# Patient Record
Sex: MALE | Race: WHITE | URBAN - METROPOLITAN AREA
[De-identification: names, ages, dates, MRNs, and addresses within clinical notes are randomized per-mention and may not be internally consistent; named-entity substitution may affect disease eponyms.]

---

## 2017-01-18 ENCOUNTER — APPOINTMENT (RX ONLY)
Dept: URBAN - METROPOLITAN AREA CLINIC 23 | Facility: CLINIC | Age: 77
Setting detail: DERMATOLOGY
End: 2017-01-18

## 2017-01-18 DIAGNOSIS — L57.0 ACTINIC KERATOSIS: ICD-10-CM

## 2017-01-18 DIAGNOSIS — D22 MELANOCYTIC NEVI: ICD-10-CM

## 2017-01-18 DIAGNOSIS — D485 NEOPLASM OF UNCERTAIN BEHAVIOR OF SKIN: ICD-10-CM

## 2017-01-18 DIAGNOSIS — L98.8 OTHER SPECIFIED DISORDERS OF THE SKIN AND SUBCUTANEOUS TISSUE: ICD-10-CM

## 2017-01-18 DIAGNOSIS — L81.4 OTHER MELANIN HYPERPIGMENTATION: ICD-10-CM

## 2017-01-18 PROBLEM — D22.5 MELANOCYTIC NEVI OF TRUNK: Status: ACTIVE | Noted: 2017-01-18

## 2017-01-18 PROBLEM — D48.5 NEOPLASM OF UNCERTAIN BEHAVIOR OF SKIN: Status: ACTIVE | Noted: 2017-01-18

## 2017-01-18 PROCEDURE — ? LIQUID NITROGEN

## 2017-01-18 PROCEDURE — ? COUNSELING

## 2017-01-18 PROCEDURE — ? BIOPSY BY SHAVE METHOD

## 2017-01-18 PROCEDURE — 17004 DESTROY PREMAL LESIONS 15/>: CPT

## 2017-01-18 PROCEDURE — 11100: CPT | Mod: 59

## 2017-01-18 PROCEDURE — 99202 OFFICE O/P NEW SF 15 MIN: CPT | Mod: 25

## 2017-01-18 PROCEDURE — ? OTHER

## 2017-01-18 ASSESSMENT — LOCATION SIMPLE DESCRIPTION DERM
LOCATION SIMPLE: RIGHT WRIST
LOCATION SIMPLE: LEFT HAND
LOCATION SIMPLE: RIGHT UPPER BACK
LOCATION SIMPLE: RIGHT FOREHEAD
LOCATION SIMPLE: LEFT EAR
LOCATION SIMPLE: CHEST
LOCATION SIMPLE: RIGHT TEMPLE
LOCATION SIMPLE: RIGHT HAND
LOCATION SIMPLE: LEFT WRIST
LOCATION SIMPLE: LEFT FOREARM
LOCATION SIMPLE: RIGHT FOREARM

## 2017-01-18 ASSESSMENT — LOCATION ZONE DERM
LOCATION ZONE: EAR
LOCATION ZONE: HAND
LOCATION ZONE: ARM
LOCATION ZONE: FACE
LOCATION ZONE: TRUNK

## 2017-01-18 ASSESSMENT — LOCATION DETAILED DESCRIPTION DERM
LOCATION DETAILED: LEFT RADIAL DORSAL HAND
LOCATION DETAILED: RIGHT DORSAL WRIST
LOCATION DETAILED: RIGHT DISTAL DORSAL FOREARM
LOCATION DETAILED: RIGHT RADIAL DORSAL HAND
LOCATION DETAILED: LEFT DISTAL DORSAL FOREARM
LOCATION DETAILED: RIGHT LATERAL FOREHEAD
LOCATION DETAILED: RIGHT INFERIOR TEMPLE
LOCATION DETAILED: LEFT MEDIAL INFERIOR CHEST
LOCATION DETAILED: RIGHT INFERIOR FOREHEAD
LOCATION DETAILED: RIGHT SUPERIOR LATERAL UPPER BACK
LOCATION DETAILED: RIGHT PROXIMAL DORSAL FOREARM
LOCATION DETAILED: RIGHT FOREHEAD
LOCATION DETAILED: LEFT DORSAL WRIST
LOCATION DETAILED: LEFT SUPERIOR CRUS OF ANTIHELIX
LOCATION DETAILED: LEFT PROXIMAL DORSAL FOREARM
LOCATION DETAILED: RIGHT ULNAR DORSAL HAND

## 2017-01-18 NOTE — PROCEDURE: OTHER
Note Text (......Xxx Chief Complaint.): This diagnosis correlates with the
Other (Free Text): Hypertrophic lesions on right and left mid-forearm warrant followuo
Detail Level: Simple

## 2017-01-18 NOTE — PROCEDURE: BIOPSY BY SHAVE METHOD
Bill 89907 For Specimen Handling/Conveyance To Laboratory?: no
Biopsy Method: Dermablade
Hemostasis: Aluminum Chloride
Silver Nitrate Text: The wound bed was treated with silver nitrate after the biopsy was performed.
Type Of Destruction Used: Curettage
Wound Care: Petrolatum
X Size Of Lesion In Cm: 0
Billing Type: Third-Party Bill
Dressing: bandage
Anesthesia Volume In Cc: 0.5
Curettage Text: The wound bed was treated with curettage after the biopsy was performed.
Biopsy Type: H and E
Cryotherapy Text: The wound bed was treated with cryotherapy after the biopsy was performed.
Detail Level: Detailed
Anesthesia Type: 1% lidocaine with 1:100,000 epinephrine and a 1:6 solution of 8.4% sodium bicarbonate
Electrodesiccation And Curettage Text: The wound bed was treated with electrodesiccation and curettage after the biopsy was performed.
Electrodesiccation Text: The wound bed was treated with electrodesiccation after the biopsy was performed.

## 2017-01-18 NOTE — PROCEDURE: LIQUID NITROGEN
Detail Level: Simple
Consent: The patient's consent was obtained including but not limited to risks of crusting, scabbing, blistering, scarring, darker or lighter pigmentary change, recurrence, incomplete removal and infection.
Post-Care Instructions: I reviewed with the patient in detail post-care instructions. Patient is to wear sunprotection, and avoid picking at any of the treated lesions. Pt may apply Vaseline to crusted or scabbing areas.
Number Of Freeze-Thaw Cycles: 1 freeze-thaw cycle
Render Post-Care Instructions In Note?: no
Duration Of Freeze Thaw-Cycle (Seconds): 3

## 2017-03-01 ENCOUNTER — APPOINTMENT (RX ONLY)
Dept: URBAN - METROPOLITAN AREA CLINIC 24 | Facility: CLINIC | Age: 77
Setting detail: DERMATOLOGY
End: 2017-03-01

## 2017-03-01 PROBLEM — C44.319 BASAL CELL CARCINOMA OF SKIN OF OTHER PARTS OF FACE: Status: ACTIVE | Noted: 2017-03-01

## 2017-03-01 PROCEDURE — ? MOHS SURGERY

## 2017-03-01 PROCEDURE — 17312 MOHS ADDL STAGE: CPT

## 2017-03-01 PROCEDURE — 17311 MOHS 1 STAGE H/N/HF/G: CPT

## 2017-03-01 PROCEDURE — 12052 INTMD RPR FACE/MM 2.6-5.0 CM: CPT

## 2017-03-01 NOTE — PROCEDURE: MOHS SURGERY
A-T Advancement Flap Text: The defect edges were debeveled with a #15 scalpel blade.  Given the location of the defect, shape of the defect and the proximity to free margins an A-T advancement flap was deemed most appropriate.  Using a sterile surgical marker, an appropriate advancement flap was drawn incorporating the defect and placing the expected incisions within the relaxed skin tension lines where possible.    The area thus outlined was incised deep to adipose tissue with a #15 scalpel blade.  The skin margins were undermined to an appropriate distance in all directions utilizing iris scissors.
Stage 13: Number Of Blocks?: 0
Display The Individual Mohs Indications As Separate Paragraphs: Yes
Additional Anesthesia Type: 1% lidocaine with epinephrine
Quadrant Reporting?: no
Cheek-To-Nose Interpolation Flap Text: A decision was made to reconstruct the defect utilizing an interpolation axial flap and a staged reconstruction.  A telfa template was made of the defect.  This telfa template was then used to outline the Cheek-To-Nose Interpolation flap.  The donor area for the pedicle flap was then injected with anesthesia.  The flap was excised through the skin and subcutaneous tissue down to the layer of the underlying musculature.  The interpolation flap was carefully excised within this deep plane to maintain its blood supply.  The edges of the donor site were undermined.   The donor site was closed in a primary fashion.  The pedicle was then rotated into position and sutured.  Once the tube was sutured into place, adequate blood supply was confirmed with blanching and refill.  The pedicle was then wrapped with xeroform gauze and dressed appropriately with a telfa and gauze bandage to ensure continued blood supply and protect the attached pedicle.
Surgical Defect Width In Cm (Optional): -
Ftsg Text: The defect edges were debeveled with a #15 scalpel blade.  Given the location of the defect, shape of the defect and the proximity to free margins a full thickness skin graft was deemed most appropriate.  Using a sterile surgical marker, the primary defect shape was transferred to the donor site. The area thus outlined was incised deep to adipose tissue with a #15 scalpel blade.  The harvested graft was then trimmed of adipose tissue until only dermis and epidermis was left.  The skin margins of the secondary defect were undermined to an appropriate distance in all directions utilizing iris scissors.  The secondary defect was closed with interrupted buried subcutaneous sutures.  The skin edges were then re-apposed with running  sutures.  The skin graft was then placed in the primary defect and oriented appropriately.
Referring Physician (Optional): Tad
Interpolation Flap Text: A decision was made to reconstruct the defect utilizing an interpolation axial flap and a staged reconstruction.  A telfa template was made of the defect.  This telfa template was then used to outline the interpolation flap.  The donor area for the pedicle flap was then injected with anesthesia.  The flap was excised through the skin and subcutaneous tissue down to the layer of the underlying musculature.  The interpolation flap was carefully excised within this deep plane to maintain its blood supply.  The edges of the donor site were undermined.   The donor site was closed in a primary fashion.  The pedicle was then rotated into position and sutured.  Once the tube was sutured into place, adequate blood supply was confirmed with blanching and refill.  The pedicle was then wrapped with xeroform gauze and dressed appropriately with a telfa and gauze bandage to ensure continued blood supply and protect the attached pedicle.
Rotation Flap Text: The defect edges were debeveled with a #15 scalpel blade.  Given the location of the defect, shape of the defect and the proximity to free margins a rotation flap was deemed most appropriate.  Using a sterile surgical marker, an appropriate rotation flap was drawn incorporating the defect and placing the expected incisions within the relaxed skin tension lines where possible.    The area thus outlined was incised deep to adipose tissue with a #15 scalpel blade.  The skin margins were undermined to an appropriate distance in all directions utilizing iris scissors.
Mohs Histo Method Verbiage: The section(s) were then chromacoded and processed in the Mohs lab using the Mohs protocol and submitted for frozen section.
Cheiloplasty (Less Than 50%) Text: A decision was made to reconstruct the defect with a  cheiloplasty.  The defect was undermined extensively.  Additional obicularis oris muscle was excised with a 15 blade scalpel.  The defect was converted into a full thickness wedge, of less than 50% of the vertical height of the lip, to facilite a better cosmetic result.  Small vessels were then tied off with 5-0 monocyrl. The obicularis oris, superficial fascia, adipose and dermis were then reapproximated.  After the deeper layers were approximated the epidermis was reapproximated with particular care given to realign the vermillion border.
Mohs Method Verbiage: An incision at a 45 degree angle following the standard Mohs approach was done and the specimen was harvested as a microscopic controlled layer.
Lazy S Intermediate Repair Preamble Text (Leave Blank If You Do Not Want): Undermining was performed with blunt dissection.
O-T Plasty Text: The defect edges were debeveled with a #15 scalpel blade.  Given the location of the defect, shape of the defect and the proximity to free margins an O-T plasty was deemed most appropriate.  Using a sterile surgical marker, an appropriate O-T plasty was drawn incorporating the defect and placing the expected incisions within the relaxed skin tension lines where possible.    The area thus outlined was incised deep to adipose tissue with a #15 scalpel blade.  The skin margins were undermined to an appropriate distance in all directions utilizing iris scissors.
Graft Donor Site Dermal Sutures (Optional): 5-0 Vicryl
Manual Repair Warning Statement: We plan on removing the manually selected variable below in favor of our much easier automatic structured text blocks found in the previous tab. We decided to do this to help make the flow better and give you the full power of structured data. Manual selection is never going to be ideal in our platform and I would encourage you to avoid using manual selection from this point on, especially since I will be sunsetting this feature. It is important that you do one of two things with the customized text below. First, you can save all of the text in a word file so you can have it for future reference. Second, transfer the text to the appropriate area in the Library tab. Lastly, if there is a flap or graft type which we do not have you need to let us know right away so I can add it in before the variable is hidden. No need to panic, we plan to give you roughly 6 months to make the change.
Simple / Intermediate / Complex Repair - Final Wound Length In Cm: 3.1
Localized Dermabrasion With Wire Brush Text: The patient was draped in routine manner.  Localized dermabrasion using 3 x 17 mm wire brush was performed in routine manner to papillary dermis. This spot dermabrasion is being performed to complete skin cancer reconstruction. It also will eliminate the other sun damaged precancerous cells that are known to be part of the regional effect of a lifetime's worth of sun exposure. This localized dermabrasion is therapeutic and should not be considered cosmetic in any regard.
Posterior Auricular Interpolation Flap Text: A decision was made to reconstruct the defect utilizing an interpolation axial flap and a staged reconstruction.  A telfa template was made of the defect.  This telfa template was then used to outline the posterior auricular interpolation flap.  The donor area for the pedicle flap was then injected with anesthesia.  The flap was excised through the skin and subcutaneous tissue down to the layer of the underlying musculature.  The pedicle flap was carefully excised within this deep plane to maintain its blood supply.  The edges of the donor site were undermined.   The donor site was closed in a primary fashion.  The pedicle was then rotated into position and sutured.  Once the tube was sutured into place, adequate blood supply was confirmed with blanching and refill.  The pedicle was then wrapped with xeroform gauze and dressed appropriately with a telfa and gauze bandage to ensure continued blood supply and protect the attached pedicle.
Lazy S Complex Repair Preamble Text (Leave Blank If You Do Not Want): Extensive wide undermining was performed.
Closure 4 Information: This tab is for additional flaps and grafts above and beyond our usual structured repairs.  Please note if you enter information here it will not currently bill and you will need to add the billing information manually.
Consent 1/Introductory Paragraph: The rationale for Mohs was explained to the patient and consent was obtained. The risks, benefits and alternatives to therapy were discussed in detail. Specifically, the risks of infection, scarring, bleeding, prolonged wound healing, incomplete removal, allergy to anesthesia, nerve injury (both sensory which can be permanent and motor which is permanent) and recurrence were addressed. Prior to the procedure, the treatment site was clearly identified and confirmed by the patient.
No Repair - Repaired With Adjacent Surgical Defect Text (Leave Blank If You Do Not Want): After obtaining clear surgical margins the defect was repaired concurrently with another surgical defect which was in close approximation.
Area H Indication Text: Tumors in this location are included in Area H (eyelids, eyebrows, nose, lips, chin, ear, pre-auricular, post-auricular, temple, genitalia, hands, feet, ankles and areola).  Tissue conservation is critical in these anatomic locations.
Secondary Intention Text (Leave Blank If You Do Not Want): The defect will heal with secondary intention.
Area M Indication Text: Tumors in this location are included in Area M (cheek, forehead, scalp, neck, jawline and pretibial skin).  Mohs surgery is indicated for tumors 1 cm or larger in these anatomic locations.
Spiral Flap Text: The defect edges were debeveled with a #15 scalpel blade.  Given the location of the defect, shape of the defect and the proximity to free margins a spiral flap was deemed most appropriate.  Using a sterile surgical marker, an appropriate rotation flap was drawn incorporating the defect and placing the expected incisions within the relaxed skin tension lines where possible. The area thus outlined was incised deep to adipose tissue with a #15 scalpel blade.  The skin margins were undermined to an appropriate distance in all directions utilizing iris scissors.
O-L Flap Text: The defect edges were debeveled with a #15 scalpel blade.  Given the location of the defect, shape of the defect and the proximity to free margins an O-L flap was deemed most appropriate.  Using a sterile surgical marker, an appropriate advancement flap was drawn incorporating the defect and placing the expected incisions within the relaxed skin tension lines where possible.    The area thus outlined was incised deep to adipose tissue with a #15 scalpel blade.  The skin margins were undermined to an appropriate distance in all directions utilizing iris scissors.
Referred To Otolaryngology For Closure Text (Leave Blank If You Do Not Want): After obtaining clear surgical margins the patient was sent to otolaryngology for surgical repair.  The patient understands they will receive post-surgical care and follow-up from the referring physician's office.
Closure 2 Information: This tab is for additional flaps and grafts, including complex repair and grafts and complex repair and flaps. You can also specify a different location for the additional defect, if the location is the same you do not need to select a new one. We will insert the automated text for the repair you select below just as we do for solitary flaps and grafts. Please note that at this time if you select a location with a different insurance zone you will need to override the ICD10 and CPT if appropriate.
Consent 3/Introductory Paragraph: I gave the patient a chance to ask questions they had about the procedure.  Following this I explained the Mohs procedure and consent was obtained. The risks, benefits and alternatives to therapy were discussed in detail. Specifically, the risks of infection, scarring, bleeding, prolonged wound healing, incomplete removal, allergy to anesthesia, nerve injury and recurrence were addressed. Prior to the procedure, the treatment site was clearly identified and confirmed by the patient. All components of Universal Protocol/PAUSE Rule completed.
Surgeon/Pathologist Verbiage (Will Incorporate Name Of Surgeon From Intro If Not Blank): operated in two distinct and integrated capacities as the surgeon and pathologist.
Consent (Marginal Mandibular)/Introductory Paragraph: The rationale for Mohs was explained to the patient and consent was obtained. The risks, benefits and alternatives to therapy were discussed in detail. Specifically, the risks of damage to the marginal mandibular branch of the facial nerve, infection, scarring, bleeding, prolonged wound healing, incomplete removal, allergy to anesthesia, and recurrence were addressed. Prior to the procedure, the treatment site was clearly identified and confirmed by the patient. All components of Universal Protocol/PAUSE Rule completed.
Melolabial Interpolation Flap Text: A decision was made to reconstruct the defect utilizing an interpolation axial flap and a staged reconstruction.  A telfa template was made of the defect.  This telfa template was then used to outline the melolabial interpolation flap.  The donor area for the pedicle flap was then injected with anesthesia.  The flap was excised through the skin and subcutaneous tissue down to the layer of the underlying musculature.  The pedicle flap was carefully excised within this deep plane to maintain its blood supply.  The edges of the donor site were undermined.   The donor site was closed in a primary fashion.  The pedicle was then rotated into position and sutured.  Once the tube was sutured into place, adequate blood supply was confirmed with blanching and refill.  The pedicle was then wrapped with xeroform gauze and dressed appropriately with a telfa and gauze bandage to ensure continued blood supply and protect the attached pedicle.
Muscle Hinge Flap Text: The defect edges were debeveled with a #15 scalpel blade.  Given the size, depth and location of the defect and the proximity to free margins a muscle hinge flap was deemed most appropriate.  Using a sterile surgical marker, an appropriate hinge flap was drawn incorporating the defect. The area thus outlined was incised with a #15 scalpel blade.  The skin margins were undermined to an appropriate distance in all directions utilizing iris scissors.
Epidermal Closure: running and interrupted
Composite Graft Text: The defect edges were debeveled with a #15 scalpel blade.  Given the location of the defect, shape of the defect, the proximity to free margins and the fact the defect was full thickness a composite graft was deemed most appropriate.  The defect was outline and then transferred to the donor site.  A full thickness graft was then excised from the donor site. The graft was then placed in the primary defect, oriented appropriately and then sutured into place.  The secondary defect was then repaired using a primary closure.
Ear Star Wedge Flap Text: The defect edges were debeveled with a #15 blade scalpel.  Given the location of the defect and the proximity to free margins (helical rim) an ear star wedge flap was deemed most appropriate.  Using a sterile surgical marker, the appropriate flap was drawn incorporating the defect and placing the expected incisions between the helical rim and antihelix where possible.  The area thus outlined was incised through and through with a #15 scalpel blade.
Tissue Cultured Epidermal Autograft Text: The defect edges were debeveled with a #15 scalpel blade.  Given the location of the defect, shape of the defect and the proximity to free margins a tissue cultured epidermal autograft was deemed most appropriate.  The graft was then trimmed to fit the size of the defect.  The graft was then placed in the primary defect and oriented appropriately.
Epidermal Sutures: 5-0 Prolene
Anesthesia Volume In Cc: 1.8
Consent (Near Eyelid Margin)/Introductory Paragraph: The rationale for Mohs was explained to the patient and consent was obtained. The risks, benefits and alternatives to therapy were discussed in detail. Specifically, the risks of ectropion or eyelid deformity, infection, scarring, bleeding, prolonged wound healing, incomplete removal, allergy to anesthesia, nerve injury and recurrence were addressed. Prior to the procedure, the treatment site was clearly identified and confirmed by the patient. All components of Universal Protocol/PAUSE Rule completed.
Modified Advancement Flap Text: The defect edges were debeveled with a #15 scalpel blade.  Given the location of the defect, shape of the defect and the proximity to free margins a modified advancement flap was deemed most appropriate.  Using a sterile surgical marker, an appropriate advancement flap was drawn incorporating the defect and placing the expected incisions within the relaxed skin tension lines where possible.    The area thus outlined was incised deep to adipose tissue with a #15 scalpel blade.  The skin margins were undermined to an appropriate distance in all directions utilizing iris scissors.
Complex Repair And Flap Additional Text (Will Appearing After The Standard Complex Repair Text): The complex repair was not sufficient to completely close the primary defect. The remaining additional defect was repaired with the flap mentioned below.
Epidermal Autograft Text: The defect edges were debeveled with a #15 scalpel blade.  Given the location of the defect, shape of the defect and the proximity to free margins an epidermal autograft was deemed most appropriate.  Using a sterile surgical marker, the primary defect shape was transferred to the donor site. The epidermal graft was then harvested.  The skin graft was then placed in the primary defect and oriented appropriately.
Burow's Advancement Flap Text: The defect edges were debeveled with a #15 scalpel blade.  Given the location of the defect and the proximity to free margins a Burow's advancement flap was deemed most appropriate.  Using a sterile surgical marker, the appropriate advancement flap was drawn incorporating the defect and placing the expected incisions within the relaxed skin tension lines where possible.    The area thus outlined was incised deep to adipose tissue with a #15 scalpel blade.  The skin margins were undermined to an appropriate distance in all directions utilizing iris scissors.
Tumor Debulked?: curette
Bcc Histology Text: There were numerous aggregates of basaloid cells.
Repair Anesthesia Method: local infiltration
Purse String (Simple) Text: Given the location of the defect and the characteristics of the surrounding skin a pursestring closure was deemed most appropriate.  Undermining was performed circumfirentially around the surgical defect.  A purstring suture was then placed and tightened.
Bilobed Transposition Flap Text: The defect edges were debeveled with a #15 scalpel blade.  Given the location of the defect and the proximity to free margins a bilobed transposition flap was deemed most appropriate.  Using a sterile surgical marker, an appropriate bilobe flap drawn around the defect.    The area thus outlined was incised deep to adipose tissue with a #15 scalpel blade.  The skin margins were undermined to an appropriate distance in all directions utilizing iris scissors.
Mucosal Advancement Flap Text: Given the location of the defect, shape of the defect and the proximity to free margins a mucosal advancement flap was deemed most appropriate. Incisions were made with a 15 blade scalpel in the appropriate fashion along the cutaneous vermillion border and the mucosal lip. The remaining actinically damaged mucosal tissue was excised.  The mucosal advancement flap was then elevated to the gingival sulcus with care taken to preserve the neurovascular structures and advanced into the primary defect. Care was taken to ensure that precise realignment of the vermillion border was achieved.
Double Island Pedicle Flap Text: The defect edges were debeveled with a #15 scalpel blade.  Given the location of the defect, shape of the defect and the proximity to free margins a double island pedicle advancement flap was deemed most appropriate.  Using a sterile surgical marker, an appropriate advancement flap was drawn incorporating the defect, outlining the appropriate donor tissue and placing the expected incisions within the relaxed skin tension lines where possible.    The area thus outlined was incised deep to adipose tissue with a #15 scalpel blade.  The skin margins were undermined to an appropriate distance in all directions around the primary defect and laterally outward around the island pedicle utilizing iris scissors.  There was minimal undermining beneath the pedicle flap.
Consent 2/Introductory Paragraph: Mohs surgery was explained to the patient and consent was obtained. The risks, benefits and alternatives to therapy were discussed in detail. Specifically, the risks of infection, scarring, bleeding, prolonged wound healing, incomplete removal, allergy to anesthesia, nerve injury and recurrence were addressed. Prior to the procedure, the treatment site was clearly identified and confirmed by the patient. All components of Universal Protocol/PAUSE Rule completed.
Detail Level: Detailed
Split-Thickness Skin Graft Text: The defect edges were debeveled with a #15 scalpel blade.  Given the location of the defect, shape of the defect and the proximity to free margins a split thickness skin graft was deemed most appropriate.  Using a sterile surgical marker, the primary defect shape was transferred to the donor site. The split thickness graft was then harvested.  The skin graft was then placed in the primary defect and oriented appropriately.
Number Of Stages: 2
Melolabial Transposition Flap Text: The defect edges were debeveled with a #15 scalpel blade.  Given the location of the defect and the proximity to free margins a melolabial flap was deemed most appropriate.  Using a sterile surgical marker, an appropriate melolabial transposition flap was drawn incorporating the defect.    The area thus outlined was incised deep to adipose tissue with a #15 scalpel blade.  The skin margins were undermined to an appropriate distance in all directions utilizing iris scissors.
Island Pedicle Flap With Canthal Suspension Text: The defect edges were debeveled with a #15 scalpel blade.  Given the location of the defect, shape of the defect and the proximity to free margins an island pedicle advancement flap was deemed most appropriate.  Using a sterile surgical marker, an appropriate advancement flap was drawn incorporating the defect, outlining the appropriate donor tissue and placing the expected incisions within the relaxed skin tension lines where possible. The area thus outlined was incised deep to adipose tissue with a #15 scalpel blade.  The skin margins were undermined to an appropriate distance in all directions around the primary defect and laterally outward around the island pedicle utilizing iris scissors.  There was minimal undermining beneath the pedicle flap. A suspension suture was placed in the canthal tendon to prevent tension and prevent ectropion.
Advancement Flap (Single) Text: The defect edges were debeveled with a #15 scalpel blade.  Given the location of the defect and the proximity to free margins a single advancement flap was deemed most appropriate.  Using a sterile surgical marker, an appropriate advancement flap was drawn incorporating the defect and placing the expected incisions within the relaxed skin tension lines where possible.    The area thus outlined was incised deep to adipose tissue with a #15 scalpel blade.  The skin margins were undermined to an appropriate distance in all directions utilizing iris scissors.
Previous Accession (Optional): [id number]
H Plasty Text: Given the location of the defect, shape of the defect and the proximity to free margins a H-plasty was deemed most appropriate for repair.  Using a sterile surgical marker, the appropriate advancement arms of the H-plasty were drawn incorporating the defect and placing the expected incisions within the relaxed skin tension lines where possible. The area thus outlined was incised deep to adipose tissue with a #15 scalpel blade. The skin margins were undermined to an appropriate distance in all directions utilizing iris scissors.  The opposing advancement arms were then advanced into place in opposite direction and anchored with interrupted buried subcutaneous sutures.
Xenograft Text: The defect edges were debeveled with a #15 scalpel blade.  Given the location of the defect, shape of the defect and the proximity to free margins a xenograft was deemed most appropriate.  The graft was then trimmed to fit the size of the defect.  The graft was then placed in the primary defect and oriented appropriately.
Alternatives Discussed Intro (Do Not Add Period): I discussed alternative treatments to Mohs surgery and specifically discussed the risks and benefits of
Rhombic Flap Text: The defect edges were debeveled with a #15 scalpel blade.  Given the location of the defect and the proximity to free margins a rhombic flap was deemed most appropriate.  Using a sterile surgical marker, an appropriate rhombic flap was drawn incorporating the defect.    The area thus outlined was incised deep to adipose tissue with a #15 scalpel blade.  The skin margins were undermined to an appropriate distance in all directions utilizing iris scissors.
Repair Performed By Another Provider Text (Leave Blank If You Do Not Want): After obtaining clear surgical margins the defect was repaired by another provider.
Consent (Scalp)/Introductory Paragraph: The rationale for Mohs was explained to the patient and consent was obtained. The risks, benefits and alternatives to therapy were discussed in detail. Specifically, the risks of changes in hair growth pattern secondary to repair, infection, scarring, bleeding, prolonged wound healing, incomplete removal, allergy to anesthesia, nerve injury and recurrence were addressed. Prior to the procedure, the treatment site was clearly identified and confirmed by the patient. All components of Universal Protocol/PAUSE Rule completed.
S Plasty Text: Given the location and shape of the defect, and the orientation of relaxed skin tension lines, an S-plasty was deemed most appropriate for repair.  Using a sterile surgical marker, the appropriate outline of the S-plasty was drawn, incorporating the defect and placing the expected incisions within the relaxed skin tension lines where possible.  The area thus outlined was incised deep to adipose tissue with a #15 scalpel blade.  The skin margins were undermined to an appropriate distance in all directions utilizing iris scissors. The skin flaps were advanced over the defect.  The opposing margins were then approximated with interrupted buried subcutaneous sutures.
Ear Wedge Repair Text: A wedge excision was completed by carrying down an excision through the full thickness of the ear and cartilage with an inward facing Burow's triangle. The wound was then closed in a layered fashion.
Full Thickness Lip Wedge Repair (Flap) Text: Given the location of the defect and the proximity to free margins a full thickness wedge repair was deemed most appropriate.  Using a sterile surgical marker, the appropriate repair was drawn incorporating the defect and placing the expected incisions perpendicular to the vermillion border.  The vermillion border was also meticulously outlined to ensure appropriate reapproximation during the repair.  The area thus outlined was incised through and through with a #15 scalpel blade.  The muscularis and dermis were reaproximated with deep sutures following hemostasis. Care was taken to realign the vermillion border before proceeding with the superficial closure.  Once the vermillion was realigned the superfical and mucosal closure was finished.
Wound Care: Petrolatum
Advancement-Rotation Flap Text: The defect edges were debeveled with a #15 scalpel blade.  Given the location of the defect, shape of the defect and the proximity to free margins an advancement-rotation flap was deemed most appropriate.  Using a sterile surgical marker, an appropriate flap was drawn incorporating the defect and placing the expected incisions within the relaxed skin tension lines where possible. The area thus outlined was incised deep to adipose tissue with a #15 scalpel blade.  The skin margins were undermined to an appropriate distance in all directions utilizing iris scissors.
Z Plasty Text: The lesion was extirpated to the level of the fat with a #15 scalpel blade.  Given the location of the defect, shape of the defect and the proximity to free margins a Z-plasty was deemed most appropriate for repair.  Using a sterile surgical marker, the appropriate transposition arms of the Z-plasty were drawn incorporating the defect and placing the expected incisions within the relaxed skin tension lines where possible.    The area thus outlined was incised deep to adipose tissue with a #15 scalpel blade.  The skin margins were undermined to an appropriate distance in all directions utilizing iris scissors.  The opposing transposition arms were then transposed into place in opposite direction and anchored with interrupted buried subcutaneous sutures.
Postop Diagnosis: same
Home Suture Removal Text: Patient was provided instructions on removing sutures and will remove their sutures at home.  If they have any questions or difficulties they will call the office.
Subsequent Stages Histo Method Verbiage: Using a similar technique to that described above, a thin layer of tissue was removed from all areas where tumor was visible on the previous stage.  The tissue was again oriented, mapped, dyed, and processed as above.
Consent (Spinal Accessory)/Introductory Paragraph: The rationale for Mohs was explained to the patient and consent was obtained. The risks, benefits and alternatives to therapy were discussed in detail. Specifically, the risks of damage to the spinal accessory nerve, infection, scarring, bleeding, prolonged wound healing, incomplete removal, allergy to anesthesia, and recurrence were addressed. Prior to the procedure, the treatment site was clearly identified and confirmed by the patient. All components of Universal Protocol/PAUSE Rule completed.
Helical Rim Advancement Flap Text: The defect edges were debeveled with a #15 blade scalpel.  Given the location of the defect and the proximity to free margins (helical rim) a double helical rim advancement flap was deemed most appropriate.  Using a sterile surgical marker, the appropriate advancement flaps were drawn incorporating the defect and placing the expected incisions between the helical rim and antihelix where possible.  The area thus outlined was incised through and through with a #15 scalpel blade.  With a skin hook and iris scissors, the flaps were gently and sharply undermined and freed up.
Cartilage Graft Text: The defect edges were debeveled with a #15 scalpel blade.  Given the location of the defect, shape of the defect, the fact the defect involved a full thickness cartilage defect a cartilage graft was deemed most appropriate.  An appropriate donor site was identified, cleansed, and anesthetized. The cartilage graft was then harvested and transferred to the recipient site, oriented appropriately and then sutured into place.  The secondary defect was then repaired using a primary closure.
V-Y Flap Text: The defect edges were debeveled with a #15 scalpel blade.  Given the location of the defect, shape of the defect and the proximity to free margins a V-Y flap was deemed most appropriate.  Using a sterile surgical marker, an appropriate advancement flap was drawn incorporating the defect and placing the expected incisions within the relaxed skin tension lines where possible.    The area thus outlined was incised deep to adipose tissue with a #15 scalpel blade.  The skin margins were undermined to an appropriate distance in all directions utilizing iris scissors.
Bilobed Flap Text: The defect edges were debeveled with a #15 scalpel blade.  Given the location of the defect and the proximity to free margins a bilobe flap was deemed most appropriate.  Using a sterile surgical marker, an appropriate bilobe flap drawn around the defect.    The area thus outlined was incised deep to adipose tissue with a #15 scalpel blade.  The skin margins were undermined to an appropriate distance in all directions utilizing iris scissors.
Location Indication Override (Is Already Calculated Based On Selected Body Location): Area H
Alar Island Pedicle Flap Text: The defect edges were debeveled with a #15 scalpel blade.  Given the location of the defect, shape of the defect and the proximity to the alar rim an island pedicle advancement flap was deemed most appropriate.  Using a sterile surgical marker, an appropriate advancement flap was drawn incorporating the defect, outlining the appropriate donor tissue and placing the expected incisions within the nasal ala running parallel to the alar rim. The area thus outlined was incised with a #15 scalpel blade.  The skin margins were undermined minimally to an appropriate distance in all directions around the primary defect and laterally outward around the island pedicle utilizing iris scissors.  There was minimal undermining beneath the pedicle flap.
Anesthesia Volume In Cc: 2.5
Mauc Instructions: By selecting yes to the question below the MAUC number will be added into the note.  This will be calculated automatically based on the diagnosis chosen, the size entered, the body zone selected (H,M,L) and the specific indications you chose. You will also have the option to override the Mohs AUC if you disagree with the automatically calculated number and this option is found in the Case Summary tab.
Bilateral Helical Rim Advancement Flap Text: The defect edges were debeveled with a #15 blade scalpel.  Given the location of the defect and the proximity to free margins (helical rim) a bilateral helical rim advancement flap was deemed most appropriate.  Using a sterile surgical marker, the appropriate advancement flaps were drawn incorporating the defect and placing the expected incisions between the helical rim and antihelix where possible.  The area thus outlined was incised through and through with a #15 scalpel blade.  With a skin hook and iris scissors, the flaps were gently and sharply undermined and freed up.
Purse String (Intermediate) Text: Given the location of the defect and the characteristics of the surrounding skin a pursestring intermediate closure was deemed most appropriate.  Undermining was performed circumfirentially around the surgical defect.  A purstring suture was then placed and tightened.
Primary Defect Width In Cm (Final Defect Size - Required For Flaps/Grafts): 1.3
O-T Advancement Flap Text: The defect edges were debeveled with a #15 scalpel blade.  Given the location of the defect, shape of the defect and the proximity to free margins an O-T advancement flap was deemed most appropriate.  Using a sterile surgical marker, an appropriate advancement flap was drawn incorporating the defect and placing the expected incisions within the relaxed skin tension lines where possible.    The area thus outlined was incised deep to adipose tissue with a #15 scalpel blade.  The skin margins were undermined to an appropriate distance in all directions utilizing iris scissors.
No Residual Tumor Seen Histology Text: There were no malignant cells seen in the sections examined.
Dermal Autograft Text: The defect edges were debeveled with a #15 scalpel blade.  Given the location of the defect, shape of the defect and the proximity to free margins a dermal autograft was deemed most appropriate.  Using a sterile surgical marker, the primary defect shape was transferred to the donor site. The area thus outlined was incised deep to adipose tissue with a #15 scalpel blade.  The harvested graft was then trimmed of adipose and epidermal tissue until only dermis was left.  The skin graft was then placed in the primary defect and oriented appropriately.
Complex Repair And Graft Additional Text (Will Appearing After The Standard Complex Repair Text): The complex repair was not sufficient to completely close the primary defect. The remaining additional defect was repaired with the graft mentioned below.
Mastoid Interpolation Flap Text: A decision was made to reconstruct the defect utilizing an interpolation axial flap and a staged reconstruction.  A telfa template was made of the defect.  This telfa template was then used to outline the mastoid interpolation flap.  The donor area for the pedicle flap was then injected with anesthesia.  The flap was excised through the skin and subcutaneous tissue down to the layer of the underlying musculature.  The pedicle flap was carefully excised within this deep plane to maintain its blood supply.  The edges of the donor site were undermined.   The donor site was closed in a primary fashion.  The pedicle was then rotated into position and sutured.  Once the tube was sutured into place, adequate blood supply was confirmed with blanching and refill.  The pedicle was then wrapped with xeroform gauze and dressed appropriately with a telfa and gauze bandage to ensure continued blood supply and protect the attached pedicle.
Hemostasis: Electrodesiccation
Island Pedicle Flap-Requiring Vessel Identification Text: The defect edges were debeveled with a #15 scalpel blade.  Given the location of the defect, shape of the defect and the proximity to free margins an island pedicle advancement flap was deemed most appropriate.  Using a sterile surgical marker, an appropriate advancement flap was drawn, based on the axial vessel mentioned above, incorporating the defect, outlining the appropriate donor tissue and placing the expected incisions within the relaxed skin tension lines where possible.    The area thus outlined was incised deep to adipose tissue with a #15 scalpel blade.  The skin margins were undermined to an appropriate distance in all directions around the primary defect and laterally outward around the island pedicle utilizing iris scissors.  There was minimal undermining beneath the pedicle flap.
Mohs Case Number: 
Estimated Blood Loss (Cc): minimal
Trilobed Flap Text: The defect edges were debeveled with a #15 scalpel blade.  Given the location of the defect and the proximity to free margins a trilobed flap was deemed most appropriate.  Using a sterile surgical marker, an appropriate trilobed flap drawn around the defect.    The area thus outlined was incised deep to adipose tissue with a #15 scalpel blade.  The skin margins were undermined to an appropriate distance in all directions utilizing iris scissors.
Dorsal Nasal Flap Text: The defect edges were debeveled with a #15 scalpel blade.  Given the location of the defect and the proximity to free margins a dorsal nasal flap was deemed most appropriate.  Using a sterile surgical marker, an appropriate dorsal nasal flap was drawn around the defect.    The area thus outlined was incised deep to adipose tissue with a #15 scalpel blade.  The skin margins were undermined to an appropriate distance in all directions utilizing iris scissors.
Keystone Flap Text: The defect edges were debeveled with a #15 scalpel blade.  Given the location of the defect, shape of the defect a keystone flap was deemed most appropriate.  Using a sterile surgical marker, an appropriate keystone flap was drawn incorporating the defect, outlining the appropriate donor tissue and placing the expected incisions within the relaxed skin tension lines where possible. The area thus outlined was incised deep to adipose tissue with a #15 scalpel blade.  The skin margins were undermined to an appropriate distance in all directions around the primary defect and laterally outward around the flap utilizing iris scissors.
Mohs Rapid Report Verbiage: The area of clinically evident tumor was marked with skin marking ink and appropriately hatched.  The initial incision was made following the Mohs approach through the skin.  The specimen was taken to the lab, divided into the necessary number of pieces, chromacoded and processed according to the Mohs protocol.  This was repeated in successive stages until a tumor free defect was achieved.
Consent (Nose)/Introductory Paragraph: The rationale for Mohs was explained to the patient and consent was obtained. The risks, benefits and alternatives to therapy were discussed in detail. Specifically, the risks of nasal deformity, changes in the flow of air through the nose, infection, scarring, bleeding, prolonged wound healing, incomplete removal, allergy to anesthesia, nerve injury and recurrence were addressed. Prior to the procedure, the treatment site was clearly identified and confirmed by the patient. All components of Universal Protocol/PAUSE Rule completed.
Island Pedicle Flap Text: The defect edges were debeveled with a #15 scalpel blade.  Given the location of the defect, shape of the defect and the proximity to free margins an island pedicle advancement flap was deemed most appropriate.  Using a sterile surgical marker, an appropriate advancement flap was drawn incorporating the defect, outlining the appropriate donor tissue and placing the expected incisions within the relaxed skin tension lines where possible.    The area thus outlined was incised deep to adipose tissue with a #15 scalpel blade.  The skin margins were undermined to an appropriate distance in all directions around the primary defect and laterally outward around the island pedicle utilizing iris scissors.  There was minimal undermining beneath the pedicle flap.
Repair Type: Intermediate Layered Repair
Dressing: dry sterile dressing
Medical Necessity Statement: Based on my medical judgement, Mohs surgery is the most appropriate treatment for this cancer compared to other treatments.
Transposition Flap Text: The defect edges were debeveled with a #15 scalpel blade.  Given the location of the defect and the proximity to free margins a transposition flap was deemed most appropriate.  Using a sterile surgical marker, an appropriate transposition flap was drawn incorporating the defect.    The area thus outlined was incised deep to adipose tissue with a #15 scalpel blade.  The skin margins were undermined to an appropriate distance in all directions utilizing iris scissors.
Referred To Oculoplastics For Closure Text (Leave Blank If You Do Not Want): After obtaining clear surgical margins the patient was sent to oculoplastics for surgical repair.  The patient understands they will receive post-surgical care and follow-up from the referring physician's office.
Referred To Plastics For Closure Text (Leave Blank If You Do Not Want): After obtaining clear surgical margins the patient was sent to plastics for surgical repair.  The patient understands they will receive post-surgical care and follow-up from the referring physician's office.
Surgeon: Enzo Johnson MD
Consent (Temporal Branch)/Introductory Paragraph: The rationale for Mohs was explained to the patient and consent was obtained. The risks, benefits and alternatives to therapy were discussed in detail. Specifically, the risks of damage to the temporal branch of the facial nerve, infection, scarring, bleeding, prolonged wound healing, incomplete removal, allergy to anesthesia, and recurrence were addressed. Prior to the procedure, the treatment site was clearly identified and confirmed by the patient. All components of Universal Protocol/PAUSE Rule completed.
Referred To Asc For Closure Text (Leave Blank If You Do Not Want): After obtaining clear surgical margins the patient was sent to an ASC for surgical repair.  The patient understands they will receive post-surgical care and follow-up from the ASC physician.
Cheiloplasty (Complex) Text: A decision was made to reconstruct the defect with a  cheiloplasty.  The defect was undermined extensively.  Additional obicularis oris muscle was excised with a 15 blade scalpel.  The defect was converted into a full thickness wedge to facilite a better cosmetic result.  Small vessels were then tied off with 5-0 monocyrl. The obicularis oris, superficial fascia, adipose and dermis were then reapproximated.  After the deeper layers were approximated the epidermis was reapproximated with particular care given to realign the vermillion border.
W Plasty Text: The lesion was extirpated to the level of the fat with a #15 scalpel blade.  Given the location of the defect, shape of the defect and the proximity to free margins a W-plasty was deemed most appropriate for repair.  Using a sterile surgical marker, the appropriate transposition arms of the W-plasty were drawn incorporating the defect and placing the expected incisions within the relaxed skin tension lines where possible.    The area thus outlined was incised deep to adipose tissue with a #15 scalpel blade.  The skin margins were undermined to an appropriate distance in all directions utilizing iris scissors.  The opposing transposition arms were then transposed into place in opposite direction and anchored with interrupted buried subcutaneous sutures.
Advancement Flap (Double) Text: The defect edges were debeveled with a #15 scalpel blade.  Given the location of the defect and the proximity to free margins a double advancement flap was deemed most appropriate.  Using a sterile surgical marker, the appropriate advancement flaps were drawn incorporating the defect and placing the expected incisions within the relaxed skin tension lines where possible.    The area thus outlined was incised deep to adipose tissue with a #15 scalpel blade.  The skin margins were undermined to an appropriate distance in all directions utilizing iris scissors.
Consent (Ear)/Introductory Paragraph: The rationale for Mohs was explained to the patient and consent was obtained. The risks, benefits and alternatives to therapy were discussed in detail. Specifically, the risks of ear deformity, infection, scarring, bleeding, prolonged wound healing, incomplete removal, allergy to anesthesia, nerve injury and recurrence were addressed. Prior to the procedure, the treatment site was clearly identified and confirmed by the patient. All components of Universal Protocol/PAUSE Rule completed.
Area L Indication Text: Tumors in this location are included in Area L (trunk and extremities).  Mohs surgery is indicated for larger tumors, 2 cm or larger, in these anatomic locations.
Bi-Rhombic Flap Text: The defect edges were debeveled with a #15 scalpel blade.  Given the location of the defect and the proximity to free margins a bi-rhombic flap was deemed most appropriate.  Using a sterile surgical marker, an appropriate rhombic flap was drawn incorporating the defect. The area thus outlined was incised deep to adipose tissue with a #15 scalpel blade.  The skin margins were undermined to an appropriate distance in all directions utilizing iris scissors.
Consent Type: Consent 1 (Standard)
Same Histology In Subsequent Stages Text: The pattern and morphology of the tumor is as described in the first stage.
O-Z Plasty Text: The defect edges were debeveled with a #15 scalpel blade.  Given the location of the defect, shape of the defect and the proximity to free margins an O-Z plasty (double transposition flap) was deemed most appropriate.  Using a sterile surgical marker, the appropriate transposition flaps were drawn incorporating the defect and placing the expected incisions within the relaxed skin tension lines where possible.    The area thus outlined was incised deep to adipose tissue with a #15 scalpel blade.  The skin margins were undermined to an appropriate distance in all directions utilizing iris scissors.  Hemostasis was achieved with electrocautery.  The flaps were then transposed into place, one clockwise and the other counterclockwise, and anchored with interrupted buried subcutaneous sutures.
Cheek Interpolation Flap Text: A decision was made to reconstruct the defect utilizing an interpolation axial flap and a staged reconstruction.  A telfa template was made of the defect.  This telfa template was then used to outline the Cheek Interpolation flap.  The donor area for the pedicle flap was then injected with anesthesia.  The flap was excised through the skin and subcutaneous tissue down to the layer of the underlying musculature.  The interpolation flap was carefully excised within this deep plane to maintain its blood supply.  The edges of the donor site were undermined.   The donor site was closed in a primary fashion.  The pedicle was then rotated into position and sutured.  Once the tube was sutured into place, adequate blood supply was confirmed with blanching and refill.  The pedicle was then wrapped with xeroform gauze and dressed appropriately with a telfa and gauze bandage to ensure continued blood supply and protect the attached pedicle.
Crescentic Advancement Flap Text: The defect edges were debeveled with a #15 scalpel blade.  Given the location of the defect and the proximity to free margins a crescentic advancement flap was deemed most appropriate.  Using a sterile surgical marker, the appropriate advancement flap was drawn incorporating the defect and placing the expected incisions within the relaxed skin tension lines where possible.    The area thus outlined was incised deep to adipose tissue with a #15 scalpel blade.  The skin margins were undermined to an appropriate distance in all directions utilizing iris scissors.
Inflammation Suggestive Of Cancer Camouflage Histology Text: There was a dense lymphocytic infiltrate which prevented adequate histologic evaluation of adjacent structures.
Consent (Lip)/Introductory Paragraph: The rationale for Mohs was explained to the patient and consent was obtained. The risks, benefits and alternatives to therapy were discussed in detail. Specifically, the risks of lip deformity, changes in the oral aperture, infection, scarring, bleeding, prolonged wound healing, incomplete removal, allergy to anesthesia, nerve injury and recurrence were addressed. Prior to the procedure, the treatment site was clearly identified and confirmed by the patient. All components of Universal Protocol/PAUSE Rule completed.
V-Y Plasty Text: The defect edges were debeveled with a #15 scalpel blade.  Given the location of the defect, shape of the defect and the proximity to free margins an V-Y advancement flap was deemed most appropriate.  Using a sterile surgical marker, an appropriate advancement flap was drawn incorporating the defect and placing the expected incisions within the relaxed skin tension lines where possible.    The area thus outlined was incised deep to adipose tissue with a #15 scalpel blade.  The skin margins were undermined to an appropriate distance in all directions utilizing iris scissors.
Bcc Infiltrative Histology Text: There were numerous aggregates of basaloid cells demonstrating an infiltrative pattern.
Initial Size Of Lesion: 1
Skin Substitute Text: The defect edges were debeveled with a #15 scalpel blade.  Given the location of the defect, shape of the defect and the proximity to free margins a skin substitute graft was deemed most appropriate.  The graft material was trimmed to fit the size of the defect. The graft was then placed in the primary defect and oriented appropriately.
Paramedian Forehead Flap Text: A decision was made to reconstruct the defect utilizing an interpolation axial flap and a staged reconstruction.  A telfa template was made of the defect.  This telfa template was then used to outline the paramedian forehead pedicle flap.  The donor area for the pedicle flap was then injected with anesthesia.  The flap was excised through the skin and subcutaneous tissue down to the layer of the underlying musculature.  The pedicle flap was carefully excised within this deep plane to maintain its blood supply.  The edges of the donor site were undermined.   The donor site was closed in a primary fashion.  The pedicle was then rotated into position and sutured.  Once the tube was sutured into place, adequate blood supply was confirmed with blanching and refill.  The pedicle was then wrapped with xeroform gauze and dressed appropriately with a telfa and gauze bandage to ensure continued blood supply and protect the attached pedicle.
Unna Boot Text: An Unna boot was placed to help immobilize the limb and facilitate more rapid healing.
Eye Protection Verbiage: Before proceeding with the stage, a plastic scleral shield was inserted. The globe was anesthetized with a few drops of 1% lidocaine with 1:100,000 epinephrine. Then, an appropriate sized scleral shield was chosen and coated with lacrilube ointment. The shield was gently inserted and left in place for the duration of each stage. After the stage was completed, the shield was gently removed.
Post-Care Instructions: I reviewed with the patient in detail post-care instructions. Patient is not to engage in any heavy lifting, exercise, or swimming for the next 14 days. Should the patient develop any fevers, chills, bleeding, severe pain patient will contact the office immediately.
Hatchet Flap Text: The defect edges were debeveled with a #15 scalpel blade.  Given the location of the defect, shape of the defect and the proximity to free margins a hatchet flap was deemed most appropriate.  Using a sterile surgical marker, an appropriate hatchet flap was drawn incorporating the defect and placing the expected incisions within the relaxed skin tension lines where possible.    The area thus outlined was incised deep to adipose tissue with a #15 scalpel blade.  The skin margins were undermined to an appropriate distance in all directions utilizing iris scissors.

## 2017-03-09 ENCOUNTER — APPOINTMENT (RX ONLY)
Dept: URBAN - METROPOLITAN AREA CLINIC 23 | Facility: CLINIC | Age: 77
Setting detail: DERMATOLOGY
End: 2017-03-09

## 2017-03-09 DIAGNOSIS — Z48.01 ENCOUNTER FOR CHANGE OR REMOVAL OF SURGICAL WOUND DRESSING: ICD-10-CM

## 2017-03-09 PROCEDURE — ? SUTURE REMOVAL (GLOBAL PERIOD)

## 2017-03-09 ASSESSMENT — LOCATION ZONE DERM: LOCATION ZONE: FACE

## 2017-03-09 ASSESSMENT — LOCATION DETAILED DESCRIPTION DERM: LOCATION DETAILED: RIGHT LATERAL FOREHEAD

## 2017-03-09 ASSESSMENT — LOCATION SIMPLE DESCRIPTION DERM: LOCATION SIMPLE: RIGHT FOREHEAD

## 2017-03-09 NOTE — PROCEDURE: SUTURE REMOVAL (GLOBAL PERIOD)
Detail Level: Detailed
Add 88534 Cpt? (Important Note: In 2017 The Use Of 28585 Is Being Tracked By Cms To Determine Future Global Period Reimbursement For Global Periods): no

## 2017-07-20 ENCOUNTER — APPOINTMENT (RX ONLY)
Dept: URBAN - METROPOLITAN AREA CLINIC 23 | Facility: CLINIC | Age: 77
Setting detail: DERMATOLOGY
End: 2017-07-20

## 2017-07-20 DIAGNOSIS — D485 NEOPLASM OF UNCERTAIN BEHAVIOR OF SKIN: ICD-10-CM

## 2017-07-20 DIAGNOSIS — D22 MELANOCYTIC NEVI: ICD-10-CM

## 2017-07-20 DIAGNOSIS — Z85.828 PERSONAL HISTORY OF OTHER MALIGNANT NEOPLASM OF SKIN: ICD-10-CM

## 2017-07-20 DIAGNOSIS — L98.8 OTHER SPECIFIED DISORDERS OF THE SKIN AND SUBCUTANEOUS TISSUE: ICD-10-CM

## 2017-07-20 DIAGNOSIS — L81.4 OTHER MELANIN HYPERPIGMENTATION: ICD-10-CM

## 2017-07-20 DIAGNOSIS — L57.0 ACTINIC KERATOSIS: ICD-10-CM

## 2017-07-20 PROBLEM — D22.5 MELANOCYTIC NEVI OF TRUNK: Status: ACTIVE | Noted: 2017-07-20

## 2017-07-20 PROBLEM — D48.5 NEOPLASM OF UNCERTAIN BEHAVIOR OF SKIN: Status: ACTIVE | Noted: 2017-07-20

## 2017-07-20 PROCEDURE — 17003 DESTRUCT PREMALG LES 2-14: CPT

## 2017-07-20 PROCEDURE — ? LIQUID NITROGEN

## 2017-07-20 PROCEDURE — ? BIOPSY BY SHAVE METHOD

## 2017-07-20 PROCEDURE — 11100: CPT | Mod: 59

## 2017-07-20 PROCEDURE — 17000 DESTRUCT PREMALG LESION: CPT

## 2017-07-20 PROCEDURE — ? COUNSELING

## 2017-07-20 PROCEDURE — 99213 OFFICE O/P EST LOW 20 MIN: CPT | Mod: 25

## 2017-07-20 ASSESSMENT — LOCATION DETAILED DESCRIPTION DERM
LOCATION DETAILED: RIGHT INFERIOR FOREHEAD
LOCATION DETAILED: MID-OCCIPITAL SCALP
LOCATION DETAILED: LEFT SUPERIOR CRUS OF ANTIHELIX
LOCATION DETAILED: LEFT SUPERIOR CENTRAL MALAR CHEEK
LOCATION DETAILED: LEFT ULNAR DORSAL HAND
LOCATION DETAILED: LEFT MEDIAL INFERIOR CHEST
LOCATION DETAILED: RIGHT SUPERIOR LATERAL UPPER BACK
LOCATION DETAILED: LEFT SUPERIOR POSTERIOR HELIX

## 2017-07-20 ASSESSMENT — LOCATION ZONE DERM
LOCATION ZONE: TRUNK
LOCATION ZONE: HAND
LOCATION ZONE: SCALP
LOCATION ZONE: FACE
LOCATION ZONE: EAR

## 2017-07-20 ASSESSMENT — LOCATION SIMPLE DESCRIPTION DERM
LOCATION SIMPLE: LEFT CHEEK
LOCATION SIMPLE: CHEST
LOCATION SIMPLE: RIGHT UPPER BACK
LOCATION SIMPLE: LEFT HAND
LOCATION SIMPLE: LEFT EAR
LOCATION SIMPLE: RIGHT FOREHEAD
LOCATION SIMPLE: POSTERIOR SCALP

## 2017-07-20 NOTE — PROCEDURE: BIOPSY BY SHAVE METHOD
Billing Type: Third-Party Bill
Biopsy Type: H and E
Biopsy Method: Dermablade
Hemostasis: Aluminum Chloride
X Size Of Lesion In Cm: 0
Curettage Text: The wound bed was treated with curettage after the biopsy was performed.
Anesthesia Type: 1% lidocaine with 1:100,000 epinephrine and a 1:6 solution of 8.4% sodium bicarbonate
Type Of Destruction Used: Curettage
Wound Care: Petrolatum
Anesthesia Volume In Cc: 0.5
Bill For Surgical Tray: no
Electrodesiccation And Curettage Text: The wound bed was treated with electrodesiccation and curettage after the biopsy was performed.
Cryotherapy Text: The wound bed was treated with cryotherapy after the biopsy was performed.
Silver Nitrate Text: The wound bed was treated with silver nitrate after the biopsy was performed.
Dressing: bandage
Detail Level: Detailed
Electrodesiccation Text: The wound bed was treated with electrodesiccation after the biopsy was performed.

## 2017-07-28 ENCOUNTER — HOSPITAL ENCOUNTER (OUTPATIENT)
Dept: CT IMAGING | Age: 77
Discharge: HOME | End: 2017-07-28
Attending: PSYCHIATRY & NEUROLOGY
Payer: MEDICARE

## 2017-07-28 DIAGNOSIS — G72.9 MYOPATHY: ICD-10-CM

## 2017-07-28 PROCEDURE — 72125 CT NECK SPINE W/O DYE: CPT

## 2017-07-28 PROCEDURE — 72131 CT LUMBAR SPINE W/O DYE: CPT

## 2017-12-07 PROBLEM — G72.9 MYOPATHY: Status: ACTIVE | Noted: 2017-12-07

## 2017-12-07 PROBLEM — M25.50 ARTHRALGIA: Status: ACTIVE | Noted: 2017-12-07

## 2017-12-12 PROBLEM — M48.062 LUMBAR STENOSIS WITH NEUROGENIC CLAUDICATION: Status: ACTIVE | Noted: 2017-12-12

## 2018-01-05 ENCOUNTER — HOSPITAL ENCOUNTER (OUTPATIENT)
Age: 78
Setting detail: OUTPATIENT SURGERY
End: 2018-01-05
Attending: NEUROLOGICAL SURGERY | Admitting: NEUROLOGICAL SURGERY

## 2018-01-08 PROBLEM — E11.40 TYPE 2 DIABETES MELLITUS WITH DIABETIC NEUROPATHY (HCC): Status: ACTIVE | Noted: 2018-01-08

## 2018-01-24 ENCOUNTER — HOSPITAL ENCOUNTER (OUTPATIENT)
Dept: SURGERY | Age: 78
Discharge: HOME | End: 2018-01-24
Payer: MEDICARE

## 2018-01-24 VITALS
HEART RATE: 75 BPM | OXYGEN SATURATION: 98 % | TEMPERATURE: 97.4 F | SYSTOLIC BLOOD PRESSURE: 142 MMHG | RESPIRATION RATE: 16 BRPM | HEIGHT: 73 IN | WEIGHT: 220.13 LBS | BODY MASS INDEX: 29.17 KG/M2 | DIASTOLIC BLOOD PRESSURE: 88 MMHG

## 2018-01-24 LAB
ANION GAP SERPL CALC-SCNC: 9 MMOL/L (ref 7–16)
APPEARANCE UR: CLEAR
BACTERIA SPEC CULT: NORMAL
BASOPHILS # BLD: 0 K/UL (ref 0–0.2)
BASOPHILS NFR BLD: 1 % (ref 0–2)
BILIRUB UR QL: NEGATIVE
BUN SERPL-MCNC: 19 MG/DL (ref 8–23)
CALCIUM SERPL-MCNC: 8.6 MG/DL (ref 8.3–10.4)
CHLORIDE SERPL-SCNC: 107 MMOL/L (ref 98–107)
CO2 SERPL-SCNC: 26 MMOL/L (ref 21–32)
COLOR UR: YELLOW
CREAT SERPL-MCNC: 1.38 MG/DL (ref 0.8–1.5)
DIFFERENTIAL METHOD BLD: ABNORMAL
EOSINOPHIL # BLD: 0.2 K/UL (ref 0–0.8)
EOSINOPHIL NFR BLD: 4 % (ref 0.5–7.8)
ERYTHROCYTE [DISTWIDTH] IN BLOOD BY AUTOMATED COUNT: 16 % (ref 11.9–14.6)
EST. AVERAGE GLUCOSE BLD GHB EST-MCNC: 177 MG/DL
GLUCOSE BLD STRIP.AUTO-MCNC: 131 MG/DL (ref 65–100)
GLUCOSE SERPL-MCNC: 141 MG/DL (ref 65–100)
GLUCOSE UR STRIP.AUTO-MCNC: NEGATIVE MG/DL
HBA1C MFR BLD: 7.8 % (ref 4.8–6)
HCT VFR BLD AUTO: 40.8 % (ref 41.1–50.3)
HGB BLD-MCNC: 12.6 G/DL (ref 13.6–17.2)
HGB UR QL STRIP: NEGATIVE
IMM GRANULOCYTES # BLD: 0 K/UL (ref 0–0.5)
IMM GRANULOCYTES NFR BLD AUTO: 0 % (ref 0–5)
INR PPP: 5.2
KETONES UR QL STRIP.AUTO: NEGATIVE MG/DL
LEUKOCYTE ESTERASE UR QL STRIP.AUTO: NEGATIVE
LYMPHOCYTES # BLD: 1.3 K/UL (ref 0.5–4.6)
LYMPHOCYTES NFR BLD: 22 % (ref 13–44)
MCH RBC QN AUTO: 23.2 PG (ref 26.1–32.9)
MCHC RBC AUTO-ENTMCNC: 30.9 G/DL (ref 31.4–35)
MCV RBC AUTO: 75 FL (ref 79.6–97.8)
MONOCYTES # BLD: 0.6 K/UL (ref 0.1–1.3)
MONOCYTES NFR BLD: 10 % (ref 4–12)
NEUTS SEG # BLD: 3.6 K/UL (ref 1.7–8.2)
NEUTS SEG NFR BLD: 63 % (ref 43–78)
NITRITE UR QL STRIP.AUTO: NEGATIVE
PH UR STRIP: 5.5 [PH] (ref 5–9)
PLATELET # BLD AUTO: 217 K/UL (ref 150–450)
PMV BLD AUTO: 9.3 FL (ref 10.8–14.1)
POTASSIUM SERPL-SCNC: 4.6 MMOL/L (ref 3.5–5.1)
PROT UR STRIP-MCNC: NEGATIVE MG/DL
PROTHROMBIN TIME: 48 SEC (ref 11.5–14.5)
RBC # BLD AUTO: 5.44 M/UL (ref 4.23–5.67)
SERVICE CMNT-IMP: NORMAL
SODIUM SERPL-SCNC: 142 MMOL/L (ref 136–145)
SP GR UR REFRACTOMETRY: 1 (ref 1–1.02)
UROBILINOGEN UR QL STRIP.AUTO: 0.2 EU/DL (ref 0.2–1)
WBC # BLD AUTO: 5.7 K/UL (ref 4.3–11.1)

## 2018-01-24 PROCEDURE — 77030027138 HC INCENT SPIROMETER -A

## 2018-01-24 PROCEDURE — 85610 PROTHROMBIN TIME: CPT | Performed by: NEUROLOGICAL SURGERY

## 2018-01-24 PROCEDURE — 82962 GLUCOSE BLOOD TEST: CPT

## 2018-01-24 PROCEDURE — 87641 MR-STAPH DNA AMP PROBE: CPT | Performed by: NEUROLOGICAL SURGERY

## 2018-01-24 PROCEDURE — 81003 URINALYSIS AUTO W/O SCOPE: CPT | Performed by: NEUROLOGICAL SURGERY

## 2018-01-24 PROCEDURE — 83036 HEMOGLOBIN GLYCOSYLATED A1C: CPT | Performed by: NEUROLOGICAL SURGERY

## 2018-01-24 PROCEDURE — 85025 COMPLETE CBC W/AUTO DIFF WBC: CPT | Performed by: NEUROLOGICAL SURGERY

## 2018-01-24 PROCEDURE — 80048 BASIC METABOLIC PNL TOTAL CA: CPT | Performed by: NEUROLOGICAL SURGERY

## 2018-01-24 RX ORDER — WARFARIN SODIUM 5 MG/1
5 TABLET ORAL DAILY
COMMUNITY
End: 2018-02-16

## 2018-01-24 RX ORDER — CEFAZOLIN SODIUM/WATER 2 G/20 ML
2 SYRINGE (ML) INTRAVENOUS
Status: CANCELLED | OUTPATIENT
Start: 2018-01-31 | End: 2018-01-31

## 2018-01-24 RX ORDER — METOPROLOL TARTRATE 50 MG/1
50 TABLET ORAL DAILY
COMMUNITY

## 2018-01-24 NOTE — PERIOP NOTES
All labs reviewed. MRSA/MSSA negative. Hgb A1c 7.8, INR 5.2. All labs report to PCP and surgeon. UA in process, spoke with Lab, UA received. Results to be updated in [facility]. Chart placed in chart room, to follow up on UA results.

## 2018-01-24 NOTE — PERIOP NOTES
Call placed to Dr. Sha Joya office, spoke with Brandan Truong. Brandan Truong will contact Dr Lambert Pettit office for Plavix, Coumadin, Aspirin clearance. Informed pt with heart stents, taking Aspirin 81mg PO BID. [place] to notify pt with further instructions on meds. Pt voice understanding to take medications as instructed by Dr Lambert Pettit and Dr Sha Joya office will discus blood thinner with Dr Kimmy Fletcher office and notify pt with further instructions.

## 2018-01-24 NOTE — PERIOP NOTES
Spoke with Sourav Roche, nurse for Dr Bethel Poe. Reported INR 5.2 and Hgb A1c 7.8. All labs read-back. Notified Sourav Roche INR called and reported to Dr Rachell Cavanaugh office. UA pending.

## 2018-01-24 NOTE — PERIOP NOTES
Recent Results (from the past 12 hour(s))   GLUCOSE, POC    Collection Time: 01/24/18 11:55 AM   Result Value Ref Range    Glucose (POC) 131 (H) 65 - 100 mg/dL   CBC WITH AUTOMATED DIFF    Collection Time: 01/24/18 11:56 AM   Result Value Ref Range    WBC 5.7 4.3 - 11.1 K/uL    RBC 5.44 4.23 - 5.67 M/uL    HGB 12.6 (L) 13.6 - 17.2 g/dL    HCT 40.8 (L) 41.1 - 50.3 %    MCV 75.0 (L) 79.6 - 97.8 FL    MCH 23.2 (L) 26.1 - 32.9 PG    MCHC 30.9 (L) 31.4 - 35.0 g/dL    RDW 16.0 (H) 11.9 - 14.6 %    PLATELET 901 091 - 144 K/uL    MPV 9.3 (L) 10.8 - 14.1 FL    DF AUTOMATED      NEUTROPHILS 63 43 - 78 %    LYMPHOCYTES 22 13 - 44 %    MONOCYTES 10 4.0 - 12.0 %    EOSINOPHILS 4 0.5 - 7.8 %    BASOPHILS 1 0.0 - 2.0 %    IMMATURE GRANULOCYTES 0 0.0 - 5.0 %    ABS. NEUTROPHILS 3.6 1.7 - 8.2 K/UL    ABS. LYMPHOCYTES 1.3 0.5 - 4.6 K/UL    ABS. MONOCYTES 0.6 0.1 - 1.3 K/UL    ABS. EOSINOPHILS 0.2 0.0 - 0.8 K/UL    ABS. BASOPHILS 0.0 0.0 - 0.2 K/UL    ABS. IMM. GRANS. 0.0 0.0 - 0.5 K/UL   METABOLIC PANEL, BASIC    Collection Time: 01/24/18 11:56 AM   Result Value Ref Range    Sodium 142 136 - 145 mmol/L    Potassium 4.6 3.5 - 5.1 mmol/L    Chloride 107 98 - 107 mmol/L    CO2 26 21 - 32 mmol/L    Anion gap 9 7 - 16 mmol/L    Glucose 141 (H) 65 - 100 mg/dL    BUN 19 8 - 23 MG/DL    Creatinine 1.38 0.8 - 1.5 MG/DL    GFR est AA >60 >60 ml/min/1.73m2    GFR est non-AA 53 (L) >60 ml/min/1.73m2    Calcium 8.6 8.3 - 10.4 MG/DL   MSSA/MRSA SC BY PCR, NASAL SWAB    Collection Time: 01/24/18 11:56 AM   Result Value Ref Range    Special Requests: NO SPECIAL REQUESTS      Culture result:        SA target not detected. A MRSA NEGATIVE, SA NEGATIVE test result does not preclude MRSA or SA nasal colonization.    HEMOGLOBIN A1C WITH EAG    Collection Time: 01/24/18 11:59 AM   Result Value Ref Range    Hemoglobin A1c 7.8 (H) 4.8 - 6.0 %    Est. average glucose 177 mg/dL   PROTHROMBIN TIME + INR    Collection Time: 01/24/18 11:59 AM   Result Value Ref Range    Prothrombin time 48.0 (H) 11.5 - 14.5 sec    INR 5.2 ()

## 2018-01-24 NOTE — PERIOP NOTES
Call placed to Dr Maureen May office, spoke with Nurse Becky Siddiqui. Critical INR reported 5.2. Results read back and confirmed. Informed by Becky Siddiqui, pt holding blood thinners due to last INR 7. Pt to come in on Thursday for repeat INR. All labs to be routed to Dr Maureen May office.

## 2018-01-24 NOTE — PERIOP NOTES
Patient verified name, , and surgery as listed in [facility]. Patient provided medical/health information and PTA medications to the best of their ability. TYPE  CASE: 1B  Orders per surgeon: received and dated 18  Labs per surgeon: CBC with diff, BMP,UA, PT/INR,MRSA/MSSA,Hgb A1c. Results: pending  Labs per anesthesia protocol: POC glucose and PT/INR DOS. EKG:  ECHO 12.15.17/EKG 17/Stress test 17/ Surgical clearance 17     POC glucose  131. Instructed Patient that if blood sugar 300 or > , surgery may be cancelled. Pt voice understanding. SN instruct pt to contact [phone] for any complications. Nasal Swab collected per MD order and instructions for Mupirocin nasal ointment if required. Patient provided with and instructed on education handouts including Guide to Surgery, blood transfusions, pain management, and hand hygiene for the family and community, and [facility] brochure. Road to Recovery Spine surgery patient guide given. Instructed on incentive spirometry with return demonstration. Long handled prehab sponge given with instructions for use. Patient viewed spine prehab video. Hibiclens and instructions given per hospital policy. Instructed patient to continue previous medications as prescribed prior to surgery unless otherwise directed and to take the following medications the day of surgery according to anesthesia guidelines : Zyrtec, Neurontin,Prevacid,Lopressor,Aspirin 81mg as instructed . Instructed patient to hold  the following medications on the day of surgery: Magnesium. Original medication prescription bottles where not visualized during patient appointment. Medication profile updated and reviewed with patient. Patient teach back successful and patient demonstrates knowledge of instruction.

## 2018-01-24 NOTE — PERIOP NOTES
Recent Results (from the past 12 hour(s))   GLUCOSE, POC    Collection Time: 01/24/18 11:55 AM   Result Value Ref Range    Glucose (POC) 131 (H) 65 - 100 mg/dL   CBC WITH AUTOMATED DIFF    Collection Time: 01/24/18 11:56 AM   Result Value Ref Range    WBC 5.7 4.3 - 11.1 K/uL    RBC 5.44 4.23 - 5.67 M/uL    HGB 12.6 (L) 13.6 - 17.2 g/dL    HCT 40.8 (L) 41.1 - 50.3 %    MCV 75.0 (L) 79.6 - 97.8 FL    MCH 23.2 (L) 26.1 - 32.9 PG    MCHC 30.9 (L) 31.4 - 35.0 g/dL    RDW 16.0 (H) 11.9 - 14.6 %    PLATELET 590 359 - 869 K/uL    MPV 9.3 (L) 10.8 - 14.1 FL    DF AUTOMATED      NEUTROPHILS 63 43 - 78 %    LYMPHOCYTES 22 13 - 44 %    MONOCYTES 10 4.0 - 12.0 %    EOSINOPHILS 4 0.5 - 7.8 %    BASOPHILS 1 0.0 - 2.0 %    IMMATURE GRANULOCYTES 0 0.0 - 5.0 %    ABS. NEUTROPHILS 3.6 1.7 - 8.2 K/UL    ABS. LYMPHOCYTES 1.3 0.5 - 4.6 K/UL    ABS. MONOCYTES 0.6 0.1 - 1.3 K/UL    ABS. EOSINOPHILS 0.2 0.0 - 0.8 K/UL    ABS. BASOPHILS 0.0 0.0 - 0.2 K/UL    ABS. IMM. GRANS. 0.0 0.0 - 0.5 K/UL   METABOLIC PANEL, BASIC    Collection Time: 01/24/18 11:56 AM   Result Value Ref Range    Sodium 142 136 - 145 mmol/L    Potassium 4.6 3.5 - 5.1 mmol/L    Chloride 107 98 - 107 mmol/L    CO2 26 21 - 32 mmol/L    Anion gap 9 7 - 16 mmol/L    Glucose 141 (H) 65 - 100 mg/dL    BUN 19 8 - 23 MG/DL    Creatinine 1.38 0.8 - 1.5 MG/DL    GFR est AA >60 >60 ml/min/1.73m2    GFR est non-AA 53 (L) >60 ml/min/1.73m2    Calcium 8.6 8.3 - 10.4 MG/DL   MSSA/MRSA SC BY PCR, NASAL SWAB    Collection Time: 01/24/18 11:56 AM   Result Value Ref Range    Special Requests: NO SPECIAL REQUESTS      Culture result:        SA target not detected. A MRSA NEGATIVE, SA NEGATIVE test result does not preclude MRSA or SA nasal colonization.    HEMOGLOBIN A1C WITH EAG    Collection Time: 01/24/18 11:59 AM   Result Value Ref Range    Hemoglobin A1c 7.8 (H) 4.8 - 6.0 %    Est. average glucose 177 mg/dL   PROTHROMBIN TIME + INR    Collection Time: 01/24/18 11:59 AM   Result Value Ref Range    Prothrombin time 48.0 (H) 11.5 - 14.5 sec    INR 5.2 ()

## 2018-02-06 ENCOUNTER — HOME HEALTH ADMISSION (OUTPATIENT)
Dept: HOME HEALTH SERVICES | Facility: HOME HEALTH | Age: 78
End: 2018-02-06
Payer: MEDICARE

## 2018-02-13 ENCOUNTER — ANESTHESIA EVENT (OUTPATIENT)
Dept: SURGERY | Age: 78
End: 2018-02-13
Payer: MEDICARE

## 2018-02-14 ENCOUNTER — APPOINTMENT (OUTPATIENT)
Dept: GENERAL RADIOLOGY | Age: 78
End: 2018-02-14
Attending: NEUROLOGICAL SURGERY
Payer: MEDICARE

## 2018-02-14 ENCOUNTER — HOSPITAL ENCOUNTER (OUTPATIENT)
Age: 78
Discharge: HOME | End: 2018-02-16
Attending: NEUROLOGICAL SURGERY | Admitting: NEUROLOGICAL SURGERY
Payer: MEDICARE

## 2018-02-14 ENCOUNTER — ANESTHESIA (OUTPATIENT)
Dept: SURGERY | Age: 78
End: 2018-02-14
Payer: MEDICARE

## 2018-02-14 DIAGNOSIS — M48.062 LUMBAR STENOSIS WITH NEUROGENIC CLAUDICATION: Primary | ICD-10-CM

## 2018-02-14 DIAGNOSIS — M48.062 SPINAL STENOSIS OF LUMBAR REGION WITH NEUROGENIC CLAUDICATION: ICD-10-CM

## 2018-02-14 PROBLEM — M48.061 LUMBAR STENOSIS: Status: ACTIVE | Noted: 2018-02-14

## 2018-02-14 LAB
GLUCOSE BLD STRIP.AUTO-MCNC: 114 MG/DL (ref 65–100)
GLUCOSE BLD STRIP.AUTO-MCNC: 127 MG/DL (ref 65–100)
GLUCOSE BLD STRIP.AUTO-MCNC: 127 MG/DL (ref 65–100)
INR BLD: 1.1 (ref 0.9–1.2)
PT BLD: 13.4 SECS (ref 9.6–11.6)

## 2018-02-14 PROCEDURE — 77030019557 HC ELECTRD VES SEAL MEDT -F: Performed by: NEUROLOGICAL SURGERY

## 2018-02-14 PROCEDURE — 88304 TISSUE EXAM BY PATHOLOGIST: CPT | Performed by: NEUROLOGICAL SURGERY

## 2018-02-14 PROCEDURE — 77030029372 HC ADH SKN CLSR PRINEO J&J -C: Performed by: NEUROLOGICAL SURGERY

## 2018-02-14 PROCEDURE — 77030018836 HC SOL IRR NACL ICUM -A: Performed by: NEUROLOGICAL SURGERY

## 2018-02-14 PROCEDURE — 77010033678 HC OXYGEN DAILY

## 2018-02-14 PROCEDURE — 74011250637 HC RX REV CODE- 250/637: Performed by: NEUROLOGICAL SURGERY

## 2018-02-14 PROCEDURE — 77030008477 HC STYL SATN SLP COVD -A: Performed by: NURSE ANESTHETIST, CERTIFIED REGISTERED

## 2018-02-14 PROCEDURE — 74011250637 HC RX REV CODE- 250/637: Performed by: ANESTHESIOLOGY

## 2018-02-14 PROCEDURE — 77030011640 HC PAD GRND REM COVD -A: Performed by: NEUROLOGICAL SURGERY

## 2018-02-14 PROCEDURE — 74011636637 HC RX REV CODE- 636/637: Performed by: NEUROLOGICAL SURGERY

## 2018-02-14 PROCEDURE — 74011000250 HC RX REV CODE- 250

## 2018-02-14 PROCEDURE — 76210000017 HC OR PH I REC 1.5 TO 2 HR: Performed by: NEUROLOGICAL SURGERY

## 2018-02-14 PROCEDURE — 74011250636 HC RX REV CODE- 250/636: Performed by: NEUROLOGICAL SURGERY

## 2018-02-14 PROCEDURE — 74011250636 HC RX REV CODE- 250/636

## 2018-02-14 PROCEDURE — 77030008703 HC TU ET UNCUF COVD -A: Performed by: NURSE ANESTHETIST, CERTIFIED REGISTERED

## 2018-02-14 PROCEDURE — 77030018390 HC SPNG HEMSTAT2 J&J -B: Performed by: NEUROLOGICAL SURGERY

## 2018-02-14 PROCEDURE — 74011250636 HC RX REV CODE- 250/636: Performed by: ANESTHESIOLOGY

## 2018-02-14 PROCEDURE — 77030019908 HC STETH ESOPH SIMS -A: Performed by: NURSE ANESTHETIST, CERTIFIED REGISTERED

## 2018-02-14 PROCEDURE — 77030028271 HC SRGFL HEMSTAT MTRX KT J&J -C: Performed by: NEUROLOGICAL SURGERY

## 2018-02-14 PROCEDURE — 85610 PROTHROMBIN TIME: CPT

## 2018-02-14 PROCEDURE — 77030003029 HC SUT VCRL J&J -B: Performed by: NEUROLOGICAL SURGERY

## 2018-02-14 PROCEDURE — 77030030163 HC BN WAX J&J -A: Performed by: NEUROLOGICAL SURGERY

## 2018-02-14 PROCEDURE — 74011000250 HC RX REV CODE- 250: Performed by: NEUROLOGICAL SURGERY

## 2018-02-14 PROCEDURE — 77030032490 HC SLV COMPR SCD KNE COVD -B: Performed by: NEUROLOGICAL SURGERY

## 2018-02-14 PROCEDURE — 82962 GLUCOSE BLOOD TEST: CPT

## 2018-02-14 PROCEDURE — 77030012894: Performed by: NEUROLOGICAL SURGERY

## 2018-02-14 PROCEDURE — 72020 X-RAY EXAM OF SPINE 1 VIEW: CPT

## 2018-02-14 PROCEDURE — 77030012935 HC DRSG AQUACEL BMS -B: Performed by: NEUROLOGICAL SURGERY

## 2018-02-14 PROCEDURE — 77030019940 HC BLNKT HYPOTHRM STRY -B: Performed by: NURSE ANESTHETIST, CERTIFIED REGISTERED

## 2018-02-14 PROCEDURE — 94760 N-INVAS EAR/PLS OXIMETRY 1: CPT

## 2018-02-14 PROCEDURE — 76060000033 HC ANESTHESIA 1 TO 1.5 HR: Performed by: NEUROLOGICAL SURGERY

## 2018-02-14 PROCEDURE — 76010000161 HC OR TIME 1 TO 1.5 HR INTENSV-TIER 1: Performed by: NEUROLOGICAL SURGERY

## 2018-02-14 PROCEDURE — 77030020782 HC GWN BAIR PAWS FLX 3M -B: Performed by: NURSE ANESTHETIST, CERTIFIED REGISTERED

## 2018-02-14 RX ORDER — GLYCOPYRROLATE 0.2 MG/ML
INJECTION INTRAMUSCULAR; INTRAVENOUS AS NEEDED
Status: DISCONTINUED | OUTPATIENT
Start: 2018-02-14 | End: 2018-02-14 | Stop reason: HOSPADM

## 2018-02-14 RX ORDER — FENTANYL CITRATE 50 UG/ML
INJECTION, SOLUTION INTRAMUSCULAR; INTRAVENOUS AS NEEDED
Status: DISCONTINUED | OUTPATIENT
Start: 2018-02-14 | End: 2018-02-14 | Stop reason: HOSPADM

## 2018-02-14 RX ORDER — SODIUM CHLORIDE, SODIUM LACTATE, POTASSIUM CHLORIDE, CALCIUM CHLORIDE 600; 310; 30; 20 MG/100ML; MG/100ML; MG/100ML; MG/100ML
75 INJECTION, SOLUTION INTRAVENOUS CONTINUOUS
Status: DISCONTINUED | OUTPATIENT
Start: 2018-02-14 | End: 2018-02-14

## 2018-02-14 RX ORDER — LANSOPRAZOLE 30 MG/1
30 TABLET, ORALLY DISINTEGRATING, DELAYED RELEASE ORAL
Status: DISCONTINUED | OUTPATIENT
Start: 2018-02-15 | End: 2018-02-16 | Stop reason: HOSPADM

## 2018-02-14 RX ORDER — ROCURONIUM BROMIDE 10 MG/ML
INJECTION, SOLUTION INTRAVENOUS AS NEEDED
Status: DISCONTINUED | OUTPATIENT
Start: 2018-02-14 | End: 2018-02-14 | Stop reason: HOSPADM

## 2018-02-14 RX ORDER — LIDOCAINE HYDROCHLORIDE 20 MG/ML
INJECTION, SOLUTION EPIDURAL; INFILTRATION; INTRACAUDAL; PERINEURAL AS NEEDED
Status: DISCONTINUED | OUTPATIENT
Start: 2018-02-14 | End: 2018-02-14 | Stop reason: HOSPADM

## 2018-02-14 RX ORDER — LANOLIN ALCOHOL/MO/W.PET/CERES
400 CREAM (GRAM) TOPICAL DAILY
Status: DISCONTINUED | OUTPATIENT
Start: 2018-02-15 | End: 2018-02-16 | Stop reason: HOSPADM

## 2018-02-14 RX ORDER — SODIUM CHLORIDE 0.9 % (FLUSH) 0.9 %
5-10 SYRINGE (ML) INJECTION AS NEEDED
Status: DISCONTINUED | OUTPATIENT
Start: 2018-02-14 | End: 2018-02-16 | Stop reason: HOSPADM

## 2018-02-14 RX ORDER — CEFAZOLIN SODIUM/WATER 2 G/20 ML
2 SYRINGE (ML) INTRAVENOUS
Status: COMPLETED | OUTPATIENT
Start: 2018-02-14 | End: 2018-02-14

## 2018-02-14 RX ORDER — FLUMAZENIL 0.1 MG/ML
0.2 INJECTION INTRAVENOUS AS NEEDED
Status: DISCONTINUED | OUTPATIENT
Start: 2018-02-14 | End: 2018-02-14 | Stop reason: HOSPADM

## 2018-02-14 RX ORDER — LORATADINE 10 MG/1
10 TABLET ORAL DAILY
Status: DISCONTINUED | OUTPATIENT
Start: 2018-02-15 | End: 2018-02-16 | Stop reason: HOSPADM

## 2018-02-14 RX ORDER — LIDOCAINE HYDROCHLORIDE AND EPINEPHRINE 10; 10 MG/ML; UG/ML
INJECTION, SOLUTION INFILTRATION; PERINEURAL AS NEEDED
Status: DISCONTINUED | OUTPATIENT
Start: 2018-02-14 | End: 2018-02-14 | Stop reason: HOSPADM

## 2018-02-14 RX ORDER — SODIUM CHLORIDE 0.9 % (FLUSH) 0.9 %
5-10 SYRINGE (ML) INJECTION EVERY 8 HOURS
Status: DISCONTINUED | OUTPATIENT
Start: 2018-02-14 | End: 2018-02-16 | Stop reason: HOSPADM

## 2018-02-14 RX ORDER — ACETAMINOPHEN 325 MG/1
650 TABLET ORAL
Status: DISCONTINUED | OUTPATIENT
Start: 2018-02-14 | End: 2018-02-16 | Stop reason: HOSPADM

## 2018-02-14 RX ORDER — INSULIN GLARGINE 100 [IU]/ML
20 INJECTION, SOLUTION SUBCUTANEOUS
Status: DISCONTINUED | OUTPATIENT
Start: 2018-02-14 | End: 2018-02-16 | Stop reason: HOSPADM

## 2018-02-14 RX ORDER — THROMBIN, TOPICAL (BOVINE) 20000 UNIT
KIT TOPICAL AS NEEDED
Status: DISCONTINUED | OUTPATIENT
Start: 2018-02-14 | End: 2018-02-14 | Stop reason: HOSPADM

## 2018-02-14 RX ORDER — GABAPENTIN 300 MG/1
300 CAPSULE ORAL 2 TIMES DAILY
Status: DISCONTINUED | OUTPATIENT
Start: 2018-02-14 | End: 2018-02-16 | Stop reason: HOSPADM

## 2018-02-14 RX ORDER — CEFAZOLIN SODIUM 1 G/3ML
INJECTION, POWDER, FOR SOLUTION INTRAMUSCULAR; INTRAVENOUS AS NEEDED
Status: DISCONTINUED | OUTPATIENT
Start: 2018-02-14 | End: 2018-02-14 | Stop reason: HOSPADM

## 2018-02-14 RX ORDER — GLIPIZIDE 5 MG/1
10 TABLET ORAL DAILY
Status: DISCONTINUED | OUTPATIENT
Start: 2018-02-15 | End: 2018-02-16 | Stop reason: HOSPADM

## 2018-02-14 RX ORDER — HYDROMORPHONE HYDROCHLORIDE 2 MG/ML
1 INJECTION, SOLUTION INTRAMUSCULAR; INTRAVENOUS; SUBCUTANEOUS
Status: DISCONTINUED | OUTPATIENT
Start: 2018-02-14 | End: 2018-02-16 | Stop reason: HOSPADM

## 2018-02-14 RX ORDER — ONDANSETRON 2 MG/ML
4 INJECTION INTRAMUSCULAR; INTRAVENOUS ONCE
Status: COMPLETED | OUTPATIENT
Start: 2018-02-14 | End: 2018-02-14

## 2018-02-14 RX ORDER — DIPHENHYDRAMINE HYDROCHLORIDE 50 MG/ML
12.5 INJECTION, SOLUTION INTRAMUSCULAR; INTRAVENOUS
Status: DISCONTINUED | OUTPATIENT
Start: 2018-02-14 | End: 2018-02-14 | Stop reason: HOSPADM

## 2018-02-14 RX ORDER — TADALAFIL 5 MG/1
5 TABLET ORAL DAILY
Status: DISCONTINUED | OUTPATIENT
Start: 2018-02-15 | End: 2018-02-16 | Stop reason: HOSPADM

## 2018-02-14 RX ORDER — EPHEDRINE SULFATE 50 MG/ML
INJECTION, SOLUTION INTRAVENOUS AS NEEDED
Status: DISCONTINUED | OUTPATIENT
Start: 2018-02-14 | End: 2018-02-14 | Stop reason: HOSPADM

## 2018-02-14 RX ORDER — SODIUM CHLORIDE, SODIUM LACTATE, POTASSIUM CHLORIDE, CALCIUM CHLORIDE 600; 310; 30; 20 MG/100ML; MG/100ML; MG/100ML; MG/100ML
75 INJECTION, SOLUTION INTRAVENOUS CONTINUOUS
Status: DISCONTINUED | OUTPATIENT
Start: 2018-02-14 | End: 2018-02-14 | Stop reason: HOSPADM

## 2018-02-14 RX ORDER — PROPOFOL 10 MG/ML
INJECTION, EMULSION INTRAVENOUS AS NEEDED
Status: DISCONTINUED | OUTPATIENT
Start: 2018-02-14 | End: 2018-02-14 | Stop reason: HOSPADM

## 2018-02-14 RX ORDER — METOPROLOL TARTRATE 50 MG/1
50 TABLET ORAL DAILY
Status: DISCONTINUED | OUTPATIENT
Start: 2018-02-15 | End: 2018-02-16 | Stop reason: HOSPADM

## 2018-02-14 RX ORDER — CEFAZOLIN SODIUM/WATER 2 G/20 ML
2 SYRINGE (ML) INTRAVENOUS EVERY 8 HOURS
Status: COMPLETED | OUTPATIENT
Start: 2018-02-14 | End: 2018-02-15

## 2018-02-14 RX ORDER — ASPIRIN 81 MG/1
81 TABLET ORAL 2 TIMES DAILY
Status: DISCONTINUED | OUTPATIENT
Start: 2018-02-14 | End: 2018-02-16 | Stop reason: HOSPADM

## 2018-02-14 RX ORDER — LIDOCAINE HYDROCHLORIDE 10 MG/ML
0.1 INJECTION INFILTRATION; PERINEURAL AS NEEDED
Status: DISCONTINUED | OUTPATIENT
Start: 2018-02-14 | End: 2018-02-16 | Stop reason: HOSPADM

## 2018-02-14 RX ORDER — WARFARIN SODIUM 5 MG/1
5 TABLET ORAL EVERY EVENING
Status: DISCONTINUED | OUTPATIENT
Start: 2018-02-17 | End: 2018-02-16 | Stop reason: HOSPADM

## 2018-02-14 RX ORDER — SODIUM CHLORIDE, SODIUM LACTATE, POTASSIUM CHLORIDE, CALCIUM CHLORIDE 600; 310; 30; 20 MG/100ML; MG/100ML; MG/100ML; MG/100ML
75 INJECTION, SOLUTION INTRAVENOUS CONTINUOUS
Status: DISPENSED | OUTPATIENT
Start: 2018-02-14 | End: 2018-02-15

## 2018-02-14 RX ORDER — NALOXONE HYDROCHLORIDE 0.4 MG/ML
0.1 INJECTION, SOLUTION INTRAMUSCULAR; INTRAVENOUS; SUBCUTANEOUS
Status: DISCONTINUED | OUTPATIENT
Start: 2018-02-14 | End: 2018-02-14 | Stop reason: HOSPADM

## 2018-02-14 RX ORDER — NEOSTIGMINE METHYLSULFATE 1 MG/ML
INJECTION INTRAVENOUS AS NEEDED
Status: DISCONTINUED | OUTPATIENT
Start: 2018-02-14 | End: 2018-02-14 | Stop reason: HOSPADM

## 2018-02-14 RX ORDER — ONDANSETRON 2 MG/ML
INJECTION INTRAMUSCULAR; INTRAVENOUS AS NEEDED
Status: DISCONTINUED | OUTPATIENT
Start: 2018-02-14 | End: 2018-02-14 | Stop reason: HOSPADM

## 2018-02-14 RX ORDER — ZOLPIDEM TARTRATE 5 MG/1
5 TABLET ORAL
Status: DISCONTINUED | OUTPATIENT
Start: 2018-02-14 | End: 2018-02-16 | Stop reason: HOSPADM

## 2018-02-14 RX ORDER — OXYCODONE HYDROCHLORIDE 5 MG/1
10 TABLET ORAL
Status: DISCONTINUED | OUTPATIENT
Start: 2018-02-14 | End: 2018-02-14 | Stop reason: HOSPADM

## 2018-02-14 RX ORDER — DEXAMETHASONE SODIUM PHOSPHATE 4 MG/ML
INJECTION, SOLUTION INTRA-ARTICULAR; INTRALESIONAL; INTRAMUSCULAR; INTRAVENOUS; SOFT TISSUE AS NEEDED
Status: DISCONTINUED | OUTPATIENT
Start: 2018-02-14 | End: 2018-02-14 | Stop reason: HOSPADM

## 2018-02-14 RX ORDER — OXYCODONE AND ACETAMINOPHEN 10; 325 MG/1; MG/1
1 TABLET ORAL
Status: DISCONTINUED | OUTPATIENT
Start: 2018-02-14 | End: 2018-02-16 | Stop reason: HOSPADM

## 2018-02-14 RX ORDER — CLOPIDOGREL BISULFATE 75 MG/1
75 TABLET ORAL DAILY
Status: DISCONTINUED | OUTPATIENT
Start: 2018-02-15 | End: 2018-02-16 | Stop reason: HOSPADM

## 2018-02-14 RX ORDER — ESMOLOL HYDROCHLORIDE 10 MG/ML
INJECTION INTRAVENOUS AS NEEDED
Status: DISCONTINUED | OUTPATIENT
Start: 2018-02-14 | End: 2018-02-14 | Stop reason: HOSPADM

## 2018-02-14 RX ORDER — WARFARIN SODIUM 5 MG/1
5 TABLET ORAL DAILY
Status: DISCONTINUED | OUTPATIENT
Start: 2018-02-15 | End: 2018-02-14

## 2018-02-14 RX ORDER — HYDROMORPHONE HYDROCHLORIDE 2 MG/ML
0.5 INJECTION, SOLUTION INTRAMUSCULAR; INTRAVENOUS; SUBCUTANEOUS
Status: DISCONTINUED | OUTPATIENT
Start: 2018-02-14 | End: 2018-02-14 | Stop reason: HOSPADM

## 2018-02-14 RX ORDER — OXYCODONE HYDROCHLORIDE 5 MG/1
5 TABLET ORAL
Status: DISCONTINUED | OUTPATIENT
Start: 2018-02-14 | End: 2018-02-14 | Stop reason: HOSPADM

## 2018-02-14 RX ORDER — HYDRALAZINE HYDROCHLORIDE 20 MG/ML
10 INJECTION INTRAMUSCULAR; INTRAVENOUS
Status: DISCONTINUED | OUTPATIENT
Start: 2018-02-14 | End: 2018-02-16 | Stop reason: HOSPADM

## 2018-02-14 RX ORDER — ACETAMINOPHEN 500 MG
1000 TABLET ORAL ONCE
Status: COMPLETED | OUTPATIENT
Start: 2018-02-14 | End: 2018-02-14

## 2018-02-14 RX ADMIN — ACETAMINOPHEN 1000 MG: 500 TABLET, FILM COATED ORAL at 11:51

## 2018-02-14 RX ADMIN — GLYCOPYRROLATE 0.2 MG: 0.2 INJECTION INTRAMUSCULAR; INTRAVENOUS at 14:00

## 2018-02-14 RX ADMIN — INSULIN GLARGINE 20 UNITS: 100 INJECTION, SOLUTION SUBCUTANEOUS at 21:48

## 2018-02-14 RX ADMIN — Medication 10 ML: at 21:42

## 2018-02-14 RX ADMIN — LIDOCAINE HYDROCHLORIDE 60 MG: 20 INJECTION, SOLUTION EPIDURAL; INFILTRATION; INTRACAUDAL; PERINEURAL at 13:24

## 2018-02-14 RX ADMIN — ASPIRIN 81 MG: 81 TABLET, COATED ORAL at 18:09

## 2018-02-14 RX ADMIN — FENTANYL CITRATE 50 MCG: 50 INJECTION, SOLUTION INTRAMUSCULAR; INTRAVENOUS at 13:20

## 2018-02-14 RX ADMIN — ONDANSETRON 4 MG: 2 INJECTION INTRAMUSCULAR; INTRAVENOUS at 14:25

## 2018-02-14 RX ADMIN — Medication 2 G: at 13:35

## 2018-02-14 RX ADMIN — SODIUM CHLORIDE, SODIUM LACTATE, POTASSIUM CHLORIDE, AND CALCIUM CHLORIDE 75 ML/HR: 600; 310; 30; 20 INJECTION, SOLUTION INTRAVENOUS at 21:41

## 2018-02-14 RX ADMIN — ROCURONIUM BROMIDE 50 MG: 10 INJECTION, SOLUTION INTRAVENOUS at 13:24

## 2018-02-14 RX ADMIN — FENTANYL CITRATE 50 MCG: 50 INJECTION, SOLUTION INTRAMUSCULAR; INTRAVENOUS at 14:25

## 2018-02-14 RX ADMIN — ONDANSETRON 4 MG: 2 INJECTION INTRAMUSCULAR; INTRAVENOUS at 16:13

## 2018-02-14 RX ADMIN — Medication 2 G: at 21:42

## 2018-02-14 RX ADMIN — DEXAMETHASONE SODIUM PHOSPHATE 4 MG: 4 INJECTION, SOLUTION INTRA-ARTICULAR; INTRALESIONAL; INTRAMUSCULAR; INTRAVENOUS; SOFT TISSUE at 13:30

## 2018-02-14 RX ADMIN — EPHEDRINE SULFATE 10 MG: 50 INJECTION, SOLUTION INTRAVENOUS at 14:04

## 2018-02-14 RX ADMIN — Medication 5 ML: at 18:11

## 2018-02-14 RX ADMIN — SODIUM CHLORIDE 12.5 MG: 9 INJECTION INTRAMUSCULAR; INTRAVENOUS; SUBCUTANEOUS at 18:45

## 2018-02-14 RX ADMIN — SODIUM CHLORIDE, SODIUM LACTATE, POTASSIUM CHLORIDE, AND CALCIUM CHLORIDE: 600; 310; 30; 20 INJECTION, SOLUTION INTRAVENOUS at 14:30

## 2018-02-14 RX ADMIN — HYDROMORPHONE HYDROCHLORIDE 0.5 MG: 2 INJECTION, SOLUTION INTRAMUSCULAR; INTRAVENOUS; SUBCUTANEOUS at 15:11

## 2018-02-14 RX ADMIN — GABAPENTIN 300 MG: 300 CAPSULE ORAL at 18:09

## 2018-02-14 RX ADMIN — NEOSTIGMINE METHYLSULFATE 3 MG: 1 INJECTION INTRAVENOUS at 14:30

## 2018-02-14 RX ADMIN — PROPOFOL 200 MG: 10 INJECTION, EMULSION INTRAVENOUS at 13:24

## 2018-02-14 RX ADMIN — HYDROMORPHONE HYDROCHLORIDE 0.5 MG: 2 INJECTION, SOLUTION INTRAMUSCULAR; INTRAVENOUS; SUBCUTANEOUS at 15:29

## 2018-02-14 RX ADMIN — HYDROMORPHONE HYDROCHLORIDE 0.5 MG: 2 INJECTION, SOLUTION INTRAMUSCULAR; INTRAVENOUS; SUBCUTANEOUS at 15:18

## 2018-02-14 RX ADMIN — SODIUM CHLORIDE, SODIUM LACTATE, POTASSIUM CHLORIDE, AND CALCIUM CHLORIDE 75 ML/HR: 600; 310; 30; 20 INJECTION, SOLUTION INTRAVENOUS at 11:51

## 2018-02-14 RX ADMIN — ESMOLOL HYDROCHLORIDE 30 MG: 10 INJECTION INTRAVENOUS at 14:32

## 2018-02-14 RX ADMIN — GLYCOPYRROLATE 0.2 MG: 0.2 INJECTION INTRAMUSCULAR; INTRAVENOUS at 14:30

## 2018-02-14 NOTE — H&P
[facility] [address]  HISTORY AND PHYSICAL      Marsha Ramírez  MR#: [id number]  : 1940  ACCOUNT #: [de-identified]   ADMIT DATE: 2018    DATE OF SURGERY:  2018     CHIEF COMPLAINT:  Weakness in the legs times months. HISTORY OF PRESENT ILLNESS:  A 71-year-old man with bilateral lower extremity neurogenic claudication refractory to conservative measures. MRI scanning was positive for severe spinal stenosis secondary to disk protrusion and ligamentous and bony hypertrophy at L3-4. Cardiac stent placement five years ago. He has been cleared for surgery by Cardiology. ALLERGIES:  NONE. PAST MEDICAL HISTORY:  Coronary artery disease, benign prostatic hypertrophy, diabetes mellitus, GERD, hyperlipidemia, hypertension, neuropathy, kidney stones. FAMILY HISTORY:  Positive for heart disease. SOCIAL HISTORY:  He is . He is a nonsmoker, although he is a former smoker, having quit years ago. He is a minimal ethanol consumer. He is retired. MEDICATIONS:  Listed on the universal medication form attached to the chart. Coumadin is being held. REVIEW OF SYSTEMS:  Negative for chest pain, shortness of breath and fatigue currently, although he does describe intermittent shortness of breath. PHYSICAL EXAMINATION:  VITAL SIGNS:  Stable, afebrile. HEENT:  Unremarkable. Nose and throat clear. CHEST:  Clear bilaterally. HEART:  Regular rate and rhythm. No murmurs or gallops. ABDOMEN:  Soft, benign, nontender, no masses. Bowel sounds positive. EXTREMITIES:  Free of deformities. NEUROLOGIC:  Awake, oriented x3. Cranial nerves II-XII intact. Motor strength 5/5. Antalgic flexor. Gait symmetric. Reflexes normal sensation. ASSESSMENT:  Lumbar stenosis with neurogenic claudication refractory to conservative measures. PLAN:  Bilateral laminectomy and diskectomy L3-4.   The risks were thoroughly explained and include bleeding, infection, weakness, paralysis, CSF leak, vascular injury and death. Persistent pain is also a risk. The patient understands and agrees to proceed.       MD RACHEL Ramirez/AMANDA  D: 02/13/2018 14:44     T: 02/13/2018 15:14  JOB #: [id number]

## 2018-02-14 NOTE — IP AVS SNAPSHOT
[facility] 
 
 
 [address] 
[phone] Patient: Balta Friedman MRN: [id number] YYI:[id number] About your hospitalization You were admitted on:  February 14, 2018 You last received care in the:  [facility] 7 MED SURG You were discharged on:  February 16, 2018 Why you were hospitalized Your primary diagnosis was:  Lumbar Stenosis Follow-up Information Follow up With Details Comments Contact Info Brandy Sandoval MD On 3/2/2018 appointment at 230 pm at the [facility] [address] [facility] [address] [facility] [address] 
[phone] Gina Calzada MD On 3/1/2018 at 10:00 a.m. [facility] [address] [facility] [address] 
[phone] Your Scheduled Appointments Monday February 19, 2018 10:00 AM EST  
PGU CATHETER CHANGE with PGU50 INJECTION/CATH [facility] 50 (PGU [facility] UROLOGY) [address] [facility] [address] [place] [address]  
[phone] Thursday March 01, 2018 10:30 AM EST  
WOUND CHECK with PNG NURSE  
[facility] ([facility]) [address] [address] [place] [address] 40  
[phone] Discharge Orders None A check fatimah indicates which time of day the medication should be taken. My Medications START taking these medications Instructions Each Dose to Equal  
 Morning Noon Evening Bedtime HYDROcodone-acetaminophen 7.5-325 mg per tablet Commonly known as:  Ingrid Henderson Your next dose is: Take on as needed schedule Take 1 Tab by mouth every eight (8) hours as needed for Pain. Max Daily Amount: 3 Tabs. 1 Tab  
    
   
   
   
  
 oxyCODONE-acetaminophen 7.5-325 mg per tablet Commonly known as:  PERCOCET 7.5 Take 1 Tab by mouth every eight (8) hours as needed for Pain. Max Daily Amount: 3 Tabs. 1 Tab tamsulosin 0.4 mg capsule Commonly known as:  FLOMAX Take 1 Cap by mouth daily. 0.4 mg  
    
   
   
   
  
  
CONTINUE taking these medications Instructions Each Dose to Equal  
 Morning Noon Evening Bedtime  
 aspirin delayed-release 81 mg tablet Your next dose is: This evening Take 81 mg by mouth two (2) times a day. 81 mg  
    
  
   
   
  
   
  
 clopidogrel 75 mg Tab Commonly known as:  PLAVIX Your next dose is:  Tomorrow Morning Take 75 mg by mouth daily. Last dose mid Jan 2018  
 75 mg  
    
  
   
   
   
  
 gabapentin 300 mg capsule Commonly known as:  NEURONTIN Your next dose is: This evening Take 300 mg by mouth two (2) times a day. 300 mg  
    
  
   
   
  
   
  
 glipiZIDE 10 mg tablet Commonly known as:  Arnjovan Hodgkin Your next dose is:  Tomorrow Morning Take 10 mg by mouth daily. 10 mg  
    
  
   
   
   
  
 insulin detemir U-100 100 unit/mL injection Commonly known as:  LEVEMIR Your next dose is: Take tonight 20 Units by SubCUTAneous route nightly. 20 Units LOPRESSOR 50 mg tablet Generic drug:  metoprolol tartrate Your next dose is:  Tomorrow Morning Take 50 mg by mouth daily. 50 mg  
    
  
   
   
   
  
 magnesium oxide 400 mg tablet Commonly known as:  MAG-OX Your next dose is:  Tomorrow Morning Take 400 mg by mouth daily. 400 mg PREVACID PO Your next dose is:  Tomorrow Morning Take 1 Tab by mouth daily. 1 Tab  
    
  
   
   
   
  
 tadalafil 5 mg tablet Commonly known as:  CIALIS Your next dose is:  Tomorrow Morning Take 1 Tab by mouth daily. 5 mg ZyrTEC 10 mg Cap Generic drug:  Cetirizine Your next dose is:  Tomorrow Morning Take 10 mg by mouth daily.   
 10 mg  
    
  
   
   
   
  
  
 STOP taking these medications   
 warfarin 5 mg tablet Commonly known as:  COUMADIN Where to Get Your Medications These medications were sent to [address], [address] AT [facility] of [address], [address] Phone:  [phone]  
  tamsulosin 0.4 mg capsule Information on where to get these meds will be given to you by the nurse or doctor. ! Ask your nurse or doctor about these medications HYDROcodone-acetaminophen 7.5-325 mg per tablet  
 oxyCODONE-acetaminophen 7.5-325 mg per tablet Discharge Instructions MAY shower (may shower with dressing on)-->NO tub baths LEAVE dressing on incision for 3 DAYS-->then may remove (If dressing starts to fall off may re-secure with tape) NO lifting anything heavier than 5LBS  
 
NO Bending, Lifting or Twisting Avoid sitting more than 20 - 30 minutes at a time NO driving until directed by your doctor CALL Dr. Jackie Lujan if:  Fever >100.5 
([phone])               Incision becomes red/ swollen/ opens up Incision has yellow, thick drainage or an odor Pain is not managed with prescribed medications Excessive nausea and/or vomiting Avoid having pets sleep in bed with you until incision is completely healed DISCHARGE SUMMARY from Nurse PATIENT INSTRUCTIONS: 
 
 
F-face looks uneven A-arms unable to move or move unevenly S-speech slurred or non-existent T-time-call 911 as soon as signs and symptoms begin-DO NOT go Back to bed or wait to see if you get better-TIME IS BRAIN. Warning Signs of HEART ATTACK Call 911 if you have these symptoms: 
? Chest discomfort.  Most heart attacks involve discomfort in the center of the chest that lasts more than a few minutes, or that goes away and comes back. It can feel like uncomfortable pressure, squeezing, fullness, or pain. ? Discomfort in other areas of the upper body. Symptoms can include pain or discomfort in one or both arms, the back, neck, jaw, or stomach. ? Shortness of breath with or without chest discomfort. ? Other signs may include breaking out in a cold sweat, nausea, or lightheadedness. Don't wait more than five minutes to call [address]! Fast action can save your life. Calling 911 is almost always the fastest way to get lifesaving treatment. Emergency Medical Services staff can begin treatment when they arrive  up to an hour sooner than if someone gets to the hospital by car. The discharge information has been reviewed with the patient. The patient verbalized understanding. Discharge medications reviewed with the patient and appropriate educational materials and side effects teaching were provided. ___________________________________________________________________________________________________________________________________ [employer] Announcement We are excited to announce that we are making your provider's discharge notes available to you in [employer]. You will see these notes when they are completed and signed by the physician that discharged you from your recent hospital stay. If you have any questions or concerns about any information you see in [employer], please call the Health Information Department where you were seen or reach out to your Primary Care Provider for more information about your plan of care. Introducing [facility] SERVICES! [facility] introduces [employer] patient portal. Now you can access parts of your medical record, email your doctor's office, and request medication refills online. 1. In your internet browser, go to [email or web address] 2. Click on the First Time User? Click Here link in the Sign In box. You will see the New Member Sign Up page. 3. Enter your DocASAP Access Code exactly as it appears below. You will not need to use this code after youve completed the sign-up process. If you do not sign up before the expiration date, you must request a new code. · DocASAP Access Code: [email or web address] Expires: 3/7/2018  2:25 PM 
 
4. Enter the last four digits of your Social Security Number (xxxx) and Date of Birth (mm/dd/yyyy) as indicated and click Submit. You will be taken to the next sign-up page. 5. Create a DocASAP ID. This will be your DocASAP login ID and cannot be changed, so think of one that is secure and easy to remember. 6. Create a Doc[employer]AP password. You can change your password at any time. 7. Enter your Password Reset Question and Answer. This can be used at a later time if you forget your password. 8. Enter your e-mail address. You will receive e-mail notification when new information is available in [address] 9. Click Sign Up. You can now view and download portions of your medical record. 10. Click the Download Summary menu link to download a portable copy of your medical information. If you have questions, please visit the Frequently Asked Questions section of the DocASAP website. Remember, DocASAP is NOT to be used for urgent needs. For medical emergencies, dial 911. Now available from your iPhone and Android! Providers Seen During Your Hospitalization Provider Specialty Primary office phone Jess Reynaga MD Neurosurgery [phone] Your Primary Care Physician (PCP) Primary Care Physician Office Phone Office Fax Sirisha Cruz  You are allergic to the following No active allergies Recent Documentation Height Weight BMI Smoking Status 1.854 m 97.3 kg 28.31 kg/m2 Former Smoker Emergency Contacts Name Discharge Info Relation Home Work Mobile Ruddy Morales  Spouse [3] [phone] Marshall Eddy CAREGIVER [3] Daughter [21] [phone] Eriak Pain  Daughter [21] [phone] Patient Belongings The following personal items are in your possession at time of discharge: 
  Dental Appliances: None  Visual Aid: None (denies wearing glasses)      Home Medications: None   Jewelry: None  Clothing: Footwear, Jacket/Coat, Pants, Shirt, Undergarments    Other Valuables: None Please provide this summary of care documentation to your next provider. Signatures-by signing, you are acknowledging that this After Visit Summary has been reviewed with you and you have received a copy. Patient Signature:  ____________________________________________________________ Date:  ____________________________________________________________  
  
Abram Thomas Provider Signature:  ____________________________________________________________ Date:  ____________________________________________________________

## 2018-02-14 NOTE — PROGRESS NOTES
TRANSFER - IN REPORT:    Verbal report received from Ashly Paula RN(name) on [facility]  being received from Post-Op(unit) for routine progression of care      Report consisted of patients Situation, Background, Assessment and   Recommendations(SBAR). Information from the following report(s) SBAR, Kardex, OR Summary, Procedure Summary, MAR and Recent Results was reviewed with the receiving nurse. Opportunity for questions and clarification was provided. Assessment completed upon patients arrival to unit and care assumed.

## 2018-02-14 NOTE — PERIOP NOTES
TRANSFER - OUT REPORT:    Verbal report given to Janelle Inman (name) on Isreal Correa  being transferred to [facility](unit) for routine post - op       Report consisted of patients Situation, Background, Assessment and   Recommendations(SBAR). Information from the following report(s) SBAR, OR Summary, Intake/Output and MAR was reviewed with the receiving nurse. Lines:   Peripheral IV 02/14/18 Left Hand (Active)   Site Assessment Clean, dry, & intact 2/14/2018  4:13 PM   Phlebitis Assessment 0 2/14/2018  4:13 PM   Infiltration Assessment 0 2/14/2018  4:13 PM   Dressing Status Clean, dry, & intact 2/14/2018  4:13 PM   Dressing Type Transparent 2/14/2018  4:13 PM   Hub Color/Line Status Infusing 2/14/2018  4:13 PM        Opportunity for questions and clarification was provided. Patient transported with:   O2 @ 3 liters    VTE prophylaxis orders have been written for Isreal Correa. Patient and family given floor number and nurses name. Family updated re: pt status after security code verified.

## 2018-02-14 NOTE — BRIEF OP NOTE
BRIEF OPERATIVE NOTE    Date of Procedure: 2/14/2018   Preoperative Diagnosis: Lumbar stenosis with neurogenic claudication [M48.062]  Postoperative Diagnosis: Lumbar stenosis with neurogenic claudication [M48.062]    Procedure(s):  LEFT L3-4  SPINE LUMBAR LAMINECTOMY AND DISCECTOMY  Surgeon(s) and Role:     * Mayra Nelson MD - Primary         Assistant Staff: Nurse Practitioner: Lynn Stoner NP      Surgical Staff:  Circ-1: Zak Herzog RN  Circ-2: Jayant James RN  Radiology Technician: RT Connie  Scrub Tech-1: Gustavo Owens  Scrub Private/Assistant: Devaughn Quiros  Nurse Practitioner:  Lynn Stoner NP  Event Time In   Incision Start 1344   Incision Close      Anesthesia: General   Estimated Blood Loss: minimal  Specimens:   ID Type Source Tests Collected by Time Destination   1 : Disc Material LUMBAR 3/4 Preservative Disc Material  Mayra Nelson MD 2/14/2018 1420 Pathology      Findings: hnp+stenosis  Complications: none  Implants: * No implants in log *

## 2018-02-14 NOTE — PROGRESS NOTES
Per Dr. Pillo Preciado, pt should restart his coumadin in 3 days. Pharmacy notified. Order modified to reflect start date of 2-17-18. Primary RN updated.

## 2018-02-14 NOTE — OP NOTES
[facility] REPORT    Sushil Ty  MR#: [id number]  : 1940  ACCOUNT #: [de-identified]   DATE OF SERVICE: 2018    PREOPERATIVE DIAGNOSIS:  Lumbar stenosis with neurogenic claudication. POSTOPERATIVE DIAGNOSIS:  Lumbar stenosis with neurogenic claudication. PROCEDURE PERFORMED:  Left L3-L4 laminectomy, facetectomy, foraminotomy, and diskectomy. SURGEON:  Vernon Carcamo M.D. ANESTHESIA:  General endotracheal.    FIRST ASSISTANT:  None. ESTIMATED BLOOD LOSS:  Minimal.    PREPARATION:  ChloraPrep. COMPLICATIONS:  None. SPECIMENS REMOVED:  L3-L4 disk. IMPLANTS:  None. HISTORY OF PRESENT ILLNESS:  A 80-year-old man with neurogenic claudication refractory to conservative measures. MRI scan is positive for severe spinal stenosis at L3-L4 due to ligamentous and bony hypertrophy, but primarily due to a large central disk herniation narrowing the spinal canal.  The patient was admitted for surgery as conservative measures have failed. OPERATIVE NOTE:  The patient was brought to the operating room, carefully placed under general endotracheal anesthesia without complications and carefully turned prone on the Cloward frame and the posterior aspect of the back was shaved and prepped in the usual sterile fashion. An incision was made in the midline overlying L3 and L4. Muscles were stripped in the left lateral subperiosteal plane with cautery and elevators and deep retractors were placed. Lateral lumbar spine x-ray confirmed an instrument  pointing towards L3-4. Next, laminectomy and facetectomy were carried out with Leksell rongeurs and 3 mm Kerrison rongeurs. Significant bony ligamentous hypertrophy were present and a plane was created between the ligamentum flavum and the ligament was removed to expose the dural sac. The distal foraminotomy was widened with a 3 mm Kerrison rongeur.   The nerve root and dural sac were mobilized medially and a very large central extruded disk was identified. It was incised with a 15 blade and cleaned with pituitary rongeurs until clear. A large volume of pathological disk material was removed. After completion of the diskectomy, a ball tip probe was passed easily under the dural sac bilaterally without further compression. The original plan for surgery was to do a bilateral exposure; however, the left-sided approach adequately decompressed the entire dural sac by removing the central disk herniation and no further significant compression was noted. The wound was irrigated until clear. Thrombin was placed for hemostasis and the wound was closed. The fascia was closed tightly with 0 Vicryl. Subcutaneous tissues closed with 3-0 Vicryl, skin was skin was closed with Prineo tape and Dermabond and sterile dressings were placed. The patient tolerated the procedure well and was turned supine, awakened, extubated, and taken to PACU in stable condition. There were no obvious complications.       MD Adebayo Briceño / Angella  D: 02/14/2018 14:36     T: 02/14/2018 15:17  JOB #: [id number]

## 2018-02-14 NOTE — PROGRESS NOTES
Primary Nurse Jennie Kraft and Arnulfo Carlton RN performed a dual skin assessment on this patient. Sulaiman score is 20  Surgical site on back only noted skin issue.

## 2018-02-14 NOTE — ANESTHESIA PREPROCEDURE EVALUATION
Anesthetic History   No history of anesthetic complications            Review of Systems / Medical History  Patient summary reviewed and pertinent labs reviewed    Pulmonary        Sleep apnea: No treatment           Neuro/Psych   Within defined limits           Cardiovascular    Hypertension: well controlled          CAD and cardiac stents (2009 - Plavix held - will continue bASA)    Exercise tolerance: >4 METS     GI/Hepatic/Renal     GERD: well controlled           Endo/Other    Diabetes: well controlled, type 2, using insulin    Obesity and arthritis     Other Findings              Physical Exam    Airway  Mallampati: II  TM Distance: > 6 cm  Neck ROM: normal range of motion   Mouth opening: Normal     Cardiovascular    Rhythm: regular  Rate: normal         Dental  No notable dental hx       Pulmonary  Breath sounds clear to auscultation               Abdominal         Other Findings            Anesthetic Plan    ASA: 3  Anesthesia type: general            Anesthetic plan and risks discussed with: Patient and Son / Daughter

## 2018-02-14 NOTE — ANESTHESIA POSTPROCEDURE EVALUATION
Post-Anesthesia Evaluation and Assessment    Patient: Vika Perales MRN: [id number]  SSN: [id number]    YOB: 1940  Age: 69 y.o. Sex: male       Cardiovascular Function/Vital Signs  Visit Vitals    /88    Pulse 82    Temp 36.4 °C (97.6 °F)    Resp 18    Ht 6' 1\" (1.854 m)    Wt 97.3 kg (214 lb 9.6 oz)    SpO2 93%    BMI 28.31 kg/m2       Patient is status post general anesthesia for Procedure(s):  LEFT L3-4  SPINE LUMBAR LAMINECTOMY AND DISCECTOMY. Nausea/Vomiting: None    Postoperative hydration reviewed and adequate. Pain:  Pain Scale 1: Numeric (0 - 10) (02/14/18 1529)  Pain Intensity 1: 8 (02/14/18 1529)   Managed    Neurological Status:   Neuro (WDL): Exceptions to WDL (02/14/18 1443)  Neuro  Neurologic State: Sleeping (02/14/18 1443)   At baseline    Mental Status and Level of Consciousness: Arousable    Pulmonary Status:   O2 Device: Nasal cannula (02/14/18 1443)   Adequate oxygenation and airway patent    Complications related to anesthesia: None    Post-anesthesia assessment completed.  No concerns    Signed By: Luana Denise MD     February 14, 2018

## 2018-02-15 LAB
APPEARANCE UR: CLEAR
BACTERIA URNS QL MICRO: 0 /HPF
BILIRUB UR QL: NEGATIVE
CASTS URNS QL MICRO: ABNORMAL /LPF
COLOR UR: YELLOW
EPI CELLS #/AREA URNS HPF: ABNORMAL /HPF
GLUCOSE UR STRIP.AUTO-MCNC: NEGATIVE MG/DL
HGB UR QL STRIP: ABNORMAL
KETONES UR QL STRIP.AUTO: NEGATIVE MG/DL
LEUKOCYTE ESTERASE UR QL STRIP.AUTO: NEGATIVE
NITRITE UR QL STRIP.AUTO: NEGATIVE
PH UR STRIP: 5.5 [PH] (ref 5–9)
PROT UR STRIP-MCNC: NEGATIVE MG/DL
RBC #/AREA URNS HPF: ABNORMAL /HPF
SP GR UR REFRACTOMETRY: 1.03 (ref 1–1.02)
UROBILINOGEN UR QL STRIP.AUTO: 0.2 EU/DL (ref 0.2–1)
WBC URNS QL MICRO: ABNORMAL /HPF

## 2018-02-15 PROCEDURE — 77030011943

## 2018-02-15 PROCEDURE — 74011250637 HC RX REV CODE- 250/637: Performed by: NEUROLOGICAL SURGERY

## 2018-02-15 PROCEDURE — 97161 PT EVAL LOW COMPLEX 20 MIN: CPT

## 2018-02-15 PROCEDURE — 81001 URINALYSIS AUTO W/SCOPE: CPT

## 2018-02-15 PROCEDURE — 74011636637 HC RX REV CODE- 636/637: Performed by: NEUROLOGICAL SURGERY

## 2018-02-15 PROCEDURE — 51798 US URINE CAPACITY MEASURE: CPT

## 2018-02-15 PROCEDURE — 97530 THERAPEUTIC ACTIVITIES: CPT

## 2018-02-15 PROCEDURE — G8979 MOBILITY GOAL STATUS: HCPCS

## 2018-02-15 PROCEDURE — 74011250637 HC RX REV CODE- 250/637: Performed by: NURSE PRACTITIONER

## 2018-02-15 PROCEDURE — 77030005520 HC CATH URETH FOL38 BARD -A

## 2018-02-15 PROCEDURE — G8978 MOBILITY CURRENT STATUS: HCPCS

## 2018-02-15 PROCEDURE — 74011250636 HC RX REV CODE- 250/636: Performed by: NEUROLOGICAL SURGERY

## 2018-02-15 RX ORDER — TAMSULOSIN HYDROCHLORIDE 0.4 MG/1
0.4 CAPSULE ORAL
Status: DISCONTINUED | OUTPATIENT
Start: 2018-02-15 | End: 2018-02-16 | Stop reason: HOSPADM

## 2018-02-15 RX ORDER — HYDROCODONE BITARTRATE AND ACETAMINOPHEN 7.5; 325 MG/1; MG/1
1 TABLET ORAL
Qty: 21 TAB | Refills: 0 | Status: SHIPPED | OUTPATIENT
Start: 2018-02-15 | End: 2018-04-28

## 2018-02-15 RX ADMIN — Medication 10 ML: at 13:00

## 2018-02-15 RX ADMIN — Medication 10 ML: at 22:50

## 2018-02-15 RX ADMIN — OXYCODONE HYDROCHLORIDE AND ACETAMINOPHEN 1 TABLET: 10; 325 TABLET ORAL at 22:17

## 2018-02-15 RX ADMIN — GLIPIZIDE 10 MG: 5 TABLET ORAL at 08:16

## 2018-02-15 RX ADMIN — Medication 2 G: at 05:26

## 2018-02-15 RX ADMIN — INSULIN GLARGINE 20 UNITS: 100 INJECTION, SOLUTION SUBCUTANEOUS at 22:18

## 2018-02-15 RX ADMIN — OXYCODONE HYDROCHLORIDE AND ACETAMINOPHEN 1 TABLET: 10; 325 TABLET ORAL at 05:59

## 2018-02-15 RX ADMIN — ASPIRIN 81 MG: 81 TABLET, COATED ORAL at 08:15

## 2018-02-15 RX ADMIN — ASPIRIN 81 MG: 81 TABLET, COATED ORAL at 17:18

## 2018-02-15 RX ADMIN — CLOPIDOGREL BISULFATE 75 MG: 75 TABLET ORAL at 08:15

## 2018-02-15 RX ADMIN — GABAPENTIN 300 MG: 300 CAPSULE ORAL at 17:19

## 2018-02-15 RX ADMIN — Medication 2 G: at 12:54

## 2018-02-15 RX ADMIN — TAMSULOSIN HYDROCHLORIDE 0.4 MG: 0.4 CAPSULE ORAL at 22:17

## 2018-02-15 RX ADMIN — METOPROLOL TARTRATE 50 MG: 50 TABLET ORAL at 08:15

## 2018-02-15 RX ADMIN — Medication 400 MG: at 08:16

## 2018-02-15 RX ADMIN — OXYCODONE HYDROCHLORIDE AND ACETAMINOPHEN 1 TABLET: 10; 325 TABLET ORAL at 17:18

## 2018-02-15 RX ADMIN — Medication 10 ML: at 05:26

## 2018-02-15 RX ADMIN — GABAPENTIN 300 MG: 300 CAPSULE ORAL at 08:15

## 2018-02-15 RX ADMIN — LANSOPRAZOLE 30 MG: 30 TABLET, ORALLY DISINTEGRATING, DELAYED RELEASE ORAL at 05:39

## 2018-02-15 NOTE — PROGRESS NOTES
[address]  Face to Face Encounter    Patients Name: Elizabeth Badillo    YOB: 1940    Ordering Physician: Dr. Scott Mandujano    Primary Diagnosis: Lumbar stenosis with neurogenic claudication [M48.062]    Date of Face to Face:   2/15/2018                                  Face to Face Encounter findings are related to primary reason for home care:   yes. 1. I certify that the patient needs intermittent care as follows: physical therapy: strengthening, stretching/ROM, transfer training, gait/stair training, balance training and pt/caregiver education  occupational therapy:  ADL safety (ie. cooking, bathing, dressing), ROM and pt/caregiver education    2. I certify that this patient is homebound, that is: 1) patient requires the use of a walker device, special transportation, or assistance of another to leave the home; or 2) patient's condition makes leaving the home medically contraindicated; and 3) patient has a normal inability to leave the home and leaving the home requires considerable and taxing effort. Patient may leave the home for infrequent and short duration for medical reasons, and occasional absences for non-medical reasons. Homebound status is due to the following functional limitations: Patient with strength deficits limiting the performance of all ADL's without caregiver assistance or the use of an assistive device. Patient with poor safety awareness and is at risk for falls without assistance of another person and the use of an assistive device. Patient with poor ambulation endurance limiting their safe ability to ascend/descend the required number of steps to leave the home. 3. I certify that this patient is under my care and that I, or a nurse practitioner or  [id number], or clinical nurse specialist, or certified nurse midwife, working with me, had a Face-to-Face Encounter that meets the physician Face-to-Face Encounter requirements.   The following are the clinical findings from the Face-to-Face encounter that support the need for skilled services and is a summary of the encounter: see hospital chart    See hospital chart      Melinda Sandoval RN  2/15/2018      THE FOLLOWING TO BE COMPLETED BY THE COMMUNITY PHYSICIAN:    I concur with the findings described above from the F2F encounter that this patient is homebound and in need of a skilled service.     Certifying Physician: _____________________________________      Printed Certifying Physician Name: _____________________________________    Date: _________________

## 2018-02-15 NOTE — PROGRESS NOTES
NS  POD#1  AFEBRILE  DRY DRESSINGS  FEELS WELL EXCEPT BURNING URINATION  HX OF PROSTATE TROUBLES  NOT RETAINING MUCH  BUT NOT VOIDING A LOT EITHER  A/P PT  TD HOME LATER  UROLOGY C/S   SEES DR. Diane Gann MD

## 2018-02-15 NOTE — CONSULTS
Urology Consult    Subjective:     Date of Consultation:  February 15, 2018    Referring Physician: Dr. Maryland Runner    Reason for Consultation:  Difficulty urinating, pt known to Dr. Mendy Bingham    History of Present Illness:     Patient is a 69 y.o.  male who is being seen for difficulty urinating. He was admitted to the hospital for Lumbar stenosis with neurogenic claudication [M48.062]. He underwent a left L3-L4 laminectomy, facetectomy, foraminotomy and diskectomy with general anesthesia by Dr. Maryland Runner on 2/14/2018. His discharge was planned for today. Early this AM, he was unable to urinate, he was bladder scanned with 370 ml in bladder and 350 ml OP with straight catheterization. He reports that he has been able to urinate small amounts (100 ml) at a time. He is currently drinking coffee and says he drinks several cups each morning. He currently he is not experiencing pain or discomfort. He recently tried to urinate and nothing would come out, he typically experiences frequent urination at home with little amount of urine; however, he has not experienced inability to void altogether. He is known to Dr. Mendy Bingham in our group and last saw him in September of 2017 for lower urinary tract symptoms. PSA was noted to be 1.02 in September 2016 and 0.353 in September 2015. He did feel that PSA needed to be checked at last visit. His nocturia had decreased from four times per night to two. He had mildly increased PVR and minimal symptoms, he was to return to office in 1 year. His medication list at that time showed that he took Proscar daily and this was active; however, the patient says he has not been taking it. He says flomax does not work for him.         Past Medical History:   Diagnosis Date    Arthritis     BPH (benign prostatic hypertrophy)     bph    CAD (coronary artery disease) 11/2009    3 stents per pt    Claustrophobia     Coagulation defects     takes Plavix, Coumadin and aspirin- held for surgery     Former cigarette smoker     GERD (gastroesophageal reflux disease)     controlled with prevacid- only thing that controls it    H/O heart artery stent     X3    Heart disease, unspecified     Heart murmur     MVP   LVEF 50%    Hyperlipidemia     Hypertension     Neuropathy     Other calculus in bladder     Other pulmonary embolism and infarction     PONV (postoperative nausea and vomiting)     Poor historian     Pure hypercholesterolemia     no current meds    [facility] (peripheral [facility] disease) [facility]     Skin cancer     reomoved from Right side of head    Thromboembolus ([facility] [address] 2/2012    3 clots in leg and 1 PE    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)     Type II or unspecified type diabetes mellitus without mention of complication, not stated as uncontrolled     oral and insulin reliant/AVg - at night/ s.s of hypoglycemia @ 48    Unspecified essential hypertension     Unspecified hyperplasia of prostate with urinary obstruction and other lower urinary tract symptoms (LUTS)     monitored by Dr Aleshia Crespo Unspecified sleep apnea     pt denies- it is on Dr Lauren Gardner H&P      Past Surgical History:   Procedure Laterality Date    HX COLONOSCOPY      polyps removed    HX HEART CATHETERIZATION  11/2009    stents x 3    HX OTHER SURGICAL      BLADDER STONE REMOVAL    HX PROSTATECTOMY      TRANSURETHRAL- pt denies    HX UROLOGICAL      CYSTOSCOPY    AR PROSTATE BIOPSY, NEEDLE, SATURATION SAMPLING      AND ULTASOUND      Family History   Problem Relation Age of Onset    Heart Disease Brother     Cancer Mother      Stomach Cancer    No Known Problems Father     Diabetes Sister     Dementia Brother       Social History   Substance Use Topics    Smoking status: Former Smoker     Packs/day: 1.00     Years: 14.00     Quit date: 1/1/1970    Smokeless tobacco: Never Used    Alcohol use No     No Known Allergies   Prior to Admission medications    Medication Sig Start Date End Date Taking? Authorizing Provider   HYDROcodone-acetaminophen (NORCO) 7.5-325 mg per tablet Take 1 Tab by mouth every eight (8) hours as needed for Pain. Max Daily Amount: 3 Tabs. 2/15/18  Yes Monse Hall MD   metoprolol tartrate (LOPRESSOR) 50 mg tablet Take 50 mg by mouth daily. Yes Historical Provider   LANSOPRAZOLE (PREVACID PO) Take 1 Tab by mouth daily. Yes Historical Provider   gabapentin (NEURONTIN) 300 mg capsule Take 300 mg by mouth two (2) times a day. Yes Historical Provider   glipiZIDE (GLUCOTROL) 10 mg tablet Take 10 mg by mouth daily. Yes Historical Provider   insulin detemir (LEVEMIR) 100 unit/mL injection 20 Units by SubCUTAneous route nightly. Yes Historical Provider   magnesium oxide (MAG-OX) 400 mg tablet Take 400 mg by mouth daily. Yes Historical Provider   aspirin delayed-release 81 mg tablet Take 81 mg by mouth two (2) times a day. Yes Historical Provider   warfarin (COUMADIN) 5 mg tablet Take 5 mg by mouth daily. Last dose was 18    Historical Provider   Cetirizine (ZYRTEC) 10 mg cap Take 10 mg by mouth daily. Historical Provider   tadalafil (CIALIS) 5 mg tablet Take 1 Tab by mouth daily. 17   Douglas Philippe MD   clopidogrel (PLAVIX) 75 mg tablet Take 75 mg by mouth daily. Last dose mid 2018    Historical Provider         Review of Systems:  A comprehensive review of systems was negative except for that written in the HPI.     Objective:     Patient Vitals for the past 8 hrs:   BP Temp Pulse Resp SpO2   02/15/18 0714 136/77 98 °F (36.7 °C) 97 19 94 %   02/15/18 0319 94/53 98.1 °F (36.7 °C) 65 16 91 %     Temp (24hrs), Av.9 °F (36.6 °C), Min:97.6 °F (36.4 °C), Max:98.2 °F (36.8 °C)      Intake and Output:    1901 - 02/15 0700  In: 1400 [I.V.:1400]  Out: 435 [Urine:410]    Physical Exam:            General:    alert, cooperative, no distress                     Skin:  no rash or abnormalities                HEENT:  ABIGAIL Throat/Neck:  neck supple and symmetrical   Lymph nodes:  Cervical nodes normal.                 Lungs:  clear to auscultation bilaterally      Cardiovascular:  RRR, S1 S2             Abdomen[de-identified] Soft, non-tender, active BS, non-distended                         Extremities:  peripheral pulses 2+ and symmetric       Assessment:     Principal Problem:    Lumbar stenosis (2/14/2018)    41-year-old male with recent spinal surgery with questionable urinary retention. Plan:     Unclear whether he is completely retaining urine, he mentioned that he urinated small amounts this AM; however, none seen in urinal.  Given his recent spinal surgery along with his hx of LUTS, will observe for a few hours, as he would like to see if he can urinate on his own without indwelling herrera. He is supposed to d/c this afternoon, he has to be worked up by PT for needing possible assistive devices. Will continue to monitor. In the mean time, UA will be obtained. Flomax q HS started. If he retains significant amount this afternoon, we will place herrera and he can keep this in for 5-7 days, continue flomax and follow-up with Dr. Cata Capone next week for voiding trial.  If he is able to urinate adequately, he can go home without herrera catheter and could consider follow-up appointment with Dr. Cata Capone in the near future. Discussed case with Dr. Melvina Quevedo, he will determine final plans. Thank you for the opportunity to assist in the care of this patient. Signed By: Tonie Garcia NP     The patient was examined and the chart was reviewed. The patient told me that he is not able to void. I would recommend that a 12 [place] coudé catheter be placed and he may go out with this. The catheter can be removed on Monday. I would recommend that he go back on Flomax.   Yosvany Spence                        February 15, 2018

## 2018-02-15 NOTE — PROGRESS NOTES
Order, referral and face to face completed for [facility] PT/OT disciplines for discharge home tomorrow.      Care Management Interventions  Transition of Care Consult (CM Consult): [address]: Yes  Plan discussed with Pt/Family/Caregiver: Yes  Freedom of Choice Offered: Yes  Discharge Location  Discharge Placement: Home with home health

## 2018-02-15 NOTE — PROGRESS NOTES
Problem: Mobility Impaired (Adult and Pediatric)  Goal: *Acute Goals and Plan of Care (Insert Text)  STG:  (1.)Mr. Amauri Raymundo will move from supine to sit and sit to supine , scoot up and down and roll side to side with SUPERVISION within 3 treatment day(s). (2.)Mr. Amauri Raymundo will transfer from bed to chair and chair to bed with SUPERVISION using the least restrictive device within 3 treatment day(s). (3.)Mr. Amauri Raymundo will ambulate with SUPERVISION for 250 feet with the least restrictive device within 3 treatment day(s). (4.)Mr. Amauri Raymundo will perform standing static and dynamic balance activities x 15 minutes with SUPERVISION to improve safety within 3 day(s). (5.)Mr. Amauri Raymundo will maintain spinal precautions throughout all functional mobility within 3 treatment days with 0 verbal cues. LTG:  (1.)Mr. Amauri Raymundo will move from supine to sit and sit to supine , scoot up and down and roll side to side in bed with MODIFIED INDEPENDENCE within 7 treatment day(s). (2.)Mr. Amauri Raymundo will transfer from bed to chair and chair to bed with MODIFIED INDEPENDENCE using the least restrictive device within 7 treatment day(s). (3.)Mr. Amauri Raymundo will ambulate with MODIFIED INDEPENDENCE for 500 feet with the least restrictive device within 7 treatment day(s). (4.)Mr. Amauri Raymundo will perform standing static and dynamic balance activities x 25 minutes with MODIFIED INDEPENDENCE to improve safety within 7 day(s). (5.)Mr. Amauri Raymundo will ascend and descend 2 stairs using 0 hand rail(s) with MODIFIED INDEPENDENCE to improve functional mobility and safety within 7 treatment day(s).   ________________________________________________________________________________________________      PHYSICAL THERAPY: Initial Assessment, AM 2/15/2018  OUTPATIENT: Hospital Day: 2  Payor: [facility] [place] MEDICARE / Plan: [employer] [place]O / Product Type: Managed Care Medicare /      NAME/AGE/GENDER: Tez Trinh is a 69 y.o. male   PRIMARY DIAGNOSIS: Lumbar stenosis with neurogenic claudication [M48.062] Lumbar stenosis Lumbar stenosis  Procedure(s) (LRB):  LEFT L3-4  SPINE LUMBAR LAMINECTOMY AND DISCECTOMY (Bilateral)  1 Day Post-Op  ICD-10: Treatment Diagnosis:   · Difficulty in walking, Not elsewhere classified (R26.2)   Precaution/Allergies:  Review of patient's allergies indicates no known allergies. ASSESSMENT:     Mr. Marlyn Churchill is a 69 y.o. male s/p above surgery. He lives alone in a single story home, however reports his daughter will be staying with him for a few weeks. He typically ambulates independently and performs ADLs independently, and drives. He admits to 1 fall outside in the past 6 months. He is supine and agreeable to therapy. Able to recall 2/3 spinal precautions, educated on them and log roll. He transferred to sitting with minimal assist, stood with minimal assist and ambulated with rolling walker and increased trunk sway. Moderate cues required for safe walker negotiation and proximity. He returned to room to sit in chair with all needs in reach and instruction to call for assistance. Educated patient on recommendation to use his rolling walker at d/c. Kris Parra is currently functioning below his baseline and would benefit from skilled PT during acute care stay to maximize safety and independence with functional mobility. This section established at most recent assessment   PROBLEM LIST (Impairments causing functional limitations):  1. Decreased Strength  2. Decreased ADL/Functional Activities  3. Decreased Transfer Abilities  4. Decreased Ambulation Ability/Technique  5. Decreased Balance  6. Increased Pain  7. Decreased Knowledge of Precautions  8. Decreased [place] with Home Exercise Program   INTERVENTIONS PLANNED: (Benefits and precautions of physical therapy have been discussed with the patient.)  1. Balance Exercise  2. Bed Mobility  3. Family Education  4. Gait Training  5.  Home Exercise Program (HEP)  6. Therapeutic Activites  7. Therapeutic Exercise/Strengthening  8. Transfer Training  9. Patient Education  10. Group Therapy     TREATMENT PLAN: Frequency/Duration: twice daily for duration of hospital stay  Rehabilitation Potential For Stated Goals: Good     RECOMMENDED REHABILITATION/EQUIPMENT: (at time of discharge pending progress): Due to the probability of continued deficits (see above) this patient will likely need continued skilled physical therapy after discharge. Equipment:    None at this time              HISTORY:   History of Present Injury/Illness (Reason for Referral):  Per MD Note: A 50-year-old man with bilateral lower extremity neurogenic claudication refractory to conservative measures. MRI scanning was positive for severe spinal stenosis secondary to disk protrusion and ligamentous and bony hypertrophy at L3-4. Cardiac stent placement five years ago. He has been cleared for surgery by Cardiology. Past Medical History/Comorbidities:   Mr. Dea Wei  has a past medical history of Arthritis; BPH (benign prostatic hypertrophy); CAD (coronary artery disease) (11/2009); Claustrophobia; Coagulation defects; Former cigarette smoker; GERD (gastroesophageal reflux disease); H/O heart artery stent; Heart disease, unspecified; Heart murmur; Hyperlipidemia; Hypertension; Neuropathy; Other calculus in bladder; Other pulmonary embolism and infarction; PONV (postoperative nausea and vomiting); Poor historian; Pure hypercholesterolemia; PVD (peripheral vascular disease) ([address] Skin cancer; Thromboembolus ([address] (2/2012); 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 ([address] Type II or unspecified type diabetes mellitus without mention of complication, not stated as uncontrolled; Unspecified essential hypertension; Unspecified hyperplasia of prostate with urinary obstruction and other lower urinary tract symptoms (LUTS); and Unspecified sleep apnea.   Mr. Dea Wei  has a past surgical history that includes hx heart catheterization (11/2009); pr prostate biopsy, needle, saturation sampling; hx prostatectomy; hx other surgical; hx urological; and hx colonoscopy. Social History/Living Environment:   Home Environment: Private residence  # Steps to Enter: 2  Rails to Enter: No  One/Two Story Residence: One story  Living Alone: Yes (daughter coming to stay with him for a few weeks)  Support Systems: Child(jori)  Patient Expects to be Discharged to[de-identified] Private residence  Current DME Used/Available at Home: Iris Radish, straight, Walker, rolling  Tub or Shower Type: Shower  Prior Level of Function/Work/Activity:  Independent with all ADLs and ambulation, driving. Admits to 1 fall. Number of Personal Factors/Comorbidities that affect the Plan of Care: 3+: HIGH COMPLEXITY   EXAMINATION:   Most Recent Physical Functioning:   Gross Assessment:  AROM: Generally decreased, functional  PROM: Generally decreased, functional  Strength: Generally decreased, functional  Coordination: Generally decreased, functional  Tone: Normal  Sensation: Intact               Posture:  Posture (WDL): Exceptions to WDL  Posture Assessment: Forward head, Rounded shoulders  Balance:  Sitting: Intact  Standing: Impaired  Standing - Static: Fair  Standing - Dynamic : Fair Bed Mobility:  Rolling: Contact guard assistance  Supine to Sit: Minimum assistance  Scooting: Contact guard assistance  Wheelchair Mobility:     Transfers:  Sit to Stand: Minimum assistance  Stand to Sit: Minimum assistance  Bed to Chair: Minimum assistance; Adaptive equipment; Additional time  Gait:     Base of Support: Narrowed; Center of gravity altered  Speed/Nikky: Pace decreased (<100 feet/min); Shuffled; Slow  Step Length: Right shortened;Left shortened  Gait Abnormalities: Decreased step clearance;Trunk sway increased; Path deviations  Distance (ft): 250 Feet (ft)  Assistive Device: Walker, rolling  Ambulation - Level of Assistance: Minimal assistance  Interventions: Safety awareness training;Manual cues; Verbal cues      Body Structures Involved:  1. Nerves  2. Bones  3. Joints  4. Muscles  5. Ligaments Body Functions Affected:  1. Sensory/Pain  2. Neuromusculoskeletal  3. Movement Related Activities and Participation Affected:  1. Mobility  2. Self Care  3. Domestic Life  4. Interpersonal Interactions and Relationships  5. Community, Social and [place]   Number of elements that affect the Plan of Care: 4+: HIGH COMPLEXITY   CLINICAL PRESENTATION:   Presentation: Stable and uncomplicated: LOW COMPLEXITY   CLINICAL DECISION MAKIN [facility] Mobility Inpatient Short Form  How much difficulty does the patient currently have. .. Unable A Lot A Little None   1. Turning over in bed (including adjusting bedclothes, sheets and blankets)? [] 1   [] 2   [x] 3   [] 4   2. Sitting down on and standing up from a chair with arms ( e.g., wheelchair, bedside commode, etc.)   [] 1   [] 2   [x] 3   [] 4   3. Moving from lying on back to sitting on the side of the bed? [] 1   [] 2   [x] 3   [] 4   How much help from another person does the patient currently need. .. Total A Lot A Little None   4. Moving to and from a bed to a chair (including a wheelchair)? [] 1   [] 2   [x] 3   [] 4   5. Need to walk in hospital room? [] 1   [x] 2   [] 3   [] 4   6. Climbing 3-5 steps with a railing? [] 1   [x] 2   [] 3   [] 4   © , Trustees of [address] [place], [place] [id number], under license to [employer]. All rights reserved      Score:  Initial: 16 Most Recent: X (Date: -- )    Interpretation of Tool:  Represents activities that are increasingly more difficult (i.e. Bed mobility, Transfers, Gait). Score 24 23 22-20 19-15 14-10 9-7 6     Modifier CH CI CJ CK CL CM CN      ?  Mobility - Walking and Moving Around:     - CURRENT STATUS: CK - 40%-59% impaired, limited or restricted    - GOAL STATUS: CJ - 20%-39% impaired, limited or restricted    - D/C STATUS:  ---------------To be determined---------------  Payor: [employer] [facility] [place] / [place] [employer] [place] / Product Type: Managed Care Medicare /      Medical Necessity:     · Patient demonstrates good rehab potential due to higher previous functional level. Reason for Services/Other Comments:  · Patient continues to require modification of therapeutic interventions to increase complexity of exercises. Use of outcome tool(s) and clinical judgement create a POC that gives a: Clear prediction of patient's progress: LOW COMPLEXITY            TREATMENT:   (In addition to Assessment/Re-Assessment sessions the following treatments were rendered)   Pre-treatment Symptoms/Complaints:  \"I only hurt when I move. \"  Pain: Initial:   Pain Intensity 1: 0  Post Session:  0/10 in chair     Assessment/Reassessment only, no treatment provided today    Braces/Orthotics/Lines/Etc:   · IV  · O2 Device: Room air  Treatment/Session Assessment:    · Response to Treatment:  Patient tolerated well. · Interdisciplinary Collaboration:   o Physical Therapist  o Registered Nurse  · After treatment position/precautions:   o Up in chair  o Bed/Chair-wheels locked  o Bed in low position  o Call light within reach  o RN notified   · Compliance with Program/Exercises: Will assess as treatment progresses. · Recommendations/Intent for next treatment session: \"Next visit will focus on advancements to more challenging activities and reduction in assistance provided\".   Total Treatment Duration:  PT Patient Time In/Time Out  Time In: 0844  Time Out: 1814 Renee Dewey DPT

## 2018-02-15 NOTE — PROGRESS NOTES
Bladder scanned 371. Straight cath 350 ml. Patient c/o of dysuria. Urine 10 ml.  Bladder scanned 126

## 2018-02-15 NOTE — PROGRESS NOTES
Problem: Falls - Risk of  Goal: *Absence of Falls  Document Jonathon Fall Risk and appropriate interventions in the flowsheet.    Outcome: Progressing Towards Goal  Fall Risk Interventions:  Mobility Interventions: Bed/chair exit alarm         Medication Interventions: Bed/chair exit alarm         History of Falls Interventions: Bed/chair exit alarm

## 2018-02-15 NOTE — PROGRESS NOTES
Problem: Mobility Impaired (Adult and Pediatric)  Goal: *Acute Goals and Plan of Care (Insert Text)  STG:  (1.)Mr. Marleen Trujillo will move from supine to sit and sit to supine , scoot up and down and roll side to side with SUPERVISION within 3 treatment day(s). (2.)Mr. Marleen Trujillo will transfer from bed to chair and chair to bed with SUPERVISION using the least restrictive device within 3 treatment day(s). (3.)Mr. Marleen Trujillo will ambulate with SUPERVISION for 250 feet with the least restrictive device within 3 treatment day(s). (4.)Mr. Marleen Trujillo will perform standing static and dynamic balance activities x 15 minutes with SUPERVISION to improve safety within 3 day(s). (5.)Mr. Marleen Trujillo will maintain spinal precautions throughout all functional mobility within 3 treatment days with 0 verbal cues. LTG:  (1.)Mr. Marleen Trujillo will move from supine to sit and sit to supine , scoot up and down and roll side to side in bed with MODIFIED INDEPENDENCE within 7 treatment day(s). (2.)Mr. Marleen Trujillo will transfer from bed to chair and chair to bed with MODIFIED INDEPENDENCE using the least restrictive device within 7 treatment day(s). (3.)Mr. Marleen Trujillo will ambulate with MODIFIED INDEPENDENCE for 500 feet with the least restrictive device within 7 treatment day(s). (4.)Mr. Marleen Trujillo will perform standing static and dynamic balance activities x 25 minutes with MODIFIED INDEPENDENCE to improve safety within 7 day(s). (5.)Mr. Marleen Trujillo will ascend and descend 2 stairs using 0 hand rail(s) with MODIFIED INDEPENDENCE to improve functional mobility and safety within 7 treatment day(s). ________________________________________________________________________________________________      PHYSICAL THERAPY: Daily Note, Treatment Day: Day of Assessment, PM 2/15/2018  OUTPATIENT: Hospital Day: 2  Payor: [facility] [place] MED[facility]ARE / Plan: [facility] / Product Type: Managed Care Medicare /      NAME/AGE/GENDER: Irena Pratt is a 69 y.o. male   PRIMARY DIAGNOSIS: Lumbar stenosis with neurogenic claudication [M48.062] Lumbar stenosis Lumbar stenosis  Procedure(s) (LRB):  LEFT L3-4  SPINE LUMBAR LAMINECTOMY AND DISCECTOMY (Bilateral)  1 Day Post-Op  ICD-10: Treatment Diagnosis:   · Difficulty in walking, Not elsewhere classified (R26.2)   Precaution/Allergies:  Review of patient's allergies indicates no known allergies. ASSESSMENT:     Mr. Michael King is a 69 y.o. male s/p above surgery. He lives alone in a single story home, however reports his daughter will be staying with him for a few weeks. He typically ambulates independently and performs ADLs independently, and drives. He admits to 1 fall outside in the past 6 months. He is supine and agreeable to therapy. Able to recall 2/3 spinal precautions, educated on them and log roll. He transferred to sitting with minimal assist, stood with minimal assist and ambulated with rolling walker and increased trunk sway. Moderate cues required for safe walker negotiation and proximity. He returned to room to sit in chair with all needs in reach and instruction to call for assistance. Educated patient on recommendation to use his rolling walker at d/c. Adam Brian is currently functioning below his baseline and would benefit from skilled PT during acute care stay to maximize safety and independence with functional mobility. PM Note: Patient supine and agreeable to therapy with daughter present. Required cues for all mobility again this session, noted poor carry over of education provided this AM. He transferred to sitting with CGA, stood with CGA and ambulated 250' with CGA and rolling walker. Improvement noted with improved BLE step clearance and posture as well as walker proximity this PM. He was able to perform stair climbing of 5 steps x2 with maximal cues and minimal assist via hand held assist (patient does not have handrails at home). Attempted with use of cane with improvement noted.  He returned to room to perform x5 sit<>stands to improve patients sequencing. He wished to return to bed at the end of the session and had to practice x3 to improve his log rolling technique, as he attempted to lay back on the bed several times despite cues. Daughter somewhat concerned about patient's technique. He will continue to benefit from skilled PT, although he did make good progress this session. Will continue therapy efforts. This section established at most recent assessment   PROBLEM LIST (Impairments causing functional limitations):  1. Decreased Strength  2. Decreased ADL/Functional Activities  3. Decreased Transfer Abilities  4. Decreased Ambulation Ability/Technique  5. Decreased Balance  6. Increased Pain  7. Decreased Knowledge of Precautions  8. Decreased [place] with Home Exercise Program   INTERVENTIONS PLANNED: (Benefits and precautions of physical therapy have been discussed with the patient.)  1. Balance Exercise  2. Bed Mobility  3. Family Education  4. Gait Training  5. Home Exercise Program (HEP)  6. Therapeutic Activites  7. Therapeutic Exercise/Strengthening  8. Transfer Training  9. Patient Education  10. Group Therapy     TREATMENT PLAN: Frequency/Duration: twice daily for duration of hospital stay  Rehabilitation Potential For Stated Goals: Good     RECOMMENDED REHABILITATION/EQUIPMENT: (at time of discharge pending progress): Due to the probability of continued deficits (see above) this patient will likely need continued skilled physical therapy after discharge. Equipment:    None at this time              HISTORY:   History of Present Injury/Illness (Reason for Referral):  Per MD Note: A 60-year-old man with bilateral lower extremity neurogenic claudication refractory to conservative measures. MRI scanning was positive for severe spinal stenosis secondary to disk protrusion and ligamentous and bony hypertrophy at L3-4. Cardiac stent placement five years ago.   He has been cleared for surgery by Cardiology. Past Medical History/Comorbidities:   Mr. Maurice Ramos  has a past medical history of Arthritis; BPH (benign prostatic hypertrophy); CAD (coronary artery disease) (11/2009); Claustrophobia; Coagulation defects; Former cigarette smoker; GERD (gastroesophageal reflux disease); H/O heart artery stent; Heart disease, unspecified; Heart murmur; Hyperlipidemia; Hypertension; Neuropathy; Other calculus in bladder; Other pulmonary embolism and infarction; PONV (postoperative nausea and vomiting); Poor historian; Pure hypercholesterolemia; PVD (peripheral vascular disease) ([facility] [address] Skin cancer; Thromboembolus ([facility] [address] (2/2012); 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 ([facility] [address] Type II or unspecified type diabetes mellitus without mention of complication, not stated as uncontrolled; Unspecified essential hypertension; Unspecified hyperplasia of prostate with urinary obstruction and other lower urinary tract symptoms (LUTS); and Unspecified sleep apnea. Mr. Maurice Ramos  has a past surgical history that includes hx heart catheterization (11/2009); pr prostate biopsy, needle, saturation sampling; hx prostatectomy; hx other surgical; hx urological; and hx colonoscopy. Social History/Living Environment:   Home Environment: Private residence  # Steps to Enter: 2  Rails to Enter: No  One/Two Story Residence: One story  Living Alone: Yes (daughter coming to stay with him for a few weeks)  Support Systems: Child(jori)  Patient Expects to be Discharged to[de-identified] Private residence  Current DME Used/Available at Home: [address], [place], straight, Walker, rolling  Tub or Shower Type: Shower  Prior Level of Function/Work/Activity:  Independent with all ADLs and ambulation, driving. Admits to 1 fall.      Number of Personal Factors/Comorbidities that affect the Plan of Care: 3+: HIGH COMPLEXITY   EXAMINATION:   Most Recent Physical Functioning:   Gross Assessment:  AROM: Generally decreased, functional  PROM: Generally decreased, functional  Strength: Generally decreased, functional  Coordination: Generally decreased, functional  Tone: Normal  Sensation: Intact               Posture:  Posture (WDL): Exceptions to WDL  Posture Assessment: Forward head, Rounded shoulders  Balance:  Sitting: Intact  Standing: Impaired  Standing - Static: Fair  Standing - Dynamic : Fair Bed Mobility:  Rolling: Contact guard assistance  Supine to Sit: Contact guard assistance  Sit to Supine: Minimum assistance (max cues)  Scooting: Contact guard assistance  Wheelchair Mobility:     Transfers:  Sit to Stand: Contact guard assistance  Stand to Sit: Contact guard assistance  Bed to Chair: Minimum assistance; Adaptive equipment; Additional time  Interventions: Safety awareness training;Verbal cues; Visual cues  Gait:     Base of Support: Narrowed; Center of gravity altered  Speed/Nikky: Slow  Step Length: Left shortened;Right shortened  Gait Abnormalities: Decreased step clearance  Distance (ft): 250 Feet (ft)  Assistive Device: Walker, rolling  Ambulation - Level of Assistance: Contact guard assistance  Number of Stairs Trained: 10 (small set x2)  Stairs - Level of Assistance: Minimum assistance  Rail Use: Both  Interventions: Safety awareness training;Manual cues; Verbal cues      Body Structures Involved:  1. Nerves  2. Bones  3. Joints  4. Muscles  5. Ligaments Body Functions Affected:  1. Sensory/Pain  2. Neuromusculoskeletal  3. Movement Related Activities and Participation Affected:  1. Mobility  2. Self Care  3. Domestic Life  4. Interpersonal Interactions and Relationships  5. Community, Social and [place]   Number of elements that affect the Plan of Care: 4+: HIGH COMPLEXITY   CLINICAL PRESENTATION:   Presentation: Stable and uncomplicated: LOW COMPLEXITY   CLINICAL DECISION MAKIN [facility] Inpatient Short Form  How much difficulty does the patient currently have. .. Unable A Lot A Little None   1.   Turning over in bed (including adjusting bedclothes, sheets and blankets)? [] 1   [] 2   [x] 3   [] 4   2. Sitting down on and standing up from a chair with arms ( e.g., wheelchair, bedside commode, etc.)   [] 1   [] 2   [x] 3   [] 4   3. Moving from lying on back to sitting on the side of the bed? [] 1   [] 2   [x] 3   [] 4   How much help from another person does the patient currently need. .. Total A Lot A Little None   4. Moving to and from a bed to a chair (including a wheelchair)? [] 1   [] 2   [x] 3   [] 4   5. Need to walk in hospital room? [] 1   [x] 2   [] 3   [] 4   6. Climbing 3-5 steps with a railing? [] 1   [x] 2   [] 3   [] 4   © 2007, Trustees of [address] [place] [address], under license to [employer]. All rights reserved      Score:  Initial: 16 Most Recent: X (Date: -- )    Interpretation of Tool:  Represents activities that are increasingly more difficult (i.e. Bed mobility, Transfers, Gait). Score 24 23 22-20 19-15 14-10 9-7 6     Modifier CH CI CJ CK CL CM CN      ? Mobility - Walking and Moving Around:     - CURRENT STATUS: CK - 40%-59% impaired, limited or restricted    - GOAL STATUS: CJ - 20%-39% impaired, limited or restricted    - D/C STATUS:  ---------------To be determined---------------  Payor: [facility] / [place] [employer] [place] / Product Type: Managed Care Medicare /      Medical Necessity:     · Patient demonstrates good rehab potential due to higher previous functional level. Reason for Services/Other Comments:  · Patient continues to require modification of therapeutic interventions to increase complexity of exercises. Use of outcome tool(s) and clinical judgement create a POC that gives a: Clear prediction of patient's progress: LOW COMPLEXITY            TREATMENT:   (In addition to Assessment/Re-Assessment sessions the following treatments were rendered)   Pre-treatment Symptoms/Complaints:  \"I only hurt when I move. \"  Pain: Initial:   Pain Intensity 1: 0  Post Session:  0/10     Therapeutic Activity: (    45 minutes): Therapeutic activities including Bed transfers, Chair transfers, stairs, Ambulation on level ground and education on log rolling and proper sit to stand technique to improve mobility, strength, balance and coordination. Required minimal Safety awareness training;Manual cues; Verbal cues to promote static and dynamic balance in standing. Braces/Orthotics/Lines/Etc:   · IV  · O2 Device: Room air  Treatment/Session Assessment:    · Response to Treatment:  Patient tolerated well. · Interdisciplinary Collaboration:   o Physical Therapist  o Registered Nurse  · After treatment position/precautions:   o Supine in bed  o Bed/Chair-wheels locked  o Bed in low position  o Call light within reach  o RN notified  o Family at bedside   · Compliance with Program/Exercises: Will assess as treatment progresses. · Recommendations/Intent for next treatment session: \"Next visit will focus on advancements to more challenging activities and reduction in assistance provided\".   Total Treatment Duration:  PT Patient Time In/Time Out  Time In: 1345  Time Out: [address] [facility] [place] [facility]

## 2018-02-15 NOTE — PROGRESS NOTES
Discharge order noted, pt will need to see PT and has a urology c/s before discharge. Will follow along with primary RN.

## 2018-02-16 VITALS
SYSTOLIC BLOOD PRESSURE: 182 MMHG | TEMPERATURE: 98.6 F | HEIGHT: 73 IN | RESPIRATION RATE: 19 BRPM | HEART RATE: 77 BPM | DIASTOLIC BLOOD PRESSURE: 94 MMHG | BODY MASS INDEX: 28.44 KG/M2 | WEIGHT: 214.6 LBS | OXYGEN SATURATION: 90 %

## 2018-02-16 LAB
ATRIAL RATE: 84 BPM
CALCULATED P AXIS, ECG09: 54 DEGREES
CALCULATED R AXIS, ECG10: 9 DEGREES
CALCULATED T AXIS, ECG11: 25 DEGREES
DIAGNOSIS, 93000: NORMAL
GLUCOSE BLD STRIP.AUTO-MCNC: 185 MG/DL (ref 65–100)
INR PPP: 1.1
P-R INTERVAL, ECG05: 184 MS
PROTHROMBIN TIME: 13.9 SEC (ref 11.5–14.5)
Q-T INTERVAL, ECG07: 406 MS
QRS DURATION, ECG06: 140 MS
QTC CALCULATION (BEZET), ECG08: 479 MS
VENTRICULAR RATE, ECG03: 84 BPM

## 2018-02-16 PROCEDURE — G8980 MOBILITY D/C STATUS: HCPCS

## 2018-02-16 PROCEDURE — 97165 OT EVAL LOW COMPLEX 30 MIN: CPT

## 2018-02-16 PROCEDURE — 82962 GLUCOSE BLOOD TEST: CPT

## 2018-02-16 PROCEDURE — G8988 SELF CARE GOAL STATUS: HCPCS

## 2018-02-16 PROCEDURE — 93005 ELECTROCARDIOGRAM TRACING: CPT | Performed by: NEUROLOGICAL SURGERY

## 2018-02-16 PROCEDURE — 36415 COLL VENOUS BLD VENIPUNCTURE: CPT | Performed by: NEUROLOGICAL SURGERY

## 2018-02-16 PROCEDURE — G8987 SELF CARE CURRENT STATUS: HCPCS

## 2018-02-16 PROCEDURE — 97535 SELF CARE MNGMENT TRAINING: CPT

## 2018-02-16 PROCEDURE — 74011250637 HC RX REV CODE- 250/637: Performed by: NEUROLOGICAL SURGERY

## 2018-02-16 PROCEDURE — 85610 PROTHROMBIN TIME: CPT | Performed by: NEUROLOGICAL SURGERY

## 2018-02-16 PROCEDURE — G8989 SELF CARE D/C STATUS: HCPCS

## 2018-02-16 PROCEDURE — 97530 THERAPEUTIC ACTIVITIES: CPT

## 2018-02-16 RX ORDER — TAMSULOSIN HYDROCHLORIDE 0.4 MG/1
0.4 CAPSULE ORAL DAILY
Qty: 30 CAP | Refills: 2 | Status: ON HOLD | OUTPATIENT
Start: 2018-02-16 | End: 2019-06-30

## 2018-02-16 RX ORDER — OXYCODONE AND ACETAMINOPHEN 7.5; 325 MG/1; MG/1
1 TABLET ORAL
Qty: 21 TAB | Refills: 0 | Status: SHIPPED | OUTPATIENT
Start: 2018-02-16 | End: 2018-03-21

## 2018-02-16 RX ADMIN — GLIPIZIDE 10 MG: 5 TABLET ORAL at 10:00

## 2018-02-16 RX ADMIN — OXYCODONE HYDROCHLORIDE AND ACETAMINOPHEN 1 TABLET: 10; 325 TABLET ORAL at 05:57

## 2018-02-16 RX ADMIN — LANSOPRAZOLE 30 MG: 30 TABLET, ORALLY DISINTEGRATING, DELAYED RELEASE ORAL at 05:51

## 2018-02-16 RX ADMIN — ASPIRIN 81 MG: 81 TABLET, COATED ORAL at 10:01

## 2018-02-16 RX ADMIN — CLOPIDOGREL BISULFATE 75 MG: 75 TABLET ORAL at 10:01

## 2018-02-16 RX ADMIN — Medication 400 MG: at 10:01

## 2018-02-16 RX ADMIN — Medication 10 ML: at 14:00

## 2018-02-16 RX ADMIN — OXYCODONE HYDROCHLORIDE AND ACETAMINOPHEN 1 TABLET: 10; 325 TABLET ORAL at 10:01

## 2018-02-16 RX ADMIN — Medication 10 ML: at 05:51

## 2018-02-16 RX ADMIN — LORATADINE 10 MG: 10 TABLET ORAL at 10:01

## 2018-02-16 RX ADMIN — CHLORASEPTIC 1 SPRAY: 1.5 LIQUID ORAL at 05:54

## 2018-02-16 RX ADMIN — GABAPENTIN 300 MG: 300 CAPSULE ORAL at 10:01

## 2018-02-16 RX ADMIN — METOPROLOL TARTRATE 50 MG: 50 TABLET ORAL at 10:01

## 2018-02-16 NOTE — PROGRESS NOTES
Problem: Mobility Impaired (Adult and Pediatric)  Goal: *Acute Goals and Plan of Care (Insert Text)  STG:  (1.)Mr. Graciela Hassan will move from supine to sit and sit to supine , scoot up and down and roll side to side with SUPERVISION within 3 treatment day(s). (2.)Mr. Graciela Hassan will transfer from bed to chair and chair to bed with SUPERVISION using the least restrictive device within 3 treatment day(s). (3.)Mr. Graciela Hassan will ambulate with SUPERVISION for 250 feet with the least restrictive device within 3 treatment day(s). (4.)Mr. Graciela Hassan will perform standing static and dynamic balance activities x 15 minutes with SUPERVISION to improve safety within 3 day(s). (5.)Mr. Graciela Hassan will maintain spinal precautions throughout all functional mobility within 3 treatment days with 0 verbal cues. LTG:  (1.)Mr. Graciela Hassan will move from supine to sit and sit to supine , scoot up and down and roll side to side in bed with MODIFIED INDEPENDENCE within 7 treatment day(s). (2.)Mr. Graciela Hassan will transfer from bed to chair and chair to bed with MODIFIED INDEPENDENCE using the least restrictive device within 7 treatment day(s). (3.)Mr. Graciela Hassan will ambulate with MODIFIED INDEPENDENCE for 500 feet with the least restrictive device within 7 treatment day(s). (4.)Mr. Graciela Hassan will perform standing static and dynamic balance activities x 25 minutes with MODIFIED INDEPENDENCE to improve safety within 7 day(s). (5.)Mr. Graciela Hassan will ascend and descend 2 stairs using 0 hand rail(s) with MODIFIED INDEPENDENCE to improve functional mobility and safety within 7 treatment day(s).   ________________________________________________________________________________________________      PHYSICAL THERAPY: Daily Note, Treatment Day: 1st, AM 2/16/2018  OUTPATIENT: Hospital Day: 3  Payor: [facility] [place] MEDICARE / Plan: [facility] [place]O / Product Type: Managed Care Medicare /      NAME/AGE/GENDER: Ida Diana is a 69 y.o. male   PRIMARY DIAGNOSIS: Lumbar stenosis with neurogenic claudication [M48.062] Lumbar stenosis Lumbar stenosis  Procedure(s) (LRB):  LEFT L3-4  SPINE LUMBAR LAMINECTOMY AND DISCECTOMY (Bilateral)  2 Days Post-Op  ICD-10: Treatment Diagnosis:   · Difficulty in walking, Not elsewhere classified (R26.2)   Precaution/Allergies:  Review of patient's allergies indicates no known allergies. ASSESSMENT:     Mr. Mae Jean is a 69 y.o. male s/p above surgery. He lives alone in a single story home, however reports his daughter will be staying with him for a few weeks. He typically ambulates independently and performs ADLs independently, and drives. He admits to 1 fall outside in the past 6 months. Sitting in chair post OT and agreeable to therapy, reports significantly more pain today and appears somewhat confused. He required moderate assistance to stand from chair and able to ambulate with minimal assist 100' in hallway with RW. Suddenly, patient reported he felt like he was going to faint. Assisted to chair with moderate assist and BP monitored. Found to be [facility] [address] however HR 51. RN called to assist. Patient assisted with moderate assist x2 to chair with wheels to be assisted back to room. Blood sugar 185 with RN checking. Patient appeared SOB with SpO2 91-94% on room air. He was assisted with moderate assist x2 back to bed with MAXIMAL cues required for log roll. Decreased command following noted. No progress this session. Daughter appears anxious. Juliette Saenz is currently functioning below his baseline and would benefit from skilled PT during acute care stay to maximize safety and independence with functional mobility. This section established at most recent assessment   PROBLEM LIST (Impairments causing functional limitations):  1. Decreased Strength  2. Decreased ADL/Functional Activities  3. Decreased Transfer Abilities  4. Decreased Ambulation Ability/Technique  5. Decreased Balance  6. Increased Pain  7.  Decreased Knowledge of Precautions  8. Decreased [place] with Home Exercise Program   INTERVENTIONS PLANNED: (Benefits and precautions of physical therapy have been discussed with the patient.)  1. Balance Exercise  2. Bed Mobility  3. Family Education  4. Gait Training  5. Home Exercise Program (HEP)  6. Therapeutic Activites  7. Therapeutic Exercise/Strengthening  8. Transfer Training  9. Patient Education  10. Group Therapy     TREATMENT PLAN: Frequency/Duration: twice daily for duration of hospital stay  Rehabilitation Potential For Stated Goals: Good     RECOMMENDED REHABILITATION/EQUIPMENT: (at time of discharge pending progress): Due to the probability of continued deficits (see above) this patient will likely need continued skilled physical therapy after discharge. Equipment:    None at this time              HISTORY:   History of Present Injury/Illness (Reason for Referral):  Per MD Note: A 69-year-old man with bilateral lower extremity neurogenic claudication refractory to conservative measures. MRI scanning was positive for severe spinal stenosis secondary to disk protrusion and ligamentous and bony hypertrophy at L3-4. Cardiac stent placement five years ago. He has been cleared for surgery by Cardiology. Past Medical History/Comorbidities:   Mr. Shyanne Siddiqui  has a past medical history of Arthritis; BPH (benign prostatic hypertrophy); CAD (coronary artery disease) (11/2009); Claustrophobia; Coagulation defects; Former cigarette smoker; GERD (gastroesophageal reflux disease); H/O heart artery stent; Heart disease, unspecified; Heart murmur; Hyperlipidemia; Hypertension; Neuropathy; Other calculus in bladder; Other pulmonary embolism and infarction; PONV (postoperative nausea and vomiting); Poor historian; Pure hypercholesterolemia; PVD (peripheral vascular disease) ([address] Skin cancer; Thromboembolus ([address] (2/2012); 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 ([address]  Type II or unspecified type diabetes mellitus without mention of complication, not stated as uncontrolled; Unspecified essential hypertension; Unspecified hyperplasia of prostate with urinary obstruction and other lower urinary tract symptoms (LUTS); and Unspecified sleep apnea. Mr. Michael King  has a past surgical history that includes hx heart catheterization (11/2009); pr prostate biopsy, needle, saturation sampling; hx prostatectomy; hx other surgical; hx urological; and hx colonoscopy. Social History/Living Environment:   Home Environment: Private residence  # Steps to Enter: 2  Rails to Enter: No  One/Two Story Residence: One story  Living Alone: Yes  Support Systems: Child(jori)  Patient Expects to be Discharged to[de-identified] Private residence  Current DME Used/Available at Home: Adaptive dressing aides, Adaptive bathing aides, Cane, straight  Tub or Shower Type: Shower  Prior Level of Function/Work/Activity:  Independent with all ADLs and ambulation, driving. Admits to 1 fall. Number of Personal Factors/Comorbidities that affect the Plan of Care: 3+: HIGH COMPLEXITY   EXAMINATION:   Most Recent Physical Functioning:   Gross Assessment:  AROM: Generally decreased, functional  PROM: Generally decreased, functional  Strength: Generally decreased, functional  Coordination: Generally decreased, functional  Tone: Normal  Sensation: Intact               Posture:  Posture (WDL): Exceptions to WDL  Posture Assessment: Forward head, Rounded shoulders  Balance:  Sitting: Impaired  Sitting - Static: Fair (occasional)  Sitting - Dynamic: Fair (occasional)  Standing: Impaired  Standing - Static: Fair;Constant support  Standing - Dynamic : Poor Bed Mobility:  Sit to Supine: Moderate assistance;Assist x2  Wheelchair Mobility:     Transfers:  Sit to Stand: Moderate assistance  Stand to Sit: Moderate assistance  Interventions: Safety awareness training;Verbal cues; Visual cues  Gait:     Base of Support: Narrowed; Center of gravity altered  Speed/Nikky: Slow;Shuffled;Pace decreased (<100 feet/min)  Step Length: Left shortened;Right shortened  Gait Abnormalities: Decreased step clearance;Trunk sway increased; Path deviations  Distance (ft): 100 Feet (ft)  Assistive Device: Walker, rolling  Ambulation - Level of Assistance: Minimal assistance; Moderate assistance  Interventions: Manual cues; Safety awareness training;Verbal cues; Visual/Demos      Body Structures Involved:  1. Nerves  2. Bones  3. Joints  4. Muscles  5. Ligaments Body Functions Affected:  1. Sensory/Pain  2. Neuromusculoskeletal  3. Movement Related Activities and Participation Affected:  1. Mobility  2. Self Care  3. Domestic Life  4. Interpersonal Interactions and Relationships  5. Community, Social and [place]   Number of elements that affect the Plan of Care: 4+: HIGH COMPLEXITY   CLINICAL PRESENTATION:   Presentation: Stable and uncomplicated: LOW COMPLEXITY   CLINICAL DECISION MAKIN [facility] Inpatient Short Form  How much difficulty does the patient currently have. .. Unable A Lot A Little None   1. Turning over in bed (including adjusting bedclothes, sheets and blankets)? [] 1   [] 2   [x] 3   [] 4   2. Sitting down on and standing up from a chair with arms ( e.g., wheelchair, bedside commode, etc.)   [] 1   [] 2   [x] 3   [] 4   3. Moving from lying on back to sitting on the side of the bed? [] 1   [] 2   [x] 3   [] 4   How much help from another person does the patient currently need. .. Total A Lot A Little None   4. Moving to and from a bed to a chair (including a wheelchair)? [] 1   [] 2   [x] 3   [] 4   5. Need to walk in hospital room? [] 1   [x] 2   [] 3   [] 4   6. Climbing 3-5 steps with a railing? [] 1   [x] 2   [] 3   [] 4   © , Trustees of [address] [place] [address] [address] [address], under license to [employer].  All rights reserved      Score:  Initial: 16 Most Recent: X (Date: -- )    Interpretation of Tool:  Represents activities that are increasingly more difficult (i.e. Bed mobility, Transfers, Gait). Score 24 23 22-20 19-15 14-10 9-7 6     Modifier CH CI CJ CK CL CM CN      ? Mobility - Walking and Moving Around:     - CURRENT STATUS: CK - 40%-59% impaired, limited or restricted    - GOAL STATUS: CJ - 20%-39% impaired, limited or restricted    - D/C STATUS:  ---------------To be determined---------------  Payor: [facility] [place] [facility] / [place] [employer] [place] / Product Type: Managed Care Medicare /      Medical Necessity:     · Patient demonstrates good rehab potential due to higher previous functional level. Reason for Services/Other Comments:  · Patient continues to require modification of therapeutic interventions to increase complexity of exercises. Use of outcome tool(s) and clinical judgement create a POC that gives a: Clear prediction of patient's progress: LOW COMPLEXITY            TREATMENT:   (In addition to Assessment/Re-Assessment sessions the following treatments were rendered)   Pre-treatment Symptoms/Complaints:  \"I only hurt when I move. \"  Pain: Initial:   Pain Intensity 1: 8  Post Session:  0/10     Therapeutic Activity: (    40 minutes): Therapeutic activities including Bed transfers, Chair transfers, Ambulation on level ground and education on log rolling and proper sit to stand technique to improve mobility, strength, balance and coordination. Required maximal Manual cues; Safety awareness training;Verbal cues; Visual/Demos to promote static and dynamic balance in standing. Braces/Orthotics/Lines/Etc:   · O2 Device: Room air  Treatment/Session Assessment:    · Response to Treatment:  Patient tolerated well.   · Interdisciplinary Collaboration:   o Physical Therapist  o Registered Nurse  o Student PT  · After treatment position/precautions:   o Supine in bed  o Bed/Chair-wheels locked  o Bed in low position  o Call light within reach  o RN notified  o Family at bedside   · Compliance with Program/Exercises: Will assess as treatment progresses. · Recommendations/Intent for next treatment session: \"Next visit will focus on advancements to more challenging activities and reduction in assistance provided\".   Total Treatment Duration:  PT Patient Time In/Time Out  Time In: 0947  Time Out: [address] [place] [address] ISAAC Kebede

## 2018-02-16 NOTE — PROGRESS NOTES
Discharged home with home health and family care, discharge instructions provided by discharge nurse.

## 2018-02-16 NOTE — DISCHARGE INSTRUCTIONS
MAY shower (may shower with dressing on)-->NO tub baths    LEAVE dressing on incision for 3 DAYS-->then may remove   (If dressing starts to fall off may re-secure with tape)    NO lifting anything heavier than 5LBS     NO Bending, Lifting or Twisting    Avoid sitting more than 20 - 30 minutes at a time    NO driving until directed by your doctor      Elena Paget Dr. [place] if:  Fever >100.5  ([phone])               Incision becomes red/ swollen/ opens up             Incision has yellow, thick drainage or an odor             Pain is not managed with prescribed medications             Excessive nausea and/or vomiting    Avoid having pets sleep in bed with you until incision is completely healed    DISCHARGE SUMMARY from Nurse    PATIENT INSTRUCTIONS:    After general anesthesia or intravenous sedation, for 24 hours or while taking prescription Narcotics:  · Limit your activities  · Do not drive and operate hazardous machinery  · Do not make important personal or business decisions  · Do  not drink alcoholic beverages  · If you have not urinated within 8 hours after discharge, please contact your surgeon on call. Report the following to your surgeon:  · Excessive pain, swelling, redness or odor of or around the surgical area  · Temperature over 100.5  · Nausea and vomiting lasting longer than 4 hours or if unable to take medications  · Any signs of decreased circulation or nerve impairment to extremity: change in color, persistent  numbness, tingling, coldness or increase pain  · Any questions    What to do at Home:  Recommended activity: See surgical instructions, diet as tolerated. *  Please give a list of your current medications to your Primary Care Provider. *  Please update this list whenever your medications are discontinued, doses are      changed, or new medications (including over-the-counter products) are added.     *  Please carry medication information at all times in case of emergency situations. These are general instructions for a healthy lifestyle:    No smoking/ No tobacco products/ Avoid exposure to second hand smoke  Surgeon General's Warning:  Quitting smoking now greatly reduces serious risk to your health. Obesity, smoking, and sedentary lifestyle greatly increases your risk for illness    A healthy diet, regular physical exercise & weight monitoring are important for maintaining a healthy lifestyle    You may be retaining fluid if you have a history of heart failure or if you experience any of the following symptoms:  Weight gain of 3 pounds or more overnight or 5 pounds in a week, increased swelling in our hands or feet or shortness of breath while lying flat in bed. Please call your doctor as soon as you notice any of these symptoms; do not wait until your next office visit. Recognize signs and symptoms of STROKE:    F-face looks uneven    A-arms unable to move or move unevenly    S-speech slurred or non-existent    T-time-call 911 as soon as signs and symptoms begin-DO NOT go       Back to bed or wait to see if you get better-TIME IS BRAIN. Warning Signs of HEART ATTACK     Call 911 if you have these symptoms:   Chest discomfort. Most heart attacks involve discomfort in the center of the chest that lasts more than a few minutes, or that goes away and comes back. It can feel like uncomfortable pressure, squeezing, fullness, or pain.  Discomfort in other areas of the upper body. Symptoms can include pain or discomfort in one or both arms, the back, neck, jaw, or stomach.  Shortness of breath with or without chest discomfort.  Other signs may include breaking out in a cold sweat, nausea, or lightheadedness. Don't wait more than five minutes to call 911 - MINUTES MATTER! Fast action can save your life. Calling 911 is almost always the fastest way to get lifesaving treatment.  Emergency Medical Services staff can begin treatment when they arrive -- up to an hour sooner than if someone gets to the hospital by car. The discharge information has been reviewed with the patient. The patient verbalized understanding. Discharge medications reviewed with the patient and appropriate educational materials and side effects teaching were provided.   ___________________________________________________________________________________________________________________________________

## 2018-02-16 NOTE — DISCHARGE SUMMARY
[address] Faheem Scott  MR#: [id number]  : 1940  ACCOUNT #: [de-identified]   ADMIT DATE: 2018  DISCHARGE DATE:     DISCHARGE DIAGNOSES:  1. Lumbar stenosis with neurogenic claudication. 2.  History of pulmonary embolism. 3.  Type 2 diabetes. 4.  Hypercholesterolemia. 5.  Hypertension. 6.  Benign prostatic hypertrophy. 7.  Myopathy. 8.  Bladder calculi. 9.  Heart disease, unspecified. OPERATIONS AND PROCEDURES:  Left L3-4 laminectomy and diskectomy on 2018. COMPLICATIONS:  None. DISCHARGE CONDITION:  Improved. HISTORY OF PRESENT ILLNESS:  A 60-year-old male with severe neurogenic claudication and lower extremity weakness refractory to conservative measures. MRI scanning was positive for spinal stenosis and dural sac compression at L3-4 secondary to bony hypertrophy and disk herniation centrally. The patient was admitted for surgery as conservative measures have failed. ALLERGIES:  NONE. PAST MEDICAL HISTORY:  Listed above. MEDICATIONS:  Listed on the universal medication form attached to the chart. EXAMINATION:  On exam, he had an antalgic flexed forward gait. He had diffuse 4+/5 weakness and difficulty with ambulation. HOSPITAL COURSE:  He was taken to the operating room on 2018 and underwent left L3-4 laminectomy and facetectomy. Postoperatively, he had some trouble with voiding, did not have a lot of urinary retention, but still had difficulty voiding, was seen by Urology. Dr. Aneudy Bill follows him normally. Dr. Renee Wright saw him in consultation and has elected to put a Rai catheter in and leave it in for a few days and pull it out in the office next Monday.   The patient was seen by Physical Therapy and Occupational Therapy and Physical Therapy worked with him on the first day postop and contacted me and determined that he did indeed require some additional physical therapy prior to being safely discharged home and therefore, he was kept till 02/16/2018 when he was seen by Physical Therapy in the morning and discharged home later that day. Home health will be contacted. DISCHARGE INSTRUCTIONS:  DIET:  Diabetic. ACTIVITY:  Up as tolerated. FOLLOWUP:  Return to the office in two weeks for suture removal.  Follow up with Urology next Monday. The patient will contact the physician if he develops any fever, drainage or swelling. DISCHARGE MEDICATIONS:  Included admission medications plus Percocet 7.5/325 q.8h. p.r.n. and Flomax 0.4 mg daily. DISCHARGE CONDITION:  Satisfactory.          MD RACHEL Charles/AMANDA  D: 02/16/2018 07:29     T: 02/16/2018 08:25  JOB #: [id number]

## 2018-02-16 NOTE — PROGRESS NOTES
Problem: Mobility Impaired (Adult and Pediatric)  Goal: *Acute Goals and Plan of Care (Insert Text)  STG:  (1.)Mr. Theodore Mark will move from supine to sit and sit to supine , scoot up and down and roll side to side with SUPERVISION within 3 treatment day(s). (2.)Mr. Theodore Mark will transfer from bed to chair and chair to bed with SUPERVISION using the least restrictive device within 3 treatment day(s). (3.)Mr. Theodore Mark will ambulate with SUPERVISION for 250 feet with the least restrictive device within 3 treatment day(s). (4.)Mr. Theodore Mark will perform standing static and dynamic balance activities x 15 minutes with SUPERVISION to improve safety within 3 day(s). (5.)Mr. Theodore Mark will maintain spinal precautions throughout all functional mobility within 3 treatment days with 0 verbal cues. LTG:  (1.)Mr. Theodore Mark will move from supine to sit and sit to supine , scoot up and down and roll side to side in bed with MODIFIED INDEPENDENCE within 7 treatment day(s). (2.)Mr. Theodore Mark will transfer from bed to chair and chair to bed with MODIFIED INDEPENDENCE using the least restrictive device within 7 treatment day(s). (3.)Mr. Theodore Mark will ambulate with MODIFIED INDEPENDENCE for 500 feet with the least restrictive device within 7 treatment day(s). (4.)Mr. Theodore Mark will perform standing static and dynamic balance activities x 25 minutes with MODIFIED INDEPENDENCE to improve safety within 7 day(s). (5.)Mr. Theodore Mark will ascend and descend 2 stairs using 0 hand rail(s) with MODIFIED INDEPENDENCE to improve functional mobility and safety within 7 treatment day(s).   ________________________________________________________________________________________________      PHYSICAL THERAPY: Daily Note, Treatment Day: 1st, PM 2/16/2018  OUTPATIENT: Hospital Day: 3  Payor: [facility] [place] [facility] / Plan: [facility] [place]/PPO / Product Type: Managed Care Medicare /      NAME/AGE/GENDER: Ida Diana is a 69 y.o. male   PRIMARY DIAGNOSIS: Lumbar stenosis with neurogenic claudication [M48.062] Lumbar stenosis Lumbar stenosis  Procedure(s) (LRB):  LEFT L3-4  SPINE LUMBAR LAMINECTOMY AND DISCECTOMY (Bilateral)  2 Days Post-Op  ICD-10: Treatment Diagnosis:   · Difficulty in walking, Not elsewhere classified (R26.2)   Precaution/Allergies:  Review of patient's allergies indicates no known allergies. ASSESSMENT:     Mr. Arie Ortiz is a 69 y.o. male s/p above surgery. In supine agrees to tx. When we performed trnf supine- sit he ignored cues to breath and during sit - stand. Improved with gait distance 200ft, but needed lots of cues to breath, stay close to RW. Required 2 standing rests. When retuning to room he requested going to toilet, did not make to toilet and vomited on floor, toilet prior to needing to to produce BM. Handoff to RN on toilet for 10min. Dora Richards is currently functioning below his baseline and would benefit from skilled PT during acute care stay to maximize safety and independence with functional mobility. This section established at most recent assessment   PROBLEM LIST (Impairments causing functional limitations):  1. Decreased Strength  2. Decreased ADL/Functional Activities  3. Decreased Transfer Abilities  4. Decreased Ambulation Ability/Technique  5. Decreased Balance  6. Increased Pain  7. Decreased Knowledge of Precautions  8. Decreased [place] with Home Exercise Program   INTERVENTIONS PLANNED: (Benefits and precautions of physical therapy have been discussed with the patient.)  1. Balance Exercise  2. Bed Mobility  3. Family Education  4. Gait Training  5. Home Exercise Program (HEP)  6. Therapeutic Activites  7. Therapeutic Exercise/Strengthening  8. Transfer Training  9. Patient Education  10.  Group Therapy     TREATMENT PLAN: Frequency/Duration: twice daily for duration of hospital stay  Rehabilitation Potential For Stated Goals: Good     RECOMMENDED REHABILITATION/EQUIPMENT: (at time of discharge pending progress): Due to the probability of continued deficits (see above) this patient will likely need continued skilled physical therapy after discharge. Equipment:    None at this time              HISTORY:   History of Present Injury/Illness (Reason for Referral):  Per MD Note: A 50-year-old man with bilateral lower extremity neurogenic claudication refractory to conservative measures. MRI scanning was positive for severe spinal stenosis secondary to disk protrusion and ligamentous and bony hypertrophy at L3-4. Cardiac stent placement five years ago. He has been cleared for surgery by Cardiology. Past Medical History/Comorbidities:   Mr. Astrid Botello  has a past medical history of Arthritis; BPH (benign prostatic hypertrophy); CAD (coronary artery disease) (11/2009); Claustrophobia; Coagulation defects; Former cigarette smoker; GERD (gastroesophageal reflux disease); H/O heart artery stent; Heart disease, unspecified; Heart murmur; Hyperlipidemia; Hypertension; Neuropathy; Other calculus in bladder; Other pulmonary embolism and infarction; PONV (postoperative nausea and vomiting); Poor historian; Pure hypercholesterolemia; PVD (peripheral vascular disease) ([address] Skin cancer; Thromboembolus ([address] (2/2012); 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 ([address] Type II or unspecified type diabetes mellitus without mention of complication, not stated as uncontrolled; Unspecified essential hypertension; Unspecified hyperplasia of prostate with urinary obstruction and other lower urinary tract symptoms (LUTS); and Unspecified sleep apnea. Mr. Astrid Botello  has a past surgical history that includes hx heart catheterization (11/2009); pr prostate biopsy, needle, saturation sampling; hx prostatectomy; hx other surgical; hx urological; and hx colonoscopy.   Social History/Living Environment:   Home Environment: Private residence  # Steps to Enter: 2  Rails to Enter: No  One/Two Story Residence: One story  Living Alone: Yes  Support Systems: Child(jori)  Patient Expects to be Discharged to[de-identified] Private residence  Current DME Used/Available at Home: Adaptive dressing aides, Adaptive bathing aides, Cane, straight  Tub or Shower Type: Shower  Prior Level of Function/Work/Activity:  Independent with all ADLs and ambulation, driving. Admits to 1 fall. Number of Personal Factors/Comorbidities that affect the Plan of Care: 3+: HIGH COMPLEXITY   EXAMINATION:   Most Recent Physical Functioning:   Gross Assessment:                  Posture:     Balance:  Sitting: Impaired  Sitting - Static: Good (unsupported)  Sitting - Dynamic: Fair (occasional)  Standing: Impaired  Standing - Static: Fair  Standing - Dynamic : Fair Bed Mobility:  Rolling: Contact guard assistance  Supine to Sit: Contact guard assistance  Wheelchair Mobility:     Transfers:  Sit to Stand: Minimum assistance  Stand to Sit: Minimum assistance  Gait:     Base of Support: Widened  Speed/Nikky: Shuffled  Gait Abnormalities: Decreased step clearance;Shuffling gait  Distance (ft): 200 Feet (ft)  Assistive Device: Walker, rolling  Ambulation - Level of Assistance: Contact guard assistance      Body Structures Involved:  1. Nerves  2. Bones  3. Joints  4. Muscles  5. Ligaments Body Functions Affected:  1. Sensory/Pain  2. Neuromusculoskeletal  3. Movement Related Activities and Participation Affected:  1. Mobility  2. Self Care  3. Domestic Life  4. Interpersonal Interactions and Relationships  5. Community, Social and [place]   Number of elements that affect the Plan of Care: 4+: HIGH COMPLEXITY   CLINICAL PRESENTATION:   Presentation: Stable and uncomplicated: LOW COMPLEXITY   CLINICAL DECISION MAKIN [facility] Mobility Inpatient Short Form  How much difficulty does the patient currently have. .. Unable A Lot A Little None   1. Turning over in bed (including adjusting bedclothes, sheets and blankets)?    [] 1   [] 2   [x] 3   [] 4 2.  Sitting down on and standing up from a chair with arms ( e.g., wheelchair, bedside commode, etc.)   [] 1   [] 2   [x] 3   [] 4   3. Moving from lying on back to sitting on the side of the bed? [] 1   [] 2   [x] 3   [] 4   How much help from another person does the patient currently need. .. Total A Lot A Little None   4. Moving to and from a bed to a chair (including a wheelchair)? [] 1   [] 2   [x] 3   [] 4   5. Need to walk in hospital room? [] 1   [x] 2   [] 3   [] 4   6. Climbing 3-5 steps with a railing? [] 1   [x] 2   [] 3   [] 4   © 2007, Trustees of [facility] [employer], under license to [employer]. All rights reserved      Score:  Initial: 16 Most Recent: X (Date: -- )    Interpretation of Tool:  Represents activities that are increasingly more difficult (i.e. Bed mobility, Transfers, Gait). Score 24 23 22-20 19-15 14-10 9-7 6     Modifier CH CI CJ CK CL CM CN      ? Mobility - Walking and Moving Around:     - CURRENT STATUS: CK - 40%-59% impaired, limited or restricted    - GOAL STATUS: CJ - 20%-39% impaired, limited or restricted    - D/C STATUS:  ---------------To be determined---------------  Payor: [facility] [place] [facility] / [place] [employer] [place] / Product Type: Managed Care Medicare /      Medical Necessity:     · Patient demonstrates good rehab potential due to higher previous functional level. Reason for Services/Other Comments:  · Patient continues to require modification of therapeutic interventions to increase complexity of exercises.    Use of outcome tool(s) and clinical judgement create a POC that gives a: Clear prediction of patient's progress: LOW COMPLEXITY            TREATMENT:   (In addition to Assessment/Re-Assessment sessions the following treatments were rendered)   Pre-treatment Symptoms/Complaints:  none  Pain: Initial:   Pain Intensity 1: 6  Pain Location 1: Back  Post Session:  5/10     Therapeutic Activity: (    24 minutes): Therapeutic activities including Bed transfers, Chair transfers, Ambulation on level ground and education on log rolling and proper sit to stand technique to improve mobility, strength, balance and coordination. Required maximal Manual cues; Safety awareness training;Verbal cues; Visual/Demos to promote static and dynamic balance in standing. Braces/Orthotics/Lines/Etc:   · O2 Device: Room air  Treatment/Session Assessment:    · Response to Treatment:  Patient tolerated well. · Interdisciplinary Collaboration:   o Physical Therapist  o Registered Nurse  · After treatment position/precautions:   o Supine in bed  o Bed/Chair-wheels locked  o Bed in low position  o Call light within reach  o RN notified  o Family at bedside   · Compliance with Program/Exercises: Will assess as treatment progresses. · Recommendations/Intent for next treatment session: \"Next visit will focus on advancements to more challenging activities and reduction in assistance provided\".   Total Treatment Duration:  PT Patient Time In/Time Out  Time In: 1450  Time Out: [address], ISAAC

## 2018-02-16 NOTE — PROGRESS NOTES
Problem: Self Care Deficits Care Plan (Adult)  Goal: *Acute Goals and Plan of Care (Insert Text)  1. Patient will verbalize and demonstrate understanding of spinal precautions with 100% accuracy during ADLs. 2. Patient will complete lower body bathing and dressing with setup/ supervision and adaptive equipment as needed. 3. Patient will complete functional transfers with supervision and adaptive equipment as needed. 4. Patient will complete toileting and toilet transfer with supervision. 5. Patient will complete functional mobility of household distances with supervision and adaptive equipment as needed. 6. Patient will demonstrate ability to log roll in bed with modified independence and no verbal cues from therapist.     Timeframe: 7 visits        OCCUPATIONAL THERAPY: Initial Assessment, Daily Note, Discharge and Treatment Day: 1st 2/16/2018  OUTPATIENT: Hospital Day: 3  Payor: Beronica Sheriff / Plan: [place] [facility] / Product Type: [employer] Medicare /      NAME/AGE/GENDER: Valentino Snow is a 69 y.o. male   PRIMARY DIAGNOSIS:  Lumbar stenosis with neurogenic claudication [M48.062] Lumbar stenosis Lumbar stenosis  Procedure(s) (LRB):  LEFT L3-4  SPINE LUMBAR LAMINECTOMY AND DISCECTOMY (Bilateral)  2 Days Post-Op  ICD-10: Treatment Diagnosis:    · Low Back Pain (M54.5)   Precautions/Allergies:    spinal precautions   Review of patient's allergies indicates no known allergies. ASSESSMENT:     Mr. Tere Rodriguez is a 69year old male who is now s/p above procedure. Patient lives alone at baseline and is typically independent with ADLs and driving. Patient supine in bed upon arrival, agreeable to OT evaluation. Reports pain 8/10 (RN aware). BUE are [place] for ROM, strength. Currently requires moderate to maximal assistance with lower body ADLs and minimal to moderate assistance for functional mobility. Patient with impaired balance and decreased safety awareness during session. Needs cues to properly use the rolling walker. Treatment initiated to include log roll training, which he did with minimal assistance and maximal step by step cues, and UB/LB dressing ADL. Able to don bathroom with minimal assistance. OT educated/ demonstrated use of sock aid, reacher, and long handled sponge to complete LB ADLs while maintaining spinal precautions. Patient required moderate assistance for lower body ADLs with use of adaptive equipment. Patient is currently functioning below his baseline for ADLs and would benefit from continued occupational therapy to increase independence and safety. Will follow. Mr. Tere Rodriguez was discharged from our facility before further treatment could be provided in this setting. This section established at most recent assessment   PROBLEM LIST (Impairments causing functional limitations):  1. Decreased Strength  2. Decreased ADL/Functional Activities  3. Decreased Transfer Abilities  4. Decreased Ambulation Ability/Technique  5. Decreased Balance  6. Increased Pain  7. Decreased Activity Tolerance  8. Decreased Flexibility/Joint Mobility  9. Decreased Knowledge of Precautions   INTERVENTIONS PLANNED: (Benefits and precautions of occupational therapy have been discussed with the patient.)  1. Activities of daily living training  2. Adaptive equipment training  3. Donning&doffing training  4. Group therapy  5. Therapeutic activity  6. Therapeutic exercise     TREATMENT PLAN: Frequency/Duration: Follow patient 3x/ week to address above goals. Rehabilitation Potential For Stated Goals: Good     RECOMMENDED REHABILITATION/EQUIPMENT: (at time of discharge pending progress): Due to the probability of continued deficits (see above) this patient will likely need continued skilled occupational therapy after discharge.   Equipment:    Continue to assess              OCCUPATIONAL PROFILE AND HISTORY:   History of Present Injury/Illness (Reason for Referral):  S/p above procedure   Past Medical History/Comorbidities:   Mr. Dea Wei  has a past medical history of Arthritis; BPH (benign prostatic hypertrophy); CAD (coronary artery disease) (11/2009); Claustrophobia; Coagulation defects; Former cigarette smoker; GERD (gastroesophageal reflux disease); H/O heart artery stent; Heart disease, unspecified; Heart murmur; Hyperlipidemia; Hypertension; Neuropathy; Other calculus in bladder; Other pulmonary embolism and infarction; PONV (postoperative nausea and vomiting); Poor historian; Pure hypercholesterolemia; PVD (peripheral vascular disease) ([facility] Skin cancer; Thromboembolus ([facility] (2/2012); 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 Mr. Dea Wei  has a past surgical history that includes hx heart catheterization (11/2009); pr prostate biopsy, needle, saturation sampling; hx prostatectomy; hx other surgical; hx urological; and hx colonoscopy. Social History/Living Environment:   Home Environment: Private residence  # Steps to Enter: 2  Rails to Enter: No  One/Two Story Residence: One story  Living Alone: Yes  Support Systems: Child(jori)  Patient Expects to be Discharged to[de-identified] Private residence  Current DME Used/Available at Home: Adaptive dressing aides, Adaptive bathing aides, Cane, straight  Tub or Shower Type: Shower  Prior Level of Function/Work/Activity:  At baseline patient lives alone. He is independent with ADLs and driving. Daughter is planning to stay with him for 2 weeks post- discharge.       Number of Personal Factors/Comorbidities that affect the Plan of Care: Brief history (0):  LOW COMPLEXITY   ASSESSMENT OF OCCUPATIONAL PERFORMANCE[de-identified]   Activities of Daily Living:           Basic ADLs (From Assessment) Complex ADLs (From Assessment)   Basic ADL  Feeding: Setup  Oral Facial Hygiene/Grooming: Setup  Bathing: Maximum assistance  Upper Body Dressing: Minimum assistance  Lower Body Dressing: Maximum assistance  Toileting: Maximum assistance Instrumental ADL  Meal Preparation: Total assistance  Homemaking: Total assistance   Grooming/Bathing/Dressing Activities of Daily Living     Cognitive Retraining  Safety/Judgement: Decreased awareness of need for safety;Decreased awareness of need for assistance; Fall prevention               Upper Body Dressing Assistance  Dressing Assistance: Minimum assistance  Front Opened Shirt: Minimum assistance     Lower Body Dressing Assistance  Dressing Assistance: Moderate assistance  Socks: Moderate assistance; Compensatory technique training  Leg Crossed Method Used: No (unable )  Position Performed: Seated in chair  Adaptive Equipment Used: Reacher;Sock aid; Walker Bed/Mat Mobility  Supine to Sit: Minimum assistance; Additional time  Sit to Supine: Moderate assistance;Assist x2  Sit to Stand: Moderate assistance  Bed to Chair: Minimum assistance  Scooting: Contact guard assistance       Most Recent Physical Functioning:   Gross Assessment:  AROM: Within functional limits (BUE)  Strength: Within functional limits (BUE)               Posture:  Posture (WDL): Exceptions to WDL  Posture Assessment: Forward head, Rounded shoulders  Balance:  Sitting: Impaired  Sitting - Static: Fair (occasional)  Sitting - Dynamic: Fair (occasional)  Standing: Impaired  Standing - Static: Fair;Constant support  Standing - Dynamic : Poor Bed Mobility:  Supine to Sit: Minimum assistance; Additional time  Sit to Supine: Moderate assistance;Assist x2  Scooting: Contact guard assistance  Wheelchair Mobility:     Transfers:  Sit to Stand: Moderate assistance  Stand to Sit: Moderate assistance  Bed to Chair: Minimum assistance  Interventions: Safety awareness training;Verbal cues; Visual cues Patient Vitals for the past 6 hrs:   BP BP Patient Position SpO2 O2 Flow Rate (L/min) Pulse   18 1030 155/85 - 92 % - (!) 51   18 1123 (!) 182/94 At rest 90 % 1 l/min 77       Mental Status  Neurologic State: Alert  Orientation Level: Oriented to person, Oriented to place, Oriented to situation  Cognition: Follows commands  Perception: Appears intact  Perseveration: No perseveration noted  Safety/Judgement: Decreased awareness of need for safety, Decreased awareness of need for assistance, Fall prevention                          Physical Skills Involved:  1. Range of Motion  2. Balance  3. Strength  4. Activity Tolerance  5. Pain (acute) Cognitive Skills Affected (resulting in the inability to perform in a timely and safe manner):  1. None Psychosocial Skills Affected:  1. Habits/Routines  2. Environmental Adaptation  3. Self-Awareness  4. Social Roles   Number of elements that affect the Plan of Care: 5+:  HIGH COMPLEXITY   CLINICAL DECISION MAKIN[address] [place] [id number] AM-PAC 6 Clicks   Daily Activity Inpatient Short Form  How much help from another person does the patient currently need. .. Total A Lot A Little None   1. Putting on and taking off regular lower body clothing? [] 1   [x] 2   [] 3   [] 4   2. Bathing (including washing, rinsing, drying)? [] 1   [x] 2   [] 3   [] 4   3. Toileting, which includes using toilet, bedpan or urinal?   [] 1   [x] 2   [] 3   [] 4   4. Putting on and taking off regular upper body clothing? [] 1   [] 2   [x] 3   [] 4   5. Taking care of personal grooming such as brushing teeth? [] 1   [] 2   [x] 3   [] 4   6. Eating meals? [] 1   [] 2   [x] 3   [] 4   © , Trustees of [address] [place] [id number], under license to [employer]. All rights reserved      Score:  Initial: 15 Most Recent: X (Date: -- )    Interpretation of Tool:  Represents activities that are increasingly more difficult (i.e. Bed mobility, Transfers, Gait).    Score 24 23 22-20 19-15 14-10 9-7 6     Modifier CH CI CJ CK CL CM CN      ? Self Care:     - CURRENT STATUS: CK - 40%-59% impaired, limited or restricted    - GOAL STATUS: CJ - 20%-39% impaired, limited or restricted    - D/C STATUS:  CK - 40%-59% impaired, limited or restricted  Payor: Elen Wigginsjhonatan Davidsonoa [id number] / Plan: [place] [address] / Product Type: Managed Care Medicare /      Medical Necessity:     · Patient demonstrates good rehab potential due to higher previous functional level. Reason for Services/Other Comments:  · Patient continues to require present interventions due to patient's inability to care for self while maintaining spinal precautions. Use of outcome tool(s) and clinical judgement create a POC that gives a: MODERATE COMPLEXITY         TREATMENT:   (In addition to Assessment/Re-Assessment sessions the following treatments were rendered)     Pre-treatment Symptoms/Complaints:  \"I can't walk cause I have this hose attached to me. \"  Pain: Initial:   Pain Intensity 1: 8  Pain Location 1: Back  Pain Intervention(s) 1: Nurse notified  Post Session:  same     Self Care: (9 minutes): Procedure(s) (per grid) utilized to improve and/or restore self-care/home management as related to upper and lower body  dressing and bed mobility/ log roll. Required maximal visual and verbal cueing to facilitate activities of daily living skills, compensatory activities and use of adaptive equipment and safety awareness. Braces/Orthotics/Lines/Etc:   · IV  · O2 Device: Nasal cannula  Treatment/Session Assessment:    · Response to Treatment:  Tolerated well   · Interdisciplinary Collaboration:   o Occupational Therapist  o Registered Nurse  · After treatment position/precautions:   o Up in chair  o Bed/Chair-wheels locked  o Bed in low position  o Call light within reach  o RN notified  o Family at bedside   · Compliance with Program/Exercises: compliant all of the time.   · Recommendations/Intent for next treatment session: \"Next visit will focus on advancements to more challenging activities and reduction in assistance provided\".   Total Treatment Duration:  OT Patient Time In/Time Out  Time In: 0915  Time Out: [address] [place], OTR/L

## 2018-02-17 ENCOUNTER — HOME CARE VISIT (OUTPATIENT)
Dept: SCHEDULING | Facility: HOME HEALTH | Age: 78
End: 2018-02-17
Payer: MEDICARE

## 2018-02-17 ENCOUNTER — HOSPITAL ENCOUNTER (EMERGENCY)
Age: 78
Discharge: HOME | End: 2018-02-17
Attending: EMERGENCY MEDICINE
Payer: MEDICARE

## 2018-02-17 VITALS
RESPIRATION RATE: 18 BRPM | SYSTOLIC BLOOD PRESSURE: 130 MMHG | HEART RATE: 72 BPM | OXYGEN SATURATION: 97 % | DIASTOLIC BLOOD PRESSURE: 80 MMHG | TEMPERATURE: 97.7 F

## 2018-02-17 DIAGNOSIS — T83.9XXA FOLEY CATHETER PROBLEM, INITIAL ENCOUNTER (HCC): Primary | ICD-10-CM

## 2018-02-17 PROCEDURE — 99282 EMERGENCY DEPT VISIT SF MDM: CPT | Performed by: EMERGENCY MEDICINE

## 2018-02-17 PROCEDURE — 3331090001 HH PPS REVENUE CREDIT

## 2018-02-17 PROCEDURE — 400013 HH SOC

## 2018-02-17 PROCEDURE — G0151 HHCP-SERV OF PT,EA 15 MIN: HCPCS

## 2018-02-17 PROCEDURE — 3331090002 HH PPS REVENUE DEBIT

## 2018-02-17 PROCEDURE — 77030012862 HC BG URIN LEG BARD -A

## 2018-02-17 NOTE — ED NOTES
I have reviewed discharge instructions with the patient. The patient verbalized understanding. Patient left ED via Discharge Method: wheelchair to Home with family. Opportunity for questions and clarification provided. Patient given 0 scripts. To continue your aftercare when you leave the hospital, you may receive an automated call from our care team to check in on how you are doing. This is a free service and part of our promise to provide the best care and service to meet your aftercare needs.  If you have questions, or wish to unsubscribe from this service please call [phone]. Thank you for Choosing our [facility] Emergency Department.

## 2018-02-17 NOTE — DISCHARGE INSTRUCTIONS
Keep appointment with your urologist.       Lisa Rodrigues About How to Care for an Indwelling Urinary Catheter    A urinary catheter is a flexible plastic tube used to drain urine from the bladder when a person cannot urinate. A doctor will place the catheter into the bladder by inserting it through the urethra. The urethra is the opening that carries urine from the bladder to the outside of the body. When the catheter is in the bladder, a small balloon is used to keep the catheter in place. The catheter lets urine drain from the bladder into a bag. The bag is usually attached to the thigh. Urinary catheters can be used in both men and women. A catheter that stays in place for a longer period of time is called an indwelling catheter. A catheter may be needed because of certain medical conditions. These include an enlarged prostate or problems controlling urine. It may be used after surgery on the pelvis or urinary tract. Urinary catheters are also used when the lower part of the body is paralyzed. When helping a loved one with a catheter, try to be relaxed. Caring for a catheter can be embarrassing for both of you. This may be especially true if you are caring for someone of the opposite sex. If you are calm and don't seem embarrassed, the person may feel more comfortable. How do you take care of the catheter? Wear disposable gloves when handling someone's catheter. Make sure to follow all of the instructions the doctor has given. And always wash your hands before and after you're done. Here are some other things to remember when caring for someone's catheter:  · Make sure that urine is running out of the catheter into the urine collection bag. And make sure that the catheter tubing does not get twisted or bent. · Keep the urine collection bag below the level of the bladder. At night it may be helpful to hang the bag on the side of the bed.   · Make sure that the urine collection bag does not drag and pull on the catheter. · It is okay to shower with a catheter and urine collection bag in place, unless the doctor says not to. · Check for swelling or signs of infection in the area around the catheter. Signs of infection include pus or irritated, swollen, red, or tender skin. · Clean the area around the catheter twice a day with water. Dry with a clean towel afterward. · Do not apply powder or lotion to the skin around the catheter. · Do not tug or pull on the catheter. · Sexual intercourse may still be possible for individuals who wear a catheter. It is best to talk with a doctor about options. How do you empty the bag? The urine collection bag needs to be emptied regularly. It is best to empty the bag when it's about half full or at bedtime. If the doctor has asked you to measure the amount of urine, do that before you empty the urine into the toilet. When you are ready to empty the bag, follow these steps:  1. Put on disposable gloves. 2. Remove the drain spout from its sleeve at the bottom of the collection bag. Open the valve on the spout. 3. Let the urine flow out of the bag and into the toilet or a container. Do not let the tubing or drain spout touch anything. 4. After you empty the bag, wipe off any liquid on the end of the drain spout. Close the valve and put the drain spout back into its sleeve. 5. Remove your gloves and throw them away. 6. Wash your hands with soap and water. How do you care for someone after the catheter is removed? After the catheter is taken out, the person may have trouble urinating. If this happens, try helping them sit in a few inches of warm water (sitz bath). If the urge to urinate comes during the sitz bath, it may be easier for them to urinate while still in the bath. Some burning may happen the first few times the person urinates. If the burning lasts longer, it may be a sign of an infection.   If the catheter causes irritation or a rash, wearing loose, cotton underwear may help. Watch closely for changes in the person's health, and be sure to contact their doctor if you notice any problems. Where can you learn more? Go to http://helen-edmundo.info/. Enter D110 in the search box to learn more about \"Learning About How to Care for an Indwelling Urinary Catheter. \"  Current as of: September 24, 2016  Content Version: 11.4  © 8781-2205 [employer]. Care instructions adapted under license by [employer] (which disclaims liability or warranty for this information). If you have questions about a medical condition or this instruction, always ask your healthcare professional. [address] any warranty or liability for your use of this information.

## 2018-02-17 NOTE — ED PROVIDER NOTES
HPI Comments: Patient recently discharged from hospital.  Had urinary catheter placed. Appointment to see urologist next week. Had leaking from the catheter during the night and soaked his bed. No pain no fever. Patient is a 69 y.o. male presenting with urinary catheter problem. The history is provided by the patient. Urinary Catheter Problem    This is a new problem. The current episode started 6 to 12 hours ago. The problem occurs every urination. The problem has not changed since onset. The patient is experiencing no pain. There has been no fever. Associated symptoms include frequency and urgency. Pertinent negatives include no chills. His past medical history is significant for urinary catheter problem.         Past Medical History:   Diagnosis Date    Arthritis     BPH (benign prostatic hypertrophy)     bph    CAD (coronary artery disease) 11/2009    3 stents per pt    Claustrophobia     Coagulation defects     takes Plavix, Coumadin and aspirin- held for surgery     Former cigarette smoker     GERD (gastroesophageal reflux disease)     controlled with prevacid- only thing that controls it    H/O heart artery stent     X3    Heart disease, unspecified     Heart murmur     MVP   LVEF 50%    Hyperlipidemia     Hypertension     Neuropathy     Other calculus in bladder     Other pulmonary embolism and infarction     PONV (postoperative nausea and vomiting)     Poor historian     Pure hypercholesterolemia     no current meds    PVD (peripheral vascular disease) ([address]     Skin cancer     reomoved from Right side of head    Thromboembolus ([address] 2/2012    3 clots in leg and 1 PE    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)     Type II or unspecified type diabetes mellitus without mention of complication, not stated as uncontrolled     oral and insulin reliant/AVg - at night/ s.s of hypoglycemia @ 48    Unspecified essential hypertension     Unspecified hyperplasia of prostate with urinary obstruction and other lower urinary tract symptoms (LUTS)     monitored by Dr Dave Matias Unspecified sleep apnea     pt denies- it is on Dr Zohra Summers H&P       Past Surgical History:   Procedure Laterality Date    HX COLONOSCOPY      polyps removed    HX HEART CATHETERIZATION  11/2009    stents x 3    HX OTHER SURGICAL      BLADDER STONE REMOVAL    HX PROSTATECTOMY      TRANSURETHRAL- pt denies    HX UROLOGICAL      CYSTOSCOPY    [place] PROSTATE BIOPSY, NEEDLE, SATURATION SAMPLING      AND ULTASOUND         Family History:   Problem Relation Age of Onset    Heart Disease Brother     Cancer Mother      Stomach Cancer    No Known Problems Father     Diabetes Sister     Dementia Brother        Social History     Social History    Marital status:      Spouse name: N/A    Number of children: N/A    Years of education: N/A     Occupational History    Not on file. Social History Main Topics    Smoking status: Former Smoker     Packs/day: 1.00     Years: 14.00     Quit date: 1/1/1970    Smokeless tobacco: Never Used    Alcohol use No    Drug use: No    Sexual activity: Not on file     Other Topics Concern    Not on file     Social History Narrative         ALLERGIES: Review of patient's allergies indicates no known allergies. Review of Systems   Constitutional: Negative for chills and fever. Genitourinary: Positive for frequency and urgency. Negative for decreased urine volume, difficulty urinating and dysuria. Vitals:    02/17/18 1034   BP: 130/80   Pulse: 72   Resp: 18   Temp: 97.7 °F (36.5 °C)   SpO2: 97%            Physical Exam   Constitutional: He appears well-developed and well-nourished. No distress. Abdominal: Soft. There is no tenderness. Genitourinary: Penis normal.   Genitourinary Comments: Catheter without bleeding. Catheter was disconnected at the connection point to the tubing to the bag. Nursing note and vitals reviewed.        MDM      ED Course       Procedures    Nursing has reconnected catheter was removed with a more secure connection. Urine is flowing into the bag without difficulty.

## 2018-02-18 PROCEDURE — 3331090001 HH PPS REVENUE CREDIT

## 2018-02-18 PROCEDURE — 3331090002 HH PPS REVENUE DEBIT

## 2018-02-19 VITALS
SYSTOLIC BLOOD PRESSURE: 132 MMHG | HEART RATE: 73 BPM | DIASTOLIC BLOOD PRESSURE: 70 MMHG | RESPIRATION RATE: 17 BRPM | OXYGEN SATURATION: 98 % | TEMPERATURE: 96.5 F

## 2018-02-19 PROCEDURE — 3331090001 HH PPS REVENUE CREDIT

## 2018-02-19 PROCEDURE — 3331090002 HH PPS REVENUE DEBIT

## 2018-02-20 PROCEDURE — 3331090001 HH PPS REVENUE CREDIT

## 2018-02-20 PROCEDURE — 3331090002 HH PPS REVENUE DEBIT

## 2018-02-21 ENCOUNTER — HOME CARE VISIT (OUTPATIENT)
Dept: SCHEDULING | Facility: HOME HEALTH | Age: 78
End: 2018-02-21
Payer: MEDICARE

## 2018-02-21 VITALS
HEART RATE: 70 BPM | RESPIRATION RATE: 16 BRPM | SYSTOLIC BLOOD PRESSURE: 140 MMHG | TEMPERATURE: 97.7 F | DIASTOLIC BLOOD PRESSURE: 82 MMHG

## 2018-02-21 PROCEDURE — 3331090002 HH PPS REVENUE DEBIT

## 2018-02-21 PROCEDURE — G0152 HHCP-SERV OF OT,EA 15 MIN: HCPCS

## 2018-02-21 PROCEDURE — G0151 HHCP-SERV OF PT,EA 15 MIN: HCPCS

## 2018-02-21 PROCEDURE — 3331090001 HH PPS REVENUE CREDIT

## 2018-02-22 PROCEDURE — 3331090001 HH PPS REVENUE CREDIT

## 2018-02-22 PROCEDURE — 3331090002 HH PPS REVENUE DEBIT

## 2018-02-23 ENCOUNTER — HOME CARE VISIT (OUTPATIENT)
Dept: SCHEDULING | Facility: HOME HEALTH | Age: 78
End: 2018-02-23
Payer: MEDICARE

## 2018-02-23 PROCEDURE — 3331090001 HH PPS REVENUE CREDIT

## 2018-02-23 PROCEDURE — 3331090002 HH PPS REVENUE DEBIT

## 2018-02-23 PROCEDURE — G0151 HHCP-SERV OF PT,EA 15 MIN: HCPCS

## 2018-02-24 PROCEDURE — 3331090002 HH PPS REVENUE DEBIT

## 2018-02-24 PROCEDURE — 3331090001 HH PPS REVENUE CREDIT

## 2018-02-25 PROCEDURE — 3331090002 HH PPS REVENUE DEBIT

## 2018-02-25 PROCEDURE — 3331090001 HH PPS REVENUE CREDIT

## 2018-02-26 VITALS
SYSTOLIC BLOOD PRESSURE: 128 MMHG | HEART RATE: 73 BPM | DIASTOLIC BLOOD PRESSURE: 82 MMHG | RESPIRATION RATE: 16 BRPM | TEMPERATURE: 97.1 F

## 2018-02-26 PROCEDURE — 3331090001 HH PPS REVENUE CREDIT

## 2018-02-26 PROCEDURE — 3331090002 HH PPS REVENUE DEBIT

## 2018-02-27 ENCOUNTER — HOME CARE VISIT (OUTPATIENT)
Dept: SCHEDULING | Facility: HOME HEALTH | Age: 78
End: 2018-02-27
Payer: MEDICARE

## 2018-02-27 VITALS
DIASTOLIC BLOOD PRESSURE: 76 MMHG | SYSTOLIC BLOOD PRESSURE: 130 MMHG | RESPIRATION RATE: 18 BRPM | HEART RATE: 81 BPM | TEMPERATURE: 96.5 F

## 2018-02-27 PROCEDURE — 3331090002 HH PPS REVENUE DEBIT

## 2018-02-27 PROCEDURE — G0151 HHCP-SERV OF PT,EA 15 MIN: HCPCS

## 2018-02-27 PROCEDURE — 3331090001 HH PPS REVENUE CREDIT

## 2018-02-28 ENCOUNTER — HOME CARE VISIT (OUTPATIENT)
Dept: SCHEDULING | Facility: HOME HEALTH | Age: 78
End: 2018-02-28
Payer: MEDICARE

## 2018-02-28 PROCEDURE — 3331090001 HH PPS REVENUE CREDIT

## 2018-02-28 PROCEDURE — G0151 HHCP-SERV OF PT,EA 15 MIN: HCPCS

## 2018-02-28 PROCEDURE — 3331090002 HH PPS REVENUE DEBIT

## 2018-03-01 VITALS
SYSTOLIC BLOOD PRESSURE: 136 MMHG | TEMPERATURE: 93.3 F | DIASTOLIC BLOOD PRESSURE: 82 MMHG | HEART RATE: 82 BPM | RESPIRATION RATE: 18 BRPM

## 2018-03-01 PROCEDURE — 3331090001 HH PPS REVENUE CREDIT

## 2018-03-01 PROCEDURE — 3331090002 HH PPS REVENUE DEBIT

## 2018-03-02 PROCEDURE — 3331090001 HH PPS REVENUE CREDIT

## 2018-03-02 PROCEDURE — 3331090002 HH PPS REVENUE DEBIT

## 2018-03-03 PROCEDURE — 3331090001 HH PPS REVENUE CREDIT

## 2018-03-03 PROCEDURE — 3331090002 HH PPS REVENUE DEBIT

## 2018-03-04 PROCEDURE — 3331090001 HH PPS REVENUE CREDIT

## 2018-03-04 PROCEDURE — 3331090002 HH PPS REVENUE DEBIT

## 2018-03-05 PROCEDURE — 3331090001 HH PPS REVENUE CREDIT

## 2018-03-05 PROCEDURE — 3331090002 HH PPS REVENUE DEBIT

## 2018-03-06 ENCOUNTER — HOME CARE VISIT (OUTPATIENT)
Dept: SCHEDULING | Facility: HOME HEALTH | Age: 78
End: 2018-03-06
Payer: MEDICARE

## 2018-03-06 PROCEDURE — 3331090002 HH PPS REVENUE DEBIT

## 2018-03-06 PROCEDURE — G0151 HHCP-SERV OF PT,EA 15 MIN: HCPCS

## 2018-03-06 PROCEDURE — 3331090001 HH PPS REVENUE CREDIT

## 2018-03-07 ENCOUNTER — HOME CARE VISIT (OUTPATIENT)
Dept: SCHEDULING | Facility: HOME HEALTH | Age: 78
End: 2018-03-07
Payer: MEDICARE

## 2018-03-07 VITALS — RESPIRATION RATE: 17 BRPM | HEART RATE: 73 BPM | SYSTOLIC BLOOD PRESSURE: 116 MMHG | DIASTOLIC BLOOD PRESSURE: 74 MMHG

## 2018-03-07 PROCEDURE — 3331090001 HH PPS REVENUE CREDIT

## 2018-03-07 PROCEDURE — G0151 HHCP-SERV OF PT,EA 15 MIN: HCPCS

## 2018-03-07 PROCEDURE — 3331090002 HH PPS REVENUE DEBIT

## 2018-03-08 VITALS — HEART RATE: 74 BPM | DIASTOLIC BLOOD PRESSURE: 84 MMHG | SYSTOLIC BLOOD PRESSURE: 136 MMHG

## 2018-03-08 PROCEDURE — 3331090001 HH PPS REVENUE CREDIT

## 2018-03-08 PROCEDURE — 3331090002 HH PPS REVENUE DEBIT

## 2018-03-09 PROCEDURE — 3331090001 HH PPS REVENUE CREDIT

## 2018-03-09 PROCEDURE — 3331090002 HH PPS REVENUE DEBIT

## 2018-03-10 ENCOUNTER — HOSPITAL ENCOUNTER (INPATIENT)
Age: 78
LOS: 11 days | Discharge: NURSING, REHAB OR LONG-TERM CARE | DRG: 920 | End: 2018-03-21
Attending: EMERGENCY MEDICINE | Admitting: INTERNAL MEDICINE
Payer: MEDICARE

## 2018-03-10 ENCOUNTER — APPOINTMENT (OUTPATIENT)
Dept: CT IMAGING | Age: 78
DRG: 920 | End: 2018-03-10
Attending: EMERGENCY MEDICINE
Payer: MEDICARE

## 2018-03-10 ENCOUNTER — APPOINTMENT (OUTPATIENT)
Dept: GENERAL RADIOLOGY | Age: 78
DRG: 920 | End: 2018-03-10
Attending: EMERGENCY MEDICINE
Payer: MEDICARE

## 2018-03-10 DIAGNOSIS — R04.0 EPISTAXIS: ICD-10-CM

## 2018-03-10 DIAGNOSIS — Z98.818 SURGICAL WOUND HEMORRHAGE AFTER DENTAL PROCEDURE: Primary | ICD-10-CM

## 2018-03-10 DIAGNOSIS — J98.8 AIRWAY COMPROMISE: ICD-10-CM

## 2018-03-10 DIAGNOSIS — R06.03 RESPIRATORY DISTRESS: ICD-10-CM

## 2018-03-10 DIAGNOSIS — Z86.711 HX PULMONARY EMBOLISM: ICD-10-CM

## 2018-03-10 DIAGNOSIS — I10 ESSENTIAL HYPERTENSION: ICD-10-CM

## 2018-03-10 DIAGNOSIS — R53.81 DEBILITY: ICD-10-CM

## 2018-03-10 DIAGNOSIS — M48.062 SPINAL STENOSIS OF LUMBAR REGION WITH NEUROGENIC CLAUDICATION: ICD-10-CM

## 2018-03-10 DIAGNOSIS — K91.840 SURGICAL WOUND HEMORRHAGE AFTER DENTAL PROCEDURE: Primary | ICD-10-CM

## 2018-03-10 LAB
ABO + RH BLD: NORMAL
ALBUMIN SERPL-MCNC: 3.6 G/DL (ref 3.2–4.6)
ALBUMIN/GLOB SERPL: 0.9 {RATIO} (ref 1.2–3.5)
ALP SERPL-CCNC: 159 U/L (ref 50–136)
ALT SERPL-CCNC: 27 U/L (ref 12–65)
ANION GAP SERPL CALC-SCNC: 11 MMOL/L (ref 7–16)
ARTERIAL PATENCY WRIST A: ABNORMAL
AST SERPL-CCNC: 17 U/L (ref 15–37)
BASE DEFICIT BLDA-SCNC: 1.3 MMOL/L (ref 0–2)
BASOPHILS # BLD: 0.1 K/UL (ref 0–0.2)
BASOPHILS NFR BLD: 1 % (ref 0–2)
BDY SITE: ABNORMAL
BILIRUB SERPL-MCNC: 0.3 MG/DL (ref 0.2–1.1)
BUN SERPL-MCNC: 23 MG/DL (ref 8–23)
CALCIUM SERPL-MCNC: 8.9 MG/DL (ref 8.3–10.4)
CHLORIDE SERPL-SCNC: 104 MMOL/L (ref 98–107)
CO2 SERPL-SCNC: 25 MMOL/L (ref 21–32)
COHGB MFR BLD: 0.5 % (ref 0.5–1.5)
CREAT SERPL-MCNC: 1.54 MG/DL (ref 0.8–1.5)
DIFFERENTIAL METHOD BLD: ABNORMAL
DO-HGB BLD-MCNC: 2 % (ref 0–5)
EOSINOPHIL # BLD: 0.2 K/UL (ref 0–0.8)
EOSINOPHIL NFR BLD: 3 % (ref 0.5–7.8)
ERYTHROCYTE [DISTWIDTH] IN BLOOD BY AUTOMATED COUNT: 15.8 % (ref 11.9–14.6)
GLOBULIN SER CALC-MCNC: 3.9 G/DL (ref 2.3–3.5)
GLUCOSE SERPL-MCNC: 166 MG/DL (ref 65–100)
HCO3 BLDA-SCNC: 24 MMOL/L (ref 22–26)
HCT VFR BLD AUTO: 41.1 % (ref 41.1–50.3)
HGB BLD-MCNC: 12.9 G/DL (ref 13.6–17.2)
HGB BLDMV-MCNC: 11.4 GM/DL (ref 11.7–15)
IMM GRANULOCYTES # BLD: 0 K/UL (ref 0–0.5)
IMM GRANULOCYTES NFR BLD AUTO: 0 % (ref 0–5)
INR PPP: 4.2
LYMPHOCYTES # BLD: 1.4 K/UL (ref 0.5–4.6)
LYMPHOCYTES NFR BLD: 19 % (ref 13–44)
MCH RBC QN AUTO: 23.4 PG (ref 26.1–32.9)
MCHC RBC AUTO-ENTMCNC: 31.4 G/DL (ref 31.4–35)
MCV RBC AUTO: 74.6 FL (ref 79.6–97.8)
METHGB MFR BLD: 0.5 % (ref 0–1.5)
MONOCYTES # BLD: 0.6 K/UL (ref 0.1–1.3)
MONOCYTES NFR BLD: 8 % (ref 4–12)
NEUTS SEG # BLD: 4.9 K/UL (ref 1.7–8.2)
NEUTS SEG NFR BLD: 69 % (ref 43–78)
OXYHGB MFR BLDA: 97.1 % (ref 94–97)
PCO2 BLDA: 44 MMHG (ref 35–45)
PEEP RESPIRATORY: 8 CM[H2O]
PH BLDA: 7.36 [PH] (ref 7.35–7.45)
PLATELET # BLD AUTO: 283 K/UL (ref 150–450)
PMV BLD AUTO: 9.1 FL (ref 10.8–14.1)
PO2 BLDA: 128 MMHG (ref 80–105)
POTASSIUM SERPL-SCNC: 3.8 MMOL/L (ref 3.5–5.1)
PROT SERPL-MCNC: 7.5 G/DL (ref 6.3–8.2)
PROTHROMBIN TIME: 40.6 SEC (ref 11.5–14.5)
RBC # BLD AUTO: 5.51 M/UL (ref 4.23–5.67)
RESP RATE: 16
SAO2 % BLD: 98 % (ref 92–98.5)
SERVICE CMNT-IMP: ABNORMAL
SODIUM SERPL-SCNC: 140 MMOL/L (ref 136–145)
VENTILATION MODE VENT: ABNORMAL
VT SETTING VENT: 500 ML
WBC # BLD AUTO: 7.2 K/UL (ref 4.3–11.1)

## 2018-03-10 PROCEDURE — 82803 BLOOD GASES ANY COMBINATION: CPT

## 2018-03-10 PROCEDURE — 74011250636 HC RX REV CODE- 250/636: Performed by: EMERGENCY MEDICINE

## 2018-03-10 PROCEDURE — 74011250636 HC RX REV CODE- 250/636

## 2018-03-10 PROCEDURE — 96367 TX/PROPH/DG ADDL SEQ IV INF: CPT | Performed by: EMERGENCY MEDICINE

## 2018-03-10 PROCEDURE — 0BH17EZ INSERTION OF ENDOTRACHEAL AIRWAY INTO TRACHEA, VIA NATURAL OR ARTIFICIAL OPENING: ICD-10-PCS | Performed by: EMERGENCY MEDICINE

## 2018-03-10 PROCEDURE — 99291 CRITICAL CARE FIRST HOUR: CPT | Performed by: INTERNAL MEDICINE

## 2018-03-10 PROCEDURE — 3331090002 HH PPS REVENUE DEBIT

## 2018-03-10 PROCEDURE — 99285 EMERGENCY DEPT VISIT HI MDM: CPT | Performed by: EMERGENCY MEDICINE

## 2018-03-10 PROCEDURE — 94002 VENT MGMT INPAT INIT DAY: CPT

## 2018-03-10 PROCEDURE — 77030013131 HC IV BLD ST ICUM -A

## 2018-03-10 PROCEDURE — 74011000258 HC RX REV CODE- 258: Performed by: INTERNAL MEDICINE

## 2018-03-10 PROCEDURE — 71046 X-RAY EXAM CHEST 2 VIEWS: CPT

## 2018-03-10 PROCEDURE — 74011250636 HC RX REV CODE- 250/636: Performed by: INTERNAL MEDICINE

## 2018-03-10 PROCEDURE — 31500 INSERT EMERGENCY AIRWAY: CPT | Performed by: EMERGENCY MEDICINE

## 2018-03-10 PROCEDURE — 74011636320 HC RX REV CODE- 636/320: Performed by: EMERGENCY MEDICINE

## 2018-03-10 PROCEDURE — 5A1945Z RESPIRATORY VENTILATION, 24-96 CONSECUTIVE HOURS: ICD-10-PCS | Performed by: EMERGENCY MEDICINE

## 2018-03-10 PROCEDURE — P9035 PLATELET PHERES LEUKOREDUCED: HCPCS | Performed by: INTERNAL MEDICINE

## 2018-03-10 PROCEDURE — 86900 BLOOD TYPING SEROLOGIC ABO: CPT | Performed by: EMERGENCY MEDICINE

## 2018-03-10 PROCEDURE — 65610000001 HC ROOM ICU GENERAL

## 2018-03-10 PROCEDURE — 85610 PROTHROMBIN TIME: CPT | Performed by: EMERGENCY MEDICINE

## 2018-03-10 PROCEDURE — 96375 TX/PRO/DX INJ NEW DRUG ADDON: CPT | Performed by: EMERGENCY MEDICINE

## 2018-03-10 PROCEDURE — P9059 PLASMA, FRZ BETWEEN 8-24HOUR: HCPCS | Performed by: EMERGENCY MEDICINE

## 2018-03-10 PROCEDURE — 85025 COMPLETE CBC W/AUTO DIFF WBC: CPT | Performed by: EMERGENCY MEDICINE

## 2018-03-10 PROCEDURE — 3331090001 HH PPS REVENUE CREDIT

## 2018-03-10 PROCEDURE — 96376 TX/PRO/DX INJ SAME DRUG ADON: CPT | Performed by: EMERGENCY MEDICINE

## 2018-03-10 PROCEDURE — 74011000250 HC RX REV CODE- 250: Performed by: EMERGENCY MEDICINE

## 2018-03-10 PROCEDURE — 30233K1 TRANSFUSION OF NONAUTOLOGOUS FROZEN PLASMA INTO PERIPHERAL VEIN, PERCUTANEOUS APPROACH: ICD-10-PCS | Performed by: EMERGENCY MEDICINE

## 2018-03-10 PROCEDURE — 36430 TRANSFUSION BLD/BLD COMPNT: CPT

## 2018-03-10 PROCEDURE — 71045 X-RAY EXAM CHEST 1 VIEW: CPT

## 2018-03-10 PROCEDURE — 96365 THER/PROPH/DIAG IV INF INIT: CPT | Performed by: EMERGENCY MEDICINE

## 2018-03-10 PROCEDURE — 36600 WITHDRAWAL OF ARTERIAL BLOOD: CPT

## 2018-03-10 PROCEDURE — 80053 COMPREHEN METABOLIC PANEL: CPT | Performed by: EMERGENCY MEDICINE

## 2018-03-10 PROCEDURE — 70491 CT SOFT TISSUE NECK W/DYE: CPT

## 2018-03-10 PROCEDURE — 74011000258 HC RX REV CODE- 258: Performed by: EMERGENCY MEDICINE

## 2018-03-10 RX ORDER — ETOMIDATE 2 MG/ML
30 INJECTION INTRAVENOUS ONCE
Status: COMPLETED | OUTPATIENT
Start: 2018-03-10 | End: 2018-03-10

## 2018-03-10 RX ORDER — PROPOFOL 10 MG/ML
0-50 VIAL (ML) INTRAVENOUS
Status: COMPLETED | OUTPATIENT
Start: 2018-03-10 | End: 2018-03-10

## 2018-03-10 RX ORDER — SODIUM CHLORIDE 0.9 % (FLUSH) 0.9 %
10 SYRINGE (ML) INJECTION
Status: COMPLETED | OUTPATIENT
Start: 2018-03-10 | End: 2018-03-10

## 2018-03-10 RX ORDER — LORAZEPAM 2 MG/ML
0.5 INJECTION INTRAMUSCULAR
Status: COMPLETED | OUTPATIENT
Start: 2018-03-10 | End: 2018-03-10

## 2018-03-10 RX ORDER — PROPOFOL 10 MG/ML
5-50 VIAL (ML) INTRAVENOUS
Status: DISCONTINUED | OUTPATIENT
Start: 2018-03-10 | End: 2018-03-14

## 2018-03-10 RX ORDER — SODIUM CHLORIDE 0.9 % (FLUSH) 0.9 %
5-10 SYRINGE (ML) INJECTION AS NEEDED
Status: DISCONTINUED | OUTPATIENT
Start: 2018-03-10 | End: 2018-03-21 | Stop reason: HOSPADM

## 2018-03-10 RX ORDER — DEXAMETHASONE SODIUM PHOSPHATE 100 MG/10ML
10 INJECTION INTRAMUSCULAR; INTRAVENOUS
Status: COMPLETED | OUTPATIENT
Start: 2018-03-10 | End: 2018-03-10

## 2018-03-10 RX ORDER — MORPHINE SULFATE 2 MG/ML
4 INJECTION, SOLUTION INTRAMUSCULAR; INTRAVENOUS
Status: COMPLETED | OUTPATIENT
Start: 2018-03-10 | End: 2018-03-10

## 2018-03-10 RX ORDER — PROPOFOL 10 MG/ML
INJECTION, EMULSION INTRAVENOUS
Status: COMPLETED
Start: 2018-03-10 | End: 2018-03-10

## 2018-03-10 RX ORDER — FENTANYL CITRATE-0.9 % NACL/PF 25 MCG/ML
0-200 PLASTIC BAG, INJECTION (ML) INJECTION
Status: DISCONTINUED | OUTPATIENT
Start: 2018-03-10 | End: 2018-03-14

## 2018-03-10 RX ORDER — MIDAZOLAM HYDROCHLORIDE 1 MG/ML
INJECTION, SOLUTION INTRAMUSCULAR; INTRAVENOUS
Status: COMPLETED
Start: 2018-03-10 | End: 2018-03-10

## 2018-03-10 RX ORDER — SODIUM CHLORIDE 9 MG/ML
75 INJECTION, SOLUTION INTRAVENOUS CONTINUOUS
Status: DISCONTINUED | OUTPATIENT
Start: 2018-03-10 | End: 2018-03-12

## 2018-03-10 RX ORDER — SUCCINYLCHOLINE CHLORIDE 20 MG/ML
100 INJECTION INTRAMUSCULAR; INTRAVENOUS
Status: COMPLETED | OUTPATIENT
Start: 2018-03-10 | End: 2018-03-10

## 2018-03-10 RX ORDER — ONDANSETRON 2 MG/ML
4 INJECTION INTRAMUSCULAR; INTRAVENOUS
Status: COMPLETED | OUTPATIENT
Start: 2018-03-10 | End: 2018-03-10

## 2018-03-10 RX ORDER — HYDRALAZINE HYDROCHLORIDE 20 MG/ML
10 INJECTION INTRAMUSCULAR; INTRAVENOUS
Status: DISCONTINUED | OUTPATIENT
Start: 2018-03-10 | End: 2018-03-21 | Stop reason: HOSPADM

## 2018-03-10 RX ORDER — SODIUM CHLORIDE 0.9 % (FLUSH) 0.9 %
5-10 SYRINGE (ML) INJECTION EVERY 8 HOURS
Status: DISCONTINUED | OUTPATIENT
Start: 2018-03-10 | End: 2018-03-21 | Stop reason: HOSPADM

## 2018-03-10 RX ORDER — SODIUM CHLORIDE 9 MG/ML
250 INJECTION, SOLUTION INTRAVENOUS AS NEEDED
Status: DISCONTINUED | OUTPATIENT
Start: 2018-03-10 | End: 2018-03-15

## 2018-03-10 RX ORDER — LABETALOL HYDROCHLORIDE 5 MG/ML
10 INJECTION, SOLUTION INTRAVENOUS
Status: DISCONTINUED | OUTPATIENT
Start: 2018-03-10 | End: 2018-03-17

## 2018-03-10 RX ORDER — MIDAZOLAM HYDROCHLORIDE 1 MG/ML
5 INJECTION, SOLUTION INTRAMUSCULAR; INTRAVENOUS ONCE
Status: COMPLETED | OUTPATIENT
Start: 2018-03-10 | End: 2018-03-10

## 2018-03-10 RX ADMIN — PROMETHAZINE HYDROCHLORIDE 12.5 MG: 25 INJECTION INTRAMUSCULAR; INTRAVENOUS at 18:19

## 2018-03-10 RX ADMIN — Medication 10 ML: at 17:11

## 2018-03-10 RX ADMIN — Medication 10 ML: at 21:41

## 2018-03-10 RX ADMIN — PHYTONADIONE 10 MG: 10 INJECTION, EMULSION INTRAMUSCULAR; INTRAVENOUS; SUBCUTANEOUS at 19:15

## 2018-03-10 RX ADMIN — MORPHINE SULFATE 4 MG: 2 INJECTION, SOLUTION INTRAMUSCULAR; INTRAVENOUS at 18:19

## 2018-03-10 RX ADMIN — PROPOFOL 30 MCG/KG/MIN: 10 INJECTION, EMULSION INTRAVENOUS at 23:55

## 2018-03-10 RX ADMIN — Medication 10 MCG/KG/MIN: at 19:05

## 2018-03-10 RX ADMIN — Medication 50 MCG/HR: at 20:41

## 2018-03-10 RX ADMIN — METHYLPREDNISOLONE SODIUM SUCCINATE 40 MG: 40 INJECTION, POWDER, FOR SOLUTION INTRAMUSCULAR; INTRAVENOUS at 22:46

## 2018-03-10 RX ADMIN — MIDAZOLAM 5 MG: 1 INJECTION INTRAMUSCULAR; INTRAVENOUS at 19:13

## 2018-03-10 RX ADMIN — DEXAMETHASONE SODIUM PHOSPHATE 10 MG: 10 INJECTION INTRAMUSCULAR; INTRAVENOUS at 17:53

## 2018-03-10 RX ADMIN — PROPOFOL 10 MCG/KG/MIN: 10 INJECTION, EMULSION INTRAVENOUS at 19:05

## 2018-03-10 RX ADMIN — SODIUM CHLORIDE 1000 ML: 900 INJECTION, SOLUTION INTRAVENOUS at 16:41

## 2018-03-10 RX ADMIN — DESMOPRESSIN ACETATE 32 MCG: 4 SOLUTION INTRAVENOUS at 22:38

## 2018-03-10 RX ADMIN — IOPAMIDOL 100 ML: 755 INJECTION, SOLUTION INTRAVENOUS at 17:11

## 2018-03-10 RX ADMIN — SUCCINYLCHOLINE CHLORIDE 100 MG: 20 INJECTION, SOLUTION INTRAMUSCULAR; INTRAVENOUS at 18:50

## 2018-03-10 RX ADMIN — SODIUM CHLORIDE 100 ML: 900 INJECTION, SOLUTION INTRAVENOUS at 17:11

## 2018-03-10 RX ADMIN — SODIUM CHLORIDE 3 G: 900 INJECTION, SOLUTION INTRAVENOUS at 22:40

## 2018-03-10 RX ADMIN — PROPOFOL 10 MCG/KG/MIN: 10 INJECTION, EMULSION INTRAVENOUS at 19:30

## 2018-03-10 RX ADMIN — PROPOFOL 30 MCG/KG/MIN: 10 INJECTION, EMULSION INTRAVENOUS at 23:15

## 2018-03-10 RX ADMIN — ONDANSETRON 4 MG: 2 INJECTION INTRAMUSCULAR; INTRAVENOUS at 16:41

## 2018-03-10 RX ADMIN — ONDANSETRON 4 MG: 2 INJECTION INTRAMUSCULAR; INTRAVENOUS at 17:14

## 2018-03-10 RX ADMIN — MORPHINE SULFATE 4 MG: 4 INJECTION, SOLUTION INTRAMUSCULAR; INTRAVENOUS at 16:41

## 2018-03-10 RX ADMIN — ETOMIDATE 30 MG: 40 INJECTION, SOLUTION INTRAVENOUS at 18:50

## 2018-03-10 RX ADMIN — MIDAZOLAM HYDROCHLORIDE 5 MG: 1 INJECTION, SOLUTION INTRAMUSCULAR; INTRAVENOUS at 19:13

## 2018-03-10 RX ADMIN — SODIUM CHLORIDE 75 ML/HR: 900 INJECTION, SOLUTION INTRAVENOUS at 21:41

## 2018-03-10 RX ADMIN — LORAZEPAM 0.5 MG: 2 INJECTION, SOLUTION INTRAMUSCULAR; INTRAVENOUS at 17:14

## 2018-03-10 NOTE — ED PROVIDER NOTES
HPI:  69 male history of CAD,, on Coumadin  For coagulation defects, hhypertension hyperlipidemia is here with left facial swollen after a tooth filling today. He is also complaining of throat swelling sensation. Feels like he cannot take any deep breath. Feeling nauseous. Did not take off his Coumadin for his dental filling procedure was done at noon today. Symptoms started 2 hours after. No chest pain. ROS  Constitutional: No fever, no chills  Skin: no rash  Eye: No vision changes  ENMT: No sore throat  Respiratory: No shortness of breath  Cardiovascular: No chest pain  Gastrointestinal: No vomiting, no nausea, no diarrhea, no abdominal pain  : No dysuria, no hematuria  MSK: No back pain, no muscle pain  Neuro: No headache, no change in mental status, no numbness, no tingling, no weakness  Psych: + anxiety, no depression  Endocrine: No hyperglycemia  All other review of systems positive per history of present illness and the above otherwise negative or noncontributory.     Visit Vitals    BP (!) 215/101    Pulse 82    Temp 98.4 °F (36.9 °C)    Resp 30    Ht 6' 1\" (1.854 m)    Wt 97.1 kg (214 lb)    SpO2 97%    BMI 28.23 kg/m2     Past Medical History:   Diagnosis Date    Arthritis     BPH (benign prostatic hypertrophy)     bph    CAD (coronary artery disease) 11/2009    3 stents per pt    Claustrophobia     Coagulation defects     takes Plavix, Coumadin and aspirin- held for surgery     Former cigarette smoker     GERD (gastroesophageal reflux disease)     controlled with prevacid- only thing that controls it    H/O heart artery stent     X3    Heart disease, unspecified     Heart murmur     MVP   LVEF 50%    Hyperlipidemia     Hypertension     Neuropathy     Other calculus in bladder     Other pulmonary embolism and infarction     PONV (postoperative nausea and vomiting)     Poor historian     Pure hypercholesterolemia     no current meds    [facility]     Skin cancer     reomoved from Right side of head    Thromboembolus ([facility] 2012    3 clots in leg and 1 PE    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)     Type II or unspecified type diabetes mellitus without mention of complication, not stated as uncontrolled     oral and insulin reliant/AVg - at night/ s.s of hypoglycemia @ 48    Unspecified essential hypertension     Unspecified hyperplasia of prostate with urinary obstruction and other lower urinary tract symptoms (LUTS)     monitored by Dr Aleshia Crespo Unspecified sleep apnea     pt denies- it is on Dr Lauren Gardner H&P     Past Surgical History:   Procedure Laterality Date    HX COLONOSCOPY      polyps removed    HX HEART CATHETERIZATION  2009    stents x 3    HX OTHER SURGICAL      BLADDER STONE REMOVAL    HX PROSTATECTOMY      TRANSURETHRAL- pt denies    HX UROLOGICAL      CYSTOSCOPY    [place] PROSTATE BIOPSY, NEEDLE, SATURATION SAMPLING      AND ULTASOUND     Prior to Admission Medications   Prescriptions Last Dose Informant Patient Reported? Taking? Cetirizine (ZYRTEC) 10 mg cap   Yes No   Sig: Take 10 mg by mouth daily. HYDROcodone-acetaminophen (NORCO) 7.5-325 mg per tablet   No No   Sig: Take 1 Tab by mouth every eight (8) hours as needed for Pain. Max Daily Amount: 3 Tabs. LANSOPRAZOLE (PREVACID PO)   Yes No   Sig: Take 1 Tab by mouth daily. aspirin delayed-release 81 mg tablet   Yes No   Sig: Take 81 mg by mouth two (2) times a day. clopidogrel (PLAVIX) 75 mg tablet   Yes No   Sig: Take 75 mg by mouth daily. Last dose mid 2018   gabapentin (NEURONTIN) 300 mg capsule   Yes No   Sig: Take 300 mg by mouth two (2) times a day. glipiZIDE (GLUCOTROL) 10 mg tablet   Yes No   Sig: Take 10 mg by mouth daily. insulin detemir (LEVEMIR) 100 unit/mL injection   Yes No   Si Units by SubCUTAneous route nightly.   magnesium oxide (MAG-OX) 400 mg tablet   Yes No   Sig: Take 400 mg by mouth daily.    metoprolol tartrate (LOPRESSOR) 50 mg tablet   Yes No   Sig: Take 50 mg by mouth daily. oxyCODONE-acetaminophen (PERCOCET 7.5) 7.5-325 mg per tablet   No No   Sig: Take 1 Tab by mouth every eight (8) hours as needed for Pain. Max Daily Amount: 3 Tabs.   tadalafil (CIALIS) 5 mg tablet   No No   Sig: Take 1 Tab by mouth daily. tamsulosin (FLOMAX) 0.4 mg capsule   No No   Sig: Take 1 Cap by mouth daily. Facility-Administered Medications: None         Adult Exam   General: alert, awake. Appeared anxious. Holding the left side of his jaw. Complaining of pain  Head: normocephalic, atraumatic  ENT: moist mucous membranes  No sublingual swelling. There is left  Facial swelling in the  Maxilla with tenderness to palpation without signs of bruising. His left lower jaw where he had the filling with nonspecific swollen without signs of active bleeding. Unable to assess posterior pharynx because patient was not willing to open his mouth secondary to pain  Neck: supple, non-tender; full range of motion  Cardiovascular: regular rate and rhythm, normal peripheral perfusion, no edema  Respiratory: lungs are clear to auscultation;no wheezing, rales or rhonchi. Tachypnea. Gastrointestinal: soft, non-tender; no rebound or guarding, no peritoneal signs, no distension  Back: non-tender, full range of motion  Musculoskeletal: normal range of motion, normal strength, no gross deformities  Neurological: alert and oriented x 4, no gross focal deficits; normal speech  Psychiatric: anxious appearing    MDM: his lungs are clear no wheezing, stridor however he complains of difficulty breathing and throat swelling sensation. Also pain in his left jaw. He is taking pain medication orally that was prescribed. Has not been given antibiotic. INR level 4.2. I do not see any signs of sublingual swelling that would be consistent with Emeka's angina. It is too soon after his procedure for that to occur in my opinion.   Due to elevated INR on Coumadin to concern for possible bleed and in the posterior  Throat but I am unable to see at this time. We'll obtain CT scan soft tissue neck with IV contrast for assessment, IV fluid, morphine, Zofran. He also has claustrophobia. We'll give IV Ativan for anxiety. 1830 - By the time patient has returned to the ED his facial swelling has increased. He appeared to have increased work of breathing. Spoke to him about intubation to control his airway but patient initially refused. Got daughter on the phone who was able to convince him to let us intubate him. RSI with etomidate, succinylcholine performed with nursing staff, respiratory therapies and all equipment at bedside. Do not have Kcentra here. We'll give 3 units of FFP, 10 mg IV vitamin K  To reverse INR. Condition is critical.  Consulted the intensivist who will come and admit the patient. Of note patient also had bilateral nostril bleed and status post procedure. Bilateral Rhino rocket placed.    ===================================================================  This patient is critically ill and there is a high probability of of imminent or life threatening deterioration in the patient's condition without immediate management. The nature of the patient's clinical problem is: airway compromised. Hemorrhage. I have spent  At least 65 minutes in direct patient care, documentation, review of labs/xrays/old records, discussion with Intensivist.     The time involved in the performance of separately reportable procedures was not counted toward critical care time. Sukumar Swain MD; 3/10/2018 @8:12 PM  ===================================================================            Intubation  Procedure Note - Intubation  Performed by: Joao Ervin MD  Immediately prior to the procedure, the patient was reevaluated and found suitable for the planned procedure and any planned medications.   Immediately prior to the procedure a timeout was called to verify the correct patient, procedure, equipment, staff, and markings as appropriate. Indication for procedure: Bleeding, hemorrhage, airway compression, stridor, respiratory distress    RSI was performed with Etomidate and Succ. The patient was orotracheally intubated with a size 7.5 endotracheal tube using a Glidescope. The tube was advanced to 23 centimeters at the teeth. The endotracheal tube location was confirmed by color metrics, auscultation, capnography and x-ray. Number of attempts: 1  Complications: None  The procedure took 15 minutes. Dragon voice recognition software was used to create this note. Although the note has been reviewed and corrected where necessary, additional errors may have been overlooked and remain in the text.

## 2018-03-10 NOTE — ED TRIAGE NOTES
Pt arrived via POV (drove self) with c/o L side facial swelling s/p tooth filling today. Pt states he feels like his throat is closing.

## 2018-03-11 ENCOUNTER — APPOINTMENT (OUTPATIENT)
Dept: GENERAL RADIOLOGY | Age: 78
DRG: 920 | End: 2018-03-11
Attending: INTERNAL MEDICINE
Payer: MEDICARE

## 2018-03-11 PROBLEM — E11.9 TYPE II DIABETES MELLITUS (HCC): Status: ACTIVE | Noted: 2018-03-11

## 2018-03-11 LAB
ALBUMIN SERPL-MCNC: 3.2 G/DL (ref 3.2–4.6)
ALBUMIN/GLOB SERPL: 1.1 {RATIO} (ref 1.2–3.5)
ALP SERPL-CCNC: 118 U/L (ref 50–136)
ALT SERPL-CCNC: 21 U/L (ref 12–65)
ANION GAP SERPL CALC-SCNC: 10 MMOL/L (ref 7–16)
AST SERPL-CCNC: 16 U/L (ref 15–37)
BASOPHILS # BLD: 0 K/UL (ref 0–0.2)
BASOPHILS NFR BLD: 0 % (ref 0–2)
BILIRUB SERPL-MCNC: 0.5 MG/DL (ref 0.2–1.1)
BLD PROD TYP BPU: NORMAL
BPU ID: NORMAL
BUN SERPL-MCNC: 24 MG/DL (ref 8–23)
CALCIUM SERPL-MCNC: 8 MG/DL (ref 8.3–10.4)
CHLORIDE SERPL-SCNC: 104 MMOL/L (ref 98–107)
CO2 SERPL-SCNC: 25 MMOL/L (ref 21–32)
CREAT SERPL-MCNC: 1.36 MG/DL (ref 0.8–1.5)
DIFFERENTIAL METHOD BLD: ABNORMAL
EOSINOPHIL # BLD: 0 K/UL (ref 0–0.8)
EOSINOPHIL NFR BLD: 0 % (ref 0.5–7.8)
ERYTHROCYTE [DISTWIDTH] IN BLOOD BY AUTOMATED COUNT: 15.9 % (ref 11.9–14.6)
GLOBULIN SER CALC-MCNC: 2.9 G/DL (ref 2.3–3.5)
GLUCOSE BLD STRIP.AUTO-MCNC: 155 MG/DL (ref 65–100)
GLUCOSE BLD STRIP.AUTO-MCNC: 234 MG/DL (ref 65–100)
GLUCOSE BLD STRIP.AUTO-MCNC: 273 MG/DL (ref 65–100)
GLUCOSE SERPL-MCNC: 269 MG/DL (ref 65–100)
HCT VFR BLD AUTO: 31.3 % (ref 41.1–50.3)
HGB BLD-MCNC: 9.5 G/DL (ref 13.6–17.2)
IMM GRANULOCYTES # BLD: 0 K/UL (ref 0–0.5)
IMM GRANULOCYTES NFR BLD AUTO: 0 % (ref 0–5)
INR PPP: 1.4
LYMPHOCYTES # BLD: 0.4 K/UL (ref 0.5–4.6)
LYMPHOCYTES NFR BLD: 5 % (ref 13–44)
MCH RBC QN AUTO: 22.7 PG (ref 26.1–32.9)
MCHC RBC AUTO-ENTMCNC: 30.4 G/DL (ref 31.4–35)
MCV RBC AUTO: 74.7 FL (ref 79.6–97.8)
MONOCYTES # BLD: 0.1 K/UL (ref 0.1–1.3)
MONOCYTES NFR BLD: 1 % (ref 4–12)
NEUTS SEG # BLD: 7 K/UL (ref 1.7–8.2)
NEUTS SEG NFR BLD: 94 % (ref 43–78)
PLATELET # BLD AUTO: 230 K/UL (ref 150–450)
PMV BLD AUTO: 9 FL (ref 10.8–14.1)
POTASSIUM SERPL-SCNC: 4.3 MMOL/L (ref 3.5–5.1)
PROT SERPL-MCNC: 6.1 G/DL (ref 6.3–8.2)
PROTHROMBIN TIME: 17 SEC (ref 11.5–14.5)
RBC # BLD AUTO: 4.19 M/UL (ref 4.23–5.67)
SODIUM SERPL-SCNC: 139 MMOL/L (ref 136–145)
STATUS OF UNIT,%ST: NORMAL
UNIT DIVISION, %UDIV: 0
WBC # BLD AUTO: 7.5 K/UL (ref 4.3–11.1)

## 2018-03-11 PROCEDURE — 80053 COMPREHEN METABOLIC PANEL: CPT | Performed by: INTERNAL MEDICINE

## 2018-03-11 PROCEDURE — 99233 SBSQ HOSP IP/OBS HIGH 50: CPT | Performed by: INTERNAL MEDICINE

## 2018-03-11 PROCEDURE — 36430 TRANSFUSION BLD/BLD COMPNT: CPT

## 2018-03-11 PROCEDURE — 71045 X-RAY EXAM CHEST 1 VIEW: CPT

## 2018-03-11 PROCEDURE — 82962 GLUCOSE BLOOD TEST: CPT

## 2018-03-11 PROCEDURE — 77030032490 HC SLV COMPR SCD KNE COVD -B

## 2018-03-11 PROCEDURE — 74011636637 HC RX REV CODE- 636/637: Performed by: INTERNAL MEDICINE

## 2018-03-11 PROCEDURE — 74011000258 HC RX REV CODE- 258: Performed by: INTERNAL MEDICINE

## 2018-03-11 PROCEDURE — P9059 PLASMA, FRZ BETWEEN 8-24HOUR: HCPCS | Performed by: EMERGENCY MEDICINE

## 2018-03-11 PROCEDURE — 65610000001 HC ROOM ICU GENERAL

## 2018-03-11 PROCEDURE — 85025 COMPLETE CBC W/AUTO DIFF WBC: CPT | Performed by: INTERNAL MEDICINE

## 2018-03-11 PROCEDURE — 74011000250 HC RX REV CODE- 250: Performed by: INTERNAL MEDICINE

## 2018-03-11 PROCEDURE — 85610 PROTHROMBIN TIME: CPT | Performed by: INTERNAL MEDICINE

## 2018-03-11 PROCEDURE — 94003 VENT MGMT INPAT SUBQ DAY: CPT

## 2018-03-11 PROCEDURE — C9113 INJ PANTOPRAZOLE SODIUM, VIA: HCPCS | Performed by: INTERNAL MEDICINE

## 2018-03-11 PROCEDURE — 74018 RADEX ABDOMEN 1 VIEW: CPT

## 2018-03-11 PROCEDURE — 36415 COLL VENOUS BLD VENIPUNCTURE: CPT | Performed by: INTERNAL MEDICINE

## 2018-03-11 PROCEDURE — 74011250636 HC RX REV CODE- 250/636: Performed by: INTERNAL MEDICINE

## 2018-03-11 RX ADMIN — Medication 10 ML: at 05:00

## 2018-03-11 RX ADMIN — METHYLPREDNISOLONE SODIUM SUCCINATE 40 MG: 40 INJECTION, POWDER, FOR SOLUTION INTRAMUSCULAR; INTRAVENOUS at 13:33

## 2018-03-11 RX ADMIN — SODIUM CHLORIDE 40 MG: 9 INJECTION INTRAMUSCULAR; INTRAVENOUS; SUBCUTANEOUS at 08:06

## 2018-03-11 RX ADMIN — SODIUM CHLORIDE 3 G: 900 INJECTION, SOLUTION INTRAVENOUS at 10:54

## 2018-03-11 RX ADMIN — INSULIN HUMAN 2 UNITS: 100 INJECTION, SOLUTION PARENTERAL at 17:09

## 2018-03-11 RX ADMIN — SODIUM CHLORIDE 3 G: 900 INJECTION, SOLUTION INTRAVENOUS at 05:04

## 2018-03-11 RX ADMIN — SODIUM CHLORIDE 75 ML/HR: 900 INJECTION, SOLUTION INTRAVENOUS at 13:43

## 2018-03-11 RX ADMIN — Medication 10 ML: at 22:20

## 2018-03-11 RX ADMIN — METHYLPREDNISOLONE SODIUM SUCCINATE 40 MG: 40 INJECTION, POWDER, FOR SOLUTION INTRAMUSCULAR; INTRAVENOUS at 22:20

## 2018-03-11 RX ADMIN — SODIUM CHLORIDE 3 G: 900 INJECTION, SOLUTION INTRAVENOUS at 22:21

## 2018-03-11 RX ADMIN — PROPOFOL 10 MCG/KG/MIN: 10 INJECTION, EMULSION INTRAVENOUS at 10:54

## 2018-03-11 RX ADMIN — METHYLPREDNISOLONE SODIUM SUCCINATE 40 MG: 40 INJECTION, POWDER, FOR SOLUTION INTRAMUSCULAR; INTRAVENOUS at 05:04

## 2018-03-11 RX ADMIN — INSULIN HUMAN 6 UNITS: 100 INJECTION, SOLUTION PARENTERAL at 08:02

## 2018-03-11 RX ADMIN — INSULIN HUMAN 4 UNITS: 100 INJECTION, SOLUTION PARENTERAL at 12:35

## 2018-03-11 RX ADMIN — SODIUM CHLORIDE 3 G: 900 INJECTION, SOLUTION INTRAVENOUS at 17:09

## 2018-03-11 RX ADMIN — Medication 10 ML: at 13:33

## 2018-03-11 RX ADMIN — Medication 75 MCG/HR: at 19:07

## 2018-03-11 NOTE — PROGRESS NOTES
TRANSFER - IN REPORT:    Verbal report received from Pernilles Vei 115 RN(name) on Wale Cabrera  being received from ED(unit) for routine progression of care      Report consisted of patients Situation, Background, Assessment and   Recommendations(SBAR). Information from the following report(s) ED Summary was reviewed with the receiving nurse. Opportunity for questions and clarification was provided. Assessment completed upon patients arrival to unit and care assumed. Pt arrived to 3109 and placed on our monitor. Pt afebrile, hr 80s NSR, sbp 180s but once settled 140s, O2 sat 99% on vent. Dual skin assessment complete with Beronica Boyd RN. Pt skin generally c/d/i. Bilateral upper arm hematomas noted from possible PIV insertion. Pressure applied. Pt noted to have bilateral rhinorockets in place - saturated with blood. Oral blood noted and suctioned. OGT in place from ER and to LIS with minimal output. Rai in place from ER with bloody drainage noted at end of penis. Urine yellow and clear. Skin is fragile, sacrum with no breakdown or DTI - allevyn applied for pt safety. Small healing scar noted above tail bone 2\" vertical along spine. Daughter states recent back surgery. Full assessment in flow sheet.

## 2018-03-11 NOTE — PROGRESS NOTES
Daughter, Herby Gaucher ([phone]), updated with pt condition. Phone consent received and verified with Gloria Saavedra RN for ICU treatment and receiving Blood products.

## 2018-03-11 NOTE — ED NOTES
Pt had increased WOB and increased swelling to face. GENE Coronado spoke with daughter, Justus Isaac about intubating pt. Pt is very claustrophobic but after speaking with daughter agreed to intubation.  30g etinudate and 100 syccinylcholine given and pt intubated by Dr. Bennie Jara.

## 2018-03-11 NOTE — PROGRESS NOTES
Care Daily Progress Note: 3/11/2018  Admission Date: 3/10/2018     The patient's chart is reviewed and the patient is discussed with the staff. 70 yo WM with upper airway obstruction for edema/hematoma following reportedly having filling placed by dentist on 3/10. Presented with respiratory distress and facial swelling. Intubate for airway stability. Subjective:     Sedated on vent. Rhino rockets in place. ENT has seen and recommended no intervention at this time (per nurse) and maintain intubation until swelling subsides. Written consult pending. Family at bedside and reports little change in facial appearance from last night.      Current Facility-Administered Medications   Medication Dose Route Frequency    insulin regular (NOVOLIN R, HUMULIN R) injection   SubCUTAneous Q6H    ampicillin-sulbactam (UNASYN) 3 g in 0.9% sodium chloride (MBP/ADV) 100 mL  3 g IntraVENous Q6H    0.9% sodium chloride infusion 250 mL  250 mL IntraVENous PRN    0.9% sodium chloride infusion 250 mL  250 mL IntraVENous PRN    sodium chloride (NS) flush 5-10 mL  5-10 mL IntraVENous Q8H    sodium chloride (NS) flush 5-10 mL  5-10 mL IntraVENous PRN    0.9% sodium chloride infusion  75 mL/hr IntraVENous CONTINUOUS    fentaNYL in normal saline (pf) 25 mcg/mL infusion  0-200 mcg/hr IntraVENous TITRATE    propofol (DIPRIVAN) infusion  5-50 mcg/kg/min IntraVENous TITRATE    methylPREDNISolone (PF) (SOLU-MEDROL) injection 40 mg  40 mg IntraVENous Q8H    pantoprazole (PROTONIX) 40 mg in sodium chloride 10 mL injection  40 mg IntraVENous DAILY    hydrALAZINE (APRESOLINE) 20 mg/mL injection 10 mg  10 mg IntraVENous Q6H PRN    labetalol (NORMODYNE;TRANDATE) injection 10 mg  10 mg IntraVENous Q2H PRN       Review of Systems    Constitutional:  negative for fever, chills, sweats  Cardiovascular:  negative for chest pain, palpitations, syncope, edema  Gastrointestinal:  negative for dysphagia, reflux, vomiting, diarrhea, abdominal pain, or melena  Neurologic:  negative for focal weakness, numbness, headache        Objective:     Vitals:    03/11/18 0604 03/11/18 0703 03/11/18 0803 03/11/18 0821   BP: 130/69 124/65 125/66    Pulse: 77 69 64 (!) 58   Resp:  12 22 8   Temp:  98 °F (36.7 °C)     SpO2: 97% 97% 98% 98%   Weight:       Height:           Intake and Output:   03/09 1901 - 03/11 0700  In: 1743.9 [I.V.:762.9]  Out: 800 [Urine:700]  03/11 0701 - 03/11 1900  In: -   Out: 180 [Urine:180]    Physical Exam:          Constitutional:  intubated and mechanically ventilated. EENMT:  Sclera clear, pupils equal, oral mucosa moist; rhino rockets in place with no active bleeding present  Respiratory: clear  Cardiovascular:  RRR with no M,G,R;  Gastrointestinal:  soft with no tenderness; positive bowel sounds present  Musculoskeletal:  warm with no cyanosis, trace lower leg edema  Skin:  no jaundice or ecchymosis  Neurologic: no gross neuro deficits     Psychiatric:  alert and FC when sedation lightened. LINES:  ETT, PIV, herrera    DRIPS:  Propofol    CXR:          CT Neck:        FINDINGS: There is abnormal soft tissue in the left parapharyngeal region  compressing and displacing the oropharynx, likely represents hemorrhage given  the history. No definite active hemorrhage is seen, although this might be  difficult to detect on a single phase study. There are no fractures or bony  lesions. Carotid arteries are patent. The lung apices are clear.   There is  fluid distention of the esophagus.       IMPRESSION:      Left parapharyngeal hemorrhage with compression of the oropharynx    Ventilator Settings  Mode FIO2 Rate Tidal Volume Pressure PEEP   PRVC  36 %    500 ml     8 cm H20      Peak airway pressure: 19 cm H2O   Minute ventilation: 4.3 l/min     ABG:   Recent Labs      03/10/18   2230   PH  7.36   PCO2  44   PO2  128*   HCO3  24        LAB  Recent Labs      03/11/18   0758   GLUCPOC  273*     Recent Labs      03/11/18   6486 03/10/18   1608   WBC  7.5  7.2   HGB  9.5*  12.9*   HCT  31.3*  41.1   PLT  230  283   INR  1.4  4.2*     Recent Labs      03/11/18   0416  03/10/18   1608   NA  139  140   K  4.3  3.8   CL  104  104   CO2  25  25   GLU  269*  166*   BUN  24*  23   CREA  1.36  1.54*   CA  8.0*  8.9   ALB  3.2  3.6   SGOT  16  17     No results for input(s): LCAD, LAC in the last 72 hours. Assessment:  (Medical Decision Making)     Hospital Problems  Date Reviewed: 12/7/2017          Codes Class Noted POA    * (Principal)Surgical wound hemorrhage after dental procedure ICD-10-CM: K91.840  ICD-9-CM: 998.11  3/10/2018 Unknown    Bleeding appears to have stopped. Airway compromise ICD-10-CM: J98.8  ICD-9-CM: 519.8  3/10/2018 Unknown    Will likely take several days for airway to become adequate for extubation    Epistaxis ICD-10-CM: R04.0  ICD-9-CM: 784.7  3/10/2018 Unknown    Seems controlled    Hx pulmonary embolism ICD-10-CM: Z86.711  ICD-9-CM: V12.55  3/10/2018 Unknown    Unable to anticoagulate at present          Plan:  (Medical Decision Making)     Follow H/H. Continue antibiotics. Continue steroids. Await ENT recommendations on how long to maintain nasal tampons. Control BS with SSI for now. May need to add LA insulin on steroids. --    More than 50% of the time documented was spent in face-to-face contact with the patient and in the care of the patient on the floor/unit where the patient is located.     Juan Mendieta MD

## 2018-03-11 NOTE — H&P
HISTORY AND PHYSICAL      Jason Murphy    3/10/2018    Date of Admission:  3/10/2018    The patient's chart is reviewed and the patient is discussed with the staff. Subjective:     Patient is a 69 y.o.  male presents with Airway compromise ,  .    69 YCM with Oral anticoagulation with coumadin , had dental work today without holding the coumadin , after the dental work , patient started having feeling of his throat closing , he came in to the ED . Patient is taking coumadin and palvix for it looks like CAD and Hx of PE from the old problem list . Patient had blood int he oral cavity , epistaxis . He had nasal packing , then rhino rockets. Review of Systems  Review of systems not obtained due to patient factors. Patient Active Problem List   Diagnosis Code    Bladder stone N21.0    Heart disease, unspecified I51.9    Other pulmonary embolism and infarction I26.99    Type II or unspecified type diabetes mellitus without mention of complication, not stated as uncontrolled E11.9    Pure hypercholesterolemia E78.00    Unspecified essential hypertension I10    Other calculus in bladder N21.0    Unspecified hyperplasia of prostate with urinary obstruction and other lower urinary tract symptoms (LUTS) N40.1, N13.8    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) I80.209    Myopathy G72.9    Arthralgia M25.50    Lumbar stenosis with neurogenic claudication M48.062    Type 2 diabetes mellitus with diabetic neuropathy (HCC) E11.40    Lumbar stenosis M48.061    Surgical wound hemorrhage after dental procedure K91.840    Airway compromise J98.8    Epistaxis R04.0       Prior to Admission Medications   Prescriptions Last Dose Informant Patient Reported? Taking? Cetirizine (ZYRTEC) 10 mg cap   Yes No   Sig: Take 10 mg by mouth daily.    HYDROcodone-acetaminophen (NORCO) 7.5-325 mg per tablet   No No   Sig: Take 1 Tab by mouth every eight (8) hours as needed for Pain. Max Daily Amount: 3 Tabs. LANSOPRAZOLE (PREVACID PO)   Yes No   Sig: Take 1 Tab by mouth daily. aspirin delayed-release 81 mg tablet   Yes No   Sig: Take 81 mg by mouth two (2) times a day. clopidogrel (PLAVIX) 75 mg tablet   Yes No   Sig: Take 75 mg by mouth daily. Last dose mid 2018   gabapentin (NEURONTIN) 300 mg capsule   Yes No   Sig: Take 300 mg by mouth two (2) times a day. glipiZIDE (GLUCOTROL) 10 mg tablet   Yes No   Sig: Take 10 mg by mouth daily. insulin detemir (LEVEMIR) 100 unit/mL injection   Yes No   Si Units by SubCUTAneous route nightly.   magnesium oxide (MAG-OX) 400 mg tablet   Yes No   Sig: Take 400 mg by mouth daily. metoprolol tartrate (LOPRESSOR) 50 mg tablet   Yes No   Sig: Take 50 mg by mouth daily. oxyCODONE-acetaminophen (PERCOCET 7.5) 7.5-325 mg per tablet   No No   Sig: Take 1 Tab by mouth every eight (8) hours as needed for Pain. Max Daily Amount: 3 Tabs.   tadalafil (CIALIS) 5 mg tablet   No No   Sig: Take 1 Tab by mouth daily. tamsulosin (FLOMAX) 0.4 mg capsule   No No   Sig: Take 1 Cap by mouth daily.       Facility-Administered Medications: None       Past Medical History:   Diagnosis Date    Arthritis     BPH (benign prostatic hypertrophy)     bph    CAD (coronary artery disease) 2009    3 stents per pt    Claustrophobia     Coagulation defects     takes Plavix, Coumadin and aspirin- held for surgery     Former cigarette smoker     GERD (gastroesophageal reflux disease)     controlled with prevacid- only thing that controls it    H/O heart artery stent     X3    Heart disease, unspecified     Heart murmur     MVP   LVEF 50%    Hyperlipidemia     Hypertension     Neuropathy     Other calculus in bladder     Other pulmonary embolism and infarction     PONV (postoperative nausea and vomiting)     Poor historian     Pure hypercholesterolemia     no current meds    PVD (peripheral vascular disease) ([address]     Skin cancer reomoved from Right side of head    Thromboembolus ([facility] [address] 2/2012    3 clots in leg and 1 PE    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)     Type II or unspecified type diabetes mellitus without mention of complication, not stated as uncontrolled     oral and insulin reliant/AVg - at night/ s.s of hypoglycemia @ 48    Unspecified essential hypertension     Unspecified hyperplasia of prostate with urinary obstruction and other lower urinary tract symptoms (LUTS)     monitored by Dr Kristin Dillon Unspecified sleep apnea     pt denies- it is on Dr Analia Mitchell H&P     Past Surgical History:   Procedure Laterality Date    HX COLONOSCOPY      polyps removed    HX HEART CATHETERIZATION  11/2009    stents x 3    HX OTHER SURGICAL      BLADDER STONE REMOVAL    HX PROSTATECTOMY      TRANSURETHRAL- pt denies    HX UROLOGICAL      CYSTOSCOPY    NE PROSTATE BIOPSY, NEEDLE, SATURATION SAMPLING      AND ULTASOUND     Social History     Social History    Marital status:      Spouse name: N/A    Number of children: N/A    Years of education: N/A     Occupational History    Not on file.      Social History Main Topics    Smoking status: Former Smoker     Packs/day: 1.00     Years: 14.00     Quit date: 1/1/1970    Smokeless tobacco: Never Used    Alcohol use No    Drug use: No    Sexual activity: Not on file     Other Topics Concern    Not on file     Social History Narrative     Family History   Problem Relation Age of Onset    Heart Disease Brother     Cancer Mother      Stomach Cancer    No Known Problems Father     Diabetes Sister     Dementia Brother      No Known Allergies    Current Facility-Administered Medications   Medication Dose Route Frequency    0.9% sodium chloride infusion 250 mL  250 mL IntraVENous PRN    desmopressin (DDAVP) 32 mcg in 0.9% sodium chloride 50 mL IVPB  32 mcg IntraVENous ONCE    0.9% sodium chloride infusion 250 mL  250 mL IntraVENous PRN    ampicillin-sulbactam (UNASYN) 3 g in 0.9% sodium chloride (MBP/ADV) 100 mL  3 g IntraVENous Q8H    0.9% sodium chloride infusion  75 mL/hr IntraVENous CONTINUOUS    fentaNYL in normal saline (pf) 25 mcg/mL infusion  0-200 mcg/hr IntraVENous TITRATE    propofol (DIPRIVAN) infusion  5-50 mcg/kg/min IntraVENous TITRATE     Current Outpatient Prescriptions   Medication Sig    oxyCODONE-acetaminophen (PERCOCET 7.5) 7.5-325 mg per tablet Take 1 Tab by mouth every eight (8) hours as needed for Pain. Max Daily Amount: 3 Tabs.  tamsulosin (FLOMAX) 0.4 mg capsule Take 1 Cap by mouth daily.  HYDROcodone-acetaminophen (NORCO) 7.5-325 mg per tablet Take 1 Tab by mouth every eight (8) hours as needed for Pain. Max Daily Amount: 3 Tabs.  metoprolol tartrate (LOPRESSOR) 50 mg tablet Take 50 mg by mouth daily.  Cetirizine (ZYRTEC) 10 mg cap Take 10 mg by mouth daily.  LANSOPRAZOLE (PREVACID PO) Take 1 Tab by mouth daily.  tadalafil (CIALIS) 5 mg tablet Take 1 Tab by mouth daily.  gabapentin (NEURONTIN) 300 mg capsule Take 300 mg by mouth two (2) times a day.  glipiZIDE (GLUCOTROL) 10 mg tablet Take 10 mg by mouth daily.  insulin detemir (LEVEMIR) 100 unit/mL injection 20 Units by SubCUTAneous route nightly.  clopidogrel (PLAVIX) 75 mg tablet Take 75 mg by mouth daily. Last dose mid Jan 2018    magnesium oxide (MAG-OX) 400 mg tablet Take 400 mg by mouth daily.  aspirin delayed-release 81 mg tablet Take 81 mg by mouth two (2) times a day.            Objective:     Vitals:    03/10/18 1933 03/10/18 1937 03/10/18 1943 03/10/18 1947   BP:  132/77 (!) 161/96 142/74   Pulse: 100 97 (!) 101 93   Resp: 22 20 23 21   Temp:       SpO2: 98% 99% 99% 99%   Weight:       Height:           PHYSICAL EXAM     Constitutional:  the patient is well developed and in no acute distress, intubated , sedated   EENMT:  Sclera clear, pupils equal, oral mucosa moist  Respiratory: intubated , sedated ,   Cardiovascular:  RRR without M,G,R  Gastrointestinal: soft and non-tender; with positive bowel sounds. Musculoskeletal: warm without cyanosis. There is no lower leg edema. Skin:  no jaundice or rashes, no wounds   Neurologic: no gross neuro deficits     Psychiatric:  Intubated, sedated     CXR:      Recent Labs      03/10/18   1608   WBC  7.2   HGB  12.9*   HCT  41.1   PLT  283   INR  4.2*     Recent Labs      03/10/18   1608   NA  140   K  3.8   CL  104   GLU  166*   CO2  25   BUN  23   CREA  1.54*   CA  8.9   ALB  3.6   TBILI  0.3   ALT  27   SGOT  17     No results for input(s): PH, PCO2, PO2, HCO3 in the last 72 hours. No results for input(s): LCAD, LAC in the last 72 hours. Assessment:  (Medical Decision Making)     Hospital Problems  Date Reviewed: 12/7/2017          Codes Class Noted POA    * (Principal)Surgical wound hemorrhage after dental procedure ICD-10-CM: K91.840  ICD-9-CM: 998.11  3/10/2018 Unknown        Airway compromise ICD-10-CM: J98.8  ICD-9-CM: 519.8  3/10/2018 Unknown        Epistaxis ICD-10-CM: R04.0  ICD-9-CM: 784.7  3/10/2018 Unknown              Plan:  (Medical Decision Making)     --Will admit for further medical management  --Vent , sedation  --3 Units FFP and Vit K for supra therapeutic INR  --Stat DDAVP for the plavix, . platelets  transfusion for the Plavix  --Repeat Labs when all the blood products are infused   --Supplemental O2   --Respiratory nebulizer treatments  --culture  --steroid therapy to decrease swelling for 48 hours  --antibiotic therapy for dental abscess  --ENT consult for possible need to drain massive hematoma once the INR is corrected   --SCD. PPI    CC time 45 min    More than 50% of the time documented was spent in face-to-face contact with the patient and in the care of the patient on the floor/unit where the patient is located.     Grace Posada MD

## 2018-03-11 NOTE — PROGRESS NOTES
Ventilator check complete; patient has a #7.5 ET tube secured at the 23 at the lip. Patient is sedated. Patient is not able to follow commands. Breath sounds are clear. Trachea is midline, Negative for subcutaneous air, and chest excursion is symmetric. Patient is also Negative for cyanosis. All alarms are set and audible. Resuscitation bag is at the head of the bed.       Ventilator Settings  Mode FIO2 Rate Tidal Volume Pressure PEEP I:E Ratio   PRVC  36 %   16   500 ml     8 cm H20  1:3.13      Peak airway pressure: 20 cm H2O   Minute ventilation: 7.7 l/min     ABG:   Recent Labs      03/10/18   2230   PH  7.36   PCO2  44   PO2  128*   HCO3  24         Abhijeet Casillas, RT

## 2018-03-11 NOTE — PROGRESS NOTES
Bedside and Verbal shift change report given to Derek Wong by Martha Magaña RN. Report included the following information SBAR, Kardex, ED Summary, Intake/Output, MAR, Accordion, Recent Results, Med Rec Status, Cardiac Rhythm SR and Alarm Parameters . Dual neuro assessment and Fentanyl verification completed.

## 2018-03-12 LAB
ANION GAP SERPL CALC-SCNC: 10 MMOL/L (ref 7–16)
ARTERIAL PATENCY WRIST A: POSITIVE
BASE DEFICIT BLDA-SCNC: 1.4 MMOL/L (ref 0–2)
BDY SITE: ABNORMAL
BLD PROD TYP BPU: NORMAL
BPU ID: NORMAL
BUN SERPL-MCNC: 35 MG/DL (ref 8–23)
CALCIUM SERPL-MCNC: 7.9 MG/DL (ref 8.3–10.4)
CHLORIDE SERPL-SCNC: 106 MMOL/L (ref 98–107)
CO2 SERPL-SCNC: 27 MMOL/L (ref 21–32)
COHGB MFR BLD: 0.3 % (ref 0.5–1.5)
CREAT SERPL-MCNC: 1.35 MG/DL (ref 0.8–1.5)
DO-HGB BLD-MCNC: 4 % (ref 0–5)
ERYTHROCYTE [DISTWIDTH] IN BLOOD BY AUTOMATED COUNT: 16.2 % (ref 11.9–14.6)
GLUCOSE BLD STRIP.AUTO-MCNC: 122 MG/DL (ref 65–100)
GLUCOSE BLD STRIP.AUTO-MCNC: 148 MG/DL (ref 65–100)
GLUCOSE BLD STRIP.AUTO-MCNC: 165 MG/DL (ref 65–100)
GLUCOSE BLD STRIP.AUTO-MCNC: 185 MG/DL (ref 65–100)
GLUCOSE SERPL-MCNC: 138 MG/DL (ref 65–100)
HCO3 BLDA-SCNC: 23 MMOL/L (ref 22–26)
HCT VFR BLD AUTO: 29.2 % (ref 41.1–50.3)
HGB BLD-MCNC: 8.8 G/DL (ref 13.6–17.2)
HGB BLDMV-MCNC: 9.4 GM/DL (ref 11.7–15)
INR PPP: 1.3
MCH RBC QN AUTO: 22.9 PG (ref 26.1–32.9)
MCHC RBC AUTO-ENTMCNC: 30.1 G/DL (ref 31.4–35)
MCV RBC AUTO: 75.8 FL (ref 79.6–97.8)
METHGB MFR BLD: 0.4 % (ref 0–1.5)
OXYHGB MFR BLDA: 95.8 % (ref 94–97)
PCO2 BLDA: 36 MMHG (ref 35–45)
PEEP RESPIRATORY: 8 CM[H2O]
PH BLDA: 7.42 [PH] (ref 7.35–7.45)
PLATELET # BLD AUTO: 229 K/UL (ref 150–450)
PMV BLD AUTO: 9.1 FL (ref 10.8–14.1)
PO2 BLDA: 91 MMHG (ref 80–105)
POTASSIUM SERPL-SCNC: 4 MMOL/L (ref 3.5–5.1)
PROTHROMBIN TIME: 15.3 SEC (ref 11.5–14.5)
RBC # BLD AUTO: 3.85 M/UL (ref 4.23–5.67)
RESP RATE: 16
SAO2 % BLD: 97 % (ref 92–98.5)
SERVICE CMNT-IMP: ABNORMAL
SODIUM SERPL-SCNC: 143 MMOL/L (ref 136–145)
STATUS OF UNIT,%ST: NORMAL
UNIT DIVISION, %UDIV: 0
UNIT DIVISION, %UDIV: NORMAL
UNIT DIVISION, %UDIV: NORMAL
VENTILATION MODE VENT: ABNORMAL
VT SETTING VENT: 500 ML
WBC # BLD AUTO: 12 K/UL (ref 4.3–11.1)

## 2018-03-12 PROCEDURE — 99233 SBSQ HOSP IP/OBS HIGH 50: CPT | Performed by: INTERNAL MEDICINE

## 2018-03-12 PROCEDURE — 85610 PROTHROMBIN TIME: CPT | Performed by: INTERNAL MEDICINE

## 2018-03-12 PROCEDURE — 74011250636 HC RX REV CODE- 250/636: Performed by: INTERNAL MEDICINE

## 2018-03-12 PROCEDURE — C9113 INJ PANTOPRAZOLE SODIUM, VIA: HCPCS | Performed by: INTERNAL MEDICINE

## 2018-03-12 PROCEDURE — 82803 BLOOD GASES ANY COMBINATION: CPT

## 2018-03-12 PROCEDURE — 85027 COMPLETE CBC AUTOMATED: CPT | Performed by: INTERNAL MEDICINE

## 2018-03-12 PROCEDURE — 36600 WITHDRAWAL OF ARTERIAL BLOOD: CPT

## 2018-03-12 PROCEDURE — 65610000001 HC ROOM ICU GENERAL

## 2018-03-12 PROCEDURE — 94003 VENT MGMT INPAT SUBQ DAY: CPT

## 2018-03-12 PROCEDURE — 36592 COLLECT BLOOD FROM PICC: CPT

## 2018-03-12 PROCEDURE — 74011000258 HC RX REV CODE- 258: Performed by: INTERNAL MEDICINE

## 2018-03-12 PROCEDURE — 74011636637 HC RX REV CODE- 636/637: Performed by: INTERNAL MEDICINE

## 2018-03-12 PROCEDURE — 80048 BASIC METABOLIC PNL TOTAL CA: CPT | Performed by: INTERNAL MEDICINE

## 2018-03-12 PROCEDURE — 82962 GLUCOSE BLOOD TEST: CPT

## 2018-03-12 PROCEDURE — 74011000250 HC RX REV CODE- 250: Performed by: INTERNAL MEDICINE

## 2018-03-12 RX ORDER — DEXTROSE, SODIUM CHLORIDE, AND POTASSIUM CHLORIDE 5; .9; .15 G/100ML; G/100ML; G/100ML
75 INJECTION INTRAVENOUS CONTINUOUS
Status: DISCONTINUED | OUTPATIENT
Start: 2018-03-12 | End: 2018-03-17

## 2018-03-12 RX ADMIN — METHYLPREDNISOLONE SODIUM SUCCINATE 40 MG: 40 INJECTION, POWDER, FOR SOLUTION INTRAMUSCULAR; INTRAVENOUS at 13:01

## 2018-03-12 RX ADMIN — PROPOFOL 5 MCG/KG/MIN: 10 INJECTION, EMULSION INTRAVENOUS at 18:08

## 2018-03-12 RX ADMIN — PROPOFOL 10 MCG/KG/MIN: 10 INJECTION, EMULSION INTRAVENOUS at 03:49

## 2018-03-12 RX ADMIN — INSULIN HUMAN 2 UNITS: 100 INJECTION, SOLUTION PARENTERAL at 11:00

## 2018-03-12 RX ADMIN — SODIUM CHLORIDE 3 G: 900 INJECTION, SOLUTION INTRAVENOUS at 16:12

## 2018-03-12 RX ADMIN — METHYLPREDNISOLONE SODIUM SUCCINATE 40 MG: 40 INJECTION, POWDER, FOR SOLUTION INTRAMUSCULAR; INTRAVENOUS at 05:06

## 2018-03-12 RX ADMIN — Medication 10 ML: at 05:06

## 2018-03-12 RX ADMIN — DEXTROSE MONOHYDRATE, SODIUM CHLORIDE, AND POTASSIUM CHLORIDE 75 ML/HR: 50; 9; 1.49 INJECTION, SOLUTION INTRAVENOUS at 11:54

## 2018-03-12 RX ADMIN — INSULIN HUMAN 2 UNITS: 100 INJECTION, SOLUTION PARENTERAL at 17:01

## 2018-03-12 RX ADMIN — HYDRALAZINE HYDROCHLORIDE 10 MG: 20 INJECTION INTRAMUSCULAR; INTRAVENOUS at 11:51

## 2018-03-12 RX ADMIN — Medication 175 MCG/HR: at 18:24

## 2018-03-12 RX ADMIN — SODIUM CHLORIDE 3 G: 900 INJECTION, SOLUTION INTRAVENOUS at 05:07

## 2018-03-12 RX ADMIN — SODIUM CHLORIDE 3 G: 900 INJECTION, SOLUTION INTRAVENOUS at 10:08

## 2018-03-12 RX ADMIN — Medication 10 ML: at 13:01

## 2018-03-12 RX ADMIN — SODIUM CHLORIDE 75 ML/HR: 900 INJECTION, SOLUTION INTRAVENOUS at 03:49

## 2018-03-12 RX ADMIN — SODIUM CHLORIDE 3 G: 900 INJECTION, SOLUTION INTRAVENOUS at 23:59

## 2018-03-12 RX ADMIN — SODIUM CHLORIDE 40 MG: 9 INJECTION INTRAMUSCULAR; INTRAVENOUS; SUBCUTANEOUS at 08:13

## 2018-03-12 NOTE — PROGRESS NOTES
Bedside report given to Andria Bowers, [address] [facility]. Fentanyl gtt dual verified.

## 2018-03-12 NOTE — PROGRESS NOTES
Initial visit made to patient and a prayer was provided to patient, his granddaughter and two friends. A  card was left.         [employer]

## 2018-03-12 NOTE — CONSULTS
HPI:  Vika Perales is a 69 y.o. male seen today in initial consultation for Facial Swelling. Patient underwent dental procedure 2 days ago without attention being paid to coagulation status, being on both Coumadin and Plavix. By report his INR was elevated over 6. The sensation of his airway closing is consistent with the soft tissue CT findings of a left parapharyngeal space bleed into the soft tissue with compression of the oropharynx. HPI    Past Medical History, Past Surgical History, Family history, Social History, and Medications were all reviewed with the patient today and updated as necessary.      No Known Allergies  Patient Active Problem List   Diagnosis Code    Bladder stone N21.0    Heart disease, unspecified I51.9    Other pulmonary embolism and infarction I26.99    Type II or unspecified type diabetes mellitus without mention of complication, not stated as uncontrolled E11.9    Pure hypercholesterolemia E78.00    Unspecified essential hypertension I10    Other calculus in bladder N21.0    Unspecified hyperplasia of prostate with urinary obstruction and other lower urinary tract symptoms (LUTS) N40.1, N13.8    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) I80.209    Myopathy G72.9    Arthralgia M25.50    Lumbar stenosis with neurogenic claudication M48.062    Type 2 diabetes mellitus with diabetic neuropathy (HCC) E11.40    Lumbar stenosis M48.061    Surgical wound hemorrhage after dental procedure K91.840    Airway compromise J98.8    Epistaxis R04.0    Hx pulmonary embolism Z86.711    Type II diabetes mellitus (HCC) E11.9     Current Facility-Administered Medications   Medication Dose Route Frequency    dextrose 5% - 0.9% NaCl with KCl 20 mEq/L infusion  75 mL/hr IntraVENous CONTINUOUS    insulin regular (NOVOLIN R, HUMULIN R) injection   SubCUTAneous Q6H    ampicillin-sulbactam (UNASYN) 3 g in 0.9% sodium chloride (MBP/ADV) 100 mL  3 g IntraVENous Q6H    0.9% sodium chloride infusion 250 mL  250 mL IntraVENous PRN    0.9% sodium chloride infusion 250 mL  250 mL IntraVENous PRN    sodium chloride (NS) flush 5-10 mL  5-10 mL IntraVENous Q8H    sodium chloride (NS) flush 5-10 mL  5-10 mL IntraVENous PRN    fentaNYL in normal saline (pf) 25 mcg/mL infusion  0-200 mcg/hr IntraVENous TITRATE    propofol (DIPRIVAN) infusion  5-50 mcg/kg/min IntraVENous TITRATE    pantoprazole (PROTONIX) 40 mg in sodium chloride 10 mL injection  40 mg IntraVENous DAILY    hydrALAZINE (APRESOLINE) 20 mg/mL injection 10 mg  10 mg IntraVENous Q6H PRN    labetalol (NORMODYNE;TRANDATE) injection 10 mg  10 mg IntraVENous Q2H PRN     Past Medical History:   Diagnosis Date    Arthritis     BPH (benign prostatic hypertrophy)     bph    CAD (coronary artery disease) 11/2009    3 stents per pt    Claustrophobia     Coagulation defects     takes Plavix, Coumadin and aspirin- held for surgery     Former cigarette smoker     GERD (gastroesophageal reflux disease)     controlled with prevacid- only thing that controls it    H/O heart artery stent     X3    Heart disease, unspecified     Heart murmur     MVP   LVEF 50%    Hyperlipidemia     Hypertension     Neuropathy     Other calculus in bladder     Other pulmonary embolism and infarction     PONV (postoperative nausea and vomiting)     Poor historian     Pure hypercholesterolemia     no current meds    PVD (peripheral vascular disease) ([facility] [address]     Skin cancer     reomoved from Right side of head    Thromboembolus ([facility] [address] 2/2012    3 clots in leg and 1 PE    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)     Type II or unspecified type diabetes mellitus without mention of complication, not stated as uncontrolled     oral and insulin reliant/AVg - at night/ s.s of hypoglycemia @ 48    Unspecified essential hypertension     Unspecified hyperplasia of prostate with urinary obstruction and other lower urinary tract symptoms (LUTS)     monitored by Dr Landon Muhammad Unspecified sleep apnea     pt denies- it is on Dr Enio Garrett H&P     Social History   Substance Use Topics    Smoking status: Former Smoker     Packs/day: 1.00     Years: 14.00     Quit date: 1/1/1970    Smokeless tobacco: Never Used    Alcohol use No     Past Surgical History:   Procedure Laterality Date    HX COLONOSCOPY      polyps removed    HX HEART CATHETERIZATION  11/2009    stents x 3    HX OTHER SURGICAL      BLADDER STONE REMOVAL    HX PROSTATECTOMY      TRANSURETHRAL- pt denies    HX UROLOGICAL      CYSTOSCOPY    ID PROSTATE BIOPSY, NEEDLE, SATURATION SAMPLING      AND ULTASOUND     Family History   Problem Relation Age of Onset    Heart Disease Brother     Cancer Mother      Stomach Cancer    No Known Problems Father     Diabetes Sister     Dementia Brother         ROS:    Review of Systems     PHYSICAL EXAM:    Visit Vitals    /65    Pulse (!) 59    Temp 98.6 °F (37 °C)    Resp 24    Ht 6' 1\" (1.854 m)    Wt 212 lb 1.3 oz (96.2 kg)    SpO2 95%    BMI 27.98 kg/m2       Physical Exam    Examination of the oral cavity and oropharynx difficult due to the presence of the ET tube, high tongue base and generalized swelling. Bilateral Rhinostat nasal packs in place. No oozing or bright red blood noted from the nose or oral cavity. ASSESSMENT and PLAN      ICD-10-CM ICD-9-CM    1. Surgical wound hemorrhage after dental procedure K91.840 998.11    2. Respiratory distress R06.00 786.09    3. Airway compromise J98.8 519.8    4. Epistaxis R04.0 784.7    5. Hx pulmonary embolism Z86.711 V12.55      Soft tissue CT scan again reviewed and shared with my partners in the office who are in agreement that there does not appear to be any localized hematoma or loculation of fluid that would be amenable to any type of drainage procedure. Agree with continuing steroids to allow for swelling to resolve.  The main question is how best to evaluate the oropharynx and parapharyngeal space and timing of extubation. This may best be done with a repeat soft tissue CT after 72 hrs as the bulk of the soft tissue bleed is in the parapharyngeal space which is not readily visualized on oral exam. Bilateral nasal packs would be maintained in place for 5-7 days.             Shahnaz Castle MD  3/12/2018

## 2018-03-12 NOTE — PROGRESS NOTES
Care Daily Progress Note: 3/12/2018  Admission Date: 3/10/2018     The patient's chart is reviewed and the patient is discussed with the staff. 68 yo WM with upper airway obstruction for edema/hematoma following reportedly having filling placed by dentist on 3/10. Presented with respiratory distress and facial swelling. Intubate for airway stability. Subjective:     Awake on ventilator now with propofol going at low rate.   INR down to 1.3  Rhino rockets removed yesterday    Current Facility-Administered Medications   Medication Dose Route Frequency    insulin regular (NOVOLIN R, HUMULIN R) injection   SubCUTAneous Q6H    ampicillin-sulbactam (UNASYN) 3 g in 0.9% sodium chloride (MBP/ADV) 100 mL  3 g IntraVENous Q6H    0.9% sodium chloride infusion 250 mL  250 mL IntraVENous PRN    0.9% sodium chloride infusion 250 mL  250 mL IntraVENous PRN    sodium chloride (NS) flush 5-10 mL  5-10 mL IntraVENous Q8H    sodium chloride (NS) flush 5-10 mL  5-10 mL IntraVENous PRN    0.9% sodium chloride infusion  75 mL/hr IntraVENous CONTINUOUS    fentaNYL in normal saline (pf) 25 mcg/mL infusion  0-200 mcg/hr IntraVENous TITRATE    propofol (DIPRIVAN) infusion  5-50 mcg/kg/min IntraVENous TITRATE    methylPREDNISolone (PF) (SOLU-MEDROL) injection 40 mg  40 mg IntraVENous Q8H    pantoprazole (PROTONIX) 40 mg in sodium chloride 10 mL injection  40 mg IntraVENous DAILY    hydrALAZINE (APRESOLINE) 20 mg/mL injection 10 mg  10 mg IntraVENous Q6H PRN    labetalol (NORMODYNE;TRANDATE) injection 10 mg  10 mg IntraVENous Q2H PRN       Review of Systems    Constitutional:  negative for fever, chills, sweats  Cardiovascular:  negative for chest pain, palpitations, syncope, edema  Gastrointestinal:  negative for dysphagia, reflux, vomiting, diarrhea, abdominal pain, or melena  Neurologic:  negative for focal weakness, numbness, headache        Objective:     Vitals:    03/12/18 0903 03/12/18 0907 03/12/18 1003 03/12/18 1104   BP: (!) 193/94 178/84 135/63 173/79   Pulse: 91 84 (!) 53 (!) 55   Resp: 26 14 16 25   Temp:    98.8 °F (37.1 °C)   SpO2: 98% 99% 96% 99%   Weight:       Height:           Intake and Output:   03/10 1901 - 03/12 0700  In: 4005.7 [I.V.:3024.7]  Out: 1800 [Urine:1450]  03/12 0701 - 03/12 1900  In: 591.9 [I.V.:591.9]  Out: 100 [Urine:100]    Physical Exam:          Constitutional:  intubated and mechanically ventilated. EENMT:  Sclera clear, pupils equal, oral mucosa moist;  Respiratory: clear  Cardiovascular:  RRR with no M,G,R;  Gastrointestinal:  soft with no tenderness; positive bowel sounds present  Musculoskeletal:  warm with no cyanosis, trace lower leg edema  Skin:  no jaundice or ecchymosis  Neurologic: no gross neuro deficits     Psychiatric:  alert and FC when sedation lightened. LINES:  ETT, PIV, herrera    DRIPS:  Propofol    CXR:          CT Neck:        FINDINGS: There is abnormal soft tissue in the left parapharyngeal region  compressing and displacing the oropharynx, likely represents hemorrhage given  the history. No definite active hemorrhage is seen, although this might be  difficult to detect on a single phase study. There are no fractures or bony  lesions. Carotid arteries are patent. The lung apices are clear.   There is  fluid distention of the esophagus.       IMPRESSION:      Left parapharyngeal hemorrhage with compression of the oropharynx    Ventilator Settings  Mode FIO2 Rate Tidal Volume Pressure PEEP   PRVC  36 %    500 ml     8 cm H20      Peak airway pressure: 20.9 cm H2O   Minute ventilation: 7.7 l/min     ABG:   Recent Labs      03/12/18   0310  03/10/18   2230   PH  7.42  7.36   PCO2  36  44   PO2  91  128*   HCO3  23  24        LAB  Recent Labs      03/12/18   0458  03/11/18   2331  03/11/18   1704  03/11/18   1232  03/11/18   0758   GLUCPOC  148*  122*  155*  234*  273*     Recent Labs      03/12/18   0522  03/11/18   0416  03/10/18   1608   WBC  12.0* 7.5  7.2   HGB  8.8*  9.5*  12.9*   HCT  29.2*  31.3*  41.1   PLT  229  230  283   INR  1.3  1.4  4.2*     Recent Labs      03/12/18   0522  03/11/18   0416  03/10/18   1608   NA  143  139  140   K  4.0  4.3  3.8   CL  106  104  104   CO2  27  25  25   GLU  138*  269*  166*   BUN  35*  24*  23   CREA  1.35  1.36  1.54*   CA  7.9*  8.0*  8.9   ALB   --   3.2  3.6   SGOT   --   16  17     No results for input(s): LCAD, LAC in the last 72 hours. Assessment:  (Medical Decision Making)     Hospital Problems  Date Reviewed: 12/7/2017          Codes Class Noted POA    * (Principal)Surgical wound hemorrhage after dental procedure ICD-10-CM: K91.840  ICD-9-CM: 998.11  3/10/2018 Unknown    Bleeding appears to have stopped. Airway compromise ICD-10-CM: J98.8  ICD-9-CM: 519.8  3/10/2018 Unknown    Will likely take several days for airway to become adequate for extubation    Epistaxis ICD-10-CM: R04.0  ICD-9-CM: 784.7  3/10/2018 Unknown    Seems controlled    Hx pulmonary embolism ICD-10-CM: Z86.711  ICD-9-CM: V12.55  3/10/2018 Unknown    Unable to anticoagulate at present          Plan:  (Medical Decision Making)     Plan to continue ETT for airway protection for the next 24-48h  Check cuff leak in the morning  Final dose steroids today  Change fluid to maintenance since NPO  F/u with ENT    --    More than 50% of the time documented was spent in face-to-face contact with the patient and in the care of the patient on the floor/unit where the patient is located.     Juliana Avelar MD

## 2018-03-12 NOTE — PROGRESS NOTES
Bedside shift report received from Marsha Strauss RN. Pt sedated on vent - Fio2 35%. VSS, afebrile. Pt opens eyes to voice and follows commands. Gentle mouth care given. Slight bloody drainage visible from nose. Lung sounds are clear, bowel sounds hypoactive. Full assessment in flow sheet.

## 2018-03-12 NOTE — INTERDISCIPLINARY ROUNDS
Interdisciplinary team rounds were held 3/12/2018 with the following team members:Care Management, Nursing, Nurse Practitioner, Nutrition, Pharmacy, Physical Therapy, Physician, Respiratory Therapy and Clinical Coordinator. Plan of care discussed. See clinical pathway and/or care plan for interventions and desired outcomes.

## 2018-03-12 NOTE — PROGRESS NOTES
Pt kicking legs and pulling against restraints. Attempting to sit up in bed and reaching for ETT. Propofol gtt and fentanyl gtts increased. Pt resting calmly at this time. Will continue to monitor.

## 2018-03-12 NOTE — PROGRESS NOTES
Ventilator check complete; patient has a #7.5 ET tube secured at the 23 at the lip. Patient is not sedated. Patient is able to follow commands. Breath sounds are diminished. Trachea is midline, Negative for subcutaneous air, and chest excursion is symmetric. Patient is also Negative for cyanosis and is Negative for pitting edema. All alarms are set and audible. Resuscitation bag is at the head of the bed.       Ventilator Settings  Mode FIO2 Rate Tidal Volume Pressure PEEP I:E Ratio   PRVC  35 %    500 ml     8 cm H20  1:3.1      Peak airway pressure: 19 cm H2O   Minute ventilation: 7.7 l/min     ABG:   Recent Labs      03/12/18   0310  03/10/18   2230   PH  7.42  7.36   PCO2  36  44   PO2  91  128*   HCO3  23  24         Kyle Bingham, RT

## 2018-03-12 NOTE — PROGRESS NOTES
Called Dr. Ana Paula Lozano office and left message with his nurse Liz Lopez. Will await return call and follow up this afternoon.

## 2018-03-13 ENCOUNTER — APPOINTMENT (OUTPATIENT)
Dept: CT IMAGING | Age: 78
DRG: 920 | End: 2018-03-13
Attending: INTERNAL MEDICINE
Payer: MEDICARE

## 2018-03-13 ENCOUNTER — APPOINTMENT (OUTPATIENT)
Dept: GENERAL RADIOLOGY | Age: 78
DRG: 920 | End: 2018-03-13
Attending: INTERNAL MEDICINE
Payer: MEDICARE

## 2018-03-13 LAB
ANION GAP SERPL CALC-SCNC: 8 MMOL/L (ref 7–16)
ARTERIAL PATENCY WRIST A: POSITIVE
BASE EXCESS BLDA CALC-SCNC: 1.5 MMOL/L (ref 0–3)
BDY SITE: ABNORMAL
BUN SERPL-MCNC: 34 MG/DL (ref 8–23)
CALCIUM SERPL-MCNC: 8.3 MG/DL (ref 8.3–10.4)
CHLORIDE SERPL-SCNC: 110 MMOL/L (ref 98–107)
CO2 SERPL-SCNC: 26 MMOL/L (ref 21–32)
COHGB MFR BLD: 0.3 % (ref 0.5–1.5)
CREAT SERPL-MCNC: 1.2 MG/DL (ref 0.8–1.5)
DO-HGB BLD-MCNC: 3 % (ref 0–5)
ERYTHROCYTE [DISTWIDTH] IN BLOOD BY AUTOMATED COUNT: 16.3 % (ref 11.9–14.6)
FIO2 ON VENT: 35 %
GLUCOSE BLD STRIP.AUTO-MCNC: 108 MG/DL (ref 65–100)
GLUCOSE BLD STRIP.AUTO-MCNC: 108 MG/DL (ref 65–100)
GLUCOSE BLD STRIP.AUTO-MCNC: 122 MG/DL (ref 65–100)
GLUCOSE BLD STRIP.AUTO-MCNC: 142 MG/DL (ref 65–100)
GLUCOSE SERPL-MCNC: 123 MG/DL (ref 65–100)
HCO3 BLDA-SCNC: 27 MMOL/L (ref 22–26)
HCT VFR BLD AUTO: 33.6 % (ref 41.1–50.3)
HGB BLD-MCNC: 9.9 G/DL (ref 13.6–17.2)
HGB BLDMV-MCNC: 10.2 GM/DL (ref 11.7–15)
INR PPP: 1.2
MCH RBC QN AUTO: 22.6 PG (ref 26.1–32.9)
MCHC RBC AUTO-ENTMCNC: 29.5 G/DL (ref 31.4–35)
MCV RBC AUTO: 76.5 FL (ref 79.6–97.8)
METHGB MFR BLD: 0.5 % (ref 0–1.5)
OXYHGB MFR BLDA: 96.2 % (ref 94–97)
PCO2 BLDA: 44 MMHG (ref 35–45)
PEEP RESPIRATORY: 8 CM[H2O]
PH BLDA: 7.4 [PH] (ref 7.35–7.45)
PLATELET # BLD AUTO: 256 K/UL (ref 150–450)
PMV BLD AUTO: 9.1 FL (ref 10.8–14.1)
PO2 BLDA: 95 MMHG (ref 80–105)
POTASSIUM SERPL-SCNC: 4 MMOL/L (ref 3.5–5.1)
PROTHROMBIN TIME: 14.8 SEC (ref 11.5–14.5)
RBC # BLD AUTO: 4.39 M/UL (ref 4.23–5.67)
RESP RATE: 16
SAO2 % BLD: 97 % (ref 92–98.5)
SERVICE CMNT-IMP: ABNORMAL
SODIUM SERPL-SCNC: 144 MMOL/L (ref 136–145)
VENTILATION MODE VENT: ABNORMAL
VT SETTING VENT: 500 ML
WBC # BLD AUTO: 10.7 K/UL (ref 4.3–11.1)

## 2018-03-13 PROCEDURE — 94003 VENT MGMT INPAT SUBQ DAY: CPT

## 2018-03-13 PROCEDURE — 82803 BLOOD GASES ANY COMBINATION: CPT

## 2018-03-13 PROCEDURE — 74011636320 HC RX REV CODE- 636/320: Performed by: INTERNAL MEDICINE

## 2018-03-13 PROCEDURE — 85027 COMPLETE CBC AUTOMATED: CPT | Performed by: INTERNAL MEDICINE

## 2018-03-13 PROCEDURE — 74011250636 HC RX REV CODE- 250/636: Performed by: INTERNAL MEDICINE

## 2018-03-13 PROCEDURE — 36415 COLL VENOUS BLD VENIPUNCTURE: CPT | Performed by: INTERNAL MEDICINE

## 2018-03-13 PROCEDURE — 36600 WITHDRAWAL OF ARTERIAL BLOOD: CPT

## 2018-03-13 PROCEDURE — 82962 GLUCOSE BLOOD TEST: CPT

## 2018-03-13 PROCEDURE — 99233 SBSQ HOSP IP/OBS HIGH 50: CPT | Performed by: INTERNAL MEDICINE

## 2018-03-13 PROCEDURE — 71045 X-RAY EXAM CHEST 1 VIEW: CPT

## 2018-03-13 PROCEDURE — 74011000258 HC RX REV CODE- 258: Performed by: INTERNAL MEDICINE

## 2018-03-13 PROCEDURE — 70491 CT SOFT TISSUE NECK W/DYE: CPT

## 2018-03-13 PROCEDURE — 74011000250 HC RX REV CODE- 250: Performed by: INTERNAL MEDICINE

## 2018-03-13 PROCEDURE — 85610 PROTHROMBIN TIME: CPT | Performed by: INTERNAL MEDICINE

## 2018-03-13 PROCEDURE — C9113 INJ PANTOPRAZOLE SODIUM, VIA: HCPCS | Performed by: INTERNAL MEDICINE

## 2018-03-13 PROCEDURE — 77030031476 HC EXCH HEAT MOISTW FLTR HALY -A

## 2018-03-13 PROCEDURE — 80048 BASIC METABOLIC PNL TOTAL CA: CPT | Performed by: INTERNAL MEDICINE

## 2018-03-13 PROCEDURE — 65610000001 HC ROOM ICU GENERAL

## 2018-03-13 RX ORDER — MORPHINE SULFATE 2 MG/ML
2 INJECTION, SOLUTION INTRAMUSCULAR; INTRAVENOUS
Status: DISCONTINUED | OUTPATIENT
Start: 2018-03-13 | End: 2018-03-21 | Stop reason: HOSPADM

## 2018-03-13 RX ORDER — SODIUM CHLORIDE 0.9 % (FLUSH) 0.9 %
10 SYRINGE (ML) INJECTION
Status: ACTIVE | OUTPATIENT
Start: 2018-03-13 | End: 2018-03-13

## 2018-03-13 RX ORDER — DIPHENHYDRAMINE HYDROCHLORIDE 50 MG/ML
50 INJECTION, SOLUTION INTRAMUSCULAR; INTRAVENOUS ONCE
Status: COMPLETED | OUTPATIENT
Start: 2018-03-13 | End: 2018-03-13

## 2018-03-13 RX ADMIN — SODIUM CHLORIDE 3 G: 900 INJECTION, SOLUTION INTRAVENOUS at 10:28

## 2018-03-13 RX ADMIN — Medication 10 ML: at 13:25

## 2018-03-13 RX ADMIN — SODIUM CHLORIDE 40 MG: 9 INJECTION INTRAMUSCULAR; INTRAVENOUS; SUBCUTANEOUS at 08:53

## 2018-03-13 RX ADMIN — Medication 10 ML: at 00:10

## 2018-03-13 RX ADMIN — Medication 10 ML: at 06:07

## 2018-03-13 RX ADMIN — PROPOFOL 10 MCG/KG/MIN: 10 INJECTION, EMULSION INTRAVENOUS at 13:24

## 2018-03-13 RX ADMIN — SODIUM CHLORIDE 3 G: 900 INJECTION, SOLUTION INTRAVENOUS at 06:00

## 2018-03-13 RX ADMIN — DEXTROSE MONOHYDRATE, SODIUM CHLORIDE, AND POTASSIUM CHLORIDE 75 ML/HR: 50; 9; 1.49 INJECTION, SOLUTION INTRAVENOUS at 02:46

## 2018-03-13 RX ADMIN — HYDRALAZINE HYDROCHLORIDE 10 MG: 20 INJECTION INTRAMUSCULAR; INTRAVENOUS at 06:05

## 2018-03-13 RX ADMIN — Medication 125 MCG/HR: at 12:10

## 2018-03-13 RX ADMIN — SODIUM CHLORIDE 3 G: 900 INJECTION, SOLUTION INTRAVENOUS at 17:35

## 2018-03-13 RX ADMIN — Medication 10 ML: at 22:00

## 2018-03-13 RX ADMIN — SODIUM CHLORIDE 100 ML: 900 INJECTION, SOLUTION INTRAVENOUS at 14:15

## 2018-03-13 RX ADMIN — DIPHENHYDRAMINE HYDROCHLORIDE 50 MG: 50 INJECTION, SOLUTION INTRAMUSCULAR; INTRAVENOUS at 01:20

## 2018-03-13 RX ADMIN — DEXTROSE MONOHYDRATE, SODIUM CHLORIDE, AND POTASSIUM CHLORIDE 75 ML/HR: 50; 9; 1.49 INJECTION, SOLUTION INTRAVENOUS at 17:15

## 2018-03-13 RX ADMIN — IOPAMIDOL 80 ML: 755 INJECTION, SOLUTION INTRAVENOUS at 14:15

## 2018-03-13 NOTE — PROGRESS NOTES
Ventilator check complete; patient has a #7.5 ET tube secured at the 23 at the lip. Patient is sedated. Patient is not able to follow commands. Breath sounds are diminished. Trachea is midline, Negative for subcutaneous air, and chest excursion is symmetric. Patient is also Negative for cyanosis and is Negative for pitting edema. All alarms are set and audible. Resuscitation bag is at the head of the bed.       Ventilator Settings  Mode FIO2 Rate Tidal Volume Pressure PEEP I:E Ratio   Pressure support  36 %    500 ml  5 cm H2O  8 cm H20  1:3.13      Peak airway pressure: 28.6 cm H2O   Minute ventilation: 9.3 l/min     ABG:   Recent Labs      03/13/18   0312  03/12/18   0310  03/10/18   2230   PH  7.40  7.42  7.36   PCO2  44  36  44   PO2  95  91  128*   HCO3  27*  23  24         Kyle Madden, RT

## 2018-03-13 NOTE — PROGRESS NOTES
Ventilator check complete; patient has a #7.5 ET tube secured at the 23 at the lip. Patient is not sedated. Patient is able to follow commands. Breath sounds are diminished. Trachea is midline, Negative for subcutaneous air, and chest excursion is symmetrical. Patient is also Negative for cyanosis and is Negative for pitting edema. All alarms are set and audible.   Resuscitation bag and mask are at the head of the bed.       Ventilator Settings  Mode FIO2 Rate Tidal Volume Pressure PEEP I:E Ratio   PRVC  35 %    500 ml     8 cm H20  1:3.1       Peak airway pressure: 31 cm H2O   Minute ventilation: 7.7 l/min      ABG:        Recent Labs       03/12/18   0310  03/10/18   2230   PH  7.42  7.36   PCO2  36  44   PO2  91  128*   HCO3  23  24

## 2018-03-13 NOTE — PROGRESS NOTES
Physical Therapy Note:    Participated in interdisciplinary rounds in ICU/CCU and chart reviewed. Patient is experiencing decrease in function from baseline. Patient would benefit from skilled acute therapy to increase independence with self care/ADLs, strength, endurance, and functional mobility. Recommend PT/OT consult when medically stable and MD agrees.     Thank you for your consideration,  Marcela Gil, PT, DPT

## 2018-03-13 NOTE — ADT AUTH CERT NOTES
LOC:Acute Adult-General Trauma (3/12/2018) by Wagner Morillo RN        Review Status Review Entered       In Primary 3/12/2018       Details         REVIEW SUMMARY     Patient: Oskar West  Review Number: [id number]  Review Status: In Primary     Condition Specific: Yes     Condition Level Of Care Code: CRITICAL  Condition Level Of Care Description: Critical        OUTCOMES  Outcome Type: Primary           REVIEW DETAILS     Service Date: 03/12/2018  Admit Date: 03/10/2018  Product: Ivette Sale Adult  Subset: General Trauma      (Symptom or finding within 24h)         (Excludes PO medications unless noted)          [X] Select Day, One:              [X] Episode Day 3-X, One:                  [X] CRITICAL, One:                      [X] Partial responder, not clinically stable for discharge and requires continued stay, >= One:                          [X] Respiratory, >= One:                              [X] Mechanical ventilation or NIPPV, >= One:                                  [X] Respiratory interventions q1-2h                                  ~--Admin, IQ Admin Admin on 03- 04:01 PM--~                                  Plan to continue ETT for airway protection for the next 24-48h                 Version: [employer] 2017.2  [employer]  © 2017 [employer] and/or one of its [place]. All Rights Reserved. CPT only © 2016 [employer]. All Rights Reserved.              Additional Notes       Clinical Date Reviewed:   3/12/18              LOS; Status; Review Type:  ICU/Inpt/CS              Vital Signs:  168/77, 87.6, 59, 28, 96% Vent              Abn. Findings LABS/RADIOLOGY:       ABG: Total Hgb 9.4, Carbox 0.3       WBC 12.0H&H 8.8/29.2, PT 15.3, , BUN 35, Alexi 7.9, GFR              Meds:       Unasyn 3g IV Q6hrs       D5 NS w/20mEq IV 75mL hr       Fentanyl IV Titrate 75-125mcg/hr       Apresoline 10mg IV Q6hrs PRN X1       Reg Insulin SSI SC Q6hrs        Protonix 40mg IV Daily       Diprovan IV Titrate 5-10 mcg/kg/min       Solu-Medrol 40mg IV Q8hrs       NS IV 75mL hr               Pulmonary Disease Progress Notes Date of Service: 03/12/18 1135              Subjective:               68 yo WM with upper airway obstruction for edema/hematoma following reportedly having filling placed by dentist on 3/10. Presented with respiratory distress and facial swelling. Intubate for airway stability.              Awake on ventilator now with propofol going at low rate.  INR down to 1.3       Rhino rockets removed yesterday              Assessment:  (Medical Decision Making)               Hospital Problems              Class         * (Principal)Surgical wound hemorrhage after dental procedure          Bleeding appears to have stopped.          Airway compromise          Will likely take several days for airway to become adequate for extubation         Epistaxis                  Hx pulmonary embolism          Unable to anticoagulate at present                              Plan:  (Medical Decision Making)               Plan to continue ETT for airway protection for the next 24-48h       Check cuff leak in the morning       Final dose steroids today       Change fluid to maintenance since NPO       F/u with ENT           LOC:Acute Adult-General Trauma (3/11/2018) by Darci Tucker RN        Review Status Review Entered       In Primary 3/12/2018       Details         REVIEW SUMMARY     Patient: Gina Garcia  Review Number: [id number]  Review Status:  In Primary     Condition Specific: Yes     Condition Level Of Care Code: CRITICAL  Condition Level Of Care Description: Critical        OUTCOMES  Outcome Type: Primary           REVIEW DETAILS     Service Date: 03/11/2018  Admit Date: 03/10/2018  Product: Ardyce Pleasant Adult  Subset: General Trauma      (Symptom or finding within 24h)         (Excludes PO medications unless noted)          [X] Select Day, One:              [X] Episode Day 2, One:                  [X] CRITICAL, >= One:                      [X] Respiratory, >= One:                          [X] Mechanical ventilation or NIPPV, >= One:                              [X] Respiratory interventions q1-2h                              ~--Admin, IQ Admin Admin on 03- 04:00 PM--~                              Pt intubated for airway stability                  Version: [employer]® 2017.2  Mig[employer] D[employer]  © 2017 [employer] and/or one of its [place]. All Rights Reserved. CPT only © 2016 American [employer] Association. All Rights Reserved.              Additional Notes       Clinical Date Reviewed:   3/11/18              LOS; Status; Review Type:  ICU/Inpt/CS              Vital Signs:  123/58, 98.8, 56, 23, 96% Vent               Abn. Findings LABS/RADIOLOGY:       H&H 9.5/31.3, Neut 94, Lymph 5, Mono 1, Eos 0, ABS Lymph 0.4, PT 17.0, , BUN 24, Alexi 8.0, GFR 54, Prot 6.1, A-G Ratio 1.1       POC , 234, 155, 122       CXR: IMPRESSION:       1. Bibasilar densities, likely atelectasis. Findings similar to prior exam.       2. ET tube tip is in good position.       ABD XR: IMPRESSION: Tip of the enteric tube is in the area of gastric fundus/proximal stomach.              Meds:       Unasyn 3g IV Q8 hrs changed to Q6hrs       Fentanyl 75mcg/hr IV Titrate        Reg Insulin SSI SC Q6hrs       Protonix 40mg IV Daily       Diprovan IV Titrate 10-25 mcg/kg/min       Solu-Medrol 40mg IV Q8hrs       NS IV 75mL hr              Pulmonary Disease Progress Notes Date of Service: 03/11/18 0913              70 yo WM with upper airway obstruction for edema/hematoma following reportedly having filling placed by dentist on 3/10. Presented with respiratory distress and facial swelling. Intubate for airway stability.               Subjective:               Sedated on vent. Rhino rockets in place.  ENT has seen and recommended no intervention at this time (per nurse) and maintain intubation until swelling subsides. Written consult pending. Family at bedside and reports little change in facial appearance from last night.                Assessment:  (Medical Decision Making)               Hospital Problems              Class         * (Principal)Surgical wound hemorrhage after dental procedure          Bleeding appears to have stopped.          Airway compromise          Will likely take several days for airway to become adequate for extubation         Epistaxis          Seems controlled         Hx pulmonary embolism          Unable to anticoagulate at present                              Plan:  (Medical Decision Making)               Follow H/H.       Continue antibiotics.       Continue steroids.        Await ENT recommendations on how long to maintain nasal tampons.       Control BS with SSI for now. May need to add LA insulin on steroids.

## 2018-03-13 NOTE — PROGRESS NOTES
Care Daily Progress Note: 3/13/2018  Admission Date: 3/10/2018     The patient's chart is reviewed and the patient is discussed with the staff. 70 yo WM with upper airway obstruction for edema/hematoma following reportedly having filling placed by dentist on 3/10. Takes chronic Coumadin and Plavix for hx PE and CAD. Presented with respiratory distress and facial swelling. Intubated for airway stability. CT neck 3/10:  Left parapharyngeal hemorrhage with compression of the oropharynx. Subjective:     Sedated but easily arouses and nods responses. Remains on vent support. Left neck hematoma.       Current Facility-Administered Medications   Medication Dose Route Frequency    morphine injection 2 mg  2 mg IntraVENous Q4H PRN    iopamidol (ISOVUE-370) 76 % injection 80 mL  80 mL IntraVENous RAD ONCE    sodium chloride 0.9 % bolus infusion 100 mL  100 mL IntraVENous RAD ONCE    saline peripheral flush soln 10 mL  10 mL InterCATHeter RAD ONCE    dextrose 5% - 0.9% NaCl with KCl 20 mEq/L infusion  75 mL/hr IntraVENous CONTINUOUS    insulin regular (NOVOLIN R, HUMULIN R) injection   SubCUTAneous Q6H    ampicillin-sulbactam (UNASYN) 3 g in 0.9% sodium chloride (MBP/ADV) 100 mL  3 g IntraVENous Q6H    0.9% sodium chloride infusion 250 mL  250 mL IntraVENous PRN    0.9% sodium chloride infusion 250 mL  250 mL IntraVENous PRN    sodium chloride (NS) flush 5-10 mL  5-10 mL IntraVENous Q8H    sodium chloride (NS) flush 5-10 mL  5-10 mL IntraVENous PRN    fentaNYL in normal saline (pf) 25 mcg/mL infusion  0-200 mcg/hr IntraVENous TITRATE    propofol (DIPRIVAN) infusion  5-50 mcg/kg/min IntraVENous TITRATE    pantoprazole (PROTONIX) 40 mg in sodium chloride 10 mL injection  40 mg IntraVENous DAILY    hydrALAZINE (APRESOLINE) 20 mg/mL injection 10 mg  10 mg IntraVENous Q6H PRN    labetalol (NORMODYNE;TRANDATE) injection 10 mg  10 mg IntraVENous Q2H PRN       Review of Systems  Unobtainable due to patient status. Objective:     Vitals:    03/13/18 1018 03/13/18 1033 03/13/18 1107 03/13/18 1108   BP: 128/59 126/60     Pulse: (!) 48 (!) 47 (!) 51 62   Resp: 12 9 25 10   Temp:       SpO2: 98% 98% 99% 99%   Weight:       Height:           Intake and Output:   03/11 1901 - 03/13 0700  In: 3337.3 [I.V.:3337.3]  Out: 5982 [Urine:1145]       Physical Exam:          Constitutional:  intubated and mechanically ventilated.   EENMT:  Sclera clear, pupils equal, oral mucosa moist, left neck hematoma  Respiratory: few anterior crackles, no wheezing  Cardiovascular:  RRR with no M,G,R;  Gastrointestinal:  soft with no tenderness; positive bowel sounds present  Musculoskeletal:  warm with no cyanosis, no lower leg edema  Skin:  no jaundice or ecchymosis  Neurologic: no gross neuro deficits     Psychiatric:  Awakens, nodding responses and following commands     LINES:    ETT:  3/10/18  PIV sites  OG:  3/10/18   Rai:  3/10/18    DRIPS:    D5NS with 20meq KCL 75ml/hr  Fentanyl 125mcg/hr  Diprivan 10mcg/kg/min    CT neck:  pending    CXR 3/13/18:        Ventilator Settings  Mode FIO2 Rate Tidal Volume Pressure PEEP   SIMV  35 %    500 ml  10 cm H2O  8 cm H20      Peak airway pressure: 44.8 cm H2O   Minute ventilation: 9.4 l/min     ABG:   Recent Labs      03/13/18   0312  03/12/18   0310  03/10/18   2230   PH  7.40  7.42  7.36   PCO2  44  36  44   PO2  95  91  128*   HCO3  27*  23  24        LAB  Recent Labs      03/13/18   0551  03/12/18   2358  03/12/18   1627  03/12/18   1048  03/12/18   0458   GLUCPOC  122*  142*  185*  165*  148*     Recent Labs      03/13/18   0619  03/12/18   0522  03/11/18   0416   WBC  10.7  12.0*  7.5   HGB  9.9*  8.8*  9.5*   HCT  33.6*  29.2*  31.3*   PLT  256  229  230   INR  1.2  1.3  1.4     Recent Labs      03/13/18   0619  03/12/18   0522  03/11/18   0416  03/10/18   1608   NA  144  143  139  140   K  4.0  4.0  4.3  3.8   CL  110*  106  104  104   CO2  26  27  25  25   GLU  123*  138* 269*  166*   BUN  34*  35*  24*  23   CREA  1.20  1.35  1.36  1.54*   CA  8.3  7.9*  8.0*  8.9   ALB   --    --   3.2  3.6   SGOT   --    --   16  17     No results for input(s): LCAD, LAC in the last 72 hours. Assessment:  (Medical Decision Making)     Hospital Problems  Date Reviewed: 3/13/2018          Codes Class Noted POA    Type II diabetes mellitus ([facility] [address] ICD-10-CM: E11.9  ICD-9-CM: 250.00  3/11/2018 Yes    Chronic--122-185    * (Principal)Surgical wound hemorrhage after dental procedure ICD-10-CM: K91.840  ICD-9-CM: 998.11  3/10/2018 Yes    unchanged    Airway compromise ICD-10-CM: J98.8  ICD-9-CM: 519.8  3/10/2018 Yes    Repeat CT neck today    Epistaxis ICD-10-CM: R04.0  ICD-9-CM: 784.7  3/10/2018 Yes    resolved    Hx pulmonary embolism ICD-10-CM: I89.307  ICD-9-CM: V12.55  3/10/2018 Yes    Chronic anticoagulation--INR down to 1.2          Plan:  (Medical Decision Making)     --Unasyn  --INR down to 1.2  --Wean vent support as tolerated  --CT neck today per ENT recommendations    More than 50% of the time documented was spent in face-to-face contact with the patient and in the care of the patient on the floor/unit where the patient is located. Ashwini Cameron NP  I have spoken with and examined the patient. I agree with the above assessment and plan as documented. CT recommended by ENT prior to extubation. Will obtain and if hematoma smaller in size, can extubate this afternoon. + cuff leak    Gen: pleasant, awake  HEENT: continued neck swelling on left  Lungs:  CTA  Heart:  RRR with no Murmur/Rubs/Gallops  Ext: no edema    --continue antibiotics for now until can convert to po  --antihypertensives adjusted  --f/u CT neck  --extubate if hematoma smaller.   If not place OGT & start tube feeds   --holding anticoagulation    Bailey Rodriguez MD

## 2018-03-13 NOTE — INTERDISCIPLINARY ROUNDS
Interdisciplinary team rounds were held 3/13/2018 with the following team members:Care Management, Nursing, Nurse Practitioner, Nutrition, Occupational Therapy, Palliative Care, Pharmacy, Physical Therapy, Physician, Respiratory Therapy, Speech Therapy and Clinical Coordinator. Plan of care discussed. See clinical pathway and/or care plan for interventions and desired outcomes.

## 2018-03-13 NOTE — PROGRESS NOTES
Bedside, Verbal and Written shift change report given to Saintclair Half, RN by Obi Watts RN. Report included the following information SBAR, Kardex, ED Summary, Intake/Output, MAR, Accordion, Recent Results, Med Rec Status and Cardiac Rhythm SR/SB.

## 2018-03-13 NOTE — PROGRESS NOTES
Some spontaneous breathing maneuvers trialed for pt to move toward ventilator liberation. Ve 9.5 L, RR 15, Avg VT 633cc, RSBI <24, strong cough with minimal secretion production with ETS, and POSITIVE for VIGOROUS ETT CUFF LEAK when cuff is deflated during PPV.

## 2018-03-14 ENCOUNTER — APPOINTMENT (OUTPATIENT)
Dept: GENERAL RADIOLOGY | Age: 78
DRG: 920 | End: 2018-03-14
Attending: INTERNAL MEDICINE
Payer: MEDICARE

## 2018-03-14 LAB
ANION GAP SERPL CALC-SCNC: 7 MMOL/L (ref 7–16)
ARTERIAL PATENCY WRIST A: ABNORMAL
ATRIAL RATE: 234 BPM
BASE EXCESS BLDA CALC-SCNC: 3.2 MMOL/L (ref 0–3)
BDY SITE: ABNORMAL
BUN SERPL-MCNC: 24 MG/DL (ref 8–23)
CALCIUM SERPL-MCNC: 7.6 MG/DL (ref 8.3–10.4)
CALCULATED R AXIS, ECG10: 10 DEGREES
CALCULATED T AXIS, ECG11: -2 DEGREES
CHLORIDE SERPL-SCNC: 112 MMOL/L (ref 98–107)
CO2 SERPL-SCNC: 27 MMOL/L (ref 21–32)
COHGB MFR BLD: 1 % (ref 0.5–1.5)
CREAT SERPL-MCNC: 1.13 MG/DL (ref 0.8–1.5)
DIAGNOSIS, 93000: NORMAL
DO-HGB BLD-MCNC: 2 % (ref 0–5)
ERYTHROCYTE [DISTWIDTH] IN BLOOD BY AUTOMATED COUNT: 16.5 % (ref 11.9–14.6)
GLUCOSE BLD STRIP.AUTO-MCNC: 117 MG/DL (ref 65–100)
GLUCOSE BLD STRIP.AUTO-MCNC: 118 MG/DL (ref 65–100)
GLUCOSE BLD STRIP.AUTO-MCNC: 168 MG/DL (ref 65–100)
GLUCOSE BLD STRIP.AUTO-MCNC: 192 MG/DL (ref 65–100)
GLUCOSE SERPL-MCNC: 132 MG/DL (ref 65–100)
HCO3 BLDA-SCNC: 28 MMOL/L (ref 22–26)
HCT VFR BLD AUTO: 33.8 % (ref 41.1–50.3)
HGB BLD-MCNC: 9.8 G/DL (ref 13.6–17.2)
HGB BLDMV-MCNC: 9.9 GM/DL (ref 11.7–15)
INR PPP: 1.3
MCH RBC QN AUTO: 22.7 PG (ref 26.1–32.9)
MCHC RBC AUTO-ENTMCNC: 29 G/DL (ref 31.4–35)
MCV RBC AUTO: 78.2 FL (ref 79.6–97.8)
METHGB MFR BLD: 0.3 % (ref 0–1.5)
OXYHGB MFR BLDA: 96.3 % (ref 94–97)
PCO2 BLDA: 43 MMHG (ref 35–45)
PEEP RESPIRATORY: 8 CM[H2O]
PH BLDA: 7.43 [PH] (ref 7.35–7.45)
PLATELET # BLD AUTO: 190 K/UL (ref 150–450)
PMV BLD AUTO: 9.3 FL (ref 10.8–14.1)
PO2 BLDA: 88 MMHG (ref 80–105)
POTASSIUM SERPL-SCNC: 4.1 MMOL/L (ref 3.5–5.1)
PROTHROMBIN TIME: 15.8 SEC (ref 11.5–14.5)
Q-T INTERVAL, ECG07: 392 MS
QRS DURATION, ECG06: 144 MS
QTC CALCULATION (BEZET), ECG08: 497 MS
RBC # BLD AUTO: 4.32 M/UL (ref 4.23–5.67)
RESP RATE: 8
SAO2 % BLD: 98 % (ref 92–98.5)
SODIUM SERPL-SCNC: 146 MMOL/L (ref 136–145)
VENTILATION MODE VENT: ABNORMAL
VENTRICULAR RATE, ECG03: 97 BPM
VT SETTING VENT: 500 ML
WBC # BLD AUTO: 7.1 K/UL (ref 4.3–11.1)

## 2018-03-14 PROCEDURE — 0BJ08ZZ INSPECTION OF TRACHEOBRONCHIAL TREE, VIA NATURAL OR ARTIFICIAL OPENING ENDOSCOPIC: ICD-10-PCS | Performed by: INTERNAL MEDICINE

## 2018-03-14 PROCEDURE — 93005 ELECTROCARDIOGRAM TRACING: CPT | Performed by: INTERNAL MEDICINE

## 2018-03-14 PROCEDURE — 74011250636 HC RX REV CODE- 250/636: Performed by: INTERNAL MEDICINE

## 2018-03-14 PROCEDURE — 74011000258 HC RX REV CODE- 258: Performed by: INTERNAL MEDICINE

## 2018-03-14 PROCEDURE — 36600 WITHDRAWAL OF ARTERIAL BLOOD: CPT

## 2018-03-14 PROCEDURE — 77030012699 HC VLV SUC CNTRL OCOA -A: Performed by: INTERNAL MEDICINE

## 2018-03-14 PROCEDURE — 65610000001 HC ROOM ICU GENERAL

## 2018-03-14 PROCEDURE — C9113 INJ PANTOPRAZOLE SODIUM, VIA: HCPCS | Performed by: INTERNAL MEDICINE

## 2018-03-14 PROCEDURE — 94003 VENT MGMT INPAT SUBQ DAY: CPT

## 2018-03-14 PROCEDURE — 71045 X-RAY EXAM CHEST 1 VIEW: CPT

## 2018-03-14 PROCEDURE — 74011000250 HC RX REV CODE- 250: Performed by: INTERNAL MEDICINE

## 2018-03-14 PROCEDURE — 77010033711 HC HIGH FLOW OXYGEN

## 2018-03-14 PROCEDURE — 31622 DX BRONCHOSCOPE/WASH: CPT | Performed by: INTERNAL MEDICINE

## 2018-03-14 PROCEDURE — 36415 COLL VENOUS BLD VENIPUNCTURE: CPT | Performed by: INTERNAL MEDICINE

## 2018-03-14 PROCEDURE — 99233 SBSQ HOSP IP/OBS HIGH 50: CPT | Performed by: INTERNAL MEDICINE

## 2018-03-14 PROCEDURE — 76040000025: Performed by: INTERNAL MEDICINE

## 2018-03-14 PROCEDURE — 80048 BASIC METABOLIC PNL TOTAL CA: CPT | Performed by: INTERNAL MEDICINE

## 2018-03-14 PROCEDURE — 74011250636 HC RX REV CODE- 250/636

## 2018-03-14 PROCEDURE — 85610 PROTHROMBIN TIME: CPT | Performed by: INTERNAL MEDICINE

## 2018-03-14 PROCEDURE — 74011636637 HC RX REV CODE- 636/637: Performed by: INTERNAL MEDICINE

## 2018-03-14 PROCEDURE — 85027 COMPLETE CBC AUTOMATED: CPT | Performed by: INTERNAL MEDICINE

## 2018-03-14 PROCEDURE — 82803 BLOOD GASES ANY COMBINATION: CPT

## 2018-03-14 RX ORDER — ONDANSETRON 2 MG/ML
4 INJECTION INTRAMUSCULAR; INTRAVENOUS
Status: DISCONTINUED | OUTPATIENT
Start: 2018-03-14 | End: 2018-03-21 | Stop reason: HOSPADM

## 2018-03-14 RX ORDER — ALBUTEROL SULFATE 0.83 MG/ML
2.5 SOLUTION RESPIRATORY (INHALATION)
Status: DISCONTINUED | OUTPATIENT
Start: 2018-03-14 | End: 2018-03-21 | Stop reason: HOSPADM

## 2018-03-14 RX ORDER — DEXAMETHASONE SODIUM PHOSPHATE 4 MG/ML
8 INJECTION, SOLUTION INTRA-ARTICULAR; INTRALESIONAL; INTRAMUSCULAR; INTRAVENOUS; SOFT TISSUE EVERY 8 HOURS
Status: DISCONTINUED | OUTPATIENT
Start: 2018-03-14 | End: 2018-03-14

## 2018-03-14 RX ORDER — METOPROLOL TARTRATE 5 MG/5ML
5 INJECTION INTRAVENOUS EVERY 6 HOURS
Status: DISCONTINUED | OUTPATIENT
Start: 2018-03-14 | End: 2018-03-19

## 2018-03-14 RX ORDER — DEXAMETHASONE SODIUM PHOSPHATE 100 MG/10ML
8 INJECTION INTRAMUSCULAR; INTRAVENOUS EVERY 8 HOURS
Status: DISCONTINUED | OUTPATIENT
Start: 2018-03-14 | End: 2018-03-15

## 2018-03-14 RX ORDER — DEXAMETHASONE SODIUM PHOSPHATE 100 MG/10ML
8 INJECTION INTRAMUSCULAR; INTRAVENOUS EVERY 8 HOURS
Status: DISCONTINUED | OUTPATIENT
Start: 2018-03-14 | End: 2018-03-14

## 2018-03-14 RX ORDER — HYDROCODONE BITARTRATE AND ACETAMINOPHEN 7.5; 325 MG/1; MG/1
1 TABLET ORAL
Status: DISCONTINUED | OUTPATIENT
Start: 2018-03-14 | End: 2018-03-21 | Stop reason: HOSPADM

## 2018-03-14 RX ORDER — PROMETHAZINE HYDROCHLORIDE 25 MG/ML
INJECTION, SOLUTION INTRAMUSCULAR; INTRAVENOUS
Status: COMPLETED
Start: 2018-03-14 | End: 2018-03-14

## 2018-03-14 RX ORDER — DEXAMETHASONE SODIUM PHOSPHATE 100 MG/10ML
8 INJECTION INTRAMUSCULAR; INTRAVENOUS EVERY 8 HOURS
Status: DISCONTINUED | OUTPATIENT
Start: 2018-03-15 | End: 2018-03-14

## 2018-03-14 RX ADMIN — Medication 10 ML: at 21:50

## 2018-03-14 RX ADMIN — Medication 10 ML: at 05:15

## 2018-03-14 RX ADMIN — SODIUM CHLORIDE 3 G: 900 INJECTION, SOLUTION INTRAVENOUS at 10:36

## 2018-03-14 RX ADMIN — Medication 10 ML: at 14:48

## 2018-03-14 RX ADMIN — ONDANSETRON 4 MG: 2 INJECTION INTRAMUSCULAR; INTRAVENOUS at 20:57

## 2018-03-14 RX ADMIN — INSULIN HUMAN 2 UNITS: 100 INJECTION, SOLUTION PARENTERAL at 11:36

## 2018-03-14 RX ADMIN — ONDANSETRON 4 MG: 2 INJECTION INTRAMUSCULAR; INTRAVENOUS at 11:27

## 2018-03-14 RX ADMIN — DEXAMETHASONE SODIUM PHOSPHATE 8 MG: 10 INJECTION INTRAMUSCULAR; INTRAVENOUS at 21:50

## 2018-03-14 RX ADMIN — METOPROLOL TARTRATE 5 MG: 5 INJECTION, SOLUTION INTRAVENOUS at 11:26

## 2018-03-14 RX ADMIN — SODIUM CHLORIDE 3 G: 900 INJECTION, SOLUTION INTRAVENOUS at 23:28

## 2018-03-14 RX ADMIN — DEXTROSE MONOHYDRATE, SODIUM CHLORIDE, AND POTASSIUM CHLORIDE 75 ML/HR: 50; 9; 1.49 INJECTION, SOLUTION INTRAVENOUS at 23:25

## 2018-03-14 RX ADMIN — PROMETHAZINE HYDROCHLORIDE 25 MG: 25 INJECTION INTRAMUSCULAR; INTRAVENOUS at 22:03

## 2018-03-14 RX ADMIN — PROPOFOL 15 MCG/KG/MIN: 10 INJECTION, EMULSION INTRAVENOUS at 01:57

## 2018-03-14 RX ADMIN — HYDRALAZINE HYDROCHLORIDE 10 MG: 20 INJECTION INTRAMUSCULAR; INTRAVENOUS at 06:41

## 2018-03-14 RX ADMIN — SODIUM CHLORIDE 3 G: 900 INJECTION, SOLUTION INTRAVENOUS at 05:13

## 2018-03-14 RX ADMIN — INSULIN HUMAN 2 UNITS: 100 INJECTION, SOLUTION PARENTERAL at 18:06

## 2018-03-14 RX ADMIN — SODIUM CHLORIDE 3 G: 900 INJECTION, SOLUTION INTRAVENOUS at 16:48

## 2018-03-14 RX ADMIN — LABETALOL HYDROCHLORIDE 10 MG: 5 INJECTION, SOLUTION INTRAVENOUS at 09:10

## 2018-03-14 RX ADMIN — INSULIN HUMAN 2 UNITS: 100 INJECTION, SOLUTION PARENTERAL at 23:33

## 2018-03-14 RX ADMIN — ONDANSETRON 4 MG: 2 INJECTION INTRAMUSCULAR; INTRAVENOUS at 17:30

## 2018-03-14 RX ADMIN — DEXTROSE MONOHYDRATE, SODIUM CHLORIDE, AND POTASSIUM CHLORIDE 75 ML/HR: 50; 9; 1.49 INJECTION, SOLUTION INTRAVENOUS at 06:32

## 2018-03-14 RX ADMIN — SODIUM CHLORIDE 3 G: 900 INJECTION, SOLUTION INTRAVENOUS at 00:07

## 2018-03-14 RX ADMIN — SODIUM CHLORIDE 40 MG: 9 INJECTION INTRAMUSCULAR; INTRAVENOUS; SUBCUTANEOUS at 09:32

## 2018-03-14 RX ADMIN — Medication 125 MCG/HR: at 08:07

## 2018-03-14 RX ADMIN — METOPROLOL TARTRATE 5 MG: 5 INJECTION, SOLUTION INTRAVENOUS at 18:13

## 2018-03-14 NOTE — PROGRESS NOTES
Care Daily Progress Note: 3/14/2018  Admission Date: 3/10/2018     The patient's chart is reviewed and the patient is discussed with the staff. 68 yo WM with upper airway obstruction for edema/hematoma following reportedly having filling placed by dentist on 3/10. Takes chronic Coumadin and Plavix for hx PE and CAD. Presented with respiratory distress and facial swelling. Intubated for airway stability. CT neck 3/10:  Left parapharyngeal hemorrhage with compression of the oropharynx. Has history fo +ANITA 1:40, antichromatin ab    Subjective:     CT neck with smaller hematoma  Examination with +cuff leak and less neck swelling today    Current Facility-Administered Medications   Medication Dose Route Frequency    metoprolol (LOPRESSOR) injection 5 mg  5 mg IntraVENous Q6H    ondansetron (ZOFRAN) injection 4 mg  4 mg IntraVENous Q6H PRN    morphine injection 2 mg  2 mg IntraVENous Q4H PRN    dextrose 5% - 0.9% NaCl with KCl 20 mEq/L infusion  75 mL/hr IntraVENous CONTINUOUS    insulin regular (NOVOLIN R, HUMULIN R) injection   SubCUTAneous Q6H    ampicillin-sulbactam (UNASYN) 3 g in 0.9% sodium chloride (MBP/ADV) 100 mL  3 g IntraVENous Q6H    0.9% sodium chloride infusion 250 mL  250 mL IntraVENous PRN    0.9% sodium chloride infusion 250 mL  250 mL IntraVENous PRN    sodium chloride (NS) flush 5-10 mL  5-10 mL IntraVENous Q8H    sodium chloride (NS) flush 5-10 mL  5-10 mL IntraVENous PRN    fentaNYL in normal saline (pf) 25 mcg/mL infusion  0-200 mcg/hr IntraVENous TITRATE    propofol (DIPRIVAN) infusion  5-50 mcg/kg/min IntraVENous TITRATE    pantoprazole (PROTONIX) 40 mg in sodium chloride 10 mL injection  40 mg IntraVENous DAILY    hydrALAZINE (APRESOLINE) 20 mg/mL injection 10 mg  10 mg IntraVENous Q6H PRN    labetalol (NORMODYNE;TRANDATE) injection 10 mg  10 mg IntraVENous Q2H PRN       Review of Systems  Unobtainable due to patient status.     Objective:     Vitals: 03/14/18 1100 03/14/18 1126 03/14/18 1247 03/14/18 1252   BP:  160/73 134/74 157/73   Pulse: 81 75 92 94   Resp: 20  18 20   Temp:       SpO2: 100%  98% (!) 82%   Weight:       Height:           Intake and Output:   03/12 1901 - 03/14 0700  In: 3628.8 [I.V.:3628.8]  Out: 2873 [Urine:1275]       Physical Exam:          Constitutional:  intubated and mechanically ventilated.   EENMT:  Sclera clear, pupils equal, oral mucosa moist, left neck hematoma  Respiratory: few anterior crackles, no wheezing  Cardiovascular:  RRR with no M,G,R;  Gastrointestinal:  soft with no tenderness; positive bowel sounds present  Musculoskeletal:  warm with no cyanosis, no lower leg edema  Skin:  no jaundice or ecchymosis  Neurologic: no gross neuro deficits     Psychiatric:  Awakens, nodding responses and following commands     LINES:    ETT:  3/10/18  PIV sites  OG:  3/10/18   Rai:  3/10/18    DRIPS:    D5NS with 20meq KCL 75ml/hr  Fentanyl 125mcg/hr  Diprivan 10mcg/kg/min    CT neck:  pending    CXR 3/13/18:        Ventilator Settings  Mode FIO2 Rate Tidal Volume Pressure PEEP   Pressure support  30 %    500 ml  10 cm H2O  8 cm H20      Peak airway pressure: 18.7 cm H2O   Minute ventilation: 9 l/min     ABG:   Recent Labs      03/14/18   0310  03/13/18   0312  03/12/18   0310   PH  7.43  7.40  7.42   PCO2  43  44  36   PO2  88  95  91   HCO3  28*  27*  23        LAB  Recent Labs      03/14/18   1132  03/14/18   0515  03/13/18   2358  03/13/18   1740  03/13/18   1234   GLUCPOC  168*  118*  117*  108*  108*     Recent Labs      03/14/18   0346  03/13/18   0619  03/12/18   0522   WBC  7.1  10.7  12.0*   HGB  9.8*  9.9*  8.8*   HCT  33.8*  33.6*  29.2*   PLT  190  256  229   INR  1.3  1.2  1.3     Recent Labs      03/14/18   0346  03/13/18   0619  03/12/18   0522   NA  146*  144  143   K  4.1  4.0  4.0   CL  112*  110*  106   CO2  27  26  27   GLU  132*  123*  138*   BUN  24*  34*  35*   CREA  1.13  1.20  1.35   CA  7.6*  8.3  7.9*     No results for input(s): LCAD, LAC in the last 72 hours. Assessment:  (Medical Decision Making)     Hospital Problems  Date Reviewed: 3/13/2018          Codes Class Noted POA    Type II diabetes mellitus ([facility] [address] ICD-10-CM: E11.9  ICD-9-CM: 250.00  3/11/2018 Yes    Chronic--122-185    * (Principal)Surgical wound hemorrhage after dental procedure ICD-10-CM: K91.840  ICD-9-CM: 998.11  3/10/2018 Yes    unchanged    Airway compromise ICD-10-CM: J98.8  ICD-9-CM: 519.8  3/10/2018 Yes    Repeat CT and examination with smaller hematoma today    Epistaxis ICD-10-CM: R04.0  ICD-9-CM: 784.7  3/10/2018 Yes    resolved    Hx pulmonary embolism ICD-10-CM: K28.305  ICD-9-CM: V12.55  3/10/2018 Yes    Chronic anticoagulation--INR down to 1.2      HTN: resume prior to admission medications if can swallow  New a fib:  Concern given irregular rhythm on monitor. Plan:  (Medical Decision Making)   --wean sedatives  --remove OGT  --extubate over bronchoscope today  --assess swallowing and resume antihypertensive medications if possible  --Holding anticoagulation while airway swelling remains an issue. 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 ENT following as well. Will need to carefully time reinstitution of warfarin in discussion with them. --ECG given concern for a fib. No anticoagulation for this since risks far outweigh benefits. Currently rate controlled.     Matthias Metcalf MD

## 2018-03-14 NOTE — PROGRESS NOTES
Bedside, Verbal and Written shift change report given to Tone Ennis RN by Jose Carlos Morgan RN. Report included the following information SBAR, Kardex, ED Summary, Intake/Output, MAR, Accordion, Recent Results, Med Rec Status and Cardiac Rhythm SR/SB.      Fentanyl gtt dual verified with [facility], [facility] unopened bag of fentanyl received

## 2018-03-14 NOTE — PROGRESS NOTES
Notified MD Leo Cortez of pt rhythm change from Sinus Bradycardia/Sinus Rhythm to Atrial Fibrillation. Orders received to oder 12 Lead EKG after patient is extubated. Preparing for extubation with bronch team in the room, and will order 12 lead after. [facility] [place] Admit Note Subjective:  
 
Puja Gonsalves is a male infant born via , Low Transverse on 
2017 at 10:24 AM. He weighed 3.515 kg and measured 19.5\" in length. His head circumference was 35 cm at birth. Apgars were 9 and 9. Maternal Data:  
Age: Information for the patient's mother:  Suzi Barrett [[id number]] 32 y.o. 
 
Almkimberly Nelson:  
Information for the patient's mother:  Suzi Barrett [[id number]] G5  Rupture Date:   
Rupture Time:  . Delivery Type: , Low Transverse Presentation: Vertex Delivery Resuscitation:  None Number of Vessels:  3 Vessels Cord Events:  None Meconium Stained:   None Amniotic Fluid Description:     
 
Information for the patient's mother:  Suzi Barrett [[id number]] Gestational Age: 44w2d Prenatal Labs: 
Lab Results Component Value Date/Time ABO/Rh(D) O NEGATIVE 2018 01:39 AM  
 HBsAg, External Negative  2018 HIV, External Negative  2018 Rubella, External Immune 2018 T. Pallidum Antibody, External Negative  2018 Gonorrhea, External Negative 2018 Chlamydia, External Negative 2018 GrBStrep, External Negative  2018 ABO,Rh O NEGATIVE  2018 Mom was GBS neg. ROM: @ del Pregnancy Complications: Maternal H/o HSV, on Valtrex at 37 wks, no active lesions. Maternal h/o PTSD, HPV, anemia, anxiety and SVT. Prenatal ultrasound: no abnormalities reported Feeding Method Used: Breast feeding Supplemental information: see above. Objective:  
 
Visit Vitals  Pulse 142  Temp 98.6 °F (37 °C)  Resp 52  Ht 0.495 m Comment: Filed from Delivery Summary  Wt 3.515 kg Comment: Filed from Delivery Summary  HC 35 cm Comment: Filed from Delivery Summary  BMI 14.33 kg/m2  
 
 
10/08 0701 - 10/08 1900 In: -  
Out: 1 [Urine:1] Patient Vitals for the past 24 hrs: 
 Urine Occurrence(s)  
10/08/18 1500 1 10/08/18 1431 1  
10/08/18 1054 1 No data found. Recent Results (from the past 24 hour(s)) CORD BLOOD EVALUATION Collection Time: 10/08/18 10:37 AM  
Result Value Ref Range ABO/Rh(D) O NEGATIVE   
 ANISH IgG NEG Bilirubin if ANISH pos: IF DIRECT KEON POSITIVE, BILIRUBIN TO FOLLOW WEAK D NEG Physical Exam: 
 
General: healthy-appearing, vigorous infant. Strong cry. Head: sutures lines are open,fontanelles soft, flat and open Eyes: sclerae white, pupils equal and reactive, red reflex normal bilaterally Ears: well-positioned, well-formed pinnae Nose: clear, normal mucosa Mouth: Normal tongue, palate intact, Neck: normal structure Chest: lungs clear to auscultation, unlabored breathing, no clavicular crepitus Heart: RRR, S1 S2, no murmurs Abd: Soft, non-tender, no masses, no HSM, nondistended, umbilical stump clean and dry Pulses: strong equal femoral pulses, brisk capillary refill Hips: Negative Russo, Ortolani, gluteal creases equal 
: Normal genitalia, descended testes Extremities: well-perfused, warm and dry Neuro: easily aroused Good symmetric tone and strength Positive root and suck. Symmetric normal reflexes Skin: warm and pink Assessment:  
 
Active Problems: 
  Single liveborn, born in hospital, delivered by  section (2017) Healthy  male Gestational Age: 44w2d infant. Plan:  
 
Continue routine  care. PCP Junior Edmonds Signed By:  So Gomez MD   
 2018

## 2018-03-14 NOTE — PROGRESS NOTES
Bedside shift report received to [facility] and fentanyl gtt dual verified.

## 2018-03-14 NOTE — PROGRESS NOTES
Bedside shift report given to [facility] RN and fentanyl gtt dual verified. Hernán WALTON handed off new unopened bag of fentanyl as well.

## 2018-03-14 NOTE — PROGRESS NOTES
Patient extubated with bronchoscopy assistance due to constricted airway. Colletta Bell, RT and Francheska, RT at bedside as well as Dr. Chuy Dietrich. Patient extubated to a 35L/40% heated high flow NC. Patient is able to communicate and is negative for stridor. Breath sounds are coarse and diminished. No complications with extubation.      Tyron Knutson, RT

## 2018-03-14 NOTE — INTERVAL H&P NOTE
H&P Update:  Johana Garcia was seen and examined. History and physical has been reviewed. The patient has been examined.  There have been no significant clinical changes since the completion of the originally dated History and Physical.    Signed By: Alley Connor MD     March 14, 2018 1:18 PM

## 2018-03-14 NOTE — INTERDISCIPLINARY ROUNDS
Interdisciplinary team rounds were held 3/14/2018 with the following team members:Care Management, Nursing, Nurse Practitioner, Nutrition, Palliative Care, Pharmacy, Physical Therapy, Physician, Respiratory Therapy and Clinical Coordinator. Plan of care discussed. See clinical pathway and/or care plan for interventions and desired outcomes.

## 2018-03-14 NOTE — PROGRESS NOTES
Bronchoscopy procedure done. Consent obtained. Time out performed. Pts vitals monitored throughout procedure. Scope # 9 used. Procedure tolerated with no adverse rxn. Pt was extubated with no issues. Pts nurse was present during the procedure.

## 2018-03-14 NOTE — PROGRESS NOTES
Ventilator check complete; patient has a #7.5 ET tube secured at the 23 at the teeth. Patient is sedated. Breath sounds are coarse. Trachea is midline, Negative for subcutaneous air, and chest excursion is symmetric. Patient is also Negative for cyanosis and is Negative for pitting edema. All alarms are set and audible. Resuscitation bag is at the head of the bed.       Ventilator Settings  Mode FIO2 Rate Tidal Volume Pressure PEEP I:E Ratio   SIMV, Pressure support, Volume control  35 %  8  500 ml  10 cm H2O  8 cm H20  1:3.13      Peak airway pressure: 19.7 cm H2O   Minute ventilation: 20.8 l/min     ABG:   Recent Labs      03/14/18   0310  03/13/18   0312  03/12/18   0310   PH  7.43  7.40  7.42   PCO2  43  44  36   PO2  88  95  91   HCO3  28*  27*  23         Bhumika Perez, RT

## 2018-03-14 NOTE — PROCEDURES
PROCEDURE  Bronchoscopy with airway inspection    INDICATION   Airway compromise with upper airway compression due to hematoma    EQUIPMENT:  Olympus Q 180 Bronchoscope. ANESTHESIA  None  Please see ICU/CCU/CTICU medication record. AIRWAY INSPECTION    After obtaining informed consent, using a ET tube adapter, an Olympus Q180 video bronchoscope was  introduced into the trachea through the ET tube, without complication. RIGHT    LOCATION NORM/ABNORM DESCRIPTION   VOCAL CORDS NL No significant compression at level of vocal cords or hypopharynx as ET tube removed   TRACHEA NL Notable dynamic collapse   SHANNON NL    RMSB NL    RUL NL    BI NL                                  The procedure was completed  without complication and the patient tolerated the procedure well.     EBL: bloody secretions expectorated upon removal of the ET tube    Recommendations:  Monitor closely in ICU  Assess swallowing  Teagan Herring MD

## 2018-03-15 ENCOUNTER — APPOINTMENT (OUTPATIENT)
Dept: GENERAL RADIOLOGY | Age: 78
DRG: 920 | End: 2018-03-15
Attending: INTERNAL MEDICINE
Payer: MEDICARE

## 2018-03-15 LAB
ANION GAP SERPL CALC-SCNC: 6 MMOL/L (ref 7–16)
BUN SERPL-MCNC: 27 MG/DL (ref 8–23)
CALCIUM SERPL-MCNC: 8 MG/DL (ref 8.3–10.4)
CHLORIDE SERPL-SCNC: 111 MMOL/L (ref 98–107)
CO2 SERPL-SCNC: 29 MMOL/L (ref 21–32)
CREAT SERPL-MCNC: 1.2 MG/DL (ref 0.8–1.5)
ERYTHROCYTE [DISTWIDTH] IN BLOOD BY AUTOMATED COUNT: 16 % (ref 11.9–14.6)
GLUCOSE BLD STRIP.AUTO-MCNC: 160 MG/DL (ref 65–100)
GLUCOSE BLD STRIP.AUTO-MCNC: 169 MG/DL (ref 65–100)
GLUCOSE BLD STRIP.AUTO-MCNC: 191 MG/DL (ref 65–100)
GLUCOSE BLD STRIP.AUTO-MCNC: 231 MG/DL (ref 65–100)
GLUCOSE BLD STRIP.AUTO-MCNC: 232 MG/DL (ref 65–100)
GLUCOSE SERPL-MCNC: 221 MG/DL (ref 65–100)
HCT VFR BLD AUTO: 31.7 % (ref 41.1–50.3)
HGB BLD-MCNC: 9.5 G/DL (ref 13.6–17.2)
INR PPP: 1.3
MCH RBC QN AUTO: 23.2 PG (ref 26.1–32.9)
MCHC RBC AUTO-ENTMCNC: 30 G/DL (ref 31.4–35)
MCV RBC AUTO: 77.3 FL (ref 79.6–97.8)
PLATELET # BLD AUTO: 167 K/UL (ref 150–450)
PMV BLD AUTO: 9.5 FL (ref 10.8–14.1)
POTASSIUM SERPL-SCNC: 4.2 MMOL/L (ref 3.5–5.1)
PROTHROMBIN TIME: 15.8 SEC (ref 11.5–14.5)
RBC # BLD AUTO: 4.1 M/UL (ref 4.23–5.67)
SODIUM SERPL-SCNC: 146 MMOL/L (ref 136–145)
WBC # BLD AUTO: 6.4 K/UL (ref 4.3–11.1)

## 2018-03-15 PROCEDURE — 82962 GLUCOSE BLOOD TEST: CPT

## 2018-03-15 PROCEDURE — 92610 EVALUATE SWALLOWING FUNCTION: CPT

## 2018-03-15 PROCEDURE — 74011250636 HC RX REV CODE- 250/636: Performed by: INTERNAL MEDICINE

## 2018-03-15 PROCEDURE — 77010033678 HC OXYGEN DAILY

## 2018-03-15 PROCEDURE — 74011000250 HC RX REV CODE- 250: Performed by: INTERNAL MEDICINE

## 2018-03-15 PROCEDURE — 74011636637 HC RX REV CODE- 636/637: Performed by: INTERNAL MEDICINE

## 2018-03-15 PROCEDURE — C9113 INJ PANTOPRAZOLE SODIUM, VIA: HCPCS | Performed by: INTERNAL MEDICINE

## 2018-03-15 PROCEDURE — 85610 PROTHROMBIN TIME: CPT | Performed by: INTERNAL MEDICINE

## 2018-03-15 PROCEDURE — 65610000001 HC ROOM ICU GENERAL

## 2018-03-15 PROCEDURE — 36415 COLL VENOUS BLD VENIPUNCTURE: CPT | Performed by: INTERNAL MEDICINE

## 2018-03-15 PROCEDURE — 85027 COMPLETE CBC AUTOMATED: CPT | Performed by: INTERNAL MEDICINE

## 2018-03-15 PROCEDURE — 80048 BASIC METABOLIC PNL TOTAL CA: CPT | Performed by: INTERNAL MEDICINE

## 2018-03-15 PROCEDURE — 86580 TB INTRADERMAL TEST: CPT | Performed by: INTERNAL MEDICINE

## 2018-03-15 PROCEDURE — 74011000258 HC RX REV CODE- 258: Performed by: INTERNAL MEDICINE

## 2018-03-15 PROCEDURE — 71045 X-RAY EXAM CHEST 1 VIEW: CPT

## 2018-03-15 PROCEDURE — 74011000302 HC RX REV CODE- 302: Performed by: INTERNAL MEDICINE

## 2018-03-15 PROCEDURE — 99233 SBSQ HOSP IP/OBS HIGH 50: CPT | Performed by: INTERNAL MEDICINE

## 2018-03-15 RX ORDER — DEXAMETHASONE SODIUM PHOSPHATE 100 MG/10ML
8 INJECTION INTRAMUSCULAR; INTRAVENOUS EVERY 12 HOURS
Status: DISCONTINUED | OUTPATIENT
Start: 2018-03-15 | End: 2018-03-16

## 2018-03-15 RX ADMIN — DEXTROSE MONOHYDRATE, SODIUM CHLORIDE, AND POTASSIUM CHLORIDE 75 ML/HR: 50; 9; 1.49 INJECTION, SOLUTION INTRAVENOUS at 15:18

## 2018-03-15 RX ADMIN — DEXAMETHASONE SODIUM PHOSPHATE 8 MG: 10 INJECTION INTRAMUSCULAR; INTRAVENOUS at 21:05

## 2018-03-15 RX ADMIN — SODIUM CHLORIDE 3 G: 900 INJECTION, SOLUTION INTRAVENOUS at 23:13

## 2018-03-15 RX ADMIN — INSULIN HUMAN 4 UNITS: 100 INJECTION, SOLUTION PARENTERAL at 11:42

## 2018-03-15 RX ADMIN — INSULIN HUMAN 2 UNITS: 100 INJECTION, SOLUTION PARENTERAL at 17:13

## 2018-03-15 RX ADMIN — METOPROLOL TARTRATE 5 MG: 5 INJECTION, SOLUTION INTRAVENOUS at 11:41

## 2018-03-15 RX ADMIN — SODIUM CHLORIDE 40 MG: 9 INJECTION INTRAMUSCULAR; INTRAVENOUS; SUBCUTANEOUS at 08:16

## 2018-03-15 RX ADMIN — SODIUM CHLORIDE 3 G: 900 INJECTION, SOLUTION INTRAVENOUS at 10:40

## 2018-03-15 RX ADMIN — Medication 10 ML: at 05:36

## 2018-03-15 RX ADMIN — DEXAMETHASONE SODIUM PHOSPHATE 8 MG: 10 INJECTION INTRAMUSCULAR; INTRAVENOUS at 05:36

## 2018-03-15 RX ADMIN — TUBERCULIN PURIFIED PROTEIN DERIVATIVE 5 UNITS: 5 INJECTION, SOLUTION INTRADERMAL at 21:04

## 2018-03-15 RX ADMIN — INSULIN HUMAN 2 UNITS: 100 INJECTION, SOLUTION PARENTERAL at 23:17

## 2018-03-15 RX ADMIN — HYDRALAZINE HYDROCHLORIDE 10 MG: 20 INJECTION INTRAMUSCULAR; INTRAVENOUS at 17:12

## 2018-03-15 RX ADMIN — Medication 10 ML: at 13:44

## 2018-03-15 RX ADMIN — SODIUM CHLORIDE 3 G: 900 INJECTION, SOLUTION INTRAVENOUS at 16:19

## 2018-03-15 RX ADMIN — Medication 10 ML: at 22:00

## 2018-03-15 RX ADMIN — INSULIN HUMAN 4 UNITS: 100 INJECTION, SOLUTION PARENTERAL at 05:45

## 2018-03-15 RX ADMIN — ONDANSETRON 4 MG: 2 INJECTION INTRAMUSCULAR; INTRAVENOUS at 08:14

## 2018-03-15 RX ADMIN — SODIUM CHLORIDE 3 G: 900 INJECTION, SOLUTION INTRAVENOUS at 05:35

## 2018-03-15 NOTE — PROGRESS NOTES
Patient is coughing up thick green secretions and HR dropped in the 50s during coughing episode. Patient makes high pitched noise when coughing. Lungs sound coarse. MD made aware. Orders received.

## 2018-03-15 NOTE — PROGRESS NOTES
Critical Care Daily Progress Note: 3/15/2018    Jason Murphy   Admission Date: 3/10/2018         The patient's chart is reviewed and the patient is discussed with the staff. 70 yo WM with upper airway obstruction for edema/hematoma following reportedly having filling placed by dentist on 3/10. Takes chronic Coumadin and Plavix for hx PE and CAD. Presented with respiratory distress and facial swelling. Intubated for airway stability. CT neck 3/10:  Left parapharyngeal hemorrhage with compression of the oropharynx. Has history fo +ANITA 1:40, antichromatin ab. Follow up CT with smaller hematoma and positive cuff leak. Bronch performed on 3/14 with no significant compression at level of vocal cords or hypopharynx and was extubated. Subjective:     Post extubation yesterday with productive cough with thick green secretions. Vomited last night with desat but recovered on NC. Sitting up in bed and denies shortness of breath.     Current Facility-Administered Medications   Medication Dose Route Frequency    metoprolol (LOPRESSOR) injection 5 mg  5 mg IntraVENous Q6H    ondansetron (ZOFRAN) injection 4 mg  4 mg IntraVENous Q6H PRN    HYDROcodone-acetaminophen (NORCO) 7.5-325 mg per tablet 1 Tab  1 Tab Oral Q6H PRN    albuterol (PROVENTIL VENTOLIN) nebulizer solution 2.5 mg  2.5 mg Nebulization Q4H PRN    dexamethasone (DECADRON) injection 8 mg  8 mg IntraVENous Q8H    promethazine (PHENERGAN) with saline injection 25 mg  25 mg IntraVENous Q6H PRN    morphine injection 2 mg  2 mg IntraVENous Q4H PRN    dextrose 5% - 0.9% NaCl with KCl 20 mEq/L infusion  75 mL/hr IntraVENous CONTINUOUS    insulin regular (NOVOLIN R, HUMULIN R) injection   SubCUTAneous Q6H    ampicillin-sulbactam (UNASYN) 3 g in 0.9% sodium chloride (MBP/ADV) 100 mL  3 g IntraVENous Q6H    0.9% sodium chloride infusion 250 mL  250 mL IntraVENous PRN    0.9% sodium chloride infusion 250 mL  250 mL IntraVENous PRN    sodium chloride (NS) flush 5-10 mL  5-10 mL IntraVENous Q8H    sodium chloride (NS) flush 5-10 mL  5-10 mL IntraVENous PRN    pantoprazole (PROTONIX) 40 mg in sodium chloride 10 mL injection  40 mg IntraVENous DAILY    hydrALAZINE (APRESOLINE) 20 mg/mL injection 10 mg  10 mg IntraVENous Q6H PRN    labetalol (NORMODYNE;TRANDATE) injection 10 mg  10 mg IntraVENous Q2H PRN       Review of Systems  Constitutional:  negative for fever, chills, sweats  Cardiovascular:  negative for chest pain, palpitations, syncope, edema  Gastrointestinal:  negative for dysphagia, reflux, vomiting, diarrhea, abdominal pain, or melena  Neurologic:  negative for focal weakness, numbness, headache      Objective:     Vitals:    03/15/18 0923 03/15/18 0932 03/15/18 1001 03/15/18 1031   BP: 152/74 164/74 153/72 148/68   Pulse: 67 74 61 65   Resp: 26 25 19 22   Temp:       SpO2: 96% 95% 96% 95%   Weight:       Height:           Intake and Output:   03/13 1901 - 03/15 0700  In: 2910.3 [I.V.:2910.3]  Out: 425 [Urine:225]  03/15 0701 - 03/15 1900  In: 100 [I.V.:100]  Out: -     Physical Exam:          Constitutional:  the patient is well developed and in no acute distress, NC 2L sat 95%  EENMT:  Sclera clear, pupils equal, oral mucosa moist, mild neck enlargement  Respiratory: few anterior crackles, no wheezing and no stridor  Cardiovascular:  RRR without M,G,R  Gastrointestinal: soft and non-tender; with positive bowel sounds. Musculoskeletal: warm without cyanosis. There is no lower leg edema, has restless leg syndrome.   Skin:  no jaundice or rashes, no open wounds   Neurologic: no gross neuro deficits     Psychiatric:  alert and oriented x 2    LINES:    PIV sites    DRIPS:     D5 NS with 20meq KCL 75ml/hr    CXR 3/15/18:       LAB  Recent Labs      03/15/18   0543  03/14/18   2331  03/14/18   1805  03/14/18   1132  03/14/18   0515   GLUCPOC  232*  169*  192*  168*  118*      Recent Labs      03/15/18   0339  03/14/18   0346  03/13/18 6282   WBC  6.4  7.1  10.7   HGB  9.5*  9.8*  9.9*   HCT  31.7*  33.8*  33.6*   PLT  167  190  256   INR  1.3  1.3  1.2     Recent Labs      03/15/18   0339  03/14/18   0346  03/13/18   0619   NA  146*  146*  144   K  4.2  4.1  4.0   CL  111*  112*  110*   CO2  29  27  26   GLU  221*  132*  123*   BUN  27*  24*  34*   CREA  1.20  1.13  1.20   CA  8.0*  7.6*  8.3     Recent Labs      03/14/18   0310  03/13/18   0312   PH  7.43  7.40   PCO2  43  44   PO2  88  95   HCO3  28*  27*     No results for input(s): LCAD, LAC in the last 72 hours. Assessment:  (Medical Decision Making)     Hospital Problems  Date Reviewed: 3/15/2018          Codes Class Noted POA    [facility] ICD-10-CM: E11.9  ICD-9-CM: 250.00  3/11/2018 Yes    Chronic--ranges 069-047    * (Principal)Surgical wound hemorrhage after dental procedure ICD-10-CM: K91.840  ICD-9-CM: 998.11  3/10/2018 Yes    Hematoma--extubated 3/14    Airway compromise ICD-10-CM: J98.8  ICD-9-CM: 519.8  3/10/2018 Yes    Extubated to NC    Epistaxis ICD-10-CM: R04.0  ICD-9-CM: 784.7  3/10/2018 Yes    resolved    Hx pulmonary embolism ICD-10-CM: P52.964  ICD-9-CM: V12.55  3/10/2018 Yes    chronic          Plan:  (Medical Decision Making)     --Unasyn day 6  --Decadron 8mg IV q8h  --Speech following--recommending NPO with crushed meds in puree  --Na 146  --Consult PT--OOB with assistance  --Possible move to the floor later today with continuous O2 sat monitor    More than 50% of the time documented was spent in face-to-face contact with the patient and in the care of the patient on the floor/unit where the patient is located. Amada Jc NP    Lungs:  clear  Heart:  RRR with no Murmur/Rubs/Gallops    Additional Comments:  Looks better, will get PT, wean steroids, likely to floor later     I have spoken with and examined the patient. I agree with the above assessment and plan as documented.     Donavon Sanchez MD

## 2018-03-15 NOTE — PROGRESS NOTES
Nutrition:  Day 5 NPO status identified as per standard of care monitoring. Patient presents with no acute nutrition risk factors identified per admission nursing nutrition assessment.    NPO diet status was initially related to longer-than-expected ventilator support and now secondary to failed ST evaluation earlier today. MD wants to give him another day \"to wake up more\" and have another ST evaluation before initiating enteral nutrition support. If the patient is not able to start on an oral diet tomorrow, suggest placing a FT and consult RD for enteral nutrition support. Arnold Beatty.  Artie Session  [phone]

## 2018-03-15 NOTE — PROGRESS NOTES
Pt kept NPO per speech recommendations. Per Dr. Ralston Gilford, will consider NGT placement tomorrow if pt is not able to be advanced to PO intake after speech reassessment.

## 2018-03-15 NOTE — PROGRESS NOTES
Patient states he is nauseated. When patient coughs, he begins to gag and vomit and his o2 sats drop into the 70s. PRN Zofran did not help decrease nausea, MD called and order for IV Phenergan received. Patient has been resting comfortably with no more vomiting episodes and sats WNL on 3L NC. Will continue to monitor.

## 2018-03-15 NOTE — PROGRESS NOTES
LTG: Patient will tolerate least restrictive diet without overt signs or symptoms of airway compromise. STG: Patient will tolerate po trials with speech therapy only. STG: Patient will participate in modified barium swallow study as clinically indicated. Speech language pathology: bedside swallow note: Initial Assessment    NAME/AGE/GENDER: Jacobo Tamayo is a 69 y.o. male  DATE: 3/15/2018  PRIMARY DIAGNOSIS: Respiratory failure, unspecified chronicity, unspecified whether with hypoxia or hypercapnia (HCC) [J96.90]  Procedure(s) (LRB):  BRONCHOSCOPY (N/A) 1 Day Post-Op  ICD-10: Treatment Diagnosis: R13.12 Oropharyngeal Dysphagia. INTERDISCIPLINARY COLLABORATION: Registered Nurse and Physician  PRECAUTIONS/ALLERGIES: Review of patient's allergies indicates no known allergies. ASSESSMENT:Based on the objective data described below, Mr. Apurva Jones presents with Moderate-severe oropharyngeal dysphagia. Patient s/p extubation on 3/14/18 after 4 day intubation. Vocal quality remains hoarse and low volume. Reports regular diet at baseline. Patient presented with thin liquid via tsp. Delayed swallow initiation with immediate strong coughing and increased respiratory rate. Delayed swallow initiation again palpated with nectar thick liquids via tsp. Delayed wet vocal quality and gurgling with subsequent strong cough. Improved tolerance with honey thick via tsp and cup sip with no overt signs or symptoms of airway compromise. However, when presented with puree and mechanical soft trials, patient with delayed mastication and swallow initiation. Multiple swallows palpated, and patient c/o food \"staying in my throat\". Honey thick liquid wash utilized with throat clear on 1/4 trials. Delayed throat clearing and coughing noted at end of session with patient stating \"it just didn't all go down\". Concern for pharyngeal stasis that accumulates with additional trials. Recommend continue NPO at this time.  Medications crushed in puree. Will see again at bedside tomorrow, but ? Need for objective, instrumental swallow assessment if overt s/s airway compromise persist. Discussed recommendations with patient, RN, and Dr. Nehemias Ibarra. Patient will benefit from skilled intervention to address the below impairments. ?????? ? ? This section established at most recent assessment??????????  PROBLEM LIST (Impairments causing functional limitations):  1. Oropharyngeal dysphagia  REHABILITATION POTENTIAL FOR STATED GOALS: Good  PLAN OF CARE:   Patient will benefit from skilled intervention to address the following impairments. RECOMMENDATIONS AND PLANNED INTERVENTIONS (Benefits and precautions of therapy have been discussed with the patient.):  · NPO  MEDICATIONS:  · Crushed in puree  COMPENSATORY STRATEGIES/MODIFICATIONS INCLUDING:  · None  OTHER RECOMMENDATIONS (including follow up treatment recommendations): · Family training/education  · Patient education  RECOMMENDED DIET MODIFICATIONS DISCUSSED WITH:  · Physician   · Nursing  · Patient  FREQUENCY/DURATION: Continue to follow patient 3 times a week for duration of hospital stay to address above goals. RECOMMENDED REHABILITATION/EQUIPMENT: (at time of discharge pending progress): Due to the probability of continued deficits (see above) this patient will not likely need continued skilled speech therapy after discharge. SUBJECTIVE:   \"This stuff is terrible\"- In regards to thickened liquids. Needed encouragement to participate in assessment  History of Present Injury/Illness: Mr. Dea Wei  has a past medical history of Arthritis; BPH (benign prostatic hypertrophy); CAD (coronary artery disease) (11/2009); Claustrophobia; Coagulation defects; Former cigarette smoker; GERD (gastroesophageal reflux disease); H/O heart artery stent; Heart disease, unspecified; Heart murmur; Hyperlipidemia; Hypertension; Neuropathy; Other calculus in bladder;  Other pulmonary embolism and infarction; PONV (postoperative nausea and vomiting); Poor historian; Pure hypercholesterolemia; PVD (peripheral vascular disease) ([facility] [address] Skin cancer; Thromboembolus ([facility] [address] (2/2012); 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 ([facility] [address] Type II or unspecified type diabetes mellitus without mention of complication, not stated as uncontrolled; Unspecified essential hypertension; Unspecified hyperplasia of prostate with urinary obstruction and other lower urinary tract symptoms (LUTS); and Unspecified sleep apnea. .  He also  has a past surgical history that includes hx heart catheterization (11/2009); pr prostate biopsy, needle, saturation sampling; hx prostatectomy; hx other surgical; hx urological; and hx colonoscopy. Present Symptoms: Oropharyngeal dysphagia as described above   Pain Intensity 1: 0  Pain Intervention(s) 1: Medication (see MAR) (increased fentanyl gtt)  Current Medications:   No current facility-administered medications on file prior to encounter. Current Outpatient Prescriptions on File Prior to Encounter   Medication Sig Dispense Refill    oxyCODONE-acetaminophen (PERCOCET 7.5) 7.5-325 mg per tablet Take 1 Tab by mouth every eight (8) hours as needed for Pain. Max Daily Amount: 3 Tabs. 21 Tab 0    tamsulosin (FLOMAX) 0.4 mg capsule Take 1 Cap by mouth daily. 30 Cap 2    HYDROcodone-acetaminophen (NORCO) 7.5-325 mg per tablet Take 1 Tab by mouth every eight (8) hours as needed for Pain. Max Daily Amount: 3 Tabs. 21 Tab 0    metoprolol tartrate (LOPRESSOR) 50 mg tablet Take 50 mg by mouth daily.  Cetirizine (ZYRTEC) 10 mg cap Take 10 mg by mouth daily.  LANSOPRAZOLE (PREVACID PO) Take 1 Tab by mouth daily.  tadalafil (CIALIS) 5 mg tablet Take 1 Tab by mouth daily. 90 Tab 3    gabapentin (NEURONTIN) 300 mg capsule Take 300 mg by mouth two (2) times a day.  glipiZIDE (GLUCOTROL) 10 mg tablet Take 10 mg by mouth daily.       insulin detemir (LEVEMIR) 100 unit/mL injection 20 Units by SubCUTAneous route nightly.  clopidogrel (PLAVIX) 75 mg tablet Take 75 mg by mouth daily. Last dose mid Jan 2018      magnesium oxide (MAG-OX) 400 mg tablet Take 400 mg by mouth daily.  aspirin delayed-release 81 mg tablet Take 81 mg by mouth two (2) times a day. Current Dietary Status:  NPO      History of reflux:  YES    Reflux medication:Patient unable to recall  Social History/Home Situation:       OBJECTIVE:   Respiratory Status:  Nasal cannula  2 l/min  CXR Results:Endotracheal and enteric tubes have been removed. Cardiac mediastinal contour  and the surrounding bones are stable. Lungs are underinflated. There are  bibasilar densities. No pneumothorax. Oral Motor Structure/Speech:  Oral-Motor Structure/Motor Speech  Labial: No impairment  Dentition: Natural, Limited, Intact  Oral Hygiene: Adequate  Lingual: No impairment    Cognitive and Communication Status:  Neurologic State: Alert  Orientation Level: Oriented X4  Cognition: Follows commands  Perception: Appears intact  Perseveration: Perseverates during conversation  Safety/Judgement: Awareness of environment    BEDSIDE SWALLOW EVALUATION  Oral Assessment:  Oral Assessment  Labial: No impairment  Dentition: Natural;Limited; Intact  Oral Hygiene: Adequate  Lingual: No impairment  P.O. Trials:  Patient Position: Upright in bed    The patient was given tsp-small bites amounts of the following:   Consistency Presented: Thin liquid;Honey thick liquid; Nectar thick liquid;Mechanical soft;Puree  How Presented: Self-fed/presented;SLP-fed/presented;Cup/sip;Spoon;Straw;Successive swallows    ORAL PHASE:  Bolus Acceptance: No impairment  Bolus Formation/Control: Impaired  Propulsion: Delayed (# of seconds)  Type of Impairment: Delayed;Mastication  Oral Residue: None    PHARYNGEAL PHASE:  Initiation of Swallow: Delayed (# of seconds)  Laryngeal Elevation: Decreased;Weak  Aspiration Signs/Symptoms: Change vocal quality;Clear throat; Increase in RR;Strong cough  Vocal Quality: Hoarse;Low volume           Pharyngeal Phase Characteristics: Multiple swallows; Poor endurance; Suspected pharyngeal residue    OTHER OBSERVATIONS:  Rate/bite size: Impaired   Endurance:  Impaired   Comments: Tool Used: Dysphagia Outcome and Severity Scale (KENDELL)    Score Comments   Normal Diet  [] 7 With no strategies or extra time needed   Functional Swallow  [] 6 May have mild oral or pharyngeal delay       Mild Dysphagia    [] 5 Which may require one diet consistency restricted (those who demonstrate penetration which is entirely cleared on MBS would be included)   Mild-Moderate Dysphagia  [] 4 With 1-2 diet consistencies restricted       Moderate Dysphagia  [] 3 With 2 or more diet consistencies restricted       Moderately Severe Dysphagia  [x] 2 With partial PO strategies (trials with ST only)       Severe Dysphagia  [] 1 With inability to tolerate any PO safely          Score:  Initial: 2 Most Recent: X (Date: -- )   Interpretation of Tool: The Dysphagia Outcome and Severity Scale (KENDELL) is a simple, easy-to-use, 7-point scale developed to systematically rate the functional severity of dysphagia based on objective assessment and make recommendations for diet level, independence level, and type of nutrition. Score 7 6 5 4 3 2 1   Modifier CH CI CJ CK CL CM CN   ?  Swallowing:     - CURRENT STATUS: CM - 80%-99% impaired, limited or restricted    - GOAL STATUS:  CI - 1%-19% impaired, limited or restricted    - D/C STATUS:  ---------------To be determined---------------  [place]or[place] [employer] [place] [employer] / [place] [employer] [place] / Product Type: Managed Care Medicare /     TREATMENT:    (In addition to Assessment/Re-Assessment sessions the following treatments were rendered)  Assessment/Reassessment only, no treatment provided today  MODALITIES:                                                                    ORAL MOTOR  EXERCISES: LARYNGEAL / PHARYNGEAL EXERCISES:                                                                                                                                     __________________________________________________________________________________________________  Safety:   After treatment position/precautions:  · Call light within reach  · RN notified  · Upright in Bed    Progression/Medical Necessity:   · Patient is expected to demonstrate progress in swallow strength, swallow timeliness, swallow function, diet tolerance and swallow safety to improve swallow safety, work toward diet advancement and decrease aspiration risk. Compliance with Program/Exercises: Will assess as treatment progresses. Reason for Continuation of Services/Other Comments:  · Patient continues to require skilled intervention due to oropharyngeal dysphagia. Recommendations/Intent for next treatment session: \"Treatment next visit will focus on po trilas\".     Total Treatment Duration:  Time In: 0920  Time Out: 1430    ITZEL Cooley, CCC-SLP, CBIS

## 2018-03-15 NOTE — PROGRESS NOTES
Bedside report received from [facility], [address] [place] [address]. Pt turned and skin assessed.

## 2018-03-15 NOTE — INTERDISCIPLINARY ROUNDS
Interdisciplinary team rounds were held 3/15/2018 with the following team members:Nursing, Nurse Practitioner, Nutrition, Pharmacy, Physician and Clinical Coordinator. Plan of care discussed. See clinical pathway and/or care plan for interventions and desired outcomes.

## 2018-03-16 ENCOUNTER — APPOINTMENT (OUTPATIENT)
Dept: GENERAL RADIOLOGY | Age: 78
DRG: 920 | End: 2018-03-16
Attending: INTERNAL MEDICINE
Payer: MEDICARE

## 2018-03-16 LAB
ANION GAP SERPL CALC-SCNC: 4 MMOL/L (ref 7–16)
BUN SERPL-MCNC: 28 MG/DL (ref 8–23)
CALCIUM SERPL-MCNC: 7.8 MG/DL (ref 8.3–10.4)
CHLORIDE SERPL-SCNC: 114 MMOL/L (ref 98–107)
CO2 SERPL-SCNC: 27 MMOL/L (ref 21–32)
CREAT SERPL-MCNC: 1.24 MG/DL (ref 0.8–1.5)
ERYTHROCYTE [DISTWIDTH] IN BLOOD BY AUTOMATED COUNT: 16.1 % (ref 11.9–14.6)
GLUCOSE BLD STRIP.AUTO-MCNC: 152 MG/DL (ref 65–100)
GLUCOSE BLD STRIP.AUTO-MCNC: 201 MG/DL (ref 65–100)
GLUCOSE BLD STRIP.AUTO-MCNC: 221 MG/DL (ref 65–100)
GLUCOSE BLD STRIP.AUTO-MCNC: 226 MG/DL (ref 65–100)
GLUCOSE SERPL-MCNC: 236 MG/DL (ref 65–100)
HCT VFR BLD AUTO: 30.5 % (ref 41.1–50.3)
HGB BLD-MCNC: 8.9 G/DL (ref 13.6–17.2)
INR PPP: 1.3
MCH RBC QN AUTO: 22.5 PG (ref 26.1–32.9)
MCHC RBC AUTO-ENTMCNC: 29.2 G/DL (ref 31.4–35)
MCV RBC AUTO: 77.2 FL (ref 79.6–97.8)
MM INDURATION POC: 0 MM (ref 0–5)
PLATELET # BLD AUTO: 148 K/UL (ref 150–450)
PMV BLD AUTO: 9.2 FL (ref 10.8–14.1)
POTASSIUM SERPL-SCNC: 4.2 MMOL/L (ref 3.5–5.1)
PPD POC: NEGATIVE NEGATIVE
PROTHROMBIN TIME: 15.9 SEC (ref 11.5–14.5)
RBC # BLD AUTO: 3.95 M/UL (ref 4.23–5.67)
SODIUM SERPL-SCNC: 145 MMOL/L (ref 136–145)
WBC # BLD AUTO: 6.4 K/UL (ref 4.3–11.1)

## 2018-03-16 PROCEDURE — 74011000250 HC RX REV CODE- 250: Performed by: INTERNAL MEDICINE

## 2018-03-16 PROCEDURE — 92611 MOTION FLUOROSCOPY/SWALLOW: CPT

## 2018-03-16 PROCEDURE — 74011000258 HC RX REV CODE- 258: Performed by: INTERNAL MEDICINE

## 2018-03-16 PROCEDURE — 85610 PROTHROMBIN TIME: CPT | Performed by: INTERNAL MEDICINE

## 2018-03-16 PROCEDURE — 80048 BASIC METABOLIC PNL TOTAL CA: CPT | Performed by: INTERNAL MEDICINE

## 2018-03-16 PROCEDURE — 74230 X-RAY XM SWLNG FUNCJ C+: CPT

## 2018-03-16 PROCEDURE — 74011636637 HC RX REV CODE- 636/637: Performed by: INTERNAL MEDICINE

## 2018-03-16 PROCEDURE — 74011250637 HC RX REV CODE- 250/637: Performed by: INTERNAL MEDICINE

## 2018-03-16 PROCEDURE — 74011000255 HC RX REV CODE- 255: Performed by: INTERNAL MEDICINE

## 2018-03-16 PROCEDURE — 74011250636 HC RX REV CODE- 250/636: Performed by: INTERNAL MEDICINE

## 2018-03-16 PROCEDURE — 65270000029 HC RM PRIVATE

## 2018-03-16 PROCEDURE — 36415 COLL VENOUS BLD VENIPUNCTURE: CPT | Performed by: INTERNAL MEDICINE

## 2018-03-16 PROCEDURE — 82962 GLUCOSE BLOOD TEST: CPT

## 2018-03-16 PROCEDURE — 92526 ORAL FUNCTION THERAPY: CPT

## 2018-03-16 PROCEDURE — 99233 SBSQ HOSP IP/OBS HIGH 50: CPT | Performed by: INTERNAL MEDICINE

## 2018-03-16 PROCEDURE — 85027 COMPLETE CBC AUTOMATED: CPT | Performed by: INTERNAL MEDICINE

## 2018-03-16 PROCEDURE — C9113 INJ PANTOPRAZOLE SODIUM, VIA: HCPCS | Performed by: INTERNAL MEDICINE

## 2018-03-16 RX ORDER — DEXAMETHASONE SODIUM PHOSPHATE 4 MG/ML
4 INJECTION, SOLUTION INTRA-ARTICULAR; INTRALESIONAL; INTRAMUSCULAR; INTRAVENOUS; SOFT TISSUE EVERY 12 HOURS
Status: DISCONTINUED | OUTPATIENT
Start: 2018-03-17 | End: 2018-03-17

## 2018-03-16 RX ORDER — DEXAMETHASONE SODIUM PHOSPHATE 100 MG/10ML
4 INJECTION INTRAMUSCULAR; INTRAVENOUS EVERY 12 HOURS
Status: DISCONTINUED | OUTPATIENT
Start: 2018-03-16 | End: 2018-03-16

## 2018-03-16 RX ORDER — ADHESIVE BANDAGE
30 BANDAGE TOPICAL DAILY PRN
Status: DISCONTINUED | OUTPATIENT
Start: 2018-03-16 | End: 2018-03-21 | Stop reason: HOSPADM

## 2018-03-16 RX ADMIN — SODIUM CHLORIDE 3 G: 900 INJECTION, SOLUTION INTRAVENOUS at 10:46

## 2018-03-16 RX ADMIN — METOPROLOL TARTRATE 5 MG: 5 INJECTION, SOLUTION INTRAVENOUS at 23:53

## 2018-03-16 RX ADMIN — HYDRALAZINE HYDROCHLORIDE 10 MG: 20 INJECTION INTRAMUSCULAR; INTRAVENOUS at 10:46

## 2018-03-16 RX ADMIN — MAGNESIUM HYDROXIDE 30 ML: 400 SUSPENSION ORAL at 10:47

## 2018-03-16 RX ADMIN — ONDANSETRON 4 MG: 2 INJECTION INTRAMUSCULAR; INTRAVENOUS at 12:19

## 2018-03-16 RX ADMIN — BARIUM SULFATE 15 ML: 400 PASTE ORAL at 11:27

## 2018-03-16 RX ADMIN — METOPROLOL TARTRATE 5 MG: 5 INJECTION, SOLUTION INTRAVENOUS at 16:24

## 2018-03-16 RX ADMIN — SODIUM CHLORIDE 40 MG: 9 INJECTION INTRAMUSCULAR; INTRAVENOUS; SUBCUTANEOUS at 08:35

## 2018-03-16 RX ADMIN — SODIUM CHLORIDE 3 G: 900 INJECTION, SOLUTION INTRAVENOUS at 05:45

## 2018-03-16 RX ADMIN — DEXAMETHASONE SODIUM PHOSPHATE 4 MG: 4 INJECTION, SOLUTION INTRAMUSCULAR; INTRAVENOUS at 23:27

## 2018-03-16 RX ADMIN — Medication 5 ML: at 21:36

## 2018-03-16 RX ADMIN — Medication 5 ML: at 16:23

## 2018-03-16 RX ADMIN — INSULIN HUMAN 4 UNITS: 100 INJECTION, SOLUTION PARENTERAL at 05:44

## 2018-03-16 RX ADMIN — SODIUM CHLORIDE 3 G: 900 INJECTION, SOLUTION INTRAVENOUS at 16:23

## 2018-03-16 RX ADMIN — DEXAMETHASONE SODIUM PHOSPHATE 8 MG: 10 INJECTION INTRAMUSCULAR; INTRAVENOUS at 08:36

## 2018-03-16 RX ADMIN — INSULIN HUMAN 4 UNITS: 100 INJECTION, SOLUTION PARENTERAL at 12:26

## 2018-03-16 RX ADMIN — SODIUM CHLORIDE 3 G: 900 INJECTION, SOLUTION INTRAVENOUS at 21:36

## 2018-03-16 RX ADMIN — INSULIN HUMAN 4 UNITS: 100 INJECTION, SOLUTION PARENTERAL at 16:47

## 2018-03-16 RX ADMIN — BARIUM SULFATE 45 ML: 980 POWDER, FOR SUSPENSION ORAL at 11:27

## 2018-03-16 RX ADMIN — DEXTROSE MONOHYDRATE, SODIUM CHLORIDE, AND POTASSIUM CHLORIDE 75 ML/HR: 50; 9; 1.49 INJECTION, SOLUTION INTRAVENOUS at 08:45

## 2018-03-16 RX ADMIN — Medication 10 ML: at 05:45

## 2018-03-16 NOTE — PROGRESS NOTES
Bedside, Verbal and Written shift change report given to Lorraine Plummer RN (oncoming nurse) by Mal Lopez RN (offgoing nurse). Report included the following information SBAR, Kardex, ED Summary, OR Summary, Procedure Summary, Intake/Output, MAR, Accordion, Recent Results, Med Rec Status, Cardiac Rhythm NSR/ Sinus Sirena Haley with BBB  and Alarm Parameters .

## 2018-03-16 NOTE — PROGRESS NOTES
Telephone report given to Lexi Clark RN. Patient to be transported from [facility] to room 804.

## 2018-03-16 NOTE — PROGRESS NOTES
PT Note:  Attempted again, patient declined stating that he's too nauseated. Just received medication per RN. Will attempt another day.   Jose Hunter, PT, DPT

## 2018-03-16 NOTE — PROGRESS NOTES
LTG: Patient will tolerate least restrictive diet without overt signs or symptoms of airway compromise. STG: Patient will tolerate po trials with speech therapy only. STG: Patient will participate in modified barium swallow study as clinically indicated. Speech language pathology: bedside swallow note: Daily Note 1    NAME/AGE/GENDER: Cipriano Macdonald is a 69 y.o. male  DATE: 3/16/2018  PRIMARY DIAGNOSIS: Respiratory failure, unspecified chronicity, unspecified whether with hypoxia or hypercapnia (HCC) [J96.90]  Procedure(s) (LRB):  BRONCHOSCOPY (N/A) 2 Days Post-Op  ICD-10: Treatment Diagnosis: R13.12 Oropharyngeal Dysphagia. INTERDISCIPLINARY COLLABORATION: Registered Nurse and Physician  PRECAUTIONS/ALLERGIES: Review of patient's allergies indicates no known allergies. ASSESSMENT:Patient seen for po trials this morning. More alert, improved vocal quality. Good recall of yesterday's session stating \"you are the one who wouldn't let me have water yesterday. I am not going to drink the thickener again\". Patient presented with ice chips, as well as thin via tsp and cup. Delayed swallow initiation palpated with thin liquids. Patient also verbalizing that his swallow \"feels different\". Inconsistent throat clear also observed following trials. Improved swallow initiation with nectar thick via cup with no overt signs or symptoms of airway compromise. When presented with puree, patient exhibited multiple swallows and increased rate of breathing/shortness of breath. He again reported perceived changes in swallow function with puree. Will benefit from objective assessment via modified barium swallow study to further assess function. Plan for study to be completed today. Ok for nectar thick liquid for pleasure until study is completed. Patient will benefit from skilled intervention to address the below impairments. ?????? ? ? This section established at most recent assessment??????????  PROBLEM LIST (Impairments causing functional limitations):  1. Oropharyngeal dysphagia  REHABILITATION POTENTIAL FOR STATED GOALS: Good  PLAN OF CARE:   Patient will benefit from skilled intervention to address the following impairments. RECOMMENDATIONS AND PLANNED INTERVENTIONS (Benefits and precautions of therapy have been discussed with the patient.):  · NPO  MEDICATIONS:  · Crushed in puree  COMPENSATORY STRATEGIES/MODIFICATIONS INCLUDING:  · None  OTHER RECOMMENDATIONS (including follow up treatment recommendations): · Family training/education  · Patient education  RECOMMENDED DIET MODIFICATIONS DISCUSSED WITH:  · Physician   · Nursing  · Patient  FREQUENCY/DURATION: Continue to follow patient 3 times a week for duration of hospital stay to address above goals. RECOMMENDED REHABILITATION/EQUIPMENT: (at time of discharge pending progress): Due to the probability of continued deficits (see above) this patient will not likely need continued skilled speech therapy after discharge. SUBJECTIVE:   Expressing desire to go home. Frustration with situation. \"I just had a tooth filled. Why am I still here\". History of Present Injury/Illness: Mr. Nakul Shirley  has a past medical history of Arthritis; BPH (benign prostatic hypertrophy); CAD (coronary artery disease) (11/2009); Claustrophobia; Coagulation defects; Former cigarette smoker; GERD (gastroesophageal reflux disease); H/O heart artery stent; Heart disease, unspecified; Heart murmur; Hyperlipidemia; Hypertension; Neuropathy; Other calculus in bladder; Other pulmonary embolism and infarction; PONV (postoperative nausea and vomiting); Poor historian; Pure hypercholesterolemia; PVD (peripheral vascular disease) ([address] Skin cancer; Thromboembolus ([address] (2/2012); 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 ([address] Type II or unspecified type diabetes mellitus without mention of complication, not stated as uncontrolled; Unspecified essential hypertension;  Unspecified hyperplasia of prostate with urinary obstruction and other lower urinary tract symptoms (LUTS); and Unspecified sleep apnea. .  He also  has a past surgical history that includes hx heart catheterization (11/2009); pr prostate biopsy, needle, saturation sampling; hx prostatectomy; hx other surgical; hx urological; and hx colonoscopy. Present Symptoms: Oropharyngeal dysphagia as described above   Pain Intensity 1: 0  Pain Intervention(s) 1: Medication (see MAR) (increased fentanyl gtt)  Current Medications:   No current facility-administered medications on file prior to encounter. Current Outpatient Prescriptions on File Prior to Encounter   Medication Sig Dispense Refill    oxyCODONE-acetaminophen (PERCOCET 7.5) 7.5-325 mg per tablet Take 1 Tab by mouth every eight (8) hours as needed for Pain. Max Daily Amount: 3 Tabs. 21 Tab 0    tamsulosin (FLOMAX) 0.4 mg capsule Take 1 Cap by mouth daily. 30 Cap 2    HYDROcodone-acetaminophen (NORCO) 7.5-325 mg per tablet Take 1 Tab by mouth every eight (8) hours as needed for Pain. Max Daily Amount: 3 Tabs. 21 Tab 0    metoprolol tartrate (LOPRESSOR) 50 mg tablet Take 50 mg by mouth daily.  Cetirizine (ZYRTEC) 10 mg cap Take 10 mg by mouth daily.  LANSOPRAZOLE (PREVACID PO) Take 1 Tab by mouth daily.  tadalafil (CIALIS) 5 mg tablet Take 1 Tab by mouth daily. 90 Tab 3    gabapentin (NEURONTIN) 300 mg capsule Take 300 mg by mouth two (2) times a day.  glipiZIDE (GLUCOTROL) 10 mg tablet Take 10 mg by mouth daily.  insulin detemir (LEVEMIR) 100 unit/mL injection 20 Units by SubCUTAneous route nightly.  clopidogrel (PLAVIX) 75 mg tablet Take 75 mg by mouth daily. Last dose mid Jan 2018      magnesium oxide (MAG-OX) 400 mg tablet Take 400 mg by mouth daily.  aspirin delayed-release 81 mg tablet Take 81 mg by mouth two (2) times a day.        Current Dietary Status:  NPO      History of reflux:  YES    Reflux medication:Patient unable to recall  Social History/Home Situation:    Home Environment: Private residence  One/Two Story Residence: One story  Living Alone: Yes  Support Systems: Family member(s), Friends \ neighbors  OBJECTIVE:   Respiratory Status:        CXR Results:Endotracheal and enteric tubes have been removed. Cardiac mediastinal contour  and the surrounding bones are stable. Lungs are underinflated. There are  bibasilar densities. No pneumothorax. Oral Motor Structure/Speech:  Oral-Motor Structure/Motor Speech  Labial: No impairment  Dentition: Natural  Oral Hygiene: Adequate  Lingual: No impairment    Cognitive and Communication Status:  Neurologic State: Alert  Orientation Level: Oriented to person;Oriented to place; Disoriented to situation;Disoriented to time  Cognition: Follows commands  Perception: Appears intact  Perseveration: Perseverates during conversation  Safety/Judgement: Awareness of environment    BEDSIDE SWALLOW EVALUATION  Oral Assessment:  Oral Assessment  Labial: No impairment  Dentition: Natural  Oral Hygiene: Adequate  Lingual: No impairment  P.O. Trials:  Patient Position: Upright in bed    The patient was given tsp-small bites amounts of the following:   Consistency Presented: Thin liquid; Nectar thick liquid;Puree  How Presented: Self-fed/presented;Cup/sip;Spoon    ORAL PHASE:  Bolus Acceptance: No impairment  Bolus Formation/Control: No impairment  Propulsion: No impairment     Oral Residue: None    PHARYNGEAL PHASE:  Initiation of Swallow: Delayed (# of seconds)     Aspiration Signs/Symptoms: Increase in RR;Clear throat  Vocal Quality: Hoarse           Pharyngeal Phase Characteristics: Multiple swallows    OTHER OBSERVATIONS:  Rate/bite size: Impaired   Endurance:  Impaired   Comments:       Tool Used: Dysphagia Outcome and Severity Scale (KENDELL)    Score Comments   Normal Diet  [] 7 With no strategies or extra time needed   Functional Swallow  [] 6 May have mild oral or pharyngeal delay       Mild Dysphagia    [] 5 Which may require one diet consistency restricted (those who demonstrate penetration which is entirely cleared on MBS would be included)   Mild-Moderate Dysphagia  [] 4 With 1-2 diet consistencies restricted       Moderate Dysphagia  [] 3 With 2 or more diet consistencies restricted       Moderately Severe Dysphagia  [x] 2 With partial PO strategies (trials with ST only)       Severe Dysphagia  [] 1 With inability to tolerate any PO safely          Score:  Initial: 2 Most Recent: X (Date: -- )   Interpretation of Tool: The Dysphagia Outcome and Severity Scale (KENDELL) is a simple, easy-to-use, 7-point scale developed to systematically rate the functional severity of dysphagia based on objective assessment and make recommendations for diet level, independence level, and type of nutrition. Score 7 6 5 4 3 2 1   Modifier CH CI CJ CK CL CM CN   ? Swallowing:     - CURRENT STATUS: CM - 80%-99% impaired, limited or restricted    - GOAL STATUS:  CI - 1%-19% impaired, limited or restricted    - D/C STATUS:  ---------------To be determined---------------  [place] [employer] [place] [employer] [place] / Product Type: Managed Care Medicare /     TREATMENT:    (In addition to Assessment/Re-Assessment sessions the following treatments were rendered)  Dysphagia Activities: Activities/Procedures listed utilized to improve progress in swallow strength, swallow timeliness, swallow function and diet tolerance. Required minimal cueing to improve swallow safety and decrease aspiration risk.   MODALITIES:                                                                    ORAL MOTOR  EXERCISES:                                                                                                                                                                      LARYNGEAL / PHARYNGEAL EXERCISES: __________________________________________________________________________________________________  Safety:   After treatment position/precautions:  · Call light within reach  · RN notified  · Upright in Bed    Progression/Medical Necessity:   · Patient is expected to demonstrate progress in swallow strength, swallow timeliness, swallow function, diet tolerance and swallow safety to improve swallow safety, work toward diet advancement and decrease aspiration risk. Compliance with Program/Exercises: Will assess as treatment progresses. Reason for Continuation of Services/Other Comments:  · Patient continues to require skilled intervention due to oropharyngeal dysphagia. Recommendations/Intent for next treatment session: \"Treatment next visit will focus on modified barium swallow study\".     Total Treatment Duration:  Time In: 0828  Time Out: 0852    ITZEL Ramirez, CCC-SLP, CBIS

## 2018-03-16 NOTE — PROGRESS NOTES
[facility] CRITICAL CARE OUTREACH NURSE PROGRESS REPORT      SUBJECTIVE: Called to assess patient secondary to transfer from ICU. MEWS Score: 2 (03/16/18 1214)  Vitals:    03/16/18 1033 03/16/18 1057 03/16/18 1200 03/16/18 1214   BP: (!) 164/93 155/78 150/71 150/71   Pulse: (!) 59   66   Resp: 27   24   Temp:    99.2 °F (37.3 °C)   SpO2: 95%   96%   Weight:       Height:            LAB DATA:    Recent Labs      03/16/18   0337  03/15/18   0339  03/14/18   0346   NA  145  146*  146*   K  4.2  4.2  4.1   CL  114*  111*  112*   CO2  27  29  27   AGAP  4*  6*  7   GLU  236*  221*  132*   BUN  28*  27*  24*   CREA  1.24  1.20  1.13   GFRAA  >60  >60  >60   GFRNA  >60  >60  >60   CA  7.8*  8.0*  7.6*        Recent Labs      03/16/18   0337  03/15/18   0339 03/14/18   0346   WBC  6.4  6.4  7.1   HGB  8.9*  9.5*  9.8*   HCT  30.5*  31.7*  33.8*   PLT  148*  167  190          OBJECTIVE: On arrival to room, I found patient to be sitting up in bed, family at bedside. Pain Assessment  Pain Intensity 1: 0 (03/16/18 1235)     Pain Intervention(s) 1: Medication (see MAR) (increased fentanyl gtt)  Patient Stated Pain Goal: 0    ASSESSMENT: Patient alert and oriented. Respirations even and unlabored. Lungs CTA. Heart tones WDL. Patient reports abdominal distention and not having bowel movement since admission. Bowel sounds active. VS, labs, and progress notes reviewed. PLAN:  Patient received MOM for constipation about 2.5 hours ago. Will continue to follow per outreach protocol.

## 2018-03-16 NOTE — PROGRESS NOTES
Problem: Dysphagia (Adult)  Goal: *Speech Goal: (INSERT TEXT)  LTG: Patient will tolerate least restrictive diet without overt signs or symptoms of airway compromise. STG: Patient will tolerate chopped mechanical soft diet with no mixed consistencies and thin liquids by single cup sip with no straws without overt signs or symptoms of aspiration 100% of the time. Goal updated 3/16/18  STG: Patient will participate in modified barium swallow study as clinically indicated. Goal met 3/16/18     Speech language pathology: modified barium swallow study: Initial Assessment    NAME/AGE/GENDER: Juliette Saenz is a 69 y.o. male  DATE: 3/16/2018  PRIMARY DIAGNOSIS: Respiratory failure, unspecified chronicity, unspecified whether with hypoxia or hypercapnia ([facility]) [J96.90]  Procedure(s) (LRB):  BRONCHOSCOPY (N/A) 2 Days Post-Op  ICD-10: Treatment Diagnosis: Oropharyngeal dysphagia (R13.12)  INTERDISCIPLINARY COLLABORATION: Radiologist, Dr. Keshav Melgoza  PRECAUTIONS/ALLERGIES: Review of patient's allergies indicates no known allergies. ASSESSMENT/PLAN OF CARE:Based on the objective data described below, Mr. Mae Jean presents with deep laryngeal penetration that does not clear with thin liquids by straw. Trace laryngeal penetration without aspiration prior to swallow with mixed consistencies. No new laryngeal penetration or aspiration observed with thin liquids by single cup sip or pudding. Patient refused cracker. Recommend initiate chopped mechanical soft diet with no mixed consistencies. Thin liquids by single cup sip - no straws. Give meds whole with liquids or in puree as tolerated. Results/recommendations called to 8th floor; spoke with John Medina RN. Patient will benefit from skilled intervention to address the below impairments. ?????? ? ? This section established at most recent assessment??????????  RECOMMENDATIONS AND PLANNED INTERVENTIONS (Benefits and precautions of therapy have been discussed with the patient.):  · PO: Mechanical soft with chopped meat and vegetables; no mixed consistencies  · Liquids:  regular thin by cup only  MEDICATIONS:  · With liquid  COMPENSATORY STRATEGIES/MODIFICATIONS INCLUDING:  · Upright for all PO  · No straws  FREQUENCY/DURATION: Continue to follow patient 3 times a week for duration of hospital stay to address above goals. RECOMMENDED REHABILITATION/EQUIPMENT: (at time of discharge pending progress): Due to the probability of continued deficits (see above) this patient will not likely need continued skilled speech therapy after discharge. SUBJECTIVE:   Limited assessment, as patient not liking barium. History of Present Injury/Illness: Mr. Sowmya Barahona  has a past medical history of Arthritis; BPH (benign prostatic hypertrophy); CAD (coronary artery disease) (11/2009); Claustrophobia; Coagulation defects; Former cigarette smoker; GERD (gastroesophageal reflux disease); H/O heart artery stent; Heart disease, unspecified; Heart murmur; Hyperlipidemia; Hypertension; Neuropathy; Other calculus in bladder; Other pulmonary embolism and infarction; PONV (postoperative nausea and vomiting); Poor historian; Pure hypercholesterolemia; PVD (peripheral vascular disease) ([facility] [address] Skin cancer; Thromboembolus ([facility] [address] (2/2012); 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 He also  has a past surgical history that includes hx heart catheterization (11/2009); pr prostate biopsy, needle, saturation sampling; hx prostatectomy; hx other surgical; hx urological; and hx colonoscopy.    Present Symptoms: aspiration symptoms   Pain Intensity 1: 0  Pain Intervention(s) 1: Medication (see MAR) (increased fentanyl gtt)    Current Dietary Status: NPO      Social History/Home Situation:    Home Environment: Private residence  One/Two Story Residence: One story  Living Alone: Yes  Support Systems: Family member(s), Friends \ neighbors  OBJECTIVE:     Cognitive/Communication Status:  Mental Status  Neurologic State: Alert  Orientation Level: Oriented to person, Oriented to place, Oriented to situation  Cognition: Follows commands  Perception: Appears intact  Perseveration: No perseveration noted  Safety/Judgement: Awareness of environment    Oral Assessment:  Oral Assessment  Labial: No impairment  Dentition: Natural  Oral Hygiene: adequate  Lingual: No impairment  Velum: No impairment    Vocal Quality: adequate    Patient Viewed:    Film Views: Lateral, Fluoro    Oral Prepatory:  The patient was given the following: Consistency Presented:  Thin liquid, Pudding, Mixed consistency  How Presented: Self-fed/presented, SLP-fed/presented, Cup/sip, Spoon, Straw    Oral Phase:  Bolus Acceptance: No impairment  Bolus Formation/Control: No impairment  Propulsion: No impairment     Oral Residue: None  Initiation of Swallow: No impairment  Oral Phase Severity: Mild    Pharyngeal Phase:  Timing: No impairment  Decreased Tongue Base Retraction?: No  Laryngeal Elevation: Incomplete laryngeal closure  Penetration: Trace, During swallow, To cords, From initial swallow  Aspiration/Timing: No evidence of aspiration  Aspiration/Penetration Score: 5 (Penetration/Visible residue-Contrast contacts the folds, but is not ejected)      Pharyngeal Dysfunction: Decreased elevation/closure  Pharyngeal Phase Severity: Mild  Pharyngeal-Esophageal Segment: No impairment, Suspected esophageal dysphagia    Assessment/Reassessment only, no treatment provided today    Tool Used: Dysphagia Outcome and Severity Scale (KENDELL)    Score Comments   Normal Diet  [] 7 With no strategies or extra time needed       Functional Swallow  [] 6 May have mild oral or pharyngeal delay       Mild Dysphagia    [x] 5 Which may require one diet consistency restricted (those who demonstrate penetration which is entirely cleared on MBS would be included)   Mild-Moderate Dysphagia  [] 4 With 1-2 diet consistencies restricted       Moderate Dysphagia  [] 3 With 2 or more diet consistencies restricted       Moderately Severe Dysphagia  [] 2 With partial PO strategies (trials with ST only)       Severe Dysphagia  [] 1 With inability to tolerate any PO safely          Score:  Initial: 2 Most Recent: 5 (Date: -- )   Interpretation of Tool: The Dysphagia Outcome and Severity Scale (KENDELL) is a simple, easy-to-use, 7-point scale developed to systematically rate the functional severity of dysphagia based on objective assessment and make recommendations for diet level, independence level, and type of nutrition. Score 7 6 5 4 3 2 1   Modifier CH CI CJ CK CL CM CN   ? Swallowing:     - CURRENT STATUS: CJ - 20%-39% impaired, limited or restricted    - GOAL STATUS:  CI - 1%-19% impaired, limited or restricted    - D/C STATUS:  ---------------To be determined---------------  Payor: [employer] [place] [employer] / Plan: [place] [facility] [place] MEDICARE HMO/PPO / Product Type: Managed Care Medicare /   __________________________________________________________________________________________________  Safety:   After treatment position/precautions:  · Patient waiting in radiology holding bay for transport back to room.    Recommendations for treatment: diet tolerance  Total Treatment Duration:  Time In: 1100   Time Out: [address] [place], MA, CCC-SLP

## 2018-03-16 NOTE — PROGRESS NOTES
PT Note:  PT orders received and chart review initiated. Patient off floor for procedure. Will attempt another time/day as schedule permits.   Corrine Kenny, PT, DPT

## 2018-03-16 NOTE — PROGRESS NOTES
Pt admitted to room 804 following barium swallow. Alert to person, place, time. No acute distress on RA, lung sounds clear. Abdomen obese/ distended. Pt states last BM was prior to admission. Milk of Mag given this AM. Complaint of nausea. 4mg IV zofran given. D5 w/ 20K infusing at 75ml/hr. Dual skin assessment completed with  Michela Duarte RN. No open wounds noted. Allevyn over sacrum, no breakdown noted to sacrum. Bruising to L neck and bilateral arms. Brief changed. Oriented to room and instructed to call for help. Call bell in reach.

## 2018-03-16 NOTE — PROGRESS NOTES
SPEECH PATHOLOGY NOTE:  Modified Barium Swallow study complete. Patient with deep laryngeal penetration that does not clear with thin liquids by straw. Trace laryngeal penetration without aspiration prior to swallow with mixed consistencies. No new laryngeal penetration or aspiration observed with thin liquids by single cup sip or pudding. Patient refused cracker. Recommend initiate chopped mechanical soft diet with no mixed consistencies. Thin liquids by single cup sip - no straws. Give meds whole with liquids or in puree as tolerated. Results/recommendations called to 8th floor; spoke with John Medina RN. Full report to follow.   Raul Edwards MA, CCC-SLP

## 2018-03-16 NOTE — PROGRESS NOTES
Date of Outreach Update:  Gian Tristan was seen and assessed. MEWS Score: 2 (03/16/18 1515)    Vitals:    03/16/18 1057 03/16/18 1200 03/16/18 1214 03/16/18 1515   BP: 155/78 150/71 150/71 157/70   Pulse:   66 (!) 58   Resp:   24 22   Temp:   99.2 °F (37.3 °C) 97.9 °F (36.6 °C)   SpO2:   96% 94%   Weight:       Height:            Pain Intensity 1: 0 (03/16/18 1235)     Pain Intervention(s) 1: Medication (see MAR) (increased fentanyl gtt)  Patient Stated Pain Goal: 0    Previous Outreach assessment has been reviewed. There have been no significant clinical changes since the completion of the last dated Outreach assessment. Patient able to have BM. Will continue to follow up per outreach protocol.     Signed By:   Rani Grant RN    March 16, 2018 6:33 PM

## 2018-03-16 NOTE — INTERDISCIPLINARY ROUNDS
Interdisciplinary team rounds were held 3/16/2018 with the following team members:Nursing, Nurse Practitioner, Nutrition, Pharmacy, Physical Therapy, Physician, Respiratory Therapy and Clinical Coordinator and the patient. Plan of care discussed. See clinical pathway and/or care plan for interventions and desired outcomes.

## 2018-03-16 NOTE — PROGRESS NOTES
Pt resting in bed. Ambulated to bathroom x2 assist, successful bowel movement. VSS. Call bell in reach.

## 2018-03-16 NOTE — PROGRESS NOTES
[facility] bed, eyes closed. Alert with confusion. Respirations even and unlabored. IV patent infusing IVFs. No acute distress noted. Call light within reach. Safety measures in place, door open.  Will continue to monitor.

## 2018-03-16 NOTE — PROGRESS NOTES
Critical Care Daily Progress Note: 3/16/2018    Brynn Urbina   Admission Date: 3/10/2018         The patient's chart is reviewed and the patient is discussed with the staff. 70 yo WM with upper airway obstruction for edema/hematoma following reportedly having filling placed by dentist on 3/10. Takes chronic Coumadin and Plavix for hx PE and CAD. Presented with respiratory distress and facial swelling. Intubated for airway stability. CT neck 3/10:  Left parapharyngeal hemorrhage with compression of the oropharynx. Has history fo +ANITA 1:40, antichromatin ab. Follow up CT with smaller hematoma and positive cuff leak. Bronch performed on 3/14 with no significant compression at level of vocal cords or hypopharynx and was extubated.          Subjective:     On 2L NC  Complains of full stomach     Current Facility-Administered Medications   Medication Dose Route Frequency    magnesium hydroxide (MILK OF MAGNESIA) 400 mg/5 mL oral suspension 30 mL  30 mL Oral DAILY PRN    dexamethasone (DECADRON) injection 4 mg  4 mg IntraVENous Q12H    tuberculin injection 5 Units  5 Units IntraDERMal ONCE    metoprolol (LOPRESSOR) injection 5 mg  5 mg IntraVENous Q6H    ondansetron (ZOFRAN) injection 4 mg  4 mg IntraVENous Q6H PRN    HYDROcodone-acetaminophen (NORCO) 7.5-325 mg per tablet 1 Tab  1 Tab Oral Q6H PRN    albuterol (PROVENTIL VENTOLIN) nebulizer solution 2.5 mg  2.5 mg Nebulization Q4H PRN    morphine injection 2 mg  2 mg IntraVENous Q4H PRN    dextrose 5% - 0.9% NaCl with KCl 20 mEq/L infusion  75 mL/hr IntraVENous CONTINUOUS    insulin regular (NOVOLIN R, HUMULIN R) injection   SubCUTAneous Q6H    ampicillin-sulbactam (UNASYN) 3 g in 0.9% sodium chloride (MBP/ADV) 100 mL  3 g IntraVENous Q6H    sodium chloride (NS) flush 5-10 mL  5-10 mL IntraVENous Q8H    sodium chloride (NS) flush 5-10 mL  5-10 mL IntraVENous PRN    pantoprazole (PROTONIX) 40 mg in sodium chloride 10 mL injection  40 mg IntraVENous DAILY    hydrALAZINE (APRESOLINE) 20 mg/mL injection 10 mg  10 mg IntraVENous Q6H PRN    labetalol (NORMODYNE;TRANDATE) injection 10 mg  10 mg IntraVENous Q2H PRN       Review of Systems  Constitutional:  negative for fever, chills, sweats  Cardiovascular:  negative for chest pain, palpitations, syncope, edema  Gastrointestinal:  negative for dysphagia, reflux, vomiting, diarrhea, abdominal pain, or melena  Neurologic:  negative for focal weakness, numbness, headache      Objective:     Vitals:    03/16/18 0731 03/16/18 0801 03/16/18 0832 03/16/18 0903   BP: 158/85 163/78 160/82 185/84   Pulse: (!) 58 (!) 52 (!) 59 (!) 57   Resp: 23 23 21 26   Temp:  98.8 °F (37.1 °C)     SpO2: 97% 94% 96% 97%   Weight:       Height:           Intake and Output:   03/14 1901 - 03/16 0700  In: 1593.8 [I.V.:1593.8]  Out: 550 [Urine:550]       Physical Exam:          Constitutional:  the patient is well developed and in no acute distress, NC 2L sat 95%  EENMT:  Sclera clear, pupils equal, oral mucosa moist, mild neck enlargement  Respiratory: few anterior crackles, no wheezing and no stridor  Cardiovascular:  RRR without M,G,R  Gastrointestinal: distended, decrease bowel sounds. Musculoskeletal: warm without cyanosis. There is no lower leg edema, has restless leg syndrome.   Skin:  no jaundice or rashes, no open wounds   Neurologic: no gross neuro deficits     Psychiatric:  alert and oriented x 2    LINES:    PIV sites    DRIPS:     D5 NS with 20meq KCL 75ml/hr    CXR 3/15/18:       LAB  Recent Labs      03/16/18   0535  03/15/18   2311  03/15/18   1711  03/15/18   1136  03/15/18   0543   GLUCPOC  226*  191*  160*  231*  232*      Recent Labs      03/16/18   0337  03/15/18   0339  03/14/18   0346   WBC  6.4  6.4  7.1   HGB  8.9*  9.5*  9.8*   HCT  30.5*  31.7*  33.8*   PLT  148*  167  190   INR  1.3  1.3  1.3     Recent Labs      03/16/18   0337  03/15/18   0339  03/14/18   0346   NA  145  146*  146*   K  4.2  4.2  4.1 CL  114*  111*  112*   CO2  27  29  27   GLU  236*  221*  132*   BUN  28*  27*  24*   CREA  1.24  1.20  1.13   CA  7.8*  8.0*  7.6*     Recent Labs      03/14/18   0310   PH  7.43   PCO2  43   PO2  88   HCO3  28*         Assessment:  (Medical Decision Making)     Hospital Problems  Date Reviewed: 3/15/2018          Codes Class Noted POA    Type II diabetes mellitus ([facility] [address] ICD-10-CM: E11.9  ICD-9-CM: 250.00  3/11/2018 Yes    Chronic--ranges 060-077    * (Principal)Surgical wound hemorrhage after dental procedure ICD-10-CM: K91.840  ICD-9-CM: 998.11  3/10/2018 Yes    Hematoma--extubated 3/14      Airway compromise ICD-10-CM: J98.8  ICD-9-CM: 519.8  3/10/2018 Yes    Extubated to NC    Epistaxis ICD-10-CM: R04.0  ICD-9-CM: 784.7  3/10/2018 Yes    resolved    Hx pulmonary embolism ICD-10-CM: J39.359  ICD-9-CM: V12.55  3/10/2018 Yes    Chronic, coumadin on hold right now          Plan:  (Medical Decision Making)     --Unasyn day 7- will stop   --wean steroids  -MBS today  - MOM for constipation   -PT  -Ok to move to the floor    More than 50% of the time documented was spent in face-to-face contact with the patient and in the care of the patient on the floor/unit where the patient is located.

## 2018-03-17 LAB
ANION GAP SERPL CALC-SCNC: 8 MMOL/L (ref 7–16)
BUN SERPL-MCNC: 32 MG/DL (ref 8–23)
CALCIUM SERPL-MCNC: 8.1 MG/DL (ref 8.3–10.4)
CHLORIDE SERPL-SCNC: 115 MMOL/L (ref 98–107)
CO2 SERPL-SCNC: 27 MMOL/L (ref 21–32)
CREAT SERPL-MCNC: 1.39 MG/DL (ref 0.8–1.5)
ERYTHROCYTE [DISTWIDTH] IN BLOOD BY AUTOMATED COUNT: 16.6 % (ref 11.9–14.6)
GLUCOSE BLD STRIP.AUTO-MCNC: 153 MG/DL (ref 65–100)
GLUCOSE BLD STRIP.AUTO-MCNC: 163 MG/DL (ref 65–100)
GLUCOSE BLD STRIP.AUTO-MCNC: 233 MG/DL (ref 65–100)
GLUCOSE SERPL-MCNC: 159 MG/DL (ref 65–100)
HCT VFR BLD AUTO: 31.4 % (ref 41.1–50.3)
HGB BLD-MCNC: 9.2 G/DL (ref 13.6–17.2)
INR PPP: 1.3
MCH RBC QN AUTO: 22.9 PG (ref 26.1–32.9)
MCHC RBC AUTO-ENTMCNC: 29.3 G/DL (ref 31.4–35)
MCV RBC AUTO: 78.1 FL (ref 79.6–97.8)
PLATELET # BLD AUTO: 157 K/UL (ref 150–450)
PMV BLD AUTO: 9.6 FL (ref 10.8–14.1)
POTASSIUM SERPL-SCNC: 4.1 MMOL/L (ref 3.5–5.1)
PROTHROMBIN TIME: 15.3 SEC (ref 11.5–14.5)
RBC # BLD AUTO: 4.02 M/UL (ref 4.23–5.67)
SODIUM SERPL-SCNC: 150 MMOL/L (ref 136–145)
WBC # BLD AUTO: 8.3 K/UL (ref 4.3–11.1)

## 2018-03-17 PROCEDURE — 65270000029 HC RM PRIVATE

## 2018-03-17 PROCEDURE — 99232 SBSQ HOSP IP/OBS MODERATE 35: CPT | Performed by: INTERNAL MEDICINE

## 2018-03-17 PROCEDURE — 36415 COLL VENOUS BLD VENIPUNCTURE: CPT | Performed by: INTERNAL MEDICINE

## 2018-03-17 PROCEDURE — C9113 INJ PANTOPRAZOLE SODIUM, VIA: HCPCS | Performed by: INTERNAL MEDICINE

## 2018-03-17 PROCEDURE — 74011250636 HC RX REV CODE- 250/636: Performed by: INTERNAL MEDICINE

## 2018-03-17 PROCEDURE — 85610 PROTHROMBIN TIME: CPT | Performed by: INTERNAL MEDICINE

## 2018-03-17 PROCEDURE — 74011000250 HC RX REV CODE- 250: Performed by: INTERNAL MEDICINE

## 2018-03-17 PROCEDURE — 85027 COMPLETE CBC AUTOMATED: CPT | Performed by: INTERNAL MEDICINE

## 2018-03-17 PROCEDURE — 74011636637 HC RX REV CODE- 636/637: Performed by: INTERNAL MEDICINE

## 2018-03-17 PROCEDURE — 80048 BASIC METABOLIC PNL TOTAL CA: CPT | Performed by: INTERNAL MEDICINE

## 2018-03-17 PROCEDURE — 82962 GLUCOSE BLOOD TEST: CPT

## 2018-03-17 RX ORDER — DEXTROSE, SODIUM CHLORIDE, AND POTASSIUM CHLORIDE 5; .45; .15 G/100ML; G/100ML; G/100ML
75 INJECTION INTRAVENOUS CONTINUOUS
Status: DISCONTINUED | OUTPATIENT
Start: 2018-03-17 | End: 2018-03-17

## 2018-03-17 RX ORDER — DEXAMETHASONE SODIUM PHOSPHATE 4 MG/ML
2 INJECTION, SOLUTION INTRA-ARTICULAR; INTRALESIONAL; INTRAMUSCULAR; INTRAVENOUS; SOFT TISSUE EVERY 12 HOURS
Status: DISCONTINUED | OUTPATIENT
Start: 2018-03-18 | End: 2018-03-18

## 2018-03-17 RX ORDER — DEXTROSE MONOHYDRATE 50 MG/ML
75 INJECTION, SOLUTION INTRAVENOUS CONTINUOUS
Status: DISCONTINUED | OUTPATIENT
Start: 2018-03-17 | End: 2018-03-18

## 2018-03-17 RX ADMIN — METOPROLOL TARTRATE 5 MG: 5 INJECTION, SOLUTION INTRAVENOUS at 05:34

## 2018-03-17 RX ADMIN — INSULIN HUMAN 2 UNITS: 100 INJECTION, SOLUTION PARENTERAL at 05:33

## 2018-03-17 RX ADMIN — INSULIN HUMAN 4 UNITS: 100 INJECTION, SOLUTION PARENTERAL at 17:01

## 2018-03-17 RX ADMIN — METOPROLOL TARTRATE 5 MG: 5 INJECTION, SOLUTION INTRAVENOUS at 12:06

## 2018-03-17 RX ADMIN — INSULIN HUMAN 2 UNITS: 100 INJECTION, SOLUTION PARENTERAL at 12:05

## 2018-03-17 RX ADMIN — METOPROLOL TARTRATE 5 MG: 5 INJECTION, SOLUTION INTRAVENOUS at 16:55

## 2018-03-17 RX ADMIN — DEXTROSE MONOHYDRATE 75 ML/HR: 5 INJECTION, SOLUTION INTRAVENOUS at 16:52

## 2018-03-17 RX ADMIN — DEXAMETHASONE SODIUM PHOSPHATE 4 MG: 4 INJECTION, SOLUTION INTRAMUSCULAR; INTRAVENOUS at 12:06

## 2018-03-17 RX ADMIN — INSULIN HUMAN 2 UNITS: 100 INJECTION, SOLUTION PARENTERAL at 00:03

## 2018-03-17 RX ADMIN — DEXTROSE MONOHYDRATE, SODIUM CHLORIDE, AND POTASSIUM CHLORIDE 75 ML/HR: 50; 9; 1.49 INJECTION, SOLUTION INTRAVENOUS at 01:57

## 2018-03-17 RX ADMIN — SODIUM CHLORIDE 40 MG: 9 INJECTION INTRAMUSCULAR; INTRAVENOUS; SUBCUTANEOUS at 08:50

## 2018-03-17 RX ADMIN — Medication 5 ML: at 22:26

## 2018-03-17 RX ADMIN — Medication 5 ML: at 05:34

## 2018-03-17 RX ADMIN — Medication 10 ML: at 12:07

## 2018-03-17 NOTE — PROGRESS NOTES
Date of Outreach Update:  Vika Perales was seen and assessed. MEWS Score: 1 (03/17/18 1120)  Vitals:    03/17/18 0244 03/17/18 0313 03/17/18 0753 03/17/18 1120   BP:  146/71 140/63 165/74   Pulse: (!) 59 (!) 58 85 (!) 53   Resp:  16 17 18   Temp:  98.4 °F (36.9 °C) 97.4 °F (36.3 °C) 97.7 °F (36.5 °C)   SpO2: 91% 92% 93% 96%   Weight:  98.3 kg (216 lb 12.8 oz)     Height:             Pain Assessment  Pain Intensity 1: 3 (03/17/18 1418)     Pain Intervention(s) 1: Medication (see MAR) (increased fentanyl gtt)  Patient Stated Pain Goal: 3      Previous Outreach assessment has been reviewed. There have been no significant clinical changes since the completion of the last dated Outreach assessment. Will continue to follow up per outreach protocol.     Signed By:   Terrie Staton RN    March 17, 2018 2:49 PM

## 2018-03-17 NOTE — PROGRESS NOTES
Date of Outreach Update:  Tez Trinh was seen and assessed. Patient laying awake in bed when this RN entered room. He states that he is \"doing terrible\" but only because he cannot sleep. He states that he is not hurting. Oriented to self and place but does appear slightly confused. Patient had Barium swallow and is now on a mechanical soft diet with no straws. O2 Sat 91% on Room Air. Spoke with primary RN who has no new concerns at this time. MEWS Score: 1 (03/16/18 2307)  Vitals:    03/16/18 1515 03/16/18 1921 03/16/18 2307 03/17/18 0244   BP: 157/70 153/74 138/76    Pulse: (!) 58 (!) 59 (!) 59 (!) 59   Resp: 22 20 18    Temp: 97.9 °F (36.6 °C) 98.5 °F (36.9 °C) 97.5 °F (36.4 °C)    SpO2: 94% 95% 92% 91%   Weight:       Height:             Pain Assessment  Pain Intensity 1: 0 (03/17/18 0228)     Pain Intervention(s) 1: Medication (see MAR) (increased fentanyl gtt)  Patient Stated Pain Goal: 0      Previous Outreach assessment has been reviewed. There have been no significant clinical changes since the completion of the last dated Outreach assessment. Will continue to follow up per outreach protocol.     Signed By:   Tony Childers RN    March 17, 2018 2:49 AM

## 2018-03-17 NOTE — PROGRESS NOTES
Patient is having a visit with family and others and requested PT come tomorrow so that he might mobilize into the chair for an extended time. PT to return in AM to evaluate and assist mobilization into the chair. Luana Garcia, PT, DPT, OCS.

## 2018-03-17 NOTE — PROGRESS NOTES
Pt is in room alert with some confusion. rr are even and unlabored he is on room air. O2 sat is 98%. Lung sounds are diminished no distress noted. He has bruising to his left face and neck. Swelling to satya upper and lower extremities. abd is soft bowel sounds are active. Pt has no new bruising noted. No increased bleeding. He has stated he is ready to eat but has no taste for food. He is stable. Safety measures in place will continue to monitor.

## 2018-03-17 NOTE — PROGRESS NOTES
[facility] CRITICAL CARE OUTREACH NURSE PROGRESS REPORT      SUBJECTIVE: Called to assess patient secondary to tx from ICU. MEWS Score: 1 (03/17/18 0753)  Vitals:    03/16/18 2307 03/17/18 0244 03/17/18 0313 03/17/18 0753   BP: 138/76  146/71 140/63   Pulse: (!) 59 (!) 59 (!) 58 85   Resp: 18 16 17   Temp: 97.5 °F (36.4 °C)  98.4 °F (36.9 °C) 97.4 °F (36.3 °C)   SpO2: 92% 91% 92% 93%   Weight:   98.3 kg (216 lb 12.8 oz)    Height:            LAB DATA:    Recent Labs      03/17/18   0551  03/16/18   0337  03/15/18   0339   NA  150*  145  146*   K  4.1  4.2  4.2   CL  115*  114*  111*   CO2  27  27  29   AGAP  8  4*  6*   GLU  159*  236*  221*   BUN  32*  28*  27*   CREA  1.39  1.24  1.20   GFRAA  >60  >60  >60   GFRNA  53*  >60  >60   CA  8.1*  7.8*  8.0*        Recent Labs      03/17/18   0551  03/16/18   0337  03/15/18   0339   WBC  8.3  6.4  6.4   HGB  9.2*  8.9*  9.5*   HCT  31.4*  30.5*  31.7*   PLT  157  148*  167          OBJECTIVE: On arrival to room, I found patient to be resting in bed. General appearance: alert, cooperative, no distress, appears stated age       Pain Assessment  Pain Intensity 1: 3 (03/17/18 0859)     Pain Intervention(s) 1: Medication (see MAR) (increased fentanyl gtt)  Patient Stated Pain Goal: 3                                 ASSESSMENT:  Pt A&O, SpO2 98% on RA. HR regular & 66 bpm. NAD & VSS. PLAN:  Will continue to monitor per ICU outreach protocol.

## 2018-03-17 NOTE — PROGRESS NOTES
Brandie Bruce  Admission Date: 3/10/2018             Daily Progress Note: 3/17/2018    The patient's chart is reviewed and the patient is discussed with the staff. 70 yo WM with a history of DM, HL, HTN, CAD and PE maintained on chronic coumadin / plavix. He presented with  upper airway obstruction for edema/hematoma following reportedly having filling placed by dentist on 3/10. He was in respiratory distress with facial swelling and was intubated for airway stability.  CT neck 3/10:  Left parapharyngeal hemorrhage with compression of the oropharynx.  Has history of +ANITA 1:40, antichromatin ab. Follow up CT with smaller hematoma and positive cuff leak. Bronch performed on 3/14 with no significant compression at level of vocal cords or hypopharynx and was extubated. Subjective:     Currently on RA with O2 sat 96%. Denies cough, mucus production, chest pain, or palpitations. Becomes quite breathless with bed mobility. C/O that nothing tastes any good, not able to eat. No further nausea or vomiting. Also c/o weakness.       Current Facility-Administered Medications   Medication Dose Route Frequency    magnesium hydroxide (MILK OF MAGNESIA) 400 mg/5 mL oral suspension 30 mL  30 mL Oral DAILY PRN    dexamethasone (DECADRON) 4 mg/mL injection 4 mg  4 mg IntraVENous Q12H    metoprolol (LOPRESSOR) injection 5 mg  5 mg IntraVENous Q6H    ondansetron (ZOFRAN) injection 4 mg  4 mg IntraVENous Q6H PRN    HYDROcodone-acetaminophen (NORCO) 7.5-325 mg per tablet 1 Tab  1 Tab Oral Q6H PRN    albuterol (PROVENTIL VENTOLIN) nebulizer solution 2.5 mg  2.5 mg Nebulization Q4H PRN    morphine injection 2 mg  2 mg IntraVENous Q4H PRN    dextrose 5% - 0.9% NaCl with KCl 20 mEq/L infusion  75 mL/hr IntraVENous CONTINUOUS    insulin regular (NOVOLIN R, HUMULIN R) injection   SubCUTAneous Q6H    sodium chloride (NS) flush 5-10 mL  5-10 mL IntraVENous Q8H    sodium chloride (NS) flush 5-10 mL 5-10 mL IntraVENous PRN    pantoprazole (PROTONIX) 40 mg in sodium chloride 10 mL injection  40 mg IntraVENous DAILY    hydrALAZINE (APRESOLINE) 20 mg/mL injection 10 mg  10 mg IntraVENous Q6H PRN    labetalol (NORMODYNE;TRANDATE) injection 10 mg  10 mg IntraVENous Q2H PRN       Review of Systems  Constitutional: negative for fever, chills, sweats  Cardiovascular: negative for chest pain, palpitations, syncope, edema  Gastrointestinal:  negative for dysphagia, reflux, vomiting, diarrhea, abdominal pain, or melena  Neurologic:  negative for focal weakness, numbness, headache    Objective:     Vitals:    03/17/18 0244 03/17/18 0313 03/17/18 0753 03/17/18 1120   BP:  146/71 140/63 165/74   Pulse: (!) 59 (!) 58 85 (!) 53   Resp:  16 17 18   Temp:  98.4 °F (36.9 °C) 97.4 °F (36.3 °C) 97.7 °F (36.5 °C)   SpO2: 91% 92% 93% 96%   Weight:  216 lb 12.8 oz (98.3 kg)     Height:         Intake and Output:   03/15 1901 - 03/17 0700  In: 100 [I.V.:100]  Out: 825 [Urine:825]  03/17 0701 - 03/17 1900  In: 474 [P.O.:474]  Out: -     Physical Exam:   Constitution:  the patient is well developed and in no acute distress  EENMT:  Sclera clear, pupils equal, oral mucosa moist  Respiratory: scattered crackles posterior, RA  Cardiovascular:  RRR without M,G,R  Gastrointestinal: soft and non-tender; with positive bowel sounds. Musculoskeletal: warm without cyanosis. There is no lower leg edema.   Skin:  no jaundice or rashes, no open wounds, +++ bruising to L neck and LUE  Neurologic: no gross neuro deficits     Psychiatric:  alert and oriented x 3    CXR:   3/15        LAB  Recent Labs      03/17/18   1111  03/17/18   0512  03/16/18   2347  03/16/18   1642  03/16/18   1217   GLUCPOC  163*  153*  152*  201*  221*      Recent Labs      03/17/18   0551  03/16/18   0337  03/15/18   0339   WBC  8.3  6.4  6.4   HGB  9.2*  8.9*  9.5*   HCT  31.4*  30.5*  31.7*   PLT  157  148*  167   INR  1.3  1.3  1.3     Recent Labs      03/17/18   0551 03/16/18   0337  03/15/18   0339   NA  150*  145  146*   K  4.1  4.2  4.2   CL  115*  114*  111*   CO2  27  27  29   GLU  159*  236*  221*   BUN  32*  28*  27*   CREA  1.39  1.24  1.20   CA  8.1*  7.8*  8.0*         Assessment:  (Medical Decision Making)     Hospital Problems  Date Reviewed: 3/15/2018          Codes Class Noted POA    Type II diabetes mellitus ([facility] [address] ICD-10-CM: E11.9  ICD-9-CM: 250.00  3/11/2018 Yes    BS range 152-201  SSI      * (Principal)Surgical wound hemorrhage after dental procedure ICD-10-CM: K91.840  ICD-9-CM: 998.11  3/10/2018 Yes    Blood thinners currently on hold      Airway compromise ICD-10-CM: J98.8  ICD-9-CM: 519.8  3/10/2018 Yes    Resolved, extubated, on RA      Epistaxis ICD-10-CM: R04.0  ICD-9-CM: 784.7  3/10/2018 Yes        Hx pulmonary embolism ICD-10-CM: J57.041  ICD-9-CM: V12.55  3/10/2018 Yes    Anticoagulation on hold         Na+ 150    Creat 1.39    Plan:  (Medical Decision Making)     --completed 7 days of IV abx - unasyn  --Decadron 4 mg IV q 12 hours  --D5NS with 20 mEq KCL at 75 ml/hr >>> changed to 0.45NS for elevated Na+  --SLP following - [facility] Recommend initiate chopped mechanical soft diet with no mixed consistencies. Thin liquids by single cup sip - no straws. --PT for mobility  --will likely need rehab at discharge    More than 50% of the time documented was spent in face-to-face contact with the patient and in the care of the patient on the floor/unit where the patient is located. Candis Armas NP    Lungs:  clear  Heart:  RRR with no Murmur/Rubs/Gallops    Additional Comments:  Wean decadron, needs  More free water ,stop ABX,PT    I have spoken with and examined the patient. I agree with the above assessment and plan as documented.     Josie Santos MD

## 2018-03-17 NOTE — PROGRESS NOTES
Resting quietly in bed. Ecchymotic areas noted to neck and upper extremities. No active bleeding noted. Call light within reach. Will continue to monitor.

## 2018-03-17 NOTE — PROGRESS NOTES
Pt is in room alert with some confusion. Family is at bedside. rr are even and unlabored he is on room air. No distress noted. He has stated several times he hates needles. He is pleasant. No increased bleeding or bruising noted. Safety measures in place will continue to monitor.

## 2018-03-18 PROBLEM — E11.9 TYPE II DIABETES MELLITUS (HCC): Chronic | Status: ACTIVE | Noted: 2018-03-11

## 2018-03-18 LAB
ANION GAP SERPL CALC-SCNC: 11 MMOL/L (ref 7–16)
BUN SERPL-MCNC: 28 MG/DL (ref 8–23)
CALCIUM SERPL-MCNC: 8.2 MG/DL (ref 8.3–10.4)
CHLORIDE SERPL-SCNC: 111 MMOL/L (ref 98–107)
CO2 SERPL-SCNC: 24 MMOL/L (ref 21–32)
CREAT SERPL-MCNC: 1.26 MG/DL (ref 0.8–1.5)
ERYTHROCYTE [DISTWIDTH] IN BLOOD BY AUTOMATED COUNT: 17.1 % (ref 11.9–14.6)
GLUCOSE BLD STRIP.AUTO-MCNC: 122 MG/DL (ref 65–100)
GLUCOSE BLD STRIP.AUTO-MCNC: 155 MG/DL (ref 65–100)
GLUCOSE BLD STRIP.AUTO-MCNC: 165 MG/DL (ref 65–100)
GLUCOSE BLD STRIP.AUTO-MCNC: 177 MG/DL (ref 65–100)
GLUCOSE BLD STRIP.AUTO-MCNC: 232 MG/DL (ref 65–100)
GLUCOSE SERPL-MCNC: 143 MG/DL (ref 65–100)
HCT VFR BLD AUTO: 30.2 % (ref 41.1–50.3)
HGB BLD-MCNC: 9 G/DL (ref 13.6–17.2)
INR PPP: 1.3
MCH RBC QN AUTO: 23 PG (ref 26.1–32.9)
MCHC RBC AUTO-ENTMCNC: 29.8 G/DL (ref 31.4–35)
MCV RBC AUTO: 77 FL (ref 79.6–97.8)
MM INDURATION POC: 0 MM (ref 0–5)
MM INDURATION POC: NORMAL MM (ref 0–5)
PLATELET # BLD AUTO: 143 K/UL (ref 150–450)
PMV BLD AUTO: 9.5 FL (ref 10.8–14.1)
POTASSIUM SERPL-SCNC: 4 MMOL/L (ref 3.5–5.1)
PPD POC: NEGATIVE NEGATIVE
PPD POC: NORMAL NEGATIVE
PROTHROMBIN TIME: 15.9 SEC (ref 11.5–14.5)
RBC # BLD AUTO: 3.92 M/UL (ref 4.23–5.67)
SODIUM SERPL-SCNC: 146 MMOL/L (ref 136–145)
WBC # BLD AUTO: 8.3 K/UL (ref 4.3–11.1)

## 2018-03-18 PROCEDURE — 97530 THERAPEUTIC ACTIVITIES: CPT

## 2018-03-18 PROCEDURE — 99232 SBSQ HOSP IP/OBS MODERATE 35: CPT | Performed by: INTERNAL MEDICINE

## 2018-03-18 PROCEDURE — 74011250636 HC RX REV CODE- 250/636: Performed by: INTERNAL MEDICINE

## 2018-03-18 PROCEDURE — 82962 GLUCOSE BLOOD TEST: CPT

## 2018-03-18 PROCEDURE — 80048 BASIC METABOLIC PNL TOTAL CA: CPT | Performed by: INTERNAL MEDICINE

## 2018-03-18 PROCEDURE — 74011636637 HC RX REV CODE- 636/637: Performed by: INTERNAL MEDICINE

## 2018-03-18 PROCEDURE — 74011000250 HC RX REV CODE- 250: Performed by: INTERNAL MEDICINE

## 2018-03-18 PROCEDURE — 74011250637 HC RX REV CODE- 250/637: Performed by: NURSE PRACTITIONER

## 2018-03-18 PROCEDURE — 97162 PT EVAL MOD COMPLEX 30 MIN: CPT

## 2018-03-18 PROCEDURE — 85027 COMPLETE CBC AUTOMATED: CPT | Performed by: INTERNAL MEDICINE

## 2018-03-18 PROCEDURE — 65270000029 HC RM PRIVATE

## 2018-03-18 PROCEDURE — 36415 COLL VENOUS BLD VENIPUNCTURE: CPT | Performed by: INTERNAL MEDICINE

## 2018-03-18 PROCEDURE — 85610 PROTHROMBIN TIME: CPT | Performed by: INTERNAL MEDICINE

## 2018-03-18 PROCEDURE — C9113 INJ PANTOPRAZOLE SODIUM, VIA: HCPCS | Performed by: INTERNAL MEDICINE

## 2018-03-18 RX ORDER — TRAZODONE HYDROCHLORIDE 50 MG/1
50 TABLET ORAL
Status: DISCONTINUED | OUTPATIENT
Start: 2018-03-18 | End: 2018-03-21 | Stop reason: HOSPADM

## 2018-03-18 RX ORDER — DEXAMETHASONE SODIUM PHOSPHATE 4 MG/ML
2 INJECTION, SOLUTION INTRA-ARTICULAR; INTRALESIONAL; INTRAMUSCULAR; INTRAVENOUS; SOFT TISSUE DAILY
Status: DISCONTINUED | OUTPATIENT
Start: 2018-03-19 | End: 2018-03-19

## 2018-03-18 RX ORDER — DIPHENHYDRAMINE HCL 25 MG
25 CAPSULE ORAL
Status: DISCONTINUED | OUTPATIENT
Start: 2018-03-18 | End: 2018-03-21 | Stop reason: HOSPADM

## 2018-03-18 RX ADMIN — TRAZODONE HYDROCHLORIDE 50 MG: 50 TABLET ORAL at 21:53

## 2018-03-18 RX ADMIN — METOPROLOL TARTRATE 5 MG: 5 INJECTION, SOLUTION INTRAVENOUS at 23:34

## 2018-03-18 RX ADMIN — METOPROLOL TARTRATE 5 MG: 5 INJECTION, SOLUTION INTRAVENOUS at 11:46

## 2018-03-18 RX ADMIN — DEXAMETHASONE SODIUM PHOSPHATE 2 MG: 4 INJECTION, SOLUTION INTRAMUSCULAR; INTRAVENOUS at 00:44

## 2018-03-18 RX ADMIN — Medication 10 ML: at 21:53

## 2018-03-18 RX ADMIN — DEXAMETHASONE SODIUM PHOSPHATE 2 MG: 4 INJECTION, SOLUTION INTRAMUSCULAR; INTRAVENOUS at 11:46

## 2018-03-18 RX ADMIN — Medication 10 ML: at 05:43

## 2018-03-18 RX ADMIN — INSULIN HUMAN 4 UNITS: 100 INJECTION, SOLUTION PARENTERAL at 17:23

## 2018-03-18 RX ADMIN — SODIUM CHLORIDE 40 MG: 9 INJECTION INTRAMUSCULAR; INTRAVENOUS; SUBCUTANEOUS at 08:57

## 2018-03-18 RX ADMIN — DEXTROSE MONOHYDRATE 75 ML/HR: 5 INJECTION, SOLUTION INTRAVENOUS at 05:54

## 2018-03-18 RX ADMIN — INSULIN HUMAN 2 UNITS: 100 INJECTION, SOLUTION PARENTERAL at 11:46

## 2018-03-18 RX ADMIN — METOPROLOL TARTRATE 5 MG: 5 INJECTION, SOLUTION INTRAVENOUS at 05:43

## 2018-03-18 RX ADMIN — METOPROLOL TARTRATE 5 MG: 5 INJECTION, SOLUTION INTRAVENOUS at 17:18

## 2018-03-18 RX ADMIN — INSULIN HUMAN 2 UNITS: 100 INJECTION, SOLUTION PARENTERAL at 23:33

## 2018-03-18 RX ADMIN — ONDANSETRON 4 MG: 2 INJECTION INTRAMUSCULAR; INTRAVENOUS at 08:57

## 2018-03-18 RX ADMIN — Medication 10 ML: at 17:18

## 2018-03-18 RX ADMIN — INSULIN HUMAN 2 UNITS: 100 INJECTION, SOLUTION PARENTERAL at 00:45

## 2018-03-18 RX ADMIN — METOPROLOL TARTRATE 5 MG: 5 INJECTION, SOLUTION INTRAVENOUS at 00:45

## 2018-03-18 NOTE — PROGRESS NOTES
SBAR shift report received from April, ISABELLE. Pt stable. Assessment complete. Pt is lying in bed. Resp even, unlabored. Pt is alert, orient X 3. Pt appears in no acute distress at this time. Pt is on RA. Pt has bruising noted on L side of neck and both upper extremities. Pt encouraged to call for assistance, call light in reach. Safety measures in place. Will continue to monitor.

## 2018-03-18 NOTE — PROGRESS NOTES
LTG:  (1.)Mr. Rex Barton will move from supine to sit and sit to supine  with SUPERVISION within 7 treatment day(s). (2.)Mr. Rex Barton will transfer from bed to chair and chair to bed with SUPERVISION using the least restrictive device within 7 treatment day(s). (3.)Mr. Rex Barton will ambulate with SUPERVISION for 200 feet with the least restrictive device within 7 treatment day(s). ________________________________________________________________________________________________      PHYSICAL THERAPY: Initial Assessment, AM 3/18/2018  INPATIENT: Hospital Day: 9  Payor: [facility] [place] MEDICARE / Plan: [employer] [place] / Product Type: Managed Care Medicare /      NAME/AGE/GENDER: Misty Sandoval is a 69 y.o. male   PRIMARY DIAGNOSIS: Respiratory failure, unspecified chronicity, unspecified whether with hypoxia or hypercapnia (HCC) [J96.90] Surgical wound hemorrhage after dental procedure Surgical wound hemorrhage after dental procedure  Procedure(s) (LRB):  BRONCHOSCOPY (N/A)  4 Days Post-Op  ICD-10: Treatment Diagnosis:   · Generalized Muscle Weakness (M62.81)  · Difficulty in walking, Not elsewhere classified (R26.2)   Precaution/Allergies:  Review of patient's allergies indicates no known allergies. ASSESSMENT:     Mr. Rex Barton presents alone in room, supine in bed and agreeable to physical therapy. Pt was visibly upset and frustrated with situation and stated he is not able to tolerate eating due to increased nausea. Pt required CGA during bed mobility and min-A for sit-to-stand transfer. Pt experienced SOB after attaining sitting position and required a few minutes to re-cooperate. Pt stood with walker at EOB for a few moments and then ambulated ~25ft. Pt required CGA during ambulation with rolling walker. Pt ambulated slowly and complained of extreme nausea as we were getting back to the room. Pt continuously asked for medication and nursing notified.  Pt sat in bedside chair to regulate breathing and rest secondary to fatigue. Pt O2 levels above 95% throughout evaluation. Pt left in chair with all needs met and nursing notified at the end of treatment session. Pt would benefit from further skilled physical therapy to address deficits listed below in evaluation note and improve functional mobility. This section established at most recent assessment   PROBLEM LIST (Impairments causing functional limitations):  1. Decreased Strength  2. Decreased ADL/Functional Activities  3. Decreased Transfer Abilities  4. Decreased Ambulation Ability/Technique  5. Decreased Balance  6. Increased Pain  7. Decreased Activity Tolerance  8. Increased Fatigue  9. Increased Shortness of Breath  10. Decreased Flexibility/Joint Mobility  11. Decreased Knowledge of Precautions  12. Decreased [place] with Home Exercise Program   INTERVENTIONS PLANNED: (Benefits and precautions of physical therapy have been discussed with the patient.)  1. Balance Exercise  2. Bed Mobility  3. Family Education  4. Gait Training  5. Home Exercise Program (HEP)  6. Neuromuscular Re-education/Strengthening  7. Range of Motion (ROM)  8. Therapeutic Activites  9. Therapeutic Exercise/Strengthening  10. Transfer Training     TREATMENT PLAN: Frequency/Duration: 3 times a week for duration of hospital stay  Rehabilitation Potential For Stated Goals: Good     RECOMMENDED REHABILITATION/EQUIPMENT: (at time of discharge pending progress): Due to the probability of continued deficits (see above) this patient will likely need continued skilled physical therapy after discharge. Equipment:    Walkers, Type: Rolling Walker              HISTORY:   History of Present Injury/Illness (Reason for Referral):  Patient is a 69 y.o.   male presents with Airway compromise ,  .     69 YCM with Oral anticoagulation with coumadin , had dental work today without holding the coumadin , after the dental work , patient started having feeling of his throat closing , he came in to the ED . Patient is taking coumadin and palvix for it looks like CAD and Hx of PE from the old problem list . Patient had blood int he oral cavity , epistaxis . He had nasal packing , then rhino rockets. Past Medical History/Comorbidities:   Mr. Rex Barton  has a past medical history of Arthritis; BPH (benign prostatic hypertrophy); CAD (coronary artery disease) (11/2009); Claustrophobia; Coagulation defects; Former cigarette smoker; GERD (gastroesophageal reflux disease); H/O heart artery stent; Heart disease, unspecified; Heart murmur; Hyperlipidemia; Hypertension; Neuropathy; Other calculus in bladder; Other pulmonary embolism and infarction; PONV (postoperative nausea and vomiting); Poor historian; Pure hypercholesterolemia; PVD (peripheral vascular disease) ([facility] [address] Skin cancer; Thromboembolus ([facility] [address] (2/2012); 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 Mr. Rex Barton  has a past surgical history that includes hx heart catheterization (11/2009); pr prostate biopsy, needle, saturation sampling; hx prostatectomy; hx other surgical; hx urological; and hx colonoscopy. Social History/Living Environment:   Home Environment: Private residence  # Steps to Enter: 0  One/Two Story Residence: One story  Living Alone: Yes  Support Systems: Friends \ neighbors, Family member(s)  Patient Expects to be Discharged to[de-identified] Private residence  Current DME Used/Available at Home: Shower chair, Grab bars  Prior Level of Function/Work/Activity:  Pt ambulated without AD at home prior to admission. Pt was also independent with all ADLs.      Number of Personal Factors/Comorbidities that affect the Plan of Care: 1-2: MODERATE COMPLEXITY   EXAMINATION:   Most Recent Physical Functioning:   Gross Assessment:  AROM: Generally decreased, functional  Strength: Generally decreased, functional               Posture:  Posture (WDL): Exceptions to WDL  Posture Assessment: Forward head, Rounded shoulders, Trunk flexion  Balance:  Sitting: Without support  Standing: With support Bed Mobility:  Rolling: Supervision  Supine to Sit: Contact guard assistance  Scooting: Contact guard assistance  Wheelchair Mobility:     Transfers:  Sit to Stand: Minimum assistance  Stand to Sit: Contact guard assistance  Bed to Chair: Contact guard assistance  Gait:     Speed/Nikky: Shuffled; Slow  Step Length: Left shortened;Right shortened  Distance (ft): 25 Feet (ft)  Assistive Device: Walker, rolling  Ambulation - Level of Assistance: Contact guard assistance      Body Structures Involved:  1. Bones  2. Joints  3. Muscles  4. Ligaments  5. Integumentary Body Functions Affected:  1. Respiratory  2. Movement Related  3. Skin Related Activities and Participation Affected:  1. Mobility  2. Self Care  3. Domestic Life   Number of elements that affect the Plan of Care: 3: MODERATE COMPLEXITY   CLINICAL PRESENTATION:   Presentation: Evolving clinical presentation with changing clinical characteristics: MODERATE COMPLEXITY   CLINICAL DECISION MAKIN [facility] Inpatient Short Form  How much difficulty does the patient currently have. .. Unable A Lot A Little None   1. Turning over in bed (including adjusting bedclothes, sheets and blankets)? [] 1   [] 2   [x] 3   [] 4   2. Sitting down on and standing up from a chair with arms ( e.g., wheelchair, bedside commode, etc.)   [] 1   [] 2   [x] 3   [] 4   3. Moving from lying on back to sitting on the side of the bed? [] 1   [] 2   [x] 3   [] 4   How much help from another person does the patient currently need. .. Total A Lot A Little None   4.   Moving to and from a bed to a chair (including a wheelchair)? [] 1   [x] 2   [] 3   [] 4   5. Need to walk in hospital room? [] 1   [] 2   [] 3   [] 4   6. Climbing 3-5 steps with a railing? [x] 1   [] 2   [] 3   [] 4   © 2007, Trustees of [address] [place] [address] [address] [address], under license to [employer]. All rights reserved      Score:  Initial: 12 Most Recent: X (Date: -- )    Interpretation of Tool:  Represents activities that are increasingly more difficult (i.e. Bed mobility, Transfers, Gait). Score 24 23 22-20 19-15 14-10 9-7 6     Modifier CH CI CJ CK CL CM CN      ? Mobility - Walking and Moving Around:     - CURRENT STATUS: CL - 60%-79% impaired, limited or restricted    - GOAL STATUS: CJ - 20%-39% impaired, limited or restricted    - D/C STATUS:  ---------------To be determined---------------  Payor: [facility] [place] MED[facility]ARE / [place] [employer] [place] / Product Type: Managed Care Medicare /      Medical Necessity:     · Patient demonstrates good rehab potential due to higher previous functional level. Reason for Services/Other Comments:  · Patient continues to require skilled intervention due to decreased tolerance for functional mobility. .   Use of outcome tool(s) and clinical judgement create a POC that gives a: Clear prediction of patient's progress: LOW COMPLEXITY            TREATMENT:   (In addition to Assessment/Re-Assessment sessions the following treatments were rendered)   Pre-treatment Symptoms/Complaints:  \"I'm weaker than a kitten and nauseous\"  Pain: Initial:   Pain Intensity 1: 0  Post Session:  0/10     Therapeutic Activity: (    15): Therapeutic activities including Bed transfers, Chair transfers, Ambulation on level ground to improve mobility, strength, balance and coordination. Required minimal   to promote static and dynamic balance in standing.       Date:   Date:   Date:     Activity/Exercise Parameters Parameters Parameters Braces/Orthotics/Lines/Etc:   · IV  · O2 Device: Room air  Treatment/Session Assessment:    · Response to Treatment:  Pt experienced SOB after ambulation and transfers and required time in between to rest.  · Interdisciplinary Collaboration:   o Physical Therapist  o Registered Nurse  · After treatment position/precautions:   o Up in chair  o Bed/Chair-wheels locked  o Call light within reach  o RN notified   · Compliance with Program/Exercises: Will assess as treatment progresses. · Recommendations/Intent for next treatment session: \"Next visit will focus on advancements to more challenging activities and reduction in assistance provided\".   Total Treatment Duration:  PT Patient Time In/Time Out  Time In: 0813  Time Out: [address] Rama Vazquez, PT, DPT

## 2018-03-18 NOTE — PROGRESS NOTES
Pt is in room alert with confusion. rr are even and unlabored. Lung sounds are diminished. No distress noted. Pt was able to shave this PM and he stated he felt better, but kept asking what happened to his face. He has no increased bleeding or bruising noted. Safety measures in place will continue to monitor.

## 2018-03-18 NOTE — PROGRESS NOTES
Pt is lying in bed, resting quietly. Pt appears in no acute distress at this time. Will continue to monitor.

## 2018-03-18 NOTE — PROGRESS NOTES
Date of Outreach Update:  Chyna Moreno was seen and assessed. Patient found to be sleeping soundly. Other staff in room checking blood sugar and patient does arouse and then goes back to sleep. Lungs CTA. O2 Sat 94% and HR 56 currently. MEWS Score: 1 (03/17/18 1546)  Vitals:    03/17/18 1546 03/17/18 1956 03/17/18 2337 03/18/18 0028   BP: 175/87 163/77 155/80    Pulse: (!) 57 (!) 56 (!) 53 (!) 56   Resp: 18 20 20    Temp: 98.2 °F (36.8 °C) 98.3 °F (36.8 °C) 97.9 °F (36.6 °C)    SpO2: 98% 96% 94% 94%   Weight:       Height:             Pain Assessment  Pain Intensity 1: 3 (03/17/18 1418)     Pain Intervention(s) 1: Medication (see MAR) (increased fentanyl gtt)  Patient Stated Pain Goal: 3      Previous Outreach assessment has been reviewed. There have been no significant clinical changes since the completion of the last dated Outreach assessment. Will continue to follow up per outreach protocol.     Signed By:   Sam Negrete RN    March 18, 2018 12:28 AM

## 2018-03-18 NOTE — PROGRESS NOTES
Problem: Falls - Risk of  Goal: *Absence of Falls  Document Jonathon Fall Risk and appropriate interventions in the flowsheet.    Outcome: Progressing Towards Goal  Fall Risk Interventions:       Mentation Interventions: Bed/chair exit alarm    Medication Interventions: Bed/chair exit alarm    Elimination Interventions: Call light in reach

## 2018-03-18 NOTE — PROGRESS NOTES
Pt is in room alert with some confusion. rr are even he did have some SOB of this AM but pt states its because he was really nauseous. Pt was given Zofran with good results. Lung sounds are diminished he is on room air tolerates well. O2 sat is 94%. abd is soft bowel sounds are active. Pt states he his taste buds are not working. He has no increased bleeding or bruising noted. He still has bruise to the rt side of his face and neck. Safety measures in place will continue to monitor.

## 2018-03-18 NOTE — PROGRESS NOTES
Brynn Urbina  Admission Date: 3/10/2018             Daily Progress Note: 3/18/2018    The patient's chart is reviewed and the patient is discussed with the staff. 70 yo WM with a history of DM, HL, HTN, CAD and PE maintained on chronic coumadin / plavix. He presented with  upper airway obstruction for edema/hematoma following reportedly having filling placed by dentist on 3/10. He was in respiratory distress with facial swelling and was intubated for airway stability.  CT neck 3/10:  Left parapharyngeal hemorrhage with compression of the oropharynx.  Has history of +ANITA 1:40, antichromatin ab. Follow up CT with smaller hematoma and positive cuff leak. Bronch performed on 3/14 with no significant compression at level of vocal cords or hypopharynx and was extubated. Subjective:     Currently on RA with O2 sat 97%. HR 50s. Denies cough, mucus production, chest pain, or palpitations. Becomes quite breathless with bed mobility. C/O that nothing tastes any good, was able to eat today. Asking for something to help him sleep.        Current Facility-Administered Medications   Medication Dose Route Frequency    diphenhydrAMINE (BENADRYL) capsule 25 mg  25 mg Oral QHS PRN    dextrose 5% infusion  75 mL/hr IntraVENous CONTINUOUS    dexamethasone (DECADRON) 4 mg/mL injection 2 mg  2 mg IntraVENous Q12H    magnesium hydroxide (MILK OF MAGNESIA) 400 mg/5 mL oral suspension 30 mL  30 mL Oral DAILY PRN    metoprolol (LOPRESSOR) injection 5 mg  5 mg IntraVENous Q6H    ondansetron (ZOFRAN) injection 4 mg  4 mg IntraVENous Q6H PRN    HYDROcodone-acetaminophen (NORCO) 7.5-325 mg per tablet 1 Tab  1 Tab Oral Q6H PRN    albuterol (PROVENTIL VENTOLIN) nebulizer solution 2.5 mg  2.5 mg Nebulization Q4H PRN    morphine injection 2 mg  2 mg IntraVENous Q4H PRN    insulin regular (NOVOLIN R, HUMULIN R) injection   SubCUTAneous Q6H    sodium chloride (NS) flush 5-10 mL  5-10 mL IntraVENous Q8H  sodium chloride (NS) flush 5-10 mL  5-10 mL IntraVENous PRN    pantoprazole (PROTONIX) 40 mg in sodium chloride 10 mL injection  40 mg IntraVENous DAILY    hydrALAZINE (APRESOLINE) 20 mg/mL injection 10 mg  10 mg IntraVENous Q6H PRN       Review of Systems  Constitutional: negative for fever, chills, sweats  Cardiovascular: negative for chest pain, palpitations, syncope, edema  Gastrointestinal:  negative for dysphagia, reflux, vomiting, diarrhea, abdominal pain, or melena  Neurologic:  negative for focal weakness, numbness, headache    Objective:     Vitals:    03/18/18 0418 03/18/18 0440 03/18/18 0754 03/18/18 1115   BP: 162/68  119/57 161/76   Pulse: (!) 53  (!) 51 (!) 53   Resp: 20 18 22   Temp: 97.8 °F (36.6 °C)  98.8 °F (37.1 °C) 97.8 °F (36.6 °C)   SpO2: 95%  94% 97%   Weight:  215 lb 11.2 oz (97.8 kg)     Height:         Intake and Output:   03/16 1901 - 03/18 0700  In: 711 [P.O.:711]  Out: 1075 [Urine:1075]       Physical Exam:   Constitution:  the patient is well developed and in no acute distress  EENMT:  Sclera clear, pupils equal, oral mucosa moist  Respiratory: scattered crackles posterior, RA  Cardiovascular:  RRR without M,G,R  Gastrointestinal: soft and non-tender; with positive bowel sounds. Musculoskeletal: warm without cyanosis. There is no lower leg edema.   Skin:  no jaundice or rashes, no open wounds, +++ bruising to L neck and LUE  Neurologic: no gross neuro deficits     Psychiatric:  alert and oriented x 3    CXR:   3/15        LAB  Recent Labs      03/18/18   1125  03/18/18   0527  03/18/18   0026  03/17/18   1658  03/17/18   1111   GLUCPOC  177*  122*  155*  233*  163*      Recent Labs      03/18/18   0636  03/17/18   0551  03/16/18   0337   WBC  8.3  8.3  6.4   HGB  9.0*  9.2*  8.9*   HCT  30.2*  31.4*  30.5*   PLT  143*  157  148*   INR  1.3  1.3  1.3     Recent Labs      03/18/18   0636  03/17/18   0551  03/16/18   0337   NA  146*  150*  145   K  4.0  4.1  4.2   CL  111*  115* 114*   CO2  24  27  27   GLU  143*  159*  236*   BUN  28*  32*  28*   CREA  1.26  1.39  1.24   CA  8.2*  8.1*  7.8*         Assessment:  (Medical Decision Making)     Hospital Problems  Date Reviewed: 3/15/2018          Codes Class Noted POA    Type II diabetes mellitus ([facility] [address] ICD-10-CM: E11.9  ICD-9-CM: 250.00  3/11/2018 Yes    BS range 122-233  SSI  Likely exacerbated by steorids  IVF D5 but patient with poor appetite      * (Principal)Surgical wound hemorrhage after dental procedure ICD-10-CM: K91.840  ICD-9-CM: 998.11  3/10/2018 Yes    Blood thinners currently on hold      Airway compromise ICD-10-CM: J98.8  ICD-9-CM: 519.8  3/10/2018 Yes    Resolved, extubated, on RA      Epistaxis ICD-10-CM: R04.0  ICD-9-CM: 784.7  3/10/2018 Yes        Hx pulmonary embolism ICD-10-CM: X18.601  ICD-9-CM: V12.55  3/10/2018 Yes    Anticoagulation on hold         Na+ 150 >>>146    Creat 1.39 >>>1.26    Plan:  (Medical Decision Making)     --completed 7 days of IV abx - unasyn  --Decadron 2 mg IV q 12 hours - wean to daily  --stop IVF and check BMP in am  --add Trazodone for sleep  --SLP following - [facility] [place] Recommend initiate chopped mechanical soft diet with no mixed consistencies. Thin liquids by single cup sip - no straws. --PT for mobility - ambulated 25' yesterday  --needs STR at discharge, lives alone / debilitated - consult SW for assistance    More than 50% of the time documented was spent in face-to-face contact with the patient and in the care of the patient on the floor/unit where the patient is located. Lieutenant Robyn NP   The patient has been seen and examined by me personally, the chart,labs, and radiographic studies have been reviewed. Chest: CTA  Extremities: bilateral bruising trace edema    I agree with the above assessment and plan.     Glo Angelucci MD.

## 2018-03-18 NOTE — PROGRESS NOTES
SBAR shift report received from ISABELLE Santana. Pt stable. Assessment complete. Pt is lying in bed. Resp even, unlabored. Pt is alert, orient X 3. Pt appears in no acute distress at this time. Pt is on RA. Pt has bruising noted on upper extremities. Pt encouraged to call for assistance, call light in reach. Safety measures in place.  Will continue to monitor

## 2018-03-19 PROBLEM — R04.0 EPISTAXIS: Status: RESOLVED | Noted: 2018-03-10 | Resolved: 2018-03-19

## 2018-03-19 PROBLEM — R53.81 DEBILITY: Status: ACTIVE | Noted: 2018-03-19

## 2018-03-19 PROBLEM — Z86.711 HX PULMONARY EMBOLISM: Chronic | Status: ACTIVE | Noted: 2018-03-10

## 2018-03-19 LAB
ANION GAP SERPL CALC-SCNC: 8 MMOL/L (ref 7–16)
BUN SERPL-MCNC: 23 MG/DL (ref 8–23)
CALCIUM SERPL-MCNC: 7.8 MG/DL (ref 8.3–10.4)
CHLORIDE SERPL-SCNC: 109 MMOL/L (ref 98–107)
CO2 SERPL-SCNC: 27 MMOL/L (ref 21–32)
CREAT SERPL-MCNC: 1.16 MG/DL (ref 0.8–1.5)
GLUCOSE BLD STRIP.AUTO-MCNC: 110 MG/DL (ref 65–100)
GLUCOSE BLD STRIP.AUTO-MCNC: 155 MG/DL (ref 65–100)
GLUCOSE BLD STRIP.AUTO-MCNC: 190 MG/DL (ref 65–100)
GLUCOSE BLD STRIP.AUTO-MCNC: 196 MG/DL (ref 65–100)
GLUCOSE SERPL-MCNC: 103 MG/DL (ref 65–100)
INR PPP: 1.3
POTASSIUM SERPL-SCNC: 3.6 MMOL/L (ref 3.5–5.1)
PROTHROMBIN TIME: 15.5 SEC (ref 11.5–14.5)
SODIUM SERPL-SCNC: 144 MMOL/L (ref 136–145)

## 2018-03-19 PROCEDURE — 74011250636 HC RX REV CODE- 250/636: Performed by: INTERNAL MEDICINE

## 2018-03-19 PROCEDURE — 85610 PROTHROMBIN TIME: CPT | Performed by: INTERNAL MEDICINE

## 2018-03-19 PROCEDURE — 74011000250 HC RX REV CODE- 250: Performed by: INTERNAL MEDICINE

## 2018-03-19 PROCEDURE — 36415 COLL VENOUS BLD VENIPUNCTURE: CPT | Performed by: INTERNAL MEDICINE

## 2018-03-19 PROCEDURE — 80048 BASIC METABOLIC PNL TOTAL CA: CPT | Performed by: INTERNAL MEDICINE

## 2018-03-19 PROCEDURE — C9113 INJ PANTOPRAZOLE SODIUM, VIA: HCPCS | Performed by: INTERNAL MEDICINE

## 2018-03-19 PROCEDURE — 99232 SBSQ HOSP IP/OBS MODERATE 35: CPT | Performed by: INTERNAL MEDICINE

## 2018-03-19 PROCEDURE — 74011636637 HC RX REV CODE- 636/637: Performed by: INTERNAL MEDICINE

## 2018-03-19 PROCEDURE — 74011250637 HC RX REV CODE- 250/637: Performed by: INTERNAL MEDICINE

## 2018-03-19 PROCEDURE — 82962 GLUCOSE BLOOD TEST: CPT

## 2018-03-19 PROCEDURE — 92526 ORAL FUNCTION THERAPY: CPT

## 2018-03-19 PROCEDURE — 74011250637 HC RX REV CODE- 250/637: Performed by: NURSE PRACTITIONER

## 2018-03-19 PROCEDURE — 65270000029 HC RM PRIVATE

## 2018-03-19 RX ORDER — INSULIN LISPRO 100 [IU]/ML
INJECTION, SOLUTION INTRAVENOUS; SUBCUTANEOUS
Status: DISCONTINUED | OUTPATIENT
Start: 2018-03-19 | End: 2018-03-21 | Stop reason: HOSPADM

## 2018-03-19 RX ORDER — METOPROLOL TARTRATE 25 MG/1
25 TABLET, FILM COATED ORAL 2 TIMES DAILY
Status: DISCONTINUED | OUTPATIENT
Start: 2018-03-19 | End: 2018-03-21 | Stop reason: HOSPADM

## 2018-03-19 RX ADMIN — SODIUM CHLORIDE 40 MG: 9 INJECTION INTRAMUSCULAR; INTRAVENOUS; SUBCUTANEOUS at 09:00

## 2018-03-19 RX ADMIN — Medication 10 ML: at 21:40

## 2018-03-19 RX ADMIN — INSULIN LISPRO 1 UNITS: 100 INJECTION, SOLUTION INTRAVENOUS; SUBCUTANEOUS at 21:41

## 2018-03-19 RX ADMIN — INSULIN HUMAN 2 UNITS: 100 INJECTION, SOLUTION PARENTERAL at 11:25

## 2018-03-19 RX ADMIN — METOPROLOL TARTRATE 25 MG: 25 TABLET ORAL at 11:56

## 2018-03-19 RX ADMIN — METOPROLOL TARTRATE 25 MG: 25 TABLET ORAL at 16:45

## 2018-03-19 RX ADMIN — METOPROLOL TARTRATE 5 MG: 5 INJECTION, SOLUTION INTRAVENOUS at 06:02

## 2018-03-19 RX ADMIN — INSULIN LISPRO 1 UNITS: 100 INJECTION, SOLUTION INTRAVENOUS; SUBCUTANEOUS at 16:45

## 2018-03-19 RX ADMIN — Medication 10 ML: at 06:03

## 2018-03-19 RX ADMIN — ONDANSETRON 4 MG: 2 INJECTION INTRAMUSCULAR; INTRAVENOUS at 11:55

## 2018-03-19 RX ADMIN — Medication 10 ML: at 16:45

## 2018-03-19 RX ADMIN — TRAZODONE HYDROCHLORIDE 50 MG: 50 TABLET ORAL at 21:41

## 2018-03-19 NOTE — PROGRESS NOTES
Pt is up in room alert and oriented. rr are even and unlabored. He does have dyspnea on exertion. No distress noted. He has a rash to his back that appears to look like dark red pin point. He denies itching or pain. No increased bleeding or bruising noted. Safety measures in place. Will continue to monitor.

## 2018-03-19 NOTE — PROGRESS NOTES
LTG: Patient will tolerate least restrictive diet without overt signs or symptoms of airway compromise. STG: Patient will tolerate po trials with speech therapy only. STG: Patient will participate in modified barium swallow study as clinically indicated. Speech language pathology: bedside swallow note: Daily Note and Discharge 1    NAME/AGE/GENDER: Johana Garcia is a 69 y.o. male  DATE: 3/19/2018  PRIMARY DIAGNOSIS: Respiratory failure, unspecified chronicity, unspecified whether with hypoxia or hypercapnia (HCC) [J96.90]  Procedure(s) (LRB):  BRONCHOSCOPY (N/A) 5 Days Post-Op  ICD-10: Treatment Diagnosis: R13.12 Oropharyngeal Dysphagia. INTERDISCIPLINARY COLLABORATION: Registered Nurse and Physician  PRECAUTIONS/ALLERGIES: Review of patient's allergies indicates no known allergies. ASSESSMENT:Pt tolerating mechanical soft textures with no mixed with thin liquids with no straw. No further ST indicated at this time. · PO:  Mechanical soft no mixed   · Liquids:  regular thin no straws  MEDICATIONS:  · With liquid  SUBJECTIVE:   alert  History of Present Injury/Illness: Mr. Shyanne Siddiqui  has a past medical history of Arthritis; BPH (benign prostatic hypertrophy); CAD (coronary artery disease) (11/2009); Claustrophobia; Coagulation defects; Former cigarette smoker; GERD (gastroesophageal reflux disease); H/O heart artery stent; Heart disease, unspecified; Heart murmur; Hyperlipidemia; Hypertension; Neuropathy; Other calculus in bladder; Other pulmonary embolism and infarction; PONV (postoperative nausea and vomiting); Poor historian; Pure hypercholesterolemia; PVD (peripheral vascular disease) ([address] Skin cancer; Thromboembolus ([address] (2/2012); 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 ([address] Type II or unspecified type diabetes mellitus without mention of complication, not stated as uncontrolled; Unspecified essential hypertension;  Unspecified hyperplasia of prostate with urinary obstruction and other lower urinary tract symptoms (LUTS); and Unspecified sleep apnea. .  He also  has a past surgical history that includes hx heart catheterization (11/2009); pr prostate biopsy, needle, saturation sampling; hx prostatectomy; hx other surgical; hx urological; and hx colonoscopy. Present Symptoms: Oropharyngeal dysphagia as described above   Pain Intensity 1: 0  Pain Intervention(s) 1: Medication (see MAR) (increased fentanyl gtt)  Current Medications:   No current facility-administered medications on file prior to encounter. Current Outpatient Prescriptions on File Prior to Encounter   Medication Sig Dispense Refill    oxyCODONE-acetaminophen (PERCOCET 7.5) 7.5-325 mg per tablet Take 1 Tab by mouth every eight (8) hours as needed for Pain. Max Daily Amount: 3 Tabs. 21 Tab 0    tamsulosin (FLOMAX) 0.4 mg capsule Take 1 Cap by mouth daily. 30 Cap 2    HYDROcodone-acetaminophen (NORCO) 7.5-325 mg per tablet Take 1 Tab by mouth every eight (8) hours as needed for Pain. Max Daily Amount: 3 Tabs. 21 Tab 0    metoprolol tartrate (LOPRESSOR) 50 mg tablet Take 50 mg by mouth daily.  Cetirizine (ZYRTEC) 10 mg cap Take 10 mg by mouth daily.  LANSOPRAZOLE (PREVACID PO) Take 1 Tab by mouth daily.  tadalafil (CIALIS) 5 mg tablet Take 1 Tab by mouth daily. 90 Tab 3    gabapentin (NEURONTIN) 300 mg capsule Take 300 mg by mouth two (2) times a day.  glipiZIDE (GLUCOTROL) 10 mg tablet Take 10 mg by mouth daily.  insulin detemir (LEVEMIR) 100 unit/mL injection 20 Units by SubCUTAneous route nightly.  clopidogrel (PLAVIX) 75 mg tablet Take 75 mg by mouth daily. Last dose mid Jan 2018      magnesium oxide (MAG-OX) 400 mg tablet Take 400 mg by mouth daily.  aspirin delayed-release 81 mg tablet Take 81 mg by mouth two (2) times a day.        Current Dietary Status:  Mechanical soft no mixed/thin no straws      History of reflux:  YES    Reflux medication:Patient unable to recall  Social History/Home Situation:    Home Environment: Private residence  # Steps to Enter: 0  One/Two Story Residence: One story  Living Alone: Yes  Support Systems: Friends \ neighbors, Family member(s)  Patient Expects to be Discharged to[de-identified] Private residence  Current DME Used/Available at Home: Shower chair, Grab bars  OBJECTIVE:   Respiratory Status:        CXR Results:Endotracheal and enteric tubes have been removed. Cardiac mediastinal contour  and the surrounding bones are stable. Lungs are underinflated. There are  bibasilar densities. No pneumothorax. Oral Motor Structure/Speech:  Oral-Motor Structure/Motor Speech  Labial: No impairment  Dentition: Natural  Oral Hygiene: adequate  Lingual: No impairment  Velum: No impairment    OTHER OBSERVATIONS:  Rate/bite size: Questionable   Endurance:  Questionable   Comments: Tool Used: Dysphagia Outcome and Severity Scale (KENDELL)    Score Comments   Normal Diet  [] 7 With no strategies or extra time needed   Functional Swallow  [x] 6 May have mild oral or pharyngeal delay       Mild Dysphagia    [] 5 Which may require one diet consistency restricted (those who demonstrate penetration which is entirely cleared on MBS would be included)   Mild-Moderate Dysphagia  [] 4 With 1-2 diet consistencies restricted       Moderate Dysphagia  [] 3 With 2 or more diet consistencies restricted       Moderately Severe Dysphagia  [x] 2 With partial PO strategies (trials with ST only)       Severe Dysphagia  [] 1 With inability to tolerate any PO safely          Score:  Initial: 6 Most Recent: X (Date: -- )   Interpretation of Tool: The Dysphagia Outcome and Severity Scale (KENDELL) is a simple, easy-to-use, 7-point scale developed to systematically rate the functional severity of dysphagia based on objective assessment and make recommendations for diet level, independence level, and type of nutrition. Score 7 6 5 4 3 2 1   Modifier CH CI CJ CK CL CM CN   ?  Swallowing:     - CURRENT STATUS: CI - 1%-19% impaired, limited or restricted    - GOAL STATUS:  CI - 1%-19% impaired, limited or restricted    - D/C STATUS:  CI - 1%-19% impaired, limited or restricted  Payor: Elen Navarrete [id number] / [place] / Product Type: Managed Care Medicare /     TREATMENT:    (In addition to Assessment/Re-Assessment sessions the following treatments were rendered)  Dysphagia Activities: Activities/Procedures listed utilized to improve progress in diet tolerance. Required minimal cueing to improve swallow safety and decrease aspiration risk.   MODALITIES:         ORAL MOTOR  EXERCISES:        LARYNGEAL / PHARYNGEAL EXERCISES:        __________________________________________________________________________________________________  Safety:   After treatment position/precautions:  · Call light within reach  · RN notified  · Upright in Bed  No further ST indicated  Time In: 0930  Time Out: [place] MA/CCC/SLP

## 2018-03-19 NOTE — PROGRESS NOTES
Balta Friedman  Admission Date: 3/10/2018             Daily Progress Note: 3/19/2018   68 yo WM with a history of DM, HL, HTN, CAD and PE maintained on chronic coumadin / plavix. He presented with  upper airway obstruction for edema/hematoma following reportedly having filling placed by dentist on 3/10. He was in respiratory distress with facial swelling and was intubated for airway stability.  CT neck 3/10:  Left parapharyngeal hemorrhage with compression of the oropharynx.  Has history of +ANITA 1:40, antichromatin ab. Follow up CT with smaller hematoma and positive cuff leak.  Bronch performed on 3/14 with no significant compression at level of vocal cords or hypopharynx and was extubated. Now on room air. Subjective: On RA  Walked 25 feet over the weekend and will need STR. PPD placed.   Tells me he has no appetite    Review of Systems  Respiratory: positive for dyspnea on exertion    Current Facility-Administered Medications   Medication Dose Route Frequency    diphenhydrAMINE (BENADRYL) capsule 25 mg  25 mg Oral QHS PRN    traZODone (DESYREL) tablet 50 mg  50 mg Oral QHS    dexamethasone (DECADRON) 4 mg/mL injection 2 mg  2 mg IntraVENous DAILY    magnesium hydroxide (MILK OF MAGNESIA) 400 mg/5 mL oral suspension 30 mL  30 mL Oral DAILY PRN    metoprolol (LOPRESSOR) injection 5 mg  5 mg IntraVENous Q6H    ondansetron (ZOFRAN) injection 4 mg  4 mg IntraVENous Q6H PRN    HYDROcodone-acetaminophen (NORCO) 7.5-325 mg per tablet 1 Tab  1 Tab Oral Q6H PRN    albuterol (PROVENTIL VENTOLIN) nebulizer solution 2.5 mg  2.5 mg Nebulization Q4H PRN    morphine injection 2 mg  2 mg IntraVENous Q4H PRN    insulin regular (NOVOLIN R, HUMULIN R) injection   SubCUTAneous Q6H    sodium chloride (NS) flush 5-10 mL  5-10 mL IntraVENous Q8H    sodium chloride (NS) flush 5-10 mL  5-10 mL IntraVENous PRN    pantoprazole (PROTONIX) 40 mg in sodium chloride 10 mL injection  40 mg IntraVENous DAILY    hydrALAZINE (APRESOLINE) 20 mg/mL injection 10 mg  10 mg IntraVENous Q6H PRN         Objective:     Vitals:    03/18/18 2308 03/19/18 0236 03/19/18 0418 03/19/18 0701   BP: 157/80  132/73 137/72   Pulse: 80  69 75   Resp: 20  20 20   Temp: 98.3 °F (36.8 °C)  98 °F (36.7 °C) 98.3 °F (36.8 °C)   SpO2: 92%  97% 94%   Weight:  215 lb 12.8 oz (97.9 kg)     Height:         Intake and Output:   03/17 1901 - 03/19 0700  In: -   Out: 1200 [Urine:1200]       Physical Exam:          Constitutional: the patient is elderly  HEENT: Sclera clear, pupils equal, oral mucosa moist  Lungs: clear bilaterally on RA  Cardiovascular: RRR without M,G,R  Abd/GI: soft and non-tender; with positive bowel sounds. Ext: warm without cyanosis. There is no lower leg edema. Musculoskeletal: moves all four extremities with equal strength  Skin: no jaundice or rashes, no wounds   Neuro: no gross neuro deficits       Lines/Drains: IV  Nutrition: [facility] Recommend initiate chopped mechanical soft diet with no mixed consistencies.  Thin liquids by single cup sip - no straws    CHEST XRAY: 3/15    LAB  Recent Labs      03/18/18   0636  03/17/18   0551   WBC  8.3  8.3   HGB  9.0*  9.2*   HCT  30.2*  31.4*   PLT  143*  157     Recent Labs      03/19/18   0622  03/18/18   0636  03/17/18   0551   NA   --   146*  150*   K   --   4.0  4.1   CL   --   111*  115*   CO2   --   24  27   GLU   --   143*  159*   BUN   --   28*  32*   CREA   --   1.26  1.39   INR  1.3  1.3  1.3         Assessment:     Patient Active Problem List   Diagnosis Code    Bladder stone N21.0    Heart disease, unspecified I51.9    Other pulmonary embolism and infarction I26.99    Type II or unspecified type diabetes mellitus without mention of complication, not stated as uncontrolled E11.9    Pure hypercholesterolemia E78.00    Unspecified essential hypertension I10    Other calculus in bladder N21.0    Unspecified hyperplasia of prostate with urinary obstruction and other lower urinary tract symptoms (LUTS) N40.1, N13.8    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) I80.209    Myopathy G72.9    Arthralgia M25.50    Lumbar stenosis with neurogenic claudication M48.062    Type 2 diabetes mellitus with diabetic neuropathy (HCC) E11.40    Lumbar stenosis M48.061    Surgical wound hemorrhage after dental procedure K91.840    Airway compromise J98.8    Hx pulmonary embolism Z86.711    Type II diabetes mellitus ([facility] [address] E11.9    Debility R53.81       Plan     Hospital Problems  Date Reviewed: 3/18/2018          Codes Class Noted POA    Debility ICD-10-CM: R53.81  ICD-9-CM: 799.3  3/19/2018 Yes    Will need rehab    Type II diabetes mellitus ([facility] [address] (Chronic) ICD-10-CM: E11.9  ICD-9-CM: 250.00  3/11/2018 Yes    controlled    * (Principal)Surgical wound hemorrhage after dental procedure ICD-10-CM: K91.840  ICD-9-CM: 998.11  3/10/2018 Yes        Airway compromise ICD-10-CM: J98.8  ICD-9-CM: 519.8  3/10/2018 Yes    improved    Hx pulmonary embolism (Chronic) ICD-10-CM: K93.049  ICD-9-CM: V12.55  3/10/2018 Yes              -- + epistaxis and  He reports he is on 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 Restart coumadin  --  F/U with ENT  -- stop steroids   -- unasyn (3/10-3/16) complete  -- plans for rehab once bed available. Roberth Waldron NP    I have spoken with and examined the patient. I agree with the above assessment and plan as documented. Patient appears to need PT, possibly at short-term rehab. He is now swallowing. Need to establish plan for anticoagulation, but would not resume within 2 weeks of this event. Note I discussed this case with the hospitalist upon admission at [address] who initially planned to manage at [address] but transferred to [facility] upon transfer and intubation (no documentation of this, however). Now that medically stable, ok to return to their service.  Will confer with ENT about timing of resuming anticoagulation. Note there are neurology notes about progressive upper and lower proximal muscle weakness and he had neurogenic claudication with lumbar surgery in Feb.      Gen: pleasant  HEENT:  Bruising over left neck but swelling improved  Lungs:  CTAB  Heart:  RRR with no Murmur/Rubs/Gallops  Abd: +BS  Ext: no edema    --will discuss with ENT timing of resuming anticoagulation. --would expect at least 2 weeks off anticoagulation in this situation- procedure was 3/8   --resume metoprolol. --PT to assess today whether STR is necessary. --Need to establish details of why patient was on combination of ASA, Plavix and warfarin. --Appreciate hospitalist resumption of care tomorrow    Meredith Bills MD    More than 50% of time documented was spent in face-to-face contact with the patient and in the care of the patient on the floor/unit where the patient is located.

## 2018-03-19 NOTE — PROGRESS NOTES
Pt is resting quietly in bed. Resp even, unlabored. Pt appears in no acute distress at this time. Will continue to monitor.

## 2018-03-19 NOTE — PROGRESS NOTES
Pt in room alert and oriented with confusion. rr are even and unlabored. Lung sounds are diminished no distress noted. He has bruising to left side of his neck. He is on room air and tolerates well. abd is soft bowel sounds are active. He is able to stand and use bedside commode. He has no increased bleeding or bruising noted. Safety measures in place will continue to monitor.

## 2018-03-19 NOTE — PROGRESS NOTES
Problem: Nutrition Deficit  Goal: *Optimize nutritional status  Nutrition F/U  Assessment:   Diet orders: CCHO, Mechanical soft, no mixed consistencies, no straws, Glucerna TID, Ensure at breakfast  Food/Nutrition Patient History:  Patient deemed able to swallow after evaluation by SLP and placed on PO diet on 3/16. No nutrition risk factors identified on nursing admission malnutrition screening tool. Patient reports he was unable to eat for several days and states he couldnt even think about food. First meal eaten was yesterdays breakfast (whole milk over ice with sausage) and he reports being able to eat since then with appetite steadily increasing. Patient states he ate eggs, sausage, and whole milk for breakfast and was able to eat all of lunch today. Patient does not like nutrition supplements and states they are too sweet. Has not been drinking them and does not want them sent on meal trays. Would prefer whole milk. C/o only having one BM during stay. Would like to try prune juice with dinner tonight to encourage BM. Anthropometrics: Height: 6' 1\" (185.4 cm), Weight: 97.9 kg (215 lb 12.8 oz), Weight Source: Bed (8th floor, 3/19), Body mass index is 28.47 kg/(m^2). BMI class of normal for person 65 years and older. Edema: none  Macronutrient needs:  EER:  7323-2364 kcal /day (18-23 kcal/kg BW)  EPR:   grams protein/day (1-1.2 grams/kg IBW)  Intake/Comparative Standards: Per intern rounds: 95% of lunch tray (roll not eaten). Average intake for past 3 days/3 recorded meals: 41%. This potentially meets ~41% of kcal and ~43% of protein needs. Nutrition Diagnosis: Inadequate oral intake r/t decreased ability to consume sufficient oral intake as evidenced by patient report of poor appetite and poor PO intake     Intervention:  Meals and snacks: Continue current diet. Took food preferences. Send whole milk on all trays. Send prune juice at dinner, patient may order PRN.   Nutrition Supplement Therapy: discontinue Glucerna TID and Ensure at breakfast.   Discharge nutrition plan: Too soon to determine.     Osei Pickens, Dietetic Intern

## 2018-03-19 NOTE — PROGRESS NOTES
Care Management Interventions  Mode of Transport at Discharge:  [facility]  Transition of Care Consult (CM Consult): SNF [facility])  Partner SNF: Yes  Physical Therapy Consult: Yes  Occupational Therapy Consult: Yes  Current Support Network: Family Lives [place], Lives with Spouse  Confirm Follow Up Transport: Family  Plan discussed with Pt/Family/Caregiver: Yes  Freedom of Choice Offered: Yes  Discharge Location  Discharge Placement: Skilled nursing facility  LMSW met with pt and grand daughter at bedside. Agreeable to a referral to [facility] SNF for rehab as spouse had previous positive stay at this facility. Will need insurance precert for SNF. Will follow up as precert received.

## 2018-03-20 LAB
ANION GAP SERPL CALC-SCNC: 8 MMOL/L (ref 7–16)
BUN SERPL-MCNC: 22 MG/DL (ref 8–23)
CALCIUM SERPL-MCNC: 7.6 MG/DL (ref 8.3–10.4)
CHLORIDE SERPL-SCNC: 108 MMOL/L (ref 98–107)
CO2 SERPL-SCNC: 26 MMOL/L (ref 21–32)
CREAT SERPL-MCNC: 1.18 MG/DL (ref 0.8–1.5)
ERYTHROCYTE [DISTWIDTH] IN BLOOD BY AUTOMATED COUNT: 17 % (ref 11.9–14.6)
GLUCOSE BLD STRIP.AUTO-MCNC: 129 MG/DL (ref 65–100)
GLUCOSE BLD STRIP.AUTO-MCNC: 129 MG/DL (ref 65–100)
GLUCOSE BLD STRIP.AUTO-MCNC: 183 MG/DL (ref 65–100)
GLUCOSE BLD STRIP.AUTO-MCNC: 228 MG/DL (ref 65–100)
GLUCOSE SERPL-MCNC: 111 MG/DL (ref 65–100)
HCT VFR BLD AUTO: 31.6 % (ref 41.1–50.3)
HGB BLD-MCNC: 9.6 G/DL (ref 13.6–17.2)
INR PPP: 1.3
MCH RBC QN AUTO: 23.2 PG (ref 26.1–32.9)
MCHC RBC AUTO-ENTMCNC: 30.4 G/DL (ref 31.4–35)
MCV RBC AUTO: 76.3 FL (ref 79.6–97.8)
PLATELET # BLD AUTO: 144 K/UL (ref 150–450)
PMV BLD AUTO: 9.7 FL (ref 10.8–14.1)
POTASSIUM SERPL-SCNC: 3.7 MMOL/L (ref 3.5–5.1)
PROTHROMBIN TIME: 15.3 SEC (ref 11.5–14.5)
RBC # BLD AUTO: 4.14 M/UL (ref 4.23–5.67)
SODIUM SERPL-SCNC: 142 MMOL/L (ref 136–145)
WBC # BLD AUTO: 6.8 K/UL (ref 4.3–11.1)

## 2018-03-20 PROCEDURE — 74011636637 HC RX REV CODE- 636/637: Performed by: INTERNAL MEDICINE

## 2018-03-20 PROCEDURE — 65270000029 HC RM PRIVATE

## 2018-03-20 PROCEDURE — 80048 BASIC METABOLIC PNL TOTAL CA: CPT | Performed by: INTERNAL MEDICINE

## 2018-03-20 PROCEDURE — 74011250637 HC RX REV CODE- 250/637: Performed by: NURSE PRACTITIONER

## 2018-03-20 PROCEDURE — 82962 GLUCOSE BLOOD TEST: CPT

## 2018-03-20 PROCEDURE — 74011250637 HC RX REV CODE- 250/637: Performed by: INTERNAL MEDICINE

## 2018-03-20 PROCEDURE — 85027 COMPLETE CBC AUTOMATED: CPT | Performed by: INTERNAL MEDICINE

## 2018-03-20 PROCEDURE — 99232 SBSQ HOSP IP/OBS MODERATE 35: CPT | Performed by: INTERNAL MEDICINE

## 2018-03-20 PROCEDURE — 36415 COLL VENOUS BLD VENIPUNCTURE: CPT | Performed by: INTERNAL MEDICINE

## 2018-03-20 PROCEDURE — 85610 PROTHROMBIN TIME: CPT | Performed by: INTERNAL MEDICINE

## 2018-03-20 PROCEDURE — 97530 THERAPEUTIC ACTIVITIES: CPT

## 2018-03-20 RX ORDER — BISACODYL 5 MG
5 TABLET, DELAYED RELEASE (ENTERIC COATED) ORAL DAILY PRN
Status: DISCONTINUED | OUTPATIENT
Start: 2018-03-20 | End: 2018-03-21 | Stop reason: HOSPADM

## 2018-03-20 RX ORDER — BISACODYL 5 MG
5 TABLET, DELAYED RELEASE (ENTERIC COATED) ORAL ONCE
Status: DISPENSED | OUTPATIENT
Start: 2018-03-20 | End: 2018-03-21

## 2018-03-20 RX ADMIN — Medication 5 ML: at 22:33

## 2018-03-20 RX ADMIN — BISACODYL 5 MG: 5 TABLET, COATED ORAL at 11:54

## 2018-03-20 RX ADMIN — TRAZODONE HYDROCHLORIDE 50 MG: 50 TABLET ORAL at 22:32

## 2018-03-20 RX ADMIN — METOPROLOL TARTRATE 25 MG: 25 TABLET ORAL at 08:54

## 2018-03-20 RX ADMIN — METOPROLOL TARTRATE 25 MG: 25 TABLET ORAL at 16:49

## 2018-03-20 RX ADMIN — Medication 5 ML: at 16:51

## 2018-03-20 RX ADMIN — INSULIN LISPRO 1 UNITS: 100 INJECTION, SOLUTION INTRAVENOUS; SUBCUTANEOUS at 16:49

## 2018-03-20 RX ADMIN — Medication 10 ML: at 05:49

## 2018-03-20 RX ADMIN — DIPHENHYDRAMINE HYDROCHLORIDE 25 MG: 25 CAPSULE ORAL at 22:32

## 2018-03-20 RX ADMIN — INSULIN LISPRO 2 UNITS: 100 INJECTION, SOLUTION INTRAVENOUS; SUBCUTANEOUS at 22:33

## 2018-03-20 NOTE — PROGRESS NOTES
Spoke with Dr. Tano Beltran of ENT who suggested that anticoagulation could be resumed at this point if necessary. Given that the indication was remote PE (with need for lifelong anticoagulation), will plan to resume on 3/22. Antiplatelet therapy needs to be decided upon as well- Dr. Delta Martinez (cardiology) has been managing both warfarin and dual antiplatelet therapy.   Gini Rosado MD

## 2018-03-20 NOTE — PROGRESS NOTES
Pt awake and alert & oriented x4. VSS. Respirations even and unlabored. No complaints of pain. No s/s of acute pain or distress. Call light in reach. Instructed patient to call for assistance. All questions answered and needs addressed at this time. Safety maintained.

## 2018-03-20 NOTE — PROGRESS NOTES
Patient insurance has approved patient for short-term rehab at [address] [place] [address]. He can transfer as early as today if medically ready for transfer. Case Management will remain available to assist as needed.     Care Management Interventions  Mode of Transport at Discharge:  Salvaodr Nguyen  Transition of Care Consult (CM Consult): SNF [facility])  Partner SNF: Yes  Physical Therapy Consult: Yes  Occupational Therapy Consult: Yes  Current Support Network: Family Lives L[place], Lives with Spouse  Confirm Follow Up Transport: Family  Plan discussed with Pt/Family/Caregiver: Yes  Freedom of Choice Offered: Yes  Discharge Location  Discharge Placement: Skilled nursing facility

## 2018-03-20 NOTE — PROGRESS NOTES
LTG:  (1.)Mr. Talisha Pitts will move from supine to sit and sit to supine  with SUPERVISION within 7 treatment day(s). (2.)Mr. Talisha Pitts will transfer from bed to chair and chair to bed with SUPERVISION using the least restrictive device within 7 treatment day(s). (3.)Mr. Talisha Pitts will ambulate with SUPERVISION for 200 feet with the least restrictive device within 7 treatment day(s). ________________________________________________________________________________________________      PHYSICAL THERAPY: Daily Note, Treatment Day: 1st, AM 3/20/2018  INPATIENT: Hospital Day: 11  Payor: [facility] [place] MEDICARE / Plan: [employer] [employer] / Product Type: Managed Care Medicare /      NAME/AGE/GENDER: Elizabeth Badillo is a 69 y.o. male   PRIMARY DIAGNOSIS: Respiratory failure, unspecified chronicity, unspecified whether with hypoxia or hypercapnia (HCC) [J96.90] Surgical wound hemorrhage after dental procedure Surgical wound hemorrhage after dental procedure  Procedure(s) (LRB):  BRONCHOSCOPY (N/A)  6 Days Post-Op  ICD-10: Treatment Diagnosis:   · Generalized Muscle Weakness (M62.81)  · Difficulty in walking, Not elsewhere classified (R26.2)   Precaution/Allergies:  Review of patient's allergies indicates no known allergies. ASSESSMENT:     Mr. Talisha Pitts presents supine saying he doesn't feel great but happy to walk with me. He is on RA and sats stayed at 97% with walking. He increased his gait distance well, used the walker relying on it quite a bit with a hunched over posture. He did well and could benefit from a stint at rehab before returning home alone. This section established at most recent assessment   PROBLEM LIST (Impairments causing functional limitations):  1. Decreased Strength  2. Decreased ADL/Functional Activities  3. Decreased Transfer Abilities  4. Decreased Ambulation Ability/Technique  5. Decreased Balance  6. Increased Pain  7. Decreased Activity Tolerance  8.  Increased Fatigue  9. Increased Shortness of Breath  10. Decreased Flexibility/Joint Mobility  11. Decreased Knowledge of Precautions  12. Decreased Tillman with Home Exercise Program   INTERVENTIONS PLANNED: (Benefits and precautions of physical therapy have been discussed with the patient.)  1. Balance Exercise  2. Bed Mobility  3. Family Education  4. Gait Training  5. Home Exercise Program (HEP)  6. Neuromuscular Re-education/Strengthening  7. Range of Motion (ROM)  8. Therapeutic Activites  9. Therapeutic Exercise/Strengthening  10. Transfer Training     TREATMENT PLAN: Frequency/Duration: 3 times a week for duration of hospital stay  Rehabilitation Potential For Stated Goals: Good     RECOMMENDED REHABILITATION/EQUIPMENT: (at time of discharge pending progress): Due to the probability of continued deficits (see above) this patient will likely need continued skilled physical therapy after discharge. Equipment:    Walkers, Type: Rolling Walker              HISTORY:   History of Present Injury/Illness (Reason for Referral):  Patient is a 69 y.o.  male presents with Airway compromise ,  .     69 YCM with Oral anticoagulation with coumadin , had dental work today without holding the coumadin , after the dental work , patient started having feeling of his throat closing , he came in to the ED . Patient is taking coumadin and palvix for it looks like CAD and Hx of PE from the old problem list . Patient had blood int he oral cavity , epistaxis . He had nasal packing , then rhino rockets. Past Medical History/Comorbidities:   Mr. Amauri Raymundo  has a past medical history of Arthritis; BPH (benign prostatic hypertrophy); CAD (coronary artery disease) (11/2009); Claustrophobia; Coagulation defects; Former cigarette smoker; GERD (gastroesophageal reflux disease); H/O heart artery stent; Heart disease, unspecified; Heart murmur; Hyperlipidemia; Hypertension; Neuropathy; Other calculus in bladder;  Other pulmonary embolism and infarction; PONV (postoperative nausea and vomiting); Poor historian; Pure hypercholesterolemia; PVD (peripheral vascular disease) ([facility] [address] Skin cancer; Thromboembolus (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 [address] Type II or unspecified type diabetes mellitus without mention of complication, not stated as uncontrolled; Unspecified essential hypertension; Unspecified hyperplasia of prostate with urinary obstruction and other lower urinary tract symptoms (LUTS); and Unspecified sleep apnea. Mr. Arlene Melgar  has a past surgical history that includes hx heart catheterization (11/2009); pr prostate biopsy, needle, saturation sampling; hx prostatectomy; hx other surgical; hx urological; and hx colonoscopy. Social History/Living Environment:   Home Environment: Private residence  # Steps to Enter: 0  One/Two Story Residence: One story  Living Alone: Yes  Support Systems: Friends \ neighbors, Family member(s)  Patient Expects to be Discharged to[de-identified] Private residence  Current DME Used/Available at Home: Shower chair, Grab bars  Prior Level of Function/Work/Activity:  Pt ambulated without AD at home prior to admission. Pt was also independent with all ADLs. Number of Personal Factors/Comorbidities that affect the Plan of Care: 1-2: MODERATE COMPLEXITY   EXAMINATION:   Most Recent Physical Functioning:   Gross Assessment:                  Posture:     Balance:    Bed Mobility:  Supine to Sit: Modified independent (with difficulty)  Wheelchair Mobility:     Transfers:  Sit to Stand: Contact guard assistance  Stand to Sit: Contact guard assistance  Gait:     Speed/Nikky: Shuffled; Slow  Step Length: Left shortened;Right shortened  Distance (ft): 200 Feet (ft)  Assistive Device: Walker, rolling  Ambulation - Level of Assistance: Contact guard assistance      Body Structures Involved:  1. Bones  2. Joints  3. Muscles  4. Ligaments  5.  Integumentary Body Functions Affected:  1. Respiratory  2. Movement Related  3. Skin Related Activities and Participation Affected:  1. Mobility  2. Self Care  3. Domestic Life   Number of elements that affect the Plan of Care: 3: MODERATE COMPLEXITY   CLINICAL PRESENTATION:   Presentation: Evolving clinical presentation with changing clinical characteristics: MODERATE COMPLEXITY   CLINICAL DECISION MAKIN [facility] Mobility Inpatient Short Form  How much difficulty does the patient currently have. .. Unable A Lot A Little None   1. Turning over in bed (including adjusting bedclothes, sheets and blankets)? [] 1   [] 2   [x] 3   [] 4   2. Sitting down on and standing up from a chair with arms ( e.g., wheelchair, bedside commode, etc.)   [] 1   [] 2   [x] 3   [] 4   3. Moving from lying on back to sitting on the side of the bed? [] 1   [] 2   [x] 3   [] 4   How much help from another person does the patient currently need. .. Total A Lot A Little None   4. Moving to and from a bed to a chair (including a wheelchair)? [] 1   [x] 2   [] 3   [] 4   5. Need to walk in hospital room? [] 1   [] 2   [] 3   [] 4   6. Climbing 3-5 steps with a railing? [x] 1   [] 2   [] 3   [] 4   © , Trustees of [address] [place] [address], under license to "[employer]. All rights reserved      Score:  Initial: 12 Most Recent: X (Date: -- )    Interpretation of Tool:  Represents activities that are increasingly more difficult (i.e. Bed mobility, Transfers, Gait). Score 24 23 22-20 19-15 14-10 9-7 6     Modifier CH CI CJ CK CL CM CN      ?  Mobility - Walking and Moving Around:     - CURRENT STATUS: CL - 60%-79% impaired, limited or restricted    - GOAL STATUS: CJ - 20%-39% impaired, limited or restricted    - D/C STATUS:  ---------------To be determined---------------  Payor: [facility] [place] / [place] [employer] [facility] [place] / Product Type: Managed Care Medicare /      Medical Necessity: · Patient demonstrates good rehab potential due to higher previous functional level. Reason for Services/Other Comments:  · Patient continues to require skilled intervention due to decreased tolerance for functional mobility. .   Use of outcome tool(s) and clinical judgement create a POC that gives a: Clear prediction of patient's progress: LOW COMPLEXITY            TREATMENT:   (In addition to Assessment/Re-Assessment sessions the following treatments were rendered)   Pre-treatment Symptoms/Complaints:  \"I'm weaker than a kitten and nauseous\"  Pain: Initial:   Pain Intensity 1: 0  Post Session:  0/10     Therapeutic Activity: (25): Therapeutic activities including Bed transfers, Chair transfers, Ambulation on level ground to improve mobility, strength, balance and coordination. Required minimal   to promote static and dynamic balance in standing. Date:   Date:   Date:     Activity/Exercise Parameters Parameters Parameters                                               Braces/Orthotics/Lines/Etc:   · none  · O2 Device: Room air  Treatment/Session Assessment:    · Response to Treatment:  Pt experienced mild SOB after ambulation . · Interdisciplinary Collaboration:   o Physical Therapy Assistant  o Registered Nurse  · After treatment position/precautions:   o Up in chair  o Bed alarm/tab alert on  o Bed/Chair-wheels locked  o Call light within reach  o RN notified   · Compliance with Program/Exercises: Will assess as treatment progresses. · Recommendations/Intent for next treatment session: \"Next visit will focus on reduction in assistance provided\".   Total Treatment Duration:  PT Patient Time In/Time Out  Time In: 0920  Time Out: 0945    Danny Finney PTA

## 2018-03-20 NOTE — PROGRESS NOTES
Hospitalist Progress Note    3/20/2018  Admit Date: 3/10/2018  4:11 PM   NAME: Pancho Scott   :  1940   MRN:  [id number]   Attending: Ivana Pruitt, *  PCP:  Thirza Aschoff, MD    SUBJECTIVE:   Patient is a 77/M with PMH DM, HTN, HLD, CAD and PE on chronic coumadin/ASA/Plavix who presented with upper airway edema and hematoma after a dental procedure. Patient's INR was supratherapeutic and this was reversed with Vitamin K. His Plavix and aspirin was held. Patient went into respiratory distress and he was intubated. CT of his neck showed parapharyngeal hemorrhage with compression of oropharynx. After extubation, bronch was done with no significant findings. He completed 7 days of Unasyn. Hospitalist group is taking over care of the patient today. Review of Systems negative with exception of pertinent positives noted above  PHYSICAL EXAM     Visit Vitals    BP (!) 141/91    Pulse 79    Temp 97.6 °F (36.4 °C)    Resp 18    Ht 6' 1\" (1.854 m)    Wt 96.4 kg (212 lb 9.6 oz)    SpO2 97%    BMI 28.05 kg/m2      Temp (24hrs), Av.8 °F (36.6 °C), Min:97.6 °F (36.4 °C), Max:98.1 °F (36.7 °C)    Oxygen Therapy  O2 Sat (%): 97 % (18 1150)  Pulse via Oximetry: 58 beats per minute (18 1033)  O2 Device: Room air (18 1930)  O2 Flow Rate (L/min): 2 l/min (03/15/18 2000)  O2 Temperature: 87.8 °F (31 °C) (18 1315)  FIO2 (%): 40 % (18 1315)    Intake/Output Summary (Last 24 hours) at 18 1446  Last data filed at 18 2105   Gross per 24 hour   Intake              360 ml   Output                0 ml   Net              360 ml      General: No acute distress    Lungs:  CTA Bilaterally.    Heart:  Regular rate and rhythm,  No murmur, rub, or gallop  Abdomen: Soft, Non distended, Non tender, Positive bowel sounds  Extremities: No cyanosis, clubbing or edema  Skin:   Large dark red area of skin over neck where hematoma was present  Neurologic:  No focal deficits    ASSESSMENT      Active Hospital Problems    Diagnosis Date Noted    Debility 03/19/2018    Type II diabetes mellitus ([facility] [address] 03/11/2018    Surgical wound hemorrhage after dental procedure 03/10/2018    Airway compromise 03/10/2018    Hx pulmonary embolism 03/10/2018     Plan:  · Pharyngeal Hemorrhage- anticoagulants and antiplatelets on hold, will resume coumadin on 3/22. Patient to discuss resuming antiplatelets with Dr. Mony Douglas. · Airway obstruction/Respiratory Failure- s/p extubation, saturating well on RA  · CAD- discuss dual antiplatelet therapy with Cardiologist upon discharge. · History of PE- resume Coumadin on 3/22/18  · DMII- continue with insulin sliding scale    DVT Prophylaxis: SCDs  Dispo- discharge to rehab tomorrow.     Signed By: Yarely Gaviria MD     March 20, 2018

## 2018-03-20 NOTE — PROGRESS NOTES
Problem: Interdisciplinary Rounds  Goal: Interdisciplinary Rounds  Interdisciplinary team rounds were held 3/20/2018 with the following team members:Care Management, Physical Therapy, Physician and Clinical Coordinator and the patient and friend. Plans for rehab tomorrow- [facility]. Plan of care discussed. See clinical pathway and/or care plan for interventions and desired outcomes.

## 2018-03-20 NOTE — PROGRESS NOTES
Pt resting in bed, alert and oriented, breathing even and unlabored, rash noted on pt back, safety measures in place, call light within reach.

## 2018-03-20 NOTE — PROGRESS NOTES
Pt resting comfortably in bed. Respirations even and unlabored. No complaints of pain at this time. No s/s of distress noted. Call light in reach. Bedside report to be given to oncoming nurse.

## 2018-03-21 VITALS
OXYGEN SATURATION: 98 % | HEART RATE: 82 BPM | SYSTOLIC BLOOD PRESSURE: 138 MMHG | DIASTOLIC BLOOD PRESSURE: 78 MMHG | WEIGHT: 208.4 LBS | RESPIRATION RATE: 20 BRPM | BODY MASS INDEX: 27.62 KG/M2 | HEIGHT: 73 IN | TEMPERATURE: 97.2 F

## 2018-03-21 LAB
ALBUMIN SERPL-MCNC: 2.6 G/DL (ref 3.2–4.6)
ALBUMIN/GLOB SERPL: 1 {RATIO} (ref 1.2–3.5)
ALP SERPL-CCNC: 85 U/L (ref 50–136)
ALT SERPL-CCNC: 44 U/L (ref 12–65)
ANION GAP SERPL CALC-SCNC: 10 MMOL/L (ref 7–16)
AST SERPL-CCNC: 17 U/L (ref 15–37)
BILIRUB SERPL-MCNC: 0.8 MG/DL (ref 0.2–1.1)
BUN SERPL-MCNC: 20 MG/DL (ref 8–23)
CALCIUM SERPL-MCNC: 8 MG/DL (ref 8.3–10.4)
CHLORIDE SERPL-SCNC: 109 MMOL/L (ref 98–107)
CO2 SERPL-SCNC: 24 MMOL/L (ref 21–32)
CREAT SERPL-MCNC: 1.19 MG/DL (ref 0.8–1.5)
ERYTHROCYTE [DISTWIDTH] IN BLOOD BY AUTOMATED COUNT: 17.5 % (ref 11.9–14.6)
GLOBULIN SER CALC-MCNC: 2.7 G/DL (ref 2.3–3.5)
GLUCOSE BLD STRIP.AUTO-MCNC: 111 MG/DL (ref 65–100)
GLUCOSE BLD STRIP.AUTO-MCNC: 142 MG/DL (ref 65–100)
GLUCOSE SERPL-MCNC: 102 MG/DL (ref 65–100)
HCT VFR BLD AUTO: 32.9 % (ref 41.1–50.3)
HGB BLD-MCNC: 10.1 G/DL (ref 13.6–17.2)
INR PPP: 1.2
MCH RBC QN AUTO: 23.4 PG (ref 26.1–32.9)
MCHC RBC AUTO-ENTMCNC: 30.7 G/DL (ref 31.4–35)
MCV RBC AUTO: 76.3 FL (ref 79.6–97.8)
PLATELET # BLD AUTO: 136 K/UL (ref 150–450)
PMV BLD AUTO: 10.2 FL (ref 10.8–14.1)
POTASSIUM SERPL-SCNC: 3.8 MMOL/L (ref 3.5–5.1)
PROT SERPL-MCNC: 5.3 G/DL (ref 6.3–8.2)
PROTHROMBIN TIME: 14.7 SEC (ref 11.5–14.5)
RBC # BLD AUTO: 4.31 M/UL (ref 4.23–5.67)
SODIUM SERPL-SCNC: 143 MMOL/L (ref 136–145)
WBC # BLD AUTO: 6.9 K/UL (ref 4.3–11.1)

## 2018-03-21 PROCEDURE — 82962 GLUCOSE BLOOD TEST: CPT

## 2018-03-21 PROCEDURE — 74011250637 HC RX REV CODE- 250/637: Performed by: INTERNAL MEDICINE

## 2018-03-21 PROCEDURE — 97530 THERAPEUTIC ACTIVITIES: CPT

## 2018-03-21 PROCEDURE — 85027 COMPLETE CBC AUTOMATED: CPT | Performed by: INTERNAL MEDICINE

## 2018-03-21 PROCEDURE — 85610 PROTHROMBIN TIME: CPT | Performed by: INTERNAL MEDICINE

## 2018-03-21 PROCEDURE — 36415 COLL VENOUS BLD VENIPUNCTURE: CPT | Performed by: INTERNAL MEDICINE

## 2018-03-21 PROCEDURE — 80053 COMPREHEN METABOLIC PANEL: CPT | Performed by: INTERNAL MEDICINE

## 2018-03-21 RX ORDER — OXYCODONE AND ACETAMINOPHEN 7.5; 325 MG/1; MG/1
1 TABLET ORAL
Qty: 15 TAB | Refills: 0 | Status: SHIPPED | OUTPATIENT
Start: 2018-03-21 | End: 2018-03-26

## 2018-03-21 RX ADMIN — METOPROLOL TARTRATE 25 MG: 25 TABLET ORAL at 08:38

## 2018-03-21 RX ADMIN — Medication 5 ML: at 05:45

## 2018-03-21 NOTE — PROGRESS NOTES
Pt discharged to [facility] via stretcher/ ambulance. Pt is alert and oriented. He was able to walk to the stretcher without any issues. No increased bleeding or new bruising noted. Pt verbalized understanding of all discharge instructions. Pt stated he is leaving with all his belongings he brought. Safety measures in place will continue to monitor. SBAR called to [facility] at [facility].

## 2018-03-21 NOTE — PROGRESS NOTES
Pt is up in room. Alert and oriented. rr are even and unlabored lung sounds are diminished no distress noted. He is on room air. Tolerates well. He has bruising to his left neck. abd is semi soft bowel sounds are active. Pt states he still has not had a bowel movement but does not want anything this am he wants to wait. He is sable. Safety measures in place will continue to monitor.

## 2018-03-21 NOTE — DISCHARGE SUMMARY
Hospitalist Discharge Summary     Patient ID:  Kim Del Rosario  [id number]  69 y.o.  1940  Admit date: 3/10/2018  4:11 PM  Discharge date and time: 3/21/2018  Attending: Yarely Gaviria MD  PCP:  Eliot Conley MD  Treatment Team: Attending Provider: Yarely Gaviria MD; Consulting Provider: Parker Quevedo MD; Consulting Provider: Sonya Mcdonald MD; Care Manager: Parisa Park RN; Utilization Review: Lucille Carson RN    Principal Diagnosis Surgical wound hemorrhage after dental procedure   Principal Problem:    Surgical wound hemorrhage after dental procedure (3/10/2018)    Active Problems:    Airway compromise (3/10/2018)      Hx pulmonary embolism (3/10/2018)      Type II diabetes mellitus ([facility] [address] (3/11/2018)      Debility (3/19/2018)             Hospital Course:  Please refer to the admission H&P for details of presentation. In summary, the patient is a 77/M with PMH of DM, HLD, HTN, CAD, PE on coumadin and plavix who had a dental procedure done and then presented with upper airway obstruction and hematoma. Patient was in respiratory distress and ended up getting intubated. CT neck showed left parapharyngeal hemorrhage with compression of the oropharynx. INR was elevated and was reversed with Vitamin K and his ASA, Plavix and Coumadin was held. ENT recommended no surgical intervention. Repeat CT showed smaller hematoma and patient was ultimately extubated. A bronchoscopy was done which showed no significant compression at level of vocal cords or hyopharynx. Patient completed 7 days of Unasyn. Patient has remained stable on room air and he was seen by ENT and Pulmonary. ENT recommended resuming coumadin on 3/22 and I called and spoke to his cardiologist Dr. Mony Douglas who states we can resume Plavix on 3/22 but hold ASA. Patient is being discharged to a rehab facility and will follow with cardiology within one week.     Significant Diagnostic Studies:       CT of the Neck     INDICATION:  Neck pain and swelling post dental procedure, difficulty breathing     Multiple axial images were obtained through the neck after intravenous injection  of 80mL of Isovue 370. Radiation dose reduction techniques were used for this  study: All CT scans performed at this facility use one or all of the following:  Automated exposure control, adjustment of the mA and/or kVp according to  patient's size, iterative reconstruction.     FINDINGS: There is abnormal soft tissue in the left parapharyngeal region  compressing and displacing the oropharynx, likely represents hemorrhage given  the history. No definite active hemorrhage is seen, although this might be  difficult to detect on a single phase study. There are no fractures or bony  lesions. Carotid arteries are patent. The lung apices are clear. There is  fluid distention of the esophagus.     IMPRESSION       Labs: Results:       Chemistry Recent Labs      03/21/18   0710  03/20/18   0657  03/19/18   0622   GLU  102*  111*  103*   NA  143  142  144   K  3.8  3.7  3.6   CL  109*  108*  109*   CO2  24  26  27   BUN  20  22  23   CREA  1.19  1.18  1.16   CA  8.0*  7.6*  7.8*   AGAP  10  8  8   AP  85   --    --    TP  5.3*   --    --    ALB  2.6*   --    --    GLOB  2.7   --    --    AGRAT  1.0*   --    --       CBC w/Diff Recent Labs      03/21/18   0710  03/20/18   0701   WBC  6.9  6.8   RBC  4.31  4.14*   HGB  10.1*  9.6*   HCT  32.9*  31.6*   PLT  136*  144*      Cardiac Enzymes No results for input(s): CPK, CKND1, NABEEL in the last 72 hours. No lab exists for component: CKRMB, TROIP   Coagulation Recent Labs      03/21/18   0710  03/20/18   0701   PTP  14.7*  15.3*   INR  1.2  1.3       Lipid Panel No results found for: CHOL, CHOLPOCT, CHOLX, CHLST, CHOLV, 669600, HDL, LDL, LDLC, DLDLP, 440731, VLDLC, VLDL, TGLX, TRIGL, TRIGP, TGLPOCT, CHHD, CHHDX   BNP No results for input(s): BNPP in the last 72 hours.    Liver Enzymes Recent Labs 03/21/18   0710   TP  5.3*   ALB  2.6*   AP  85   SGOT  17      Thyroid Studies No results found for: T4, T3U, TSH, TSHEXT         Discharge Exam:  Visit Vitals    BP (!) 152/91    Pulse 82    Temp 97.8 °F (36.6 °C)    Resp 22    Ht 6' 1\" (1.854 m)    Wt 94.5 kg (208 lb 6.4 oz)    SpO2 97%    BMI 27.5 kg/m2     General appearance: alert, cooperative, no distress, appears stated age  Lungs: clear to auscultation bilaterally  Heart: regular rate and rhythm, S1, S2 normal, no murmur, click, rub or gallop  Abdomen: soft, non-tender. Bowel sounds normal. No masses,  no organomegaly  Extremities: no cyanosis or edema  Neurologic: Grossly normal     Disposition: Rehab  Discharge Condition: stable  Patient Instructions:   Current Discharge Medication List      CONTINUE these medications which have NOT CHANGED    Details   oxyCODONE-acetaminophen (PERCOCET 7.5) 7.5-325 mg per tablet Take 1 Tab by mouth every eight (8) hours as needed for Pain. Max Daily Amount: 3 Tabs. Qty: 21 Tab, Refills: 0    Associated Diagnoses: Spinal stenosis of lumbar region with neurogenic claudication      tamsulosin (FLOMAX) 0.4 mg capsule Take 1 Cap by mouth daily. Qty: 30 Cap, Refills: 2    Associated Diagnoses: Spinal stenosis of lumbar region with neurogenic claudication      HYDROcodone-acetaminophen (NORCO) 7.5-325 mg per tablet Take 1 Tab by mouth every eight (8) hours as needed for Pain. Max Daily Amount: 3 Tabs. Qty: 21 Tab, Refills: 0    Associated Diagnoses: Spinal stenosis of lumbar region with neurogenic claudication      metoprolol tartrate (LOPRESSOR) 50 mg tablet Take 50 mg by mouth daily. LANSOPRAZOLE (PREVACID PO) Take 1 Tab by mouth daily. tadalafil (CIALIS) 5 mg tablet Take 1 Tab by mouth daily. Qty: 90 Tab, Refills: 3      gabapentin (NEURONTIN) 300 mg capsule Take 300 mg by mouth two (2) times a day. glipiZIDE (GLUCOTROL) 10 mg tablet Take 10 mg by mouth daily.       insulin detemir (LEVEMIR) 100 unit/mL injection 20 Units by SubCUTAneous route nightly. clopidogrel (PLAVIX) 75 mg tablet Take 75 mg by mouth daily. Last dose mid Jan 2018      magnesium oxide (MAG-OX) 400 mg tablet Take 400 mg by mouth daily.          STOP taking these medications       Cetirizine (ZYRTEC) 10 mg cap Comments:   Reason for Stopping:         aspirin delayed-release 81 mg tablet Comments:   Reason for Stopping:               Activity: Activity as tolerated  Diet: Cardiac Diet  Wound Care: None needed    Follow-up  · With Cardiology Dr. Kimmy Fletcher within one week      Time spent to discharge patient 35 minutes  Signed:  Dayanna Stephens MD  3/21/2018  11:19 AM

## 2018-03-21 NOTE — PROGRESS NOTES
LTG:  (1.)Mr. Sowmya Barahona will move from supine to sit and sit to supine  with SUPERVISION within 7 treatment day(s). (2.)Mr. Sowmya Barahona will transfer from bed to chair and chair to bed with SUPERVISION using the least restrictive device within 7 treatment day(s). (3.)Mr. Sowmya Barahona will ambulate with SUPERVISION for 200 feet with the least restrictive device within 7 treatment day(s). ________________________________________________________________________________________________      PHYSICAL THERAPY: Daily Note, Treatment Day: 2nd, AM 3/21/2018  INPATIENT: Hospital Day: 12  Payor: [facility] MEDICARE / Plan: [facility] MEDICARE HMO/PPO / Product Type: Managed Care Medicare /      NAME/AGE/GENDER: Brynn Urbina is a 69 y.o. male   PRIMARY DIAGNOSIS: Respiratory failure, unspecified chronicity, unspecified whether with hypoxia or hypercapnia (HCC) [J96.90] Surgical wound hemorrhage after dental procedure Surgical wound hemorrhage after dental procedure  Procedure(s) (LRB):  BRONCHOSCOPY (N/A)  7 Days Post-Op  ICD-10: Treatment Diagnosis:   · Generalized Muscle Weakness (M62.81)  · Difficulty in walking, Not elsewhere classified (R26.2)   Precaution/Allergies:  Review of patient's allergies indicates no known allergies. ASSESSMENT:     Mr. Sowmya Barahona presents supine and happy to walk with me. He required minimal assist to sit up and today walked without a walker although gait is shuffled and steppage he says since back surgery in February. He did OK but relied heavily on the railing in the hallway. Hopefully going to rehab today. This section established at most recent assessment   PROBLEM LIST (Impairments causing functional limitations):  1. Decreased Strength  2. Decreased ADL/Functional Activities  3. Decreased Transfer Abilities  4. Decreased Ambulation Ability/Technique  5. Decreased Balance  6. Increased Pain  7. Decreased Activity Tolerance  8. Increased Fatigue  9.  Increased Shortness of Breath  10. Decreased Flexibility/Joint Mobility  11. Decreased Knowledge of Precautions  12. Decreased [place] with Home Exercise Program   INTERVENTIONS PLANNED: (Benefits and precautions of physical therapy have been discussed with the patient.)  1. Balance Exercise  2. Bed Mobility  3. Family Education  4. Gait Training  5. Home Exercise Program (HEP)  6. Neuromuscular Re-education/Strengthening  7. Range of Motion (ROM)  8. Therapeutic Activites  9. Therapeutic Exercise/Strengthening  10. Transfer Training     TREATMENT PLAN: Frequency/Duration: 3 times a week for duration of hospital stay  Rehabilitation Potential For Stated Goals: Good     RECOMMENDED REHABILITATION/EQUIPMENT: (at time of discharge pending progress): Due to the probability of continued deficits (see above) this patient will likely need continued skilled physical therapy after discharge. Equipment:    Walkers, Type: Rolling Walker              HISTORY:   History of Present Injury/Illness (Reason for Referral):  Patient is a 69 y.o.  male presents with Airway compromise ,  .     69 YCM with Oral anticoagulation with coumadin , had dental work today without holding the coumadin , after the dental work , patient started having feeling of his throat closing , he came in to the ED . Patient is taking coumadin and palvix for it looks like CAD and Hx of PE from the old problem list . Patient had blood int he oral cavity , epistaxis . He had nasal packing , then rhino rockets. Past Medical History/Comorbidities:   Mr. Devi Carroll  has a past medical history of Arthritis; BPH (benign prostatic hypertrophy); CAD (coronary artery disease) (11/2009); Claustrophobia; Coagulation defects; Former cigarette smoker; GERD (gastroesophageal reflux disease); H/O heart artery stent; Heart disease, unspecified; Heart murmur; Hyperlipidemia; Hypertension; Neuropathy; Other calculus in bladder;  Other pulmonary embolism and infarction; PONV (postoperative nausea and vomiting); Poor historian; Pure hypercholesterolemia; PVD (peripheral vascular disease) ([facility] Skin cancer; Thromboembolus ([facility] (2/2012); 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 Mr. Anastacia Hicks  has a past surgical history that includes hx heart catheterization (11/2009); pr prostate biopsy, needle, saturation sampling; hx prostatectomy; hx other surgical; hx urological; and hx colonoscopy. Social History/Living Environment:   Home Environment: Private residence  # Steps to Enter: 0  One/Two Story Residence: One story  Living Alone: Yes  Support Systems: Friends \ neighbors, Family member(s)  Patient Expects to be Discharged to[de-identified] Private residence  Current DME Used/Available at Home: Shower chair, Grab bars  Prior Level of Function/Work/Activity:  Pt ambulated without AD at home prior to admission. Pt was also independent with all ADLs. Number of Personal Factors/Comorbidities that affect the Plan of Care: 1-2: MODERATE COMPLEXITY   EXAMINATION:   Most Recent Physical Functioning:   Gross Assessment:                  Posture:     Balance:    Bed Mobility:     Wheelchair Mobility:     Transfers:     Gait:     Speed/Nikky: Shuffled  Step Length: Left shortened;Right shortened  Gait Abnormalities: Decreased step clearance;Shuffling gait; Steppage gait  Distance (ft): 200 Feet (ft)  Assistive Device:  (none)  Ambulation - Level of Assistance: Contact guard assistance      Body Structures Involved:  1. Bones  2. Joints  3. Muscles  4. Ligaments  5. Integumentary Body Functions Affected:  1. Respiratory  2. Movement Related  3. Skin Related Activities and Participation Affected:  1. Mobility  2.  Self Care  3. Domestic Life   Number of elements that affect the Plan of Care: 3: MODERATE COMPLEXITY   CLINICAL PRESENTATION:   Presentation: Evolving clinical presentation with changing clinical characteristics: MODERATE COMPLEXITY   CLINICAL DECISION MAKIN [facility] Mobility Inpatient Short Form  How much difficulty does the patient currently have. .. Unable A Lot A Little None   1. Turning over in bed (including adjusting bedclothes, sheets and blankets)? [] 1   [] 2   [x] 3   [] 4   2. Sitting down on and standing up from a chair with arms ( e.g., wheelchair, bedside commode, etc.)   [] 1   [] 2   [x] 3   [] 4   3. Moving from lying on back to sitting on the side of the bed? [] 1   [] 2   [x] 3   [] 4   How much help from another person does the patient currently need. .. Total A Lot A Little None   4. Moving to and from a bed to a chair (including a wheelchair)? [] 1   [x] 2   [] 3   [] 4   5. Need to walk in hospital room? [] 1   [] 2   [] 3   [] 4   6. Climbing 3-5 steps with a railing? [x] 1   [] 2   [] 3   [] 4   © , Trustees of [address] [place] [address], under license to [employer]. All rights reserved      Score:  Initial: 12 Most Recent: X (Date: -- )    Interpretation of Tool:  Represents activities that are increasingly more difficult (i.e. Bed mobility, Transfers, Gait). Score 24 23 22-20 19-15 14-10 9-7 6     Modifier CH CI CJ CK CL CM CN      ? Mobility - Walking and Moving Around:     - CURRENT STATUS: CL - 60%-79% impaired, limited or restricted    - GOAL STATUS: CJ - 20%-39% impaired, limited or restricted    - D/C STATUS:  ---------------To be determined---------------  Payor: [facility] / Plan[place] [facility] [place] / Product Type: Managed Care Medicare /      Medical Necessity:     · Patient demonstrates good rehab potential due to higher previous functional level.   Reason for Services/Other Comments:  · Patient continues to require skilled intervention due to decreased tolerance for functional mobility. .   Use of outcome tool(s) and clinical judgement create a POC that gives a: Clear prediction of patient's progress: LOW COMPLEXITY            TREATMENT:   (In addition to Assessment/Re-Assessment sessions the following treatments were rendered)   Pre-treatment Symptoms/Complaints:  \"I'm weaker than a kitten and nauseous\"  Pain: Initial:   Pain Intensity 1: 0  Post Session:  0/10     Therapeutic Activity: (15): Therapeutic activities including Bed transfers, Chair transfers, Ambulation on level ground to improve mobility, strength, balance and coordination. Required minimal   to promote static and dynamic balance in standing. Date:   Date:   Date:     Activity/Exercise Parameters Parameters Parameters                                               Braces/Orthotics/Lines/Etc:   · none  · O2 Device: Room air  Treatment/Session Assessment:    · Response to Treatment:  Pt experienced mild SOB after ambulation . · Interdisciplinary Collaboration:   o Physical Therapy Assistant  o Registered Nurse  · After treatment position/precautions:   o Up in chair  o Bed alarm/tab alert on  o Bed/Chair-wheels locked  o Call light within reach  o RN notified   · Compliance with Program/Exercises: Will assess as treatment progresses. · Recommendations/Intent for next treatment session: \"Next visit will focus on improved gait pattern\".   Total Treatment Duration:  PT Patient Time In/Time Out  Time In: 0900  Time Out: 0915    Katherine Madison PTA

## 2018-03-22 ENCOUNTER — PATIENT OUTREACH (OUTPATIENT)
Dept: CASE MANAGEMENT | Age: 78
End: 2018-03-22

## 2018-03-22 NOTE — PROGRESS NOTES
Please note this patient was IP d/c to [facility] and will be followed by Yuli Huddleston RN until d/c. Yang Gill LPN/ Care Coordinator  [facility]  [address] [place] [address] [facility]  [email or web address] WestManufacturers' Inventory. [facility] Healthcarehis note will not be viewable in [address]

## 2018-04-24 ENCOUNTER — HOSPITAL ENCOUNTER (OUTPATIENT)
Dept: LAB | Age: 78
Discharge: HOME | End: 2018-04-24
Payer: MEDICARE

## 2018-04-24 DIAGNOSIS — D64.9 ANEMIA, UNSPECIFIED TYPE: ICD-10-CM

## 2018-04-24 LAB
ANION GAP SERPL CALC-SCNC: 8 MMOL/L (ref 7–16)
BUN SERPL-MCNC: 16 MG/DL (ref 8–23)
CALCIUM SERPL-MCNC: 8.8 MG/DL (ref 8.3–10.4)
CHLORIDE SERPL-SCNC: 110 MMOL/L (ref 98–107)
CO2 SERPL-SCNC: 27 MMOL/L (ref 21–32)
CREAT SERPL-MCNC: 1.24 MG/DL (ref 0.8–1.5)
ERYTHROCYTE [DISTWIDTH] IN BLOOD BY AUTOMATED COUNT: 16.4 % (ref 11.9–14.6)
GLUCOSE SERPL-MCNC: 127 MG/DL (ref 65–100)
HCT VFR BLD AUTO: 37.3 % (ref 41.1–50.3)
HGB BLD-MCNC: 11.5 G/DL (ref 13.6–17.2)
MCH RBC QN AUTO: 23 PG (ref 26.1–32.9)
MCHC RBC AUTO-ENTMCNC: 30.8 G/DL (ref 31.4–35)
MCV RBC AUTO: 74.6 FL (ref 79.6–97.8)
PLATELET # BLD AUTO: 225 K/UL (ref 150–450)
PMV BLD AUTO: 9.4 FL (ref 10.8–14.1)
POTASSIUM SERPL-SCNC: 4.3 MMOL/L (ref 3.5–5.1)
RBC # BLD AUTO: 5 M/UL (ref 4.23–5.67)
SODIUM SERPL-SCNC: 145 MMOL/L (ref 136–145)
WBC # BLD AUTO: 5.8 K/UL (ref 4.3–11.1)

## 2018-04-24 PROCEDURE — 80048 BASIC METABOLIC PNL TOTAL CA: CPT | Performed by: NEUROLOGICAL SURGERY

## 2018-04-24 PROCEDURE — 36415 COLL VENOUS BLD VENIPUNCTURE: CPT | Performed by: NEUROLOGICAL SURGERY

## 2018-04-24 PROCEDURE — 85027 COMPLETE CBC AUTOMATED: CPT | Performed by: NEUROLOGICAL SURGERY

## 2018-06-05 PROBLEM — R06.09 DYSPNEA ON EXERTION: Status: ACTIVE | Noted: 2018-06-05

## 2018-06-13 ENCOUNTER — HOSPITAL ENCOUNTER (OUTPATIENT)
Dept: PHYSICAL THERAPY | Age: 78
Discharge: HOME | End: 2018-06-13
Attending: NEUROLOGICAL SURGERY
Payer: MEDICARE

## 2018-06-13 DIAGNOSIS — M48.062 LUMBAR STENOSIS WITH NEUROGENIC CLAUDICATION: ICD-10-CM

## 2018-06-13 DIAGNOSIS — R06.09 DYSPNEA ON EXERTION: ICD-10-CM

## 2018-06-13 PROCEDURE — 97110 THERAPEUTIC EXERCISES: CPT

## 2018-06-13 PROCEDURE — 97163 PT EVAL HIGH COMPLEX 45 MIN: CPT

## 2018-06-13 PROCEDURE — G8978 MOBILITY CURRENT STATUS: HCPCS

## 2018-06-13 PROCEDURE — G8979 MOBILITY GOAL STATUS: HCPCS

## 2018-06-13 NOTE — THERAPY EVALUATION
Atilio Salazar  : 1940  Primary: [facility] [facility] [place] [facility]  Secondary:  [facility]  [address], [place], [facility]  [phone]   [phone]       OUTPATIENT PHYSICAL THERAPY:Initial Assessment and Treatment Day: Day of Assessment 2018 7   ICD-10: Treatment Diagnosis:   Spinal stenosis, lumbar region with neurogenic claudication (M48.062)  Difficulty in walking, not elsewhere classified (R26.2)  Muscle weakness (generalized) (M62.81)  Precautions/Allergies:   Review of patient's allergies indicates no known allergies. Fall Risk Score: 9 (? 5 = High Risk)  MD Orders: PT evaluation and treatment  MEDICAL/REFERRING DIAGNOSIS:  Lumbar stenosis with neurogenic claudication [M48.062]  Dyspnea on exertion [R06.09]   DATE OF ONSET: 2018  REFERRING PHYSICIAN: Magdy Mares MD  RETURN PHYSICIAN APPOINTMENT: Pending      INITIAL ASSESSMENT:  Mr. Mechelle Starr presents with lumbar degenerative changes with neurological compression for estimated 3-4 years at L3/L4 segment, now s/p decompression with Dr. Amara Eldridge in February. He has other cardiovascular comorbities and general decline in functional mobility. He is at increased risk of falls. All trunk mobility is decreased at 75% restriction. He is experiencing difficulty with foot and LE clearance. All LE strength is limited at 3/5 or less proximal strength left side weaker than right side. He demonstrates decreased walking and home ADL's using a single point cane. He states functional decline since inpatient SNF. Dr. Amara Eldridge office is contacted regarding these findings before any additional appointments are scheduled. He requires CGA x 1 for balance in clinic and to prevent falls. Spinal stiffness, trunk and lower extremity weakness are present with soft tissue restriction.  Skilled PT is indicated for his functional mobility deficits once additional medical/surgical clearance is received. PROBLEM LIST:  1. Decreased Strength affecting function  2. Decreased ADL/Functional Activities  3. Decreased Flexibility/joint mobility  4. Increased Pain affecting function  5. Decreased Activity tolerance   6. Increased Fatigue affecting function    INTERVENTIONS PLANNED:  1. Balance Exercise  2. Gait Training  3. Home Exercise Program (HEP)  4. Neuromuscular Re-education/Strengthening  5. Therapeutic Activites  6. Therapeutic Exercise/Strengthening  7. Transfer Training      TREATMENT PLAN:  Effective Dates: 6/13/2018 TO 9/11/2018 (90 days). Frequency/Duration: up to 2 times a week for 90 Days    GOALS: (Goals have been discussed and agreed upon with patient.)  DISCHARGE GOALS: Time Frame: 12 weeks   1. Pt will be compliant and independent with HEP in order to increase lumbar mobility and LE and core strength/endurance to improve quality of life. 2. Pt will reduce score on Modified Oswestry Low Back Pain Questionnaire to 2/50 in order to demonstrate improved functional mobility and quality of life. 3. Pt will report standing for > 60 minutes with minimal to no increase in pain in order to be able to stand for prolonged periods as needed to perform standing for home activities. 4. Patient to demonstrate increased strength in bilateral LE's 1/2 muscle grade. REHABILITATION POTENTIAL FOR STATED GOALS: GOOD     PLAN : Patient to continue with treatment per plan of care progressing to functional goal achievement utilizing interventions per problem list up to 3 x's per week until goals met or reassessment, or discharge. Regarding Ralph Morales's therapy, I certify that the treatment plan above will be carried out by a therapist or under their direction.   Thank you for this referral,  Minerva Rosas, PT     Referring Physician Signature: Trinidad Connors MD              Date                    The information in this section was collected on 6/13/2018 (except where otherwise noted). HISTORY:   History of Present Injury/Illness (Reason for Referral):  PER DR. Nicci Schaefer NOTE April 2018 : This patient is a 69 y.o. male who presents today for postoperative evaluation status post left L3-4 laminectomy and discectomy 2 months ago. The patient did well following the surgery however had a dental procedure performed in March subsequent to this developed severe uncontrolled hemorrhage and had to be readmitted to the hospital for 3 days in the intensive care unit in 5 days on the floor. The patient has been recovering from this but it did set back his lumbar spine surgery recovery. He is generally weak. He does receive home physical therapy and home health nurse comes to the house regularly.     PER PT 6/13/2018 :      Patient is demonstrating decreased mobility with debility and strength loss for the LE's and proximal trunk. He is having difficulty with foot clearance. He reports no pain, however reports a tingling in the LE's and states he is worsened since SNF rehabilitation. He requests further medical and surgical consultation to see if this is \"normal\" after surgery. We contacted Dr. Johnson Score office with concerns and will not continue with PT until further clearance is issued from the office. Past Medical History/Comorbidities:   Mr. Donavon Bull  has a past medical history of Arthritis; BPH (benign prostatic hypertrophy); CAD (coronary artery disease) (11/2009); Claustrophobia; Coagulation defects; Former cigarette smoker; GERD (gastroesophageal reflux disease); H/O heart artery stent; Heart disease, unspecified; Heart murmur; Hyperlipidemia; Hypertension; Neuropathy; Other calculus in bladder; Other pulmonary embolism and infarction; PONV (postoperative nausea and vomiting); Poor historian; Pure hypercholesterolemia; PVD (peripheral vascular disease) ([address] Skin cancer; Thromboembolus ([address] (2/2012); 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 ([address]  Type II or unspecified type diabetes mellitus without mention of complication, not stated as uncontrolled; Unspecified essential hypertension; Unspecified hyperplasia of prostate with urinary obstruction and other lower urinary tract symptoms (LUTS); and Unspecified sleep apnea. Mr. Jett Jean  has a past surgical history that includes hx heart catheterization (11/2009); pr prostate biopsy, needle, saturation sampling; hx prostatectomy; hx other surgical; hx urological; and hx colonoscopy. Social History/Living Environment:     Home Environment: Private residence  # Steps to Enter: 0  One/Two Story Residence: One story  Living Alone: Yes  Support Systems: Friends \ neighbors, Family member(s)  Patient Expects to be Discharged to[de-identified] Private residence  Current DME Used/Available at Home: Shower chair, Grab bars  Prior Level of Function/Work/Activity:  Functional decline utilizing single point cane with debility and decreased mobility. Dominant Side:         RIGHT  Personal Factors:          Sex:  male        Age:  69 y.o. Current Medications:       Current Outpatient Prescriptions:     tamsulosin (FLOMAX) 0.4 mg capsule, Take 1 Cap by mouth daily. , Disp: 30 Cap, Rfl: 2    metoprolol tartrate (LOPRESSOR) 50 mg tablet, Take 50 mg by mouth daily. , Disp: , Rfl:     LANSOPRAZOLE (PREVACID PO), Take 1 Tab by mouth daily. , Disp: , Rfl:     tadalafil (CIALIS) 5 mg tablet, Take 1 Tab by mouth daily. , Disp: 90 Tab, Rfl: 3    gabapentin (NEURONTIN) 300 mg capsule, Take 300 mg by mouth two (2) times a day., Disp: , Rfl:     glipiZIDE (GLUCOTROL) 10 mg tablet, Take 10 mg by mouth daily. , Disp: , Rfl:     insulin detemir (LEVEMIR) 100 unit/mL injection, 20 Units by SubCUTAneous route nightly., Disp: , Rfl:     clopidogrel (PLAVIX) 75 mg tablet, Take 75 mg by mouth daily. Last dose mid Jan 2018, Disp: , Rfl:     magnesium oxide (MAG-OX) 400 mg tablet, Take 400 mg by mouth daily. , Disp: , Rfl:    Date Last Reviewed:  6/13/2018 Number of Personal Factors/Comorbidities that affect the Plan of Care: 3+: HIGH COMPLEXITY   EXAMINATION:   ROM:  TRUNK ROM limited at 33% in all planes of motion decreased dynamic balance   APPEARANCE/PALPATION :   Forward trunk position and crouched posture present. Strength:  Hip strength limited at bilateral sides at 3/5 hip extension, abduction, and flexion or less left side weaker than the right side  CORE strength at 3/5 or less   Neurological Screen:       Dermatomes: intact generally , diffuse tingling reporting      Sensation: burning and sharp shooting in the left SI and lumbosacral region. Functional Mobility:       Gait/Ambulation:decreased octavio and mobility, proprioceptive deficits and single point cane. Transfers:  decreased transfers at slow independent mobility, slightly antalgic    Balance:  Single leg stance at less than 1 seconds each side. Increased fall risk . Minimally clearing his feet from floor. Shuffling gait pattern. Mental Status:  Intact alert, oriented, coherent and lucid. Body Structures Involved:  1. Bones  2. Joints  3. Muscles  4. Ligaments Body Functions Affected:  1. Movement Related  2. Skin Related  3. Metobolic/Endocrine Activities and Participation Affected:  1. Communication  2. Mobility  3. Self Care  4. Community, Social and [place]   Number of elements (examined above) that affect the Plan of Care: 4+: HIGH COMPLEXITY   CLINICAL PRESENTATION:   Presentation: Evolving clinical presentation with unstable and unpredictable characteristics: HIGH COMPLEXITY   CLINICAL DECISION MAKING:   Outcome Measure: Tool Used: Modified Oswestry Low Back Pain Questionnaire  Score:  Initial: 4/50  Most Recent: X/50 (Date: -- )   Interpretation of Score: Each section is scored on a 0-5 scale, 5 representing the greatest disability. The scores of each section are added together for a total score of 50.     Score 0 1-10 11-20 21-30 31-40 41-49 50 Modifier CH CI CJ CK CL CM CN     ? Mobility - Walking and Moving Around:     - CURRENT STATUS: CI - 1%-19% impaired, limited or restricted    - GOAL STATUS: CI - 1%-19% impaired, limited or restricted    - D/C STATUS:  ---------------To be determined---------------  As patient is having difficulty with balance more than pain, he will be given LEFS next treatment visit for more accurate assessement of functional limitations. Medical Necessity:   · Patient demonstrates fair rehab potential due to higher previous functional level. Reason for Services/Other Comments:  · Patient continues to require skilled intervention due to medical complications, patient unable to attend/participate in therapy as expected and debility and decreased mobility. Use of outcome tool(s) and clinical judgement create a POC that gives a: Difficult prediction of patient's progress: HIGH COMPLEXITY            TREATMENT:   (In addition to Assessment/Re-Assessment sessions the following treatments were rendered)  Pre-treatment Symptoms/Complaints:  No specific pain reported but advanced weakness and debility with increased falls risk reported. Pain: Initial:   Pain Intensity 1: 0 (patient reports no pain but increased weakness and shuffling) 0/10  Post Session:  0/10      THERAPEUTIC EXERCISE: (12  minutes):  Exercises per grid below to improve mobility, strength, balance and coordination. Required minimal visual, verbal, manual and tactile cues to promote proper body alignment, promote proper body posture and promote proper body mechanics. Progressed repetitions as indicated. Date:  6/13/2018  Date:   Date:     Activity/Exercise Parameters Parameters Parameters   Nustep  12 mins for skilled trunk reciprocation and muscular activation of the LE's.  Level 1                                             [employer] Portal  Treatment/Session Assessment:    · Response to Treatment:  Patient expresses concern about decreased balance and transfers with decline in mobility and strength. · Compliance with Program/Exercises: Will assess as treatment progresses. · Recommendations/Intent for next treatment session: \"Next visit will focus on advancements to more challenging activities, reduction in assistance provided and transfers, ambulation and mobility with strengthening for balance improvement pending MD/Surgical Improvement to continue PT \".   Total Treatment Duration:  PT Patient Time In/Time Out  Time In: 1015  Time Out: [address] [place], [place]

## 2018-06-13 NOTE — PROGRESS NOTES
Ambulatory/Rehab Services H2 Model Falls Risk Assessment    Risk Factor Pts. ·   Confusion/Disorientation/Impulsivity  []    4 ·   Symptomatic Depression  []   2 ·   Altered Elimination  []   1 ·   Dizziness/Vertigo  []   1 ·   Gender (Male)  [x]   1 ·   Any administered antiepileptics (anticonvulsants):  []   2 ·   Any administered benzodiazepines:  []   1 ·   Visual Impairment (specify):  []   1 ·   Portable Oxygen Use  []   1 ·   Orthostatic ? BP  []   1 ·   History of Recent Falls (within 3 mos.)  [x]   5     Ability to Rise from Chair (choose one) Pts. ·   Ability to rise in a single movement  []   0 ·   Pushes up, successful in one attempt  []   1 ·   Multiple attempts, but successful  [x]   3 ·   Unable to rise without assistance  []   4   Total: (5 or greater = High Risk) 9     Falls Prevention Plan:   []                Physical Limitations to Exercise (specify):   []                Mobility Assistance Device (type):   []                Exercise/Equipment Adaptation (specify):    ©2010 [facility] [address] Patent #[id number].  Federal Law prohibits the replication, distribution or use without written permission from [facility]

## 2018-06-28 ENCOUNTER — APPOINTMENT (RX ONLY)
Dept: URBAN - METROPOLITAN AREA CLINIC 23 | Facility: CLINIC | Age: 78
Setting detail: DERMATOLOGY
End: 2018-06-28

## 2018-06-28 DIAGNOSIS — D22 MELANOCYTIC NEVI: ICD-10-CM

## 2018-06-28 DIAGNOSIS — Z85.828 PERSONAL HISTORY OF OTHER MALIGNANT NEOPLASM OF SKIN: ICD-10-CM

## 2018-06-28 DIAGNOSIS — L81.4 OTHER MELANIN HYPERPIGMENTATION: ICD-10-CM

## 2018-06-28 DIAGNOSIS — L82.1 OTHER SEBORRHEIC KERATOSIS: ICD-10-CM

## 2018-06-28 DIAGNOSIS — L57.0 ACTINIC KERATOSIS: ICD-10-CM

## 2018-06-28 PROBLEM — D22.5 MELANOCYTIC NEVI OF TRUNK: Status: ACTIVE | Noted: 2018-06-28

## 2018-06-28 PROCEDURE — 99213 OFFICE O/P EST LOW 20 MIN: CPT | Mod: 25

## 2018-06-28 PROCEDURE — 17003 DESTRUCT PREMALG LES 2-14: CPT

## 2018-06-28 PROCEDURE — 17000 DESTRUCT PREMALG LESION: CPT

## 2018-06-28 PROCEDURE — ? LIQUID NITROGEN

## 2018-06-28 PROCEDURE — ? COUNSELING

## 2018-06-28 ASSESSMENT — LOCATION DETAILED DESCRIPTION DERM
LOCATION DETAILED: RIGHT LATERAL TEMPLE
LOCATION DETAILED: LEFT MEDIAL INFERIOR CHEST
LOCATION DETAILED: LEFT SUPERIOR CRUS OF ANTIHELIX
LOCATION DETAILED: RIGHT LATERAL FOREHEAD
LOCATION DETAILED: RIGHT INFERIOR FOREHEAD
LOCATION DETAILED: SUPERIOR MID FOREHEAD
LOCATION DETAILED: RIGHT SUPERIOR LATERAL UPPER BACK
LOCATION DETAILED: LEFT INFERIOR POSTAURICULAR SKIN
LOCATION DETAILED: LEFT VENTRAL MEDIAL DISTAL FOREARM

## 2018-06-28 ASSESSMENT — LOCATION ZONE DERM
LOCATION ZONE: TRUNK
LOCATION ZONE: EAR
LOCATION ZONE: SCALP
LOCATION ZONE: ARM
LOCATION ZONE: FACE

## 2018-06-28 ASSESSMENT — LOCATION SIMPLE DESCRIPTION DERM
LOCATION SIMPLE: RIGHT TEMPLE
LOCATION SIMPLE: RIGHT UPPER BACK
LOCATION SIMPLE: SUPERIOR FOREHEAD
LOCATION SIMPLE: LEFT FOREARM
LOCATION SIMPLE: RIGHT FOREHEAD
LOCATION SIMPLE: LEFT EAR
LOCATION SIMPLE: SCALP
LOCATION SIMPLE: CHEST

## 2018-06-28 NOTE — PROCEDURE: LIQUID NITROGEN
Duration Of Freeze Thaw-Cycle (Seconds): 3
Detail Level: Simple
Consent: The patient's consent was obtained including but not limited to risks of crusting, scabbing, blistering, scarring, darker or lighter pigmentary change, recurrence, incomplete removal and infection.
Number Of Freeze-Thaw Cycles: 1 freeze-thaw cycle
Post-Care Instructions: I reviewed with the patient in detail post-care instructions. Patient is to wear sunprotection, and avoid picking at any of the treated lesions. Pt may apply Vaseline to crusted or scabbing areas.
Render Post-Care Instructions In Note?: no

## 2018-09-11 PROBLEM — G62.9 PERIPHERAL POLYNEUROPATHY: Status: ACTIVE | Noted: 2018-09-11

## 2018-11-14 NOTE — THERAPY DISCHARGE
Delisa Carmen has been seen in physical therapy from 6/13/2018 to 6/13/2018for 1 visits. Treatment has been discontinued at this time due to initial evaluation and discharge not returning. .  The below goals were met prior to discontinuation. No goals were not met due to initial evaluation only. Thank you for this referral.   Roger Beatty, PT, DPT, OCS.

## 2018-11-30 ENCOUNTER — APPOINTMENT (OUTPATIENT)
Dept: GENERAL RADIOLOGY | Age: 78
End: 2018-11-30
Attending: EMERGENCY MEDICINE
Payer: MEDICARE

## 2018-11-30 VITALS
DIASTOLIC BLOOD PRESSURE: 86 MMHG | RESPIRATION RATE: 18 BRPM | OXYGEN SATURATION: 97 % | HEIGHT: 73 IN | TEMPERATURE: 97.4 F | BODY MASS INDEX: 28.23 KG/M2 | HEART RATE: 92 BPM | SYSTOLIC BLOOD PRESSURE: 140 MMHG | WEIGHT: 213 LBS

## 2018-11-30 LAB
ALBUMIN SERPL-MCNC: 3.7 G/DL (ref 3.2–4.6)
ALBUMIN/GLOB SERPL: 1 {RATIO} (ref 1.2–3.5)
ALP SERPL-CCNC: 131 U/L (ref 50–136)
ALT SERPL-CCNC: 24 U/L (ref 12–65)
ANION GAP SERPL CALC-SCNC: 9 MMOL/L (ref 7–16)
AST SERPL-CCNC: 11 U/L (ref 15–37)
BASOPHILS # BLD: 0.1 K/UL (ref 0–0.2)
BASOPHILS NFR BLD: 1 % (ref 0–2)
BILIRUB SERPL-MCNC: 0.4 MG/DL (ref 0.2–1.1)
BUN SERPL-MCNC: 24 MG/DL (ref 8–23)
CALCIUM SERPL-MCNC: 8.6 MG/DL (ref 8.3–10.4)
CHLORIDE SERPL-SCNC: 102 MMOL/L (ref 98–107)
CO2 SERPL-SCNC: 26 MMOL/L (ref 21–32)
CREAT SERPL-MCNC: 1.64 MG/DL (ref 0.8–1.5)
DIFFERENTIAL METHOD BLD: ABNORMAL
EOSINOPHIL # BLD: 0.2 K/UL (ref 0–0.8)
EOSINOPHIL NFR BLD: 3 % (ref 0.5–7.8)
ERYTHROCYTE [DISTWIDTH] IN BLOOD BY AUTOMATED COUNT: 15.5 % (ref 11.9–14.6)
GLOBULIN SER CALC-MCNC: 3.8 G/DL (ref 2.3–3.5)
GLUCOSE BLD STRIP.AUTO-MCNC: 244 MG/DL (ref 65–100)
GLUCOSE SERPL-MCNC: 247 MG/DL (ref 65–100)
HCT VFR BLD AUTO: 43.4 % (ref 41.1–50.3)
HGB BLD-MCNC: 13.2 G/DL (ref 13.6–17.2)
IMM GRANULOCYTES # BLD: 0 K/UL (ref 0–0.5)
IMM GRANULOCYTES NFR BLD AUTO: 0 % (ref 0–5)
LYMPHOCYTES # BLD: 1.2 K/UL (ref 0.5–4.6)
LYMPHOCYTES NFR BLD: 15 % (ref 13–44)
MCH RBC QN AUTO: 23.7 PG (ref 26.1–32.9)
MCHC RBC AUTO-ENTMCNC: 30.4 G/DL (ref 31.4–35)
MCV RBC AUTO: 77.8 FL (ref 79.6–97.8)
MONOCYTES # BLD: 0.6 K/UL (ref 0.1–1.3)
MONOCYTES NFR BLD: 8 % (ref 4–12)
NEUTS SEG # BLD: 5.6 K/UL (ref 1.7–8.2)
NEUTS SEG NFR BLD: 73 % (ref 43–78)
NRBC # BLD: 0 K/UL (ref 0–0.2)
PLATELET # BLD AUTO: 233 K/UL (ref 150–450)
PMV BLD AUTO: 9.4 FL (ref 9.4–12.3)
POTASSIUM SERPL-SCNC: 4 MMOL/L (ref 3.5–5.1)
PROT SERPL-MCNC: 7.5 G/DL (ref 6.3–8.2)
RBC # BLD AUTO: 5.58 M/UL (ref 4.23–5.6)
SODIUM SERPL-SCNC: 137 MMOL/L (ref 136–145)
TROPONIN I SERPL-MCNC: <0.02 NG/ML (ref 0.02–0.05)
WBC # BLD AUTO: 7.7 K/UL (ref 4.3–11.1)

## 2018-11-30 PROCEDURE — 93005 ELECTROCARDIOGRAM TRACING: CPT | Performed by: EMERGENCY MEDICINE

## 2018-11-30 PROCEDURE — 80053 COMPREHEN METABOLIC PANEL: CPT

## 2018-11-30 PROCEDURE — 75810000275 HC EMERGENCY DEPT VISIT NO LEVEL OF CARE: Performed by: EMERGENCY MEDICINE

## 2018-11-30 PROCEDURE — 84484 ASSAY OF TROPONIN QUANT: CPT

## 2018-11-30 PROCEDURE — 85025 COMPLETE CBC W/AUTO DIFF WBC: CPT

## 2018-11-30 PROCEDURE — 82962 GLUCOSE BLOOD TEST: CPT

## 2018-11-30 PROCEDURE — 71045 X-RAY EXAM CHEST 1 VIEW: CPT

## 2018-12-01 ENCOUNTER — HOSPITAL ENCOUNTER (EMERGENCY)
Age: 78
Discharge: LEFT BEFORE DISCHARGE | End: 2018-12-01
Attending: EMERGENCY MEDICINE
Payer: MEDICARE

## 2018-12-01 LAB
ATRIAL RATE: 93 BPM
CALCULATED P AXIS, ECG09: 30 DEGREES
CALCULATED R AXIS, ECG10: -64 DEGREES
CALCULATED T AXIS, ECG11: -4 DEGREES
DIAGNOSIS, 93000: NORMAL
P-R INTERVAL, ECG05: 178 MS
Q-T INTERVAL, ECG07: 402 MS
QRS DURATION, ECG06: 148 MS
QTC CALCULATION (BEZET), ECG08: 499 MS
VENTRICULAR RATE, ECG03: 93 BPM

## 2018-12-01 NOTE — ED NOTES
Patient to ED with c/c generalized weakness. Patient denies any acute pain, states just feels week and that \"everything drained out of me. \" patient denies any NVD. Patient reports some constipation over the past few days. Patient with stable VS. Patient with = , - arm sway, - facial droop. Patient alert/oriented.  Unclear clinical picture- will secure labs, EKG, urine ahead of MD gonzalez.

## 2018-12-07 NOTE — PROCEDURE: LIQUID NITROGEN
Render Post-Care Instructions In Note?: no
Post-Care Instructions: I reviewed with the patient in detail post-care instructions. Patient is to wear sunprotection, and avoid picking at any of the treated lesions. Pt may apply Vaseline to crusted or scabbing areas.
Duration Of Freeze Thaw-Cycle (Seconds): 4
Number Of Freeze-Thaw Cycles: 1 freeze-thaw cycle
Detail Level: Simple
Consent: The patient's consent was obtained including but not limited to risks of crusting, scabbing, blistering, scarring, darker or lighter pigmentary change, recurrence, incomplete removal and infection.
walker, rolling

## 2019-01-03 ENCOUNTER — APPOINTMENT (RX ONLY)
Dept: URBAN - METROPOLITAN AREA CLINIC 23 | Facility: CLINIC | Age: 79
Setting detail: DERMATOLOGY
End: 2019-01-03

## 2019-01-03 DIAGNOSIS — L57.0 ACTINIC KERATOSIS: ICD-10-CM

## 2019-01-03 DIAGNOSIS — L82.1 OTHER SEBORRHEIC KERATOSIS: ICD-10-CM

## 2019-01-03 DIAGNOSIS — Z85.828 PERSONAL HISTORY OF OTHER MALIGNANT NEOPLASM OF SKIN: ICD-10-CM

## 2019-01-03 DIAGNOSIS — D22 MELANOCYTIC NEVI: ICD-10-CM

## 2019-01-03 DIAGNOSIS — L81.4 OTHER MELANIN HYPERPIGMENTATION: ICD-10-CM

## 2019-01-03 PROBLEM — D22.5 MELANOCYTIC NEVI OF TRUNK: Status: ACTIVE | Noted: 2019-01-03

## 2019-01-03 PROCEDURE — 17000 DESTRUCT PREMALG LESION: CPT

## 2019-01-03 PROCEDURE — 17003 DESTRUCT PREMALG LES 2-14: CPT

## 2019-01-03 PROCEDURE — ? LIQUID NITROGEN

## 2019-01-03 PROCEDURE — 99213 OFFICE O/P EST LOW 20 MIN: CPT | Mod: 25

## 2019-01-03 PROCEDURE — ? COUNSELING

## 2019-01-03 ASSESSMENT — LOCATION SIMPLE DESCRIPTION DERM
LOCATION SIMPLE: RIGHT FOREHEAD
LOCATION SIMPLE: CHEST
LOCATION SIMPLE: RIGHT UPPER BACK
LOCATION SIMPLE: RIGHT HAND
LOCATION SIMPLE: RIGHT TEMPLE
LOCATION SIMPLE: LEFT FOREARM
LOCATION SIMPLE: LEFT EAR
LOCATION SIMPLE: RIGHT FOREARM

## 2019-01-03 ASSESSMENT — LOCATION DETAILED DESCRIPTION DERM
LOCATION DETAILED: LEFT VENTRAL MEDIAL DISTAL FOREARM
LOCATION DETAILED: RIGHT SUPERIOR LATERAL UPPER BACK
LOCATION DETAILED: RIGHT INFERIOR FOREHEAD
LOCATION DETAILED: LEFT PROXIMAL DORSAL FOREARM
LOCATION DETAILED: LEFT DISTAL RADIAL DORSAL FOREARM
LOCATION DETAILED: RIGHT RADIAL DORSAL HAND
LOCATION DETAILED: RIGHT SUPERIOR MEDIAL FOREHEAD
LOCATION DETAILED: RIGHT LATERAL TEMPLE
LOCATION DETAILED: RIGHT ULNAR DORSAL HAND
LOCATION DETAILED: RIGHT DISTAL DORSAL FOREARM
LOCATION DETAILED: RIGHT FOREHEAD
LOCATION DETAILED: RIGHT PROXIMAL RADIAL DORSAL FOREARM
LOCATION DETAILED: LEFT MEDIAL INFERIOR CHEST
LOCATION DETAILED: LEFT SUPERIOR CRUS OF ANTIHELIX

## 2019-01-03 ASSESSMENT — LOCATION ZONE DERM
LOCATION ZONE: ARM
LOCATION ZONE: FACE
LOCATION ZONE: TRUNK
LOCATION ZONE: EAR
LOCATION ZONE: HAND

## 2019-01-03 NOTE — PROCEDURE: LIQUID NITROGEN
Render Post-Care Instructions In Note?: no
Detail Level: Simple
Duration Of Freeze Thaw-Cycle (Seconds): 4
Consent: The patient's consent was obtained including but not limited to risks of crusting, scabbing, blistering, scarring, darker or lighter pigmentary change, recurrence, incomplete removal and infection.
Number Of Freeze-Thaw Cycles: 1 freeze-thaw cycle
Post-Care Instructions: I reviewed with the patient in detail post-care instructions. Patient is to wear sunprotection, and avoid picking at any of the treated lesions. Pt may apply Vaseline to crusted or scabbing areas.

## 2019-01-28 ENCOUNTER — APPOINTMENT (OUTPATIENT)
Dept: CT IMAGING | Age: 79
DRG: 176 | End: 2019-01-28
Attending: EMERGENCY MEDICINE
Payer: MEDICARE

## 2019-01-28 ENCOUNTER — APPOINTMENT (OUTPATIENT)
Dept: ULTRASOUND IMAGING | Age: 79
DRG: 176 | End: 2019-01-28
Attending: HOSPITALIST
Payer: MEDICARE

## 2019-01-28 ENCOUNTER — HOSPITAL ENCOUNTER (INPATIENT)
Age: 79
LOS: 2 days | Discharge: HOME HEALTH | DRG: 176 | End: 2019-01-30
Attending: EMERGENCY MEDICINE | Admitting: HOSPITALIST
Payer: MEDICARE

## 2019-01-28 ENCOUNTER — APPOINTMENT (OUTPATIENT)
Dept: GENERAL RADIOLOGY | Age: 79
DRG: 176 | End: 2019-01-28
Attending: EMERGENCY MEDICINE
Payer: MEDICARE

## 2019-01-28 DIAGNOSIS — R55 SYNCOPE AND COLLAPSE: ICD-10-CM

## 2019-01-28 DIAGNOSIS — I26.99 OTHER ACUTE PULMONARY EMBOLISM WITHOUT ACUTE COR PULMONALE (HCC): Primary | ICD-10-CM

## 2019-01-28 LAB
ALBUMIN SERPL-MCNC: 3.7 G/DL (ref 3.2–4.6)
ALBUMIN/GLOB SERPL: 1 {RATIO} (ref 1.2–3.5)
ALP SERPL-CCNC: 149 U/L (ref 50–136)
ALT SERPL-CCNC: 24 U/L (ref 12–65)
ANION GAP SERPL CALC-SCNC: 9 MMOL/L (ref 7–16)
APTT PPP: 148.9 SEC (ref 24.7–39.8)
ARTERIAL PATENCY WRIST A: YES
AST SERPL-CCNC: 14 U/L (ref 15–37)
ATRIAL RATE: 96 BPM
BASE DEFICIT BLD-SCNC: 2 MMOL/L
BASOPHILS # BLD: 0.1 K/UL (ref 0–0.2)
BASOPHILS NFR BLD: 1 % (ref 0–2)
BDY SITE: ABNORMAL
BILIRUB SERPL-MCNC: 0.4 MG/DL (ref 0.2–1.1)
BNP SERPL-MCNC: 59 PG/ML
BODY TEMPERATURE: 98.6
BUN SERPL-MCNC: 14 MG/DL (ref 8–23)
CALCIUM SERPL-MCNC: 8.7 MG/DL (ref 8.3–10.4)
CALCULATED P AXIS, ECG09: 44 DEGREES
CALCULATED R AXIS, ECG10: -17 DEGREES
CALCULATED T AXIS, ECG11: 10 DEGREES
CHLORIDE SERPL-SCNC: 102 MMOL/L (ref 98–107)
CK SERPL-CCNC: 180 U/L (ref 21–215)
CO2 BLD-SCNC: 24 MMOL/L
CO2 SERPL-SCNC: 27 MMOL/L (ref 21–32)
COLLECT TIME,HTIME: 518
CREAT SERPL-MCNC: 1.63 MG/DL (ref 0.8–1.5)
DIAGNOSIS, 93000: NORMAL
DIFFERENTIAL METHOD BLD: ABNORMAL
EOSINOPHIL # BLD: 1 K/UL (ref 0–0.8)
EOSINOPHIL NFR BLD: 11 % (ref 0.5–7.8)
ERYTHROCYTE [DISTWIDTH] IN BLOOD BY AUTOMATED COUNT: 15.3 % (ref 11.9–14.6)
FLOW RATE ISTAT,IFRATE: 2 L/MIN
GAS FLOW.O2 O2 DELIVERY SYS: ABNORMAL L/MIN
GLOBULIN SER CALC-MCNC: 3.8 G/DL (ref 2.3–3.5)
GLUCOSE BLD STRIP.AUTO-MCNC: 210 MG/DL (ref 65–100)
GLUCOSE BLD STRIP.AUTO-MCNC: 227 MG/DL (ref 65–100)
GLUCOSE BLD STRIP.AUTO-MCNC: 253 MG/DL (ref 65–100)
GLUCOSE BLD STRIP.AUTO-MCNC: 302 MG/DL (ref 65–100)
GLUCOSE SERPL-MCNC: 277 MG/DL (ref 65–100)
HCO3 BLD-SCNC: 22.9 MMOL/L (ref 22–26)
HCT VFR BLD AUTO: 47.5 % (ref 41.1–50.3)
HGB BLD-MCNC: 13.9 G/DL (ref 13.6–17.2)
IMM GRANULOCYTES # BLD AUTO: 0 K/UL (ref 0–0.5)
IMM GRANULOCYTES NFR BLD AUTO: 0 % (ref 0–5)
INR PPP: 1
LACTATE BLD-SCNC: 2.1 MMOL/L (ref 0.5–1.9)
LACTATE SERPL-SCNC: 1.6 MMOL/L (ref 0.4–2)
LYMPHOCYTES # BLD: 1 K/UL (ref 0.5–4.6)
LYMPHOCYTES NFR BLD: 11 % (ref 13–44)
MCH RBC QN AUTO: 23.3 PG (ref 26.1–32.9)
MCHC RBC AUTO-ENTMCNC: 29.3 G/DL (ref 31.4–35)
MCV RBC AUTO: 79.7 FL (ref 79.6–97.8)
MONOCYTES # BLD: 0.8 K/UL (ref 0.1–1.3)
MONOCYTES NFR BLD: 9 % (ref 4–12)
NEUTS SEG # BLD: 6 K/UL (ref 1.7–8.2)
NEUTS SEG NFR BLD: 67 % (ref 43–78)
NRBC # BLD: 0 K/UL (ref 0–0.2)
P-R INTERVAL, ECG05: 182 MS
PCO2 BLD: 37.8 MMHG (ref 35–45)
PH BLD: 7.39 [PH] (ref 7.35–7.45)
PLATELET # BLD AUTO: 203 K/UL (ref 150–450)
PMV BLD AUTO: 9.1 FL (ref 9.4–12.3)
PO2 BLD: 70 MMHG (ref 75–100)
POTASSIUM SERPL-SCNC: 3.7 MMOL/L (ref 3.5–5.1)
PROT SERPL-MCNC: 7.5 G/DL (ref 6.3–8.2)
PROTHROMBIN TIME: 13.4 SEC (ref 11.7–14.5)
Q-T INTERVAL, ECG07: 392 MS
QRS DURATION, ECG06: 154 MS
QTC CALCULATION (BEZET), ECG08: 495 MS
RBC # BLD AUTO: 5.96 M/UL (ref 4.23–5.6)
SAO2 % BLD: 94 % (ref 95–98)
SERVICE CMNT-IMP: ABNORMAL
SODIUM SERPL-SCNC: 138 MMOL/L (ref 136–145)
SPECIMEN TYPE: ABNORMAL
TROPONIN I BLD-MCNC: 0.04 NG/ML (ref 0.02–0.05)
VENTRICULAR RATE, ECG03: 96 BPM
WBC # BLD AUTO: 9 K/UL (ref 4.3–11.1)

## 2019-01-28 PROCEDURE — 85610 PROTHROMBIN TIME: CPT

## 2019-01-28 PROCEDURE — 83605 ASSAY OF LACTIC ACID: CPT

## 2019-01-28 PROCEDURE — 85300 ANTITHROMBIN III ACTIVITY: CPT

## 2019-01-28 PROCEDURE — 74011000258 HC RX REV CODE- 258: Performed by: EMERGENCY MEDICINE

## 2019-01-28 PROCEDURE — 70450 CT HEAD/BRAIN W/O DYE: CPT

## 2019-01-28 PROCEDURE — 93970 EXTREMITY STUDY: CPT

## 2019-01-28 PROCEDURE — 99223 1ST HOSP IP/OBS HIGH 75: CPT | Performed by: INTERNAL MEDICINE

## 2019-01-28 PROCEDURE — 74011250637 HC RX REV CODE- 250/637: Performed by: HOSPITALIST

## 2019-01-28 PROCEDURE — 82803 BLOOD GASES ANY COMBINATION: CPT

## 2019-01-28 PROCEDURE — 97530 THERAPEUTIC ACTIVITIES: CPT

## 2019-01-28 PROCEDURE — 77030020255 HC SOL INJ LR 1000ML BG

## 2019-01-28 PROCEDURE — 81241 F5 GENE: CPT

## 2019-01-28 PROCEDURE — 82962 GLUCOSE BLOOD TEST: CPT

## 2019-01-28 PROCEDURE — C8929 TTE W OR WO FOL WCON,DOPPLER: HCPCS

## 2019-01-28 PROCEDURE — 99285 EMERGENCY DEPT VISIT HI MDM: CPT | Performed by: EMERGENCY MEDICINE

## 2019-01-28 PROCEDURE — 74011250637 HC RX REV CODE- 250/637: Performed by: NURSE PRACTITIONER

## 2019-01-28 PROCEDURE — 85730 THROMBOPLASTIN TIME PARTIAL: CPT

## 2019-01-28 PROCEDURE — 97161 PT EVAL LOW COMPLEX 20 MIN: CPT

## 2019-01-28 PROCEDURE — 93005 ELECTROCARDIOGRAM TRACING: CPT | Performed by: EMERGENCY MEDICINE

## 2019-01-28 PROCEDURE — 74011636637 HC RX REV CODE- 636/637: Performed by: EMERGENCY MEDICINE

## 2019-01-28 PROCEDURE — 97165 OT EVAL LOW COMPLEX 30 MIN: CPT

## 2019-01-28 PROCEDURE — 74011636320 HC RX REV CODE- 636/320: Performed by: EMERGENCY MEDICINE

## 2019-01-28 PROCEDURE — 85301 ANTITHROMBIN III ANTIGEN: CPT

## 2019-01-28 PROCEDURE — 74011250636 HC RX REV CODE- 250/636: Performed by: HOSPITALIST

## 2019-01-28 PROCEDURE — 74011000302 HC RX REV CODE- 302: Performed by: HOSPITALIST

## 2019-01-28 PROCEDURE — 80053 COMPREHEN METABOLIC PANEL: CPT

## 2019-01-28 PROCEDURE — 97535 SELF CARE MNGMENT TRAINING: CPT

## 2019-01-28 PROCEDURE — 84484 ASSAY OF TROPONIN QUANT: CPT

## 2019-01-28 PROCEDURE — 36600 WITHDRAWAL OF ARTERIAL BLOOD: CPT

## 2019-01-28 PROCEDURE — 86580 TB INTRADERMAL TEST: CPT | Performed by: HOSPITALIST

## 2019-01-28 PROCEDURE — 82550 ASSAY OF CK (CPK): CPT

## 2019-01-28 PROCEDURE — 36415 COLL VENOUS BLD VENIPUNCTURE: CPT

## 2019-01-28 PROCEDURE — 74011636637 HC RX REV CODE- 636/637: Performed by: HOSPITALIST

## 2019-01-28 PROCEDURE — 74011250636 HC RX REV CODE- 250/636: Performed by: EMERGENCY MEDICINE

## 2019-01-28 PROCEDURE — 85025 COMPLETE CBC W/AUTO DIFF WBC: CPT

## 2019-01-28 PROCEDURE — 83880 ASSAY OF NATRIURETIC PEPTIDE: CPT

## 2019-01-28 PROCEDURE — 71045 X-RAY EXAM CHEST 1 VIEW: CPT

## 2019-01-28 PROCEDURE — 65660000000 HC RM CCU STEPDOWN

## 2019-01-28 PROCEDURE — 81003 URINALYSIS AUTO W/O SCOPE: CPT | Performed by: EMERGENCY MEDICINE

## 2019-01-28 PROCEDURE — 71260 CT THORAX DX C+: CPT

## 2019-01-28 PROCEDURE — 77010033678 HC OXYGEN DAILY

## 2019-01-28 PROCEDURE — 74011000250 HC RX REV CODE- 250: Performed by: HOSPITALIST

## 2019-01-28 RX ORDER — INSULIN LISPRO 100 [IU]/ML
INJECTION, SOLUTION INTRAVENOUS; SUBCUTANEOUS
Status: DISCONTINUED | OUTPATIENT
Start: 2019-01-28 | End: 2019-01-30 | Stop reason: HOSPADM

## 2019-01-28 RX ORDER — TAMSULOSIN HYDROCHLORIDE 0.4 MG/1
0.4 CAPSULE ORAL DAILY
Status: DISCONTINUED | OUTPATIENT
Start: 2019-01-28 | End: 2019-01-30 | Stop reason: HOSPADM

## 2019-01-28 RX ORDER — LANOLIN ALCOHOL/MO/W.PET/CERES
400 CREAM (GRAM) TOPICAL DAILY
Status: DISCONTINUED | OUTPATIENT
Start: 2019-01-28 | End: 2019-01-30 | Stop reason: HOSPADM

## 2019-01-28 RX ORDER — SODIUM CHLORIDE, SODIUM LACTATE, POTASSIUM CHLORIDE, CALCIUM CHLORIDE 600; 310; 30; 20 MG/100ML; MG/100ML; MG/100ML; MG/100ML
100 INJECTION, SOLUTION INTRAVENOUS CONTINUOUS
Status: DISPENSED | OUTPATIENT
Start: 2019-01-28 | End: 2019-01-29

## 2019-01-28 RX ORDER — NALOXONE HYDROCHLORIDE 0.4 MG/ML
0.4 INJECTION, SOLUTION INTRAMUSCULAR; INTRAVENOUS; SUBCUTANEOUS AS NEEDED
Status: DISCONTINUED | OUTPATIENT
Start: 2019-01-28 | End: 2019-01-30 | Stop reason: HOSPADM

## 2019-01-28 RX ORDER — METOPROLOL TARTRATE 50 MG/1
50 TABLET ORAL DAILY
Status: DISCONTINUED | OUTPATIENT
Start: 2019-01-28 | End: 2019-01-29

## 2019-01-28 RX ORDER — ACETAMINOPHEN 325 MG/1
650 TABLET ORAL
Status: DISCONTINUED | OUTPATIENT
Start: 2019-01-28 | End: 2019-01-30 | Stop reason: HOSPADM

## 2019-01-28 RX ORDER — ONDANSETRON 2 MG/ML
4 INJECTION INTRAMUSCULAR; INTRAVENOUS
Status: DISCONTINUED | OUTPATIENT
Start: 2019-01-28 | End: 2019-01-30 | Stop reason: HOSPADM

## 2019-01-28 RX ORDER — HEPARIN SODIUM 5000 [USP'U]/ML
80 INJECTION, SOLUTION INTRAVENOUS; SUBCUTANEOUS
Status: COMPLETED | OUTPATIENT
Start: 2019-01-28 | End: 2019-01-28

## 2019-01-28 RX ORDER — WARFARIN SODIUM 5 MG/1
5 TABLET ORAL EVERY EVENING
Status: DISCONTINUED | OUTPATIENT
Start: 2019-01-28 | End: 2019-01-28

## 2019-01-28 RX ORDER — SODIUM CHLORIDE 0.9 % (FLUSH) 0.9 %
5-40 SYRINGE (ML) INJECTION EVERY 8 HOURS
Status: DISCONTINUED | OUTPATIENT
Start: 2019-01-28 | End: 2019-01-30 | Stop reason: HOSPADM

## 2019-01-28 RX ORDER — MORPHINE SULFATE 2 MG/ML
1 INJECTION, SOLUTION INTRAMUSCULAR; INTRAVENOUS
Status: DISCONTINUED | OUTPATIENT
Start: 2019-01-28 | End: 2019-01-30 | Stop reason: HOSPADM

## 2019-01-28 RX ORDER — SODIUM CHLORIDE 0.9 % (FLUSH) 0.9 %
10 SYRINGE (ML) INJECTION
Status: COMPLETED | OUTPATIENT
Start: 2019-01-28 | End: 2019-01-28

## 2019-01-28 RX ORDER — SODIUM CHLORIDE 0.9 % (FLUSH) 0.9 %
5-40 SYRINGE (ML) INJECTION AS NEEDED
Status: DISCONTINUED | OUTPATIENT
Start: 2019-01-28 | End: 2019-01-30 | Stop reason: HOSPADM

## 2019-01-28 RX ORDER — INSULIN GLARGINE 100 [IU]/ML
20 INJECTION, SOLUTION SUBCUTANEOUS
Status: DISCONTINUED | OUTPATIENT
Start: 2019-01-28 | End: 2019-01-28

## 2019-01-28 RX ORDER — INSULIN GLARGINE 100 [IU]/ML
20 INJECTION, SOLUTION SUBCUTANEOUS
Status: DISCONTINUED | OUTPATIENT
Start: 2019-01-28 | End: 2019-01-30 | Stop reason: HOSPADM

## 2019-01-28 RX ORDER — AMOXICILLIN 250 MG
1 CAPSULE ORAL DAILY
Status: DISCONTINUED | OUTPATIENT
Start: 2019-01-28 | End: 2019-01-30 | Stop reason: HOSPADM

## 2019-01-28 RX ORDER — HEPARIN SODIUM 5000 [USP'U]/100ML
18-36 INJECTION, SOLUTION INTRAVENOUS
Status: DISPENSED | OUTPATIENT
Start: 2019-01-28 | End: 2019-01-28

## 2019-01-28 RX ORDER — CLOPIDOGREL BISULFATE 75 MG/1
75 TABLET ORAL DAILY
Status: DISCONTINUED | OUTPATIENT
Start: 2019-01-28 | End: 2019-01-30 | Stop reason: HOSPADM

## 2019-01-28 RX ORDER — OXYCODONE AND ACETAMINOPHEN 5; 325 MG/1; MG/1
1 TABLET ORAL
Status: DISCONTINUED | OUTPATIENT
Start: 2019-01-28 | End: 2019-01-30 | Stop reason: HOSPADM

## 2019-01-28 RX ADMIN — IOPAMIDOL 100 ML: 755 INJECTION, SOLUTION INTRAVENOUS at 04:25

## 2019-01-28 RX ADMIN — METOPROLOL TARTRATE 50 MG: 50 TABLET ORAL at 09:33

## 2019-01-28 RX ADMIN — Medication 10 ML: at 09:00

## 2019-01-28 RX ADMIN — INSULIN LISPRO 6 UNITS: 100 INJECTION, SOLUTION INTRAVENOUS; SUBCUTANEOUS at 22:37

## 2019-01-28 RX ADMIN — HEPARIN SODIUM 7850 UNITS: 5000 INJECTION INTRAVENOUS; SUBCUTANEOUS at 04:54

## 2019-01-28 RX ADMIN — TAMSULOSIN HYDROCHLORIDE 0.4 MG: 0.4 CAPSULE ORAL at 09:32

## 2019-01-28 RX ADMIN — INSULIN LISPRO 8 UNITS: 100 INJECTION, SOLUTION INTRAVENOUS; SUBCUTANEOUS at 07:30

## 2019-01-28 RX ADMIN — Medication 10 ML: at 22:37

## 2019-01-28 RX ADMIN — HEPARIN SODIUM AND DEXTROSE 18 UNITS/KG/HR: 5000; 5 INJECTION INTRAVENOUS at 04:54

## 2019-01-28 RX ADMIN — Medication 10 ML: at 04:24

## 2019-01-28 RX ADMIN — PERFLUTREN 1 ML: 6.52 INJECTION, SUSPENSION INTRAVENOUS at 09:00

## 2019-01-28 RX ADMIN — SODIUM CHLORIDE 1000 ML: 900 INJECTION, SOLUTION INTRAVENOUS at 04:40

## 2019-01-28 RX ADMIN — TUBERCULIN PURIFIED PROTEIN DERIVATIVE 5 UNITS: 5 INJECTION, SOLUTION INTRADERMAL at 09:33

## 2019-01-28 RX ADMIN — STANDARDIZED SENNA CONCENTRATE AND DOCUSATE SODIUM 1 TABLET: 8.6; 5 TABLET, FILM COATED ORAL at 09:32

## 2019-01-28 RX ADMIN — Medication 10 ML: at 14:00

## 2019-01-28 RX ADMIN — INSULIN LISPRO 6 UNITS: 100 INJECTION, SOLUTION INTRAVENOUS; SUBCUTANEOUS at 11:30

## 2019-01-28 RX ADMIN — INSULIN GLARGINE 20 UNITS: 100 INJECTION, SOLUTION SUBCUTANEOUS at 22:37

## 2019-01-28 RX ADMIN — CLOPIDOGREL BISULFATE 75 MG: 75 TABLET ORAL at 09:32

## 2019-01-28 RX ADMIN — INSULIN LISPRO 4 UNITS: 100 INJECTION, SOLUTION INTRAVENOUS; SUBCUTANEOUS at 16:30

## 2019-01-28 RX ADMIN — SODIUM CHLORIDE 100 ML: 900 INJECTION, SOLUTION INTRAVENOUS at 04:24

## 2019-01-28 RX ADMIN — APIXABAN 10 MG: 2.5 TABLET, FILM COATED ORAL at 22:36

## 2019-01-28 RX ADMIN — Medication 400 MG: at 09:33

## 2019-01-28 NOTE — H&P
HOSPITALIST H&P/CONSULTNAME:  Nakia Kam Age:  69 y.o. 
:   1940 MRN:   [id number] PCP: Loida Jalloh MD 
Consulting MD: Treatment Team: Attending Provider: Bill Granados MD 
HPI:  
Patient is a 69years old male with pmhx of multiple PE/DVT (recently taken off coumadin), HTN, GERD, BPH,, CKD-2, neuropathy, dyslipidemia, DM-2 presented to ER after having a near syncopal event. Pt reported that he fell and stayed on floor for about an hour and then crawled to his car. He lives by himself. He reports feeling weak and c/o difficulty in breathing. He denies head trauma, chest pain, palpitations, nausea/vomiting, headache, blurry vision, weakness/numbness/tingling in extremities, abdominal pain, diarrhea, urinary symptoms. In ER, CTA chest showed bilateral PE, R>L, CT head was unremarkable. Pt was on coumadin until recently, he was taken off it in view of easy bruising. Pt has been started on heparin drip in ER. Complete ROS done and is as stated in HPI or otherwise negative Past Medical History:  
Diagnosis Date  Arthritis  BPH (benign prostatic hypertrophy) bph  CAD (coronary artery disease) 2009  
 3 stents per pt  Claustrophobia  Coagulation defects   
 takes Plavix, Coumadin and aspirin- held for surgery  Former cigarette smoker  GERD (gastroesophageal reflux disease)   
 controlled with prevacid- only thing that controls it  H/O heart artery stent X3  
 Heart disease, unspecified  Heart murmur MVP   LVEF 50%  Hyperlipidemia  Hypertension  Neuropathy  Other calculus in bladder  Other pulmonary embolism and infarction  PONV (postoperative nausea and vomiting)  Poor historian  Pure hypercholesterolemia   
 no current meds  PVD (peripheral vascular disease) ([address]  Skin cancer   
 reomoved from Right side of head  Thromboembolus ([address] 2012  
 3 clots in leg and 1 PE  
 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   
 oral and insulin reliant/AVg - at night/ s.s of hypoglycemia @ 50  Unspecified essential hypertension  Unspecified hyperplasia of prostate with urinary obstruction and other lower urinary tract symptoms (LUTS)   
 monitored by Dr Kay Pardo  Unspecified sleep apnea   
 pt denies- it is on Dr David Addison H&P Past Surgical History:  
Procedure Laterality Date  HX COLONOSCOPY polyps removed  HX HEART CATHETERIZATION  2009  
 stents x 3  
 HX OTHER SURGICAL BLADDER STONE REMOVAL  
 HX PROSTATECTOMY TRANSURETHRAL- pt denies  HX UROLOGICAL CYSTOSCOPY  MN PROSTATE BIOPSY, NEEDLE, SATURATION SAMPLING    
 AND ULTASOUND Prior to Admission Medications Prescriptions Last Dose Informant Patient Reported? Taking? LANSOPRAZOLE (PREVACID PO)   Yes No  
Sig: Take 1 Tab by mouth daily. clopidogrel (PLAVIX) 75 mg tablet   Yes No  
Sig: Take 75 mg by mouth daily. Last dose mid 2018  
gabapentin (NEURONTIN) 300 mg capsule   Yes No  
Sig: Take 300 mg by mouth two (2) times a day. glipiZIDE (GLUCOTROL) 10 mg tablet   Yes No  
Sig: Take 10 mg by mouth daily. insulin detemir (LEVEMIR) 100 unit/mL injection   Yes No  
Si Units by SubCUTAneous route nightly.  
magnesium oxide (MAG-OX) 400 mg tablet   Yes No  
Sig: Take 400 mg by mouth daily. metoprolol tartrate (LOPRESSOR) 50 mg tablet   Yes No  
Sig: Take 50 mg by mouth daily. tadalafil (CIALIS) 5 mg tablet   No No  
Sig: Take 1 Tab by mouth daily. tamsulosin (FLOMAX) 0.4 mg capsule   No No  
Sig: Take 1 Cap by mouth daily. Facility-Administered Medications: None Allergies Allergen Reactions  Other Food Other (comments) Social History Tobacco Use  Smoking status: Former Smoker Packs/day: 1.00   Years: 14.00  
 Pack years: 14.00 Last attempt to quit: 1970 Years since quittin.1  Smokeless tobacco: Never Used Substance Use Topics  Alcohol use: No  
  
Family History Problem Relation Age of Onset  Heart Disease Brother  Cancer Mother Stomach Cancer  No Known Problems Father  Diabetes Sister  Dementia Brother Objective:  
 
Visit Vitals /89 (BP 1 Location: Right arm, BP Patient Position: Head of bed elevated (Comment degrees)) Pulse 90 Temp 97.5 °F (36.4 °C) Resp 20 Ht 6' 1\" (1.854 m) Wt 98 kg (216 lb) SpO2 96% BMI 28.50 kg/m² Temp (24hrs), Av.5 °F (36.4 °C), Min:97.5 °F (36.4 °C), Max:97.5 °F (36.4 °C) Oxygen Therapy O2 Sat (%): 96 % (19 7996) Pulse via Oximetry: 93 beats per minute (19 0516) O2 Device: Room air (19 5920) Physical Exam: 
General:    Alert, awake, mild distress Head:   Normocephalic, without obvious abnormality, atraumatic. Nose:  Nares normal. No drainage or sinus tenderness. Lungs:   Clear to auscultation bilaterally. No Wheezing or Rhonchi. No rales. Heart:   Regular rate and rhythm,  no murmur, rub or gallop. Abdomen:   Soft, non-tender. Not distended. Bowel sounds normal.  
Extremities: No cyanosis. No edema. No clubbing Skin:     Texture, turgor normal. No rashes or lesions. Not Jaundiced Neurologic: GCS 15, no motor or sensory deficits, CN 2-12 intact Psych:             AO x3, mood is anxious Data Review:  
Recent Results (from the past 24 hour(s)) CBC WITH AUTOMATED DIFF Collection Time: 19  3:15 AM  
Result Value Ref Range WBC 9.0 4.3 - 11.1 K/uL  
 RBC 5.96 (H) 4.23 - 5.6 M/uL  
 HGB 13.9 13.6 - 17.2 g/dL HCT 47.5 41.1 - 50.3 % MCV 79.7 79.6 - 97.8 FL  
 MCH 23.3 (L) 26.1 - 32.9 PG  
 MCHC 29.3 (L) 31.4 - 35.0 g/dL  
 RDW 15.3 (H) 11.9 - 14.6 % PLATELET 736 603 - 960 K/uL MPV 9.1 (L) 9.4 - 12.3 FL ABSOLUTE NRBC 0.00 0.0 - 0.2 K/uL DF AUTOMATED NEUTROPHILS 67 43 - 78 % LYMPHOCYTES 11 (L) 13 - 44 % MONOCYTES 9 4.0 - 12.0 % EOSINOPHILS 11 (H) 0.5 - 7.8 % BASOPHILS 1 0.0 - 2.0 % IMMATURE GRANULOCYTES 0 0.0 - 5.0 %  
 ABS. NEUTROPHILS 6.0 1.7 - 8.2 K/UL  
 ABS. LYMPHOCYTES 1.0 0.5 - 4.6 K/UL  
 ABS. MONOCYTES 0.8 0.1 - 1.3 K/UL  
 ABS. EOSINOPHILS 1.0 (H) 0.0 - 0.8 K/UL  
 ABS. BASOPHILS 0.1 0.0 - 0.2 K/UL  
 ABS. IMM. GRANS. 0.0 0.0 - 0.5 K/UL METABOLIC PANEL, COMPREHENSIVE Collection Time: 01/28/19  3:15 AM  
Result Value Ref Range Sodium 138 136 - 145 mmol/L Potassium 3.7 3.5 - 5.1 mmol/L Chloride 102 98 - 107 mmol/L  
 CO2 27 21 - 32 mmol/L Anion gap 9 7 - 16 mmol/L Glucose 277 (H) 65 - 100 mg/dL BUN 14 8 - 23 MG/DL Creatinine 1.63 (H) 0.8 - 1.5 MG/DL  
 GFR est AA 53 (L) >60 ml/min/1.73m2 GFR est non-AA 44 (L) >60 ml/min/1.73m2 Calcium 8.7 8.3 - 10.4 MG/DL Bilirubin, total 0.4 0.2 - 1.1 MG/DL  
 ALT (SGPT) 24 12 - 65 U/L  
 AST (SGOT) 14 (L) 15 - 37 U/L Alk. phosphatase 149 (H) 50 - 136 U/L Protein, total 7.5 6.3 - 8.2 g/dL Albumin 3.7 3.2 - 4.6 g/dL Globulin 3.8 (H) 2.3 - 3.5 g/dL A-G Ratio 1.0 (L) 1.2 - 3.5 BNP Collection Time: 01/28/19  3:15 AM  
Result Value Ref Range BNP 59 (H) 0 pg/mL PROTHROMBIN TIME + INR Collection Time: 01/28/19  3:15 AM  
Result Value Ref Range Prothrombin time 13.4 11.7 - 14.5 sec INR 1.0 CK Collection Time: 01/28/19  3:15 AM  
Result Value Ref Range  21 - 215 U/L  
POC TROPONIN-I Collection Time: 01/28/19  3:18 AM  
Result Value Ref Range Troponin-I (POC) 0.04 0.02 - 0.05 ng/ml POC LACTIC ACID Collection Time: 01/28/19  3:37 AM  
Result Value Ref Range Lactic Acid (POC) 2.10 (H) 0.5 - 1.9 mmol/L  
POC G3 Collection Time: 01/28/19  5:19 AM  
Result Value Ref Range Device: NASAL CANNULA pH (POC) 7.390 7.35 - 7.45    
 pCO2 (POC) 37.8 35 - 45 MMHG  
 pO2 (POC) 70 (L) 75 - 100 MMHG HCO3 (POC) 22.9 22 - 26 MMOL/L  
 sO2 (POC) 94 (L) 95 - 98 % Base deficit (POC) 2 mmol/L Allens test (POC) YES Site RIGHT RADIAL Patient temp. 98.6 Specimen type (POC) ARTERIAL Performed by Dl   
 CO2, POC 24 MMOL/L Flow rate (POC) 2.000 L/min COLLECT TIME 518 [facility] Juleskumarvanessa Breonna Meyer All diagnostic imaging personally reviewed by me. CT chest w contrast: 
IMPRESSION: 
  
Bilateral lower lobe pulmonary emboli. 
  
Coronary artery disease. 
  
Moderate-sized hiatal hernia.  
  
No evidence of right ventricular strain. CT HEAD WITHOUT CONTRAST  
  
HISTORY:  Syncope. 
  
COMPARISON: 5/27/2007 
  
TECHNIQUE: Axial imaging was performed without intravenous contrast utilizing 5mm slice thickness. Sagittal and coronal reformats were performed. Radiation 
dose reduction techniques were used for this study. Our CT scanner uses one or 
all of the following: 
  
Automated exposure control, adjustment of the MAS or KUB according to patient's 
size and iterative reconstruction. 
  
FINDINGS:     
  
*BRAIN:  
   -  There are no early signs of territorial or lacunar infarction by CT. 
   -  Supratentorial atrophy. -  For patient's age, the scattered areas of white matter hypodensities may 
represent a chronic small vessel white matter ischemia. However this is 
nonspecific. 
  
*VISUALIZED PARANASAL SINUSES: Well aerated. *MASTOIDS:  Clear. *CALVARIUM AND SCALP: Unremarkable. 
  
  
IMPRESSION IMPRESSION: 
  
No acute intracranial abnormalities. CXR: unremarkable Assessment and Plan: Active Hospital Problems Diagnosis Date Noted  Near syncope 01/28/2019  Acute pulmonary embolism ([facility] [address] 01/28/2019 PLAN 
· Admit as inpatient in view of Acute bilateral PE (R>>L) with associated syncopal event. · Continue Heparin gtt for now · F/u with bilateral LE venous doppler to evaluate for DVT · Will need to discuss the case with Hematologist and IR. Pt ideally should be treated with therapeutic anticoagulation, in case risk of bleeding is high, IVC filter should be considered. Hematology consulted for this AM. · Antithrombin III, factor V leiden ordered. Rest of the hypercoagulable work up should be done as outpatient as per Hematology recommendations. · Supplemental oxygen for respiratory distress as needed · IV fluids in view of elevated lactic acid and mild dehydration · Continue SSI and lantus 20 units qachs, monitor blood sugar qachs. · Continue other home meds as reconciled in [facility] 
· Prn morphine for sever pain · Prn tylenol for mild pain and fever · Prn zofran for nausea/vomiting · PPI for GERD 
· PT/OT eval 
 
Code Status: full High risk with opioids on board Anticipated discharge: >2MN Signed By: Reshma Bryan MD   
 January 28, 2019

## 2019-01-28 NOTE — PROGRESS NOTES
TRANSFER - IN REPORT: 
 
Verbal report received from Derik Kiran on Michaela Anthony  being received from  ER for routine progression of care Report consisted of patients Situation, Background, Assessment and  
Recommendations(SBAR). Information from the following report(s) Kardex was reviewed with the receiving nurse. Opportunity for questions and clarification was provided. Assessment completed upon patients arrival to unit and care assumed.

## 2019-01-28 NOTE — CONSULTS
[facility]ematology [facility] On[facility]        Inpatient Hematology / Oncology Consult Note    Reason for Consult:  Acute pulmonary embolism [facility])  Syncope and collapse  Referring Physician:  Jn Ling MD    History of Present Illness:  Mr. Josselyn Tapia is a 69 y.o. male admitted on 1/28/2019. PMH: HTN, GERD,CKD2, DM2, cardiac stents (on plavix). He previously had DVT in leg (unclear dates) and reports he took coumadin for \"3-4 years\". In 3/2018, he was reportedly on 3 baby aspirin, plavix and coumadin. He went for dental procedure and developed upper airway obstruction from a hematoma with subsequent respiratory distress and hospital admission. Apparently, he was supratherapeutic at the time. He stopped coumadin and aspirin but continued plavix per direction of Dr. Adriano Banda (per notes). He presented to the ER on 1/28 after a near syncopal episode and dyspnea. CTA showed bilateral PE and dopplers showed BLE DVT. He has been started on a heparin drip. We were consulted for hypercoag w/u and [facility] recommendations. He is on room air and symptoms have improved since admission. He is somewhat of a poor historian, family and friends at the bedside who assisted with history. Review of Systems:  Constitutional + fatigue. Denies fever, chills, weight loss, appetite changes, night sweats. HEENT Denies trauma, blurry vision, hearing loss, ear pain, nosebleeds, sore throat, neck pain    Skin Denies lesions or rashes. Lungs + dyspnea - improved from admission. Denies dyspnea, cough, sputum production or hemoptysis. Cardiovascular Denies chest pain, palpitations, or lower extremity edema. Gastrointestinal Denies nausea, vomiting, changes in bowel habits, bloody or black stools, abdominal pain.  Denies dysuria, frequency or hesitancy of urination. Neuro Denies headaches, visual changes or ataxia.  Denies dizziness, tingling, tremors, sensory change, speech change, focal weakness      Hematology Denies easy bruising or bleeding, denies gingival bleeding or epistaxis. Endo Denies heat/cold intolerance, denies diabetes or thyroid abnormalities. MSK Denies back pain, arthralgias, myalgias or frequent falls. Psychiatric/Behavioral Denies depression and substance abuse. The patient is not nervous/anxious.          Allergies   Allergen Reactions    Other Food Other (comments)     Past Medical History:   Diagnosis Date    Arthritis     BPH (benign prostatic hypertrophy)     bph    CAD (coronary artery disease) 11/2009    3 stents per pt    Claustrophobia     Coagulation defects     takes Plavix, Coumadin and aspirin- held for surgery     Former cigarette smoker     GERD (gastroesophageal reflux disease)     controlled with prevacid- only thing that controls it    H/O heart artery stent     X3    Heart disease, unspecified     Heart murmur     MVP   LVEF 50%    Hyperlipidemia     Hypertension     Neuropathy     Other calculus in bladder     Other pulmonary embolism and infarction     PONV (postoperative nausea and vomiting)     Poor historian     Pure hypercholesterolemia     no current meds    PVD [facility]peripheral vascular disease) [facility] [address]     Skin cancer     reomoved from Right side of head    Thromboembolus ([facility] 2/2012    3 clots in leg and 1 PE    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)     Type II or unspecified type diabetes mellitus without mention of complication, not stated as uncontrolled     oral and insulin reliant/AVg - at night/ s.s of hypoglycemia @ 48    Unspecified essential hypertension     Unspecified hyperplasia of prostate with urinary obstruction and other lower urinary tract symptoms (LUTS)     monitored by Dr Gerhardt Leghorn Unspecified sleep apnea     pt denies- it is on Dr Lety Lang H&P     Past Surgical History:   Procedure Laterality Date    HX COLONOSCOPY      polyps removed    HX HEART CATHETERIZATION  11/2009    stents x 3    HX OTHER SURGICAL BLADDER STONE REMOVAL    HX PROSTATECTOMY      TRANSURETHRAL- pt denies    HX UROLOGICAL      CYSTOSCOPY    MA PROSTATE BIOPSY, NEEDLE, SATURATION SAMPLING      AND ULTASOUND     Family History   Problem Relation Age of Onset    Heart Disease Brother     Cancer Mother         Stomach Cancer    No Known Problems Father     Diabetes Sister     Dementia Brother      Social History     Socioeconomic History    Marital status:      Spouse name: Not on file    Number of children: Not on file    Years of education: Not on file    Highest education level: Not on file   Social Needs    Financial resource strain: Not on file    Food insecurity - worry: Not on file    Food insecurity - inability: Not on file   [employer] needs - medical: Not on file   [employer] needs - non-medical: Not on file   Occupational History    Not on file   Tobacco Use    Smoking status: Former Smoker     Packs/day: 1.00     Years: 14.00     Pack years: 14.00     Last attempt to quit: 1970     Years since quittin.1    Smokeless tobacco: Never Used   Substance and Sexual Activity    Alcohol use: No    Drug use: No    Sexual activity: Not on file   Other Topics Concern    Not on file   Social History Narrative    Not on file     Current Facility-Administered Medications   Medication Dose Route Frequency Provider Last Rate Last Dose    heparin 25,000 units in dextrose 500 mL infusion  18-36 Units/kg/hr IntraVENous TITRATE Laverne Mccall MD 35.3 mL/hr at 19 0710 18 Units/kg/hr at 19 0710    clopidogrel (PLAVIX) tablet 75 mg  75 mg Oral DAILY Asuncion Portillo MD   75 mg at 19 0932    magnesium oxide (MAG-OX) tablet 400 mg  400 mg Oral DAILY Asuncion Portillo MD   400 mg at 19 0933    metoprolol tartrate (LOPRESSOR) tablet 50 mg  50 mg Oral DAILY Asuncion Portillo MD   50 mg at 19 0933    sodium chloride (NS) flush 5-40 mL  5-40 mL IntraVENous Q8H Asuncion Portillo MD 10 mL at 19 0900    sodium chloride (NS) flush 5-40 mL  5-40 mL IntraVENous PRN Ashleigh Quintero MD        tuberculin injection 5 Units  5 Units IntraDERMal Juancarlos Lowry MD   5 Units at 19 0933    acetaminophen (TYLENOL) tablet 650 mg  650 mg Oral Q6H PRN Ashleigh Quintero MD        oxyCODONE-acetaminophen (PERCOCET) 5-325 mg per tablet 1 Tab  1 Tab Oral Q6H PRN Ashleigh Quintero MD        morphine injection 1 mg  1 mg IntraVENous Q4H PRN Ashleigh Quintero MD        naloxone [facility]) injection 0.4 mg  0.4 mg IntraVENous PRN Ashleigh Quintero MD        ondansetron [facility]) injection 4 mg  4 mg IntraVENous Q4H PRN Ashleigh Quintero MD        senna-docusate (PERICOLACE) 8.6-50 mg per tablet 1 Tab  1 Tab Oral DAILY Ashleigh Quintero MD   1 Tab at 19 0932    insulin lispro (HUMALOG) injection   SubCUTAneous AC&HS Ashleigh Quintero MD   6 Units at 19 1130    tamsulosin (FLOMAX) capsule 0.4 mg  0.4 mg Oral DAILY Ashleigh Quintero MD   0.4 mg at 19 0932    insulin glargine (LANTUS) injection 20 Units  20 Units SubCUTAneous QHS Laverne Mccall MD        lactated Ringers infusion  100 mL/hr IntraVENous CONTINUOUS Ashleigh Quintero MD        warfarin (COUMADIN) tablet 5 mg  5 mg Oral QPM Ashleigh Quintero MD           OBJECTIVE:  Patient Vitals for the past 8 hrs:   BP Temp Pulse Resp SpO2   19 1219     98 %   19 1120 148/80 97.5 °F (36.4 °C) 73 18 97 %   19 0753     96 %   19 0557 143/89 97.5 °F (36.4 °C) 90 20 96 %     Temp (24hrs), Av.5 °F (36.4 °C), Min:97.5 °F (36.4 °C), Max:97.5 °F (36.4 °C)    No intake/output data recorded. Physical Exam:  Constitutional: Well developed, well nourished male in no acute distress, sitting comfortably in the hospital bed. HEENT: Normocephalic and atraumatic.  Oropharynx is clear, mucous membranes are moist.   Neck supple     Lymph node   No palpable submandibular, cervical, supraclavicular, axillary or inguinal lymph nodes. Skin Warm and dry. No bruising and no rash noted. No erythema. No pallor. Respiratory Lungs are clear to auscultation bilaterally without wheezes, rales or rhonchi, normal air exchange without accessory muscle use. CVS Normal rate, regular rhythm and normal S1 and S2. No murmurs, gallops, or rubs. Abdomen Soft, nontender and nondistended, normoactive bowel sounds. No palpable mass. No hepatosplenomegaly. Neuro Grossly nonfocal with no obvious sensory or motor deficits. MSK Normal range of motion in general.  No edema and no tenderness. Psych Appropriate mood and affect. Labs:    Recent Results (from the past 24 hour(s))   EKG, 12 LEAD, INITIAL    Collection Time: 01/28/19  3:08 AM   Result Value Ref Range    Ventricular Rate 96 BPM    Atrial Rate 96 BPM    P-R Interval 182 ms    QRS Duration 154 ms    Q-T Interval 392 ms    QTC Calculation (Bezet) 495 ms    Calculated P Axis 44 degrees    Calculated R Axis -17 degrees    Calculated T Axis 10 degrees    Diagnosis       Normal sinus rhythm  Right bundle branch block  Abnormal ECG  When compared with ECG of 30-NOV-2018 22:08,  Borderline criteria for Lateral infarct are no longer Present  Confirmed by [place] LOVE (), PIETER MCKOY ([id number]) on 1/28/2019 8:50:41 AM     CBC WITH AUTOMATED DIFF    Collection Time: 01/28/19  3:15 AM   Result Value Ref Range    WBC 9.0 4.3 - 11.1 K/uL    RBC 5.96 (H) 4.23 - 5.6 M/uL    HGB 13.9 13.6 - 17.2 g/dL    HCT 47.5 41.1 - 50.3 %    MCV 79.7 79.6 - 97.8 FL    MCH 23.3 (L) 26.1 - 32.9 PG    MCHC 29.3 (L) 31.4 - 35.0 g/dL    RDW 15.3 (H) 11.9 - 14.6 %    PLATELET 788 059 - 216 K/uL    MPV 9.1 (L) 9.4 - 12.3 FL    ABSOLUTE NRBC 0.00 0.0 - 0.2 K/uL    DF AUTOMATED      NEUTROPHILS 67 43 - 78 %    LYMPHOCYTES 11 (L) 13 - 44 %    MONOCYTES 9 4.0 - 12.0 %    EOSINOPHILS 11 (H) 0.5 - 7.8 %    BASOPHILS 1 0.0 - 2.0 %    IMMATURE GRANULOCYTES 0 0.0 - 5.0 %    ABS.  NEUTROPHILS 6.0 1.7 - 8.2 K/UL    ABS. LYMPHOCYTES 1.0 0.5 - 4.6 K/UL    ABS. MONOCYTES 0.8 0.1 - 1.3 K/UL    ABS. EOSINOPHILS 1.0 (H) 0.0 - 0.8 K/UL    ABS. BASOPHILS 0.1 0.0 - 0.2 K/UL    ABS. IMM. GRANS. 0.0 0.0 - 0.5 K/UL   METABOLIC PANEL, COMPREHENSIVE    Collection Time: 01/28/19  3:15 AM   Result Value Ref Range    Sodium 138 136 - 145 mmol/L    Potassium 3.7 3.5 - 5.1 mmol/L    Chloride 102 98 - 107 mmol/L    CO2 27 21 - 32 mmol/L    Anion gap 9 7 - 16 mmol/L    Glucose 277 (H) 65 - 100 mg/dL    BUN 14 8 - 23 MG/DL    Creatinine 1.63 (H) 0.8 - 1.5 MG/DL    GFR est AA 53 (L) >60 ml/min/1.73m2    GFR est non-AA 44 (L) >60 ml/min/1.73m2    Calcium 8.7 8.3 - 10.4 MG/DL    Bilirubin, total 0.4 0.2 - 1.1 MG/DL    ALT (SGPT) 24 12 - 65 U/L    AST (SGOT) 14 (L) 15 - 37 U/L    Alk. phosphatase 149 (H) 50 - 136 U/L    Protein, total 7.5 6.3 - 8.2 g/dL    Albumin 3.7 3.2 - 4.6 g/dL    Globulin 3.8 (H) 2.3 - 3.5 g/dL    A-G Ratio 1.0 (L) 1.2 - 3.5     BNP    Collection Time: 01/28/19  3:15 AM   Result Value Ref Range    BNP 59 (H) 0 pg/mL   PROTHROMBIN TIME + INR    Collection Time: 01/28/19  3:15 AM   Result Value Ref Range    Prothrombin time 13.4 11.7 - 14.5 sec    INR 1.0     CK    Collection Time: 01/28/19  3:15 AM   Result Value Ref Range     21 - 215 U/L   POC TROPONIN-I    Collection Time: 01/28/19  3:18 AM   Result Value Ref Range    Troponin-I (POC) 0.04 0.02 - 0.05 ng/ml   POC LACTIC ACID    Collection Time: 01/28/19  3:37 AM   Result Value Ref Range    Lactic Acid (POC) 2.10 (H) 0.5 - 1.9 mmol/L   POC G3    Collection Time: 01/28/19  5:19 AM   Result Value Ref Range    Device: NASAL CANNULA      pH (POC) 7.390 7.35 - 7.45      pCO2 (POC) 37.8 35 - 45 MMHG    pO2 (POC) 70 (L) 75 - 100 MMHG    HCO3 (POC) 22.9 22 - 26 MMOL/L    sO2 (POC) 94 (L) 95 - 98 %    Base deficit (POC) 2 mmol/L    Allens test (POC) YES      Site RIGHT RADIAL      Patient temp.  98.6      Specimen type (POC) ARTERIAL      Performed by Karen Morton CO2, POC 24 MMOL/L    Flow rate (POC) 2.000 L/min    COLLECT TIME 518     GLUCOSE, POC    Collection Time: 01/28/19  9:44 AM   Result Value Ref Range    Glucose (POC) 302 (H) 65 - 100 mg/dL   FACTOR V LEIDEN MUTATION    Collection Time: 01/28/19 10:15 AM   Result Value Ref Range    Factor V Leiden Mutation PENDING %   ANTITHROMBIN ACTIVITY    Collection Time: 01/28/19 10:15 AM   Result Value Ref Range    Antithrombin Activity PENDING %   ANTITHROMBIN ANTIGEN    Collection Time: 01/28/19 10:15 AM   Result Value Ref Range    Antithrombin Antigen PENDING %   PTT    Collection Time: 01/28/19 10:15 AM   Result Value Ref Range    aPTT 148.9 (H) 24.7 - 39.8 SEC   LACTIC ACID    Collection Time: 01/28/19 10:15 AM   Result Value Ref Range    Lactic acid 1.6 0.4 - 2.0 MMOL/L   GLUCOSE, POC    Collection Time: 01/28/19 11:19 AM   Result Value Ref Range    Glucose (POC) 227 (H) 65 - 100 mg/dL       Imaging:      ASSESSMENT:  Problem List  Date Reviewed: 8/29/2018          Codes Class Noted    Near syncope ICD-10-CM: R55  ICD-9-CM: 780.2  1/28/2019        Acute pulmonary embolism ([facility] ICD-10-CM: I26.99  ICD-9-CM: 415.19  1/28/2019        Peripheral polyneuropathy ICD-10-CM: G62.9  ICD-9-CM: 356.9  9/11/2018        Dyspnea on exertion ICD-10-CM: R06.09  ICD-9-CM: 786.09  6/5/2018        Anemia ICD-10-CM: D64.9  ICD-9-CM: 285.9  4/24/2018        Debility ICD-10-CM: R53.81  ICD-9-CM: 799.3  3/19/2018        Type II diabetes mellitus ([facility] (Chronic) ICD-10-CM: E11.9  ICD-9-CM: 250.00  3/11/2018        Surgical wound hemorrhage after dental procedure ICD-10-CM: K91.840  ICD-9-CM: 998.11  3/10/2018        Airway compromise ICD-10-CM: J98.8  ICD-9-CM: 519.8  3/10/2018        Hx pulmonary embolism (Chronic) ICD-10-CM: Z86.711  ICD-9-CM: V12.55  3/10/2018        Lumbar stenosis ICD-10-CM: M48.061  ICD-9-CM: 724.02  2/14/2018        Type 2 diabetes mellitus with diabetic neuropathy (HCC) ICD-10-CM: E11.40  ICD-9-CM: 250.60, 357.2 1/8/2018        Lumbar stenosis with neurogenic claudication ICD-10-CM: M48.062  ICD-9-CM: 724.03  12/12/2017        Myopathy ICD-10-CM: G72.9  ICD-9-CM: 359.9  12/7/2017        Arthralgia ICD-10-CM: M25.50  ICD-9-CM: 719.40  12/7/2017        Heart disease, unspecified ICD-10-CM: I51.9  ICD-9-CM: 429.9  Unknown        Other pulmonary embolism and infarction ICD-10-CM: I26.99  ICD-9-CM: 415.19  Unknown        Type II or unspecified type diabetes mellitus without mention of complication, not stated as uncontrolled ICD-10-CM: E11.9  ICD-9-CM: 250.00  Unknown        Pure hypercholesterolemia ICD-10-CM: E78.00  ICD-9-CM: 272.0  Unknown        Unspecified essential hypertension ICD-10-CM: I10  ICD-9-CM: 401.9  Unknown        Other calculus in bladder ICD-10-CM: N21.0  ICD-9-CM: 594.1  Unknown        Unspecified hyperplasia of prostate with urinary obstruction and other lower urinary tract symptoms (LUTS) ICD-10-CM: N40.1, N13.8  ICD-9-CM: 600.91  Unknown        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 ([facility] [address] ICD-10-CM: I80.209  ICD-9-CM: 451.19  Unknown        Bladder stone ICD-10-CM: N21.0  ICD-9-CM: 594.1  11/27/2012                RECOMMENDATIONS:  PE/Bilateral DVT  - Started on heparin drip on admission. Recommend eliquis given history of bleeding and difficulty maintaining therapeutic INR on coumadin in the past. Discussed with pharmD. Can do hypercoag workup outpatient. Will arrange f/u with Dr. Vel Lake in 3-4 weeks  - As long as he tolerates anticoagulation, no need for IVC filter now     CKD  - Creatinine 1.6 today, slightly above baseline. No issue with eliquis    Other medical issues per primary team    Lab studies and imaging studies (CTA chest, Dopplers BLE) were personally reviewed. Thank you for allowing us to participate in the care of Mr. Conrad Bragg.   We will follow along loosely         Vel Irwin NP   [facility]  [address] [address]  Office : [phone]  Fax : [phone]

## 2019-01-28 NOTE — PROGRESS NOTES
Hourly rounds complete this shift, no new complaints at this time, Patient arrived on heparin drip, Telemetry box is not available at this time, bed in low, locked position, call light and bedside table within reach,  all needs met. Will continue to monitor Report to day shift nurse.

## 2019-01-28 NOTE — PROGRESS NOTES
Warfarin dosing per pharmacist 
 
Jody Garcia is a 69 y.o. male. Height: 6' 1\" (185.4 cm)    Weight: 98 kg (216 lb) Indication:  Hx of DVTs and PE; acute bilateral PE and DVTs Goal INR:  2-3 Home dose:  unknown Risk factors or significant drug interactions:  Recently stopped warfarin due to hematoma/easy bruising Other anticoagulants:  Heparin gtt Daily Monitoring Date  INR     Warfarin dose HGB              Notes 1/28  1  5 mg  13.9 Pharmacy is consulted to resume warfarin for Mr. Lesley Ledezma during this admission. He does not recall his prior warfarin dose, but he has been on warfarin 5 mg here in the past.  
 
Will start warfarin 5 mg qhs and follow daily INR for adjustments. Hematology is consulted to evaluate anticoagulation given history of VTE and easy bruising. Pharmacy will continue to follow. Please call with any questions. Thank you, Sara Rudd, PharmD Clinical Pharmacist 
[phone]

## 2019-01-28 NOTE — PROGRESS NOTES
Initial visit by  to convey care and concern and encourage patient that  services are available if desired. No needs were voiced during the visit. Provided business card for future reference. Catherine Conde MDiv Board Certified [employer]

## 2019-01-28 NOTE — PROGRESS NOTES
Problem: Self Care Deficits Care Plan (Adult) Goal: *Acute Goals and Plan of Care (Insert Text) 1. Patient will complete lower body bathing and dressing with CGA and adaptive equipment as needed. 2. Patient will complete toileting with supervision. 3. Patient will tolerate 25 minutes of OT treatment with 1-2 rest breaks to increase activity tolerance for ADLs. 4. Patient will complete functional transfers with supervision and adaptive equipment as needed. 5. Patient will complete functional mobility for household distances with supervision and appropriate safety awareness. Timeframe: 7 visits OCCUPATIONAL THERAPY: Initial Assessment, Daily Note and PM 1/28/2019INPATIENT: OT Visit Days: 1 Payor: [facility] MED[facility] / Plan[place] [employer] [place] / Product Type: Managed Care Medicare /  
  
NAME/AGE/GENDER: Christen Luis is a 69 y.o. male PRIMARY DIAGNOSIS:  Acute pulmonary embolism ([address] Syncope and collapse Acute pulmonary embolism (HCC) Acute pulmonary embolism ([facility] [address] ICD-10: Treatment Diagnosis:  
 · Generalized Muscle Weakness (M62.81) · Other lack of cordination (R27.8) · History of falling (Z91.81) Precautions/Allergies: Other food ASSESSMENT:  
Mr. Leigha Plunkett was admitted with acute PE (bilateral) as well as B DVT. Pt lives with alone in a single story home with a walk-in shower and reports he doesn't have assistance with ADL at baseline. Pt does report difficulty with LB dressing at home. Pt had recent fall at home when he became very weak and crawled to the car and drove himself to the hospital.  This session, pt presented up and working with PT. Pt demonstrates unsteadiness with functional mobility with using a walker today (normally pt uses a cane). Pt needs cues to stay inside the walker with functional mobility.  Pt's family lives far away, but a  is present at bedside and she reports she helps him into the restaurant every morning (describes a festinating gait pattern when he enters the restaurant). Pt is observed to have impaired sitting balance edge of bed. Pt states he has had difficulty donning socks at home. Pt reports he has a sock-aid but does not know how to use it. Pt educated on sock-aid and needs minimal assistance with sitting balance (falling posteriorly). Pt reports at home he is mostly in bed. He does drive to breakfast every morning. Pt appears to have some impaired insight of deficits and safety. Pt demonstrated deficits in strength, activity tolerance, safety awareness, and balance impacting ADLs. Pt transferred back to supine in the bed with CGA. At the end of the session, pt was left supine with needs in reach. Pt presented below functional baseline and would benefit from skilled OT services to address deficits. This section established at most recent assessment PROBLEM LIST (Impairments causing functional limitations): 1. Decreased Strength 2. Decreased ADL/Functional Activities 3. Decreased Transfer Abilities 4. Decreased Ambulation Ability/Technique 5. Decreased Balance 6. Decreased Activity Tolerance 7. Decreased Flexibility/Joint Mobility 8. Decreased [place] with Home Exercise Program 
9. Decreased Cognition INTERVENTIONS PLANNED: (Benefits and precautions of occupational therapy have been discussed with the patient.) 1. Activities of daily living training 2. Adaptive equipment training 3. Balance training 4. Clothing management 5. Donning&doffing training 6. Neuromuscular re-eduation 7. Therapeutic activity 8. Therapeutic exercise TREATMENT PLAN: Frequency/Duration: Follow patient 3 times per week to address above goals. Rehabilitation Potential For Stated Goals: Good RECOMMENDED REHABILITATION/EQUIPMENT: (at time of discharge pending progress): Due to the probability of continued deficits (see above) this patient will likely need continued skilled occupational therapy after discharge. Equipment: ? TBD  
    
 
 
 
OCCUPATIONAL PROFILE AND HISTORY:  
History of Present Injury/Illness (Reason for Referral): 
See H&P Past Medical History/Comorbidities:  
Mr. Carlos Manuel Zhang  has a past medical history of Arthritis, BPH (benign prostatic hypertrophy), CAD (coronary artery disease) (11/2009), Claustrophobia, Coagulation defects, Former cigarette smoker, GERD (gastroesophageal reflux disease), H/O heart artery stent, Heart disease, unspecified, Heart murmur, Hyperlipidemia, Hypertension, Neuropathy, Other calculus in bladder, Other pulmonary embolism and infarction, PONV (postoperative nausea and vomiting), Poor historian, Pure hypercholesterolemia, PVD (peripheral vascular disease) ([facility] [address] Skin cancer, Thromboembolus ([facility] [address] (2/2012), 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 Mr. Carlos Manuel Zhang  has a past surgical history that includes hx heart catheterization (11/2009); pr prostate biopsy, needle, saturation sampling; hx prostatectomy; hx other surgical; hx urological; and hx colonoscopy. Social History/Living Environment:  
Home Environment: Private residence # Steps to Enter: 2 Wheelchair Ramp: No 
One/Two Story Residence: One story Living Alone: Yes Support Systems: Friends \ neighbors(children live in [facility]) Patient Expects to be Discharged to[de-identified] Unknown Current DME Used/Available at Home: Cane, straight, Shower chair, Walker, rolling Tub or Shower Type: Shower Prior Level of Function/Work/Activity: 
Pt lives with alone in a single story home with a walk-in shower and reports he doesn't have assistance with ADL at baseline. Pt does report difficulty with LB dressing at home. Pt had recent fall at home when he became very weak and crawled to the car and drove himself to the hospital. 
Personal Factors:   
      Social Background: lives alone Past/Current Experience:  Hx of recent fall Other factors that influence how disability is experienced by the patient:  Multiple co-morbidities Number of Personal Factors/Comorbidities that affect the Plan of Care: Extensive review of physical, cognitive, and psychosocial performance (3+):  HIGH COMPLEXITY ASSESSMENT OF OCCUPATIONAL PERFORMANCE[de-identified]  
Activities of Daily Living:  
Basic ADLs (From Assessment) Complex ADLs (From Assessment) Feeding: Stand-by assistance Oral Facial Hygiene/Grooming: Stand-by assistance Bathing: Moderate assistance Upper Body Dressing: Minimum assistance Lower Body Dressing: Moderate assistance Toileting: Minimum assistance Instrumental ADL Meal Preparation: Moderate assistance Homemaking: Maximum assistance Grooming/Bathing/Dressing Activities of Daily Living Cognitive Retraining Safety/Judgement: Decreased awareness of need for assistance;Decreased awareness of need for safety;Decreased insight into deficits; Fall prevention;Home safety Lower Body Dressing Assistance Socks: Minimum assistance; Compensatory technique training Position Performed: Seated edge of bed Cues: Doff;Don;Tactile cues provided;Verbal cues provided;Visual cues provided Adaptive Equipment Used: Reacher Bed/Mat Mobility Sit to Supine: Minimum assistance Bed to Chair: Minimum assistance Scooting: Contact guard assistance Most Recent Physical Functioning:  
Gross Assessment: 
AROM: Within functional limits Strength: Within functional limits Posture: 
  
Balance: 
Sitting: Impaired Sitting - Static: Fair (occasional) Sitting - Dynamic: Fair (occasional)(falls posteriorly with attempts to don socks) Standing: Impaired; With support Standing - Static: Fair Standing - Dynamic : Fair(with rolling walker) Bed Mobility: 
Sit to Supine: Minimum assistance Scooting: Contact guard assistance Wheelchair Mobility: 
  
Transfers: 
Stand to Sit: Minimum assistance Bed to Chair: Minimum assistance Patient Vitals for the past 6 hrs: 
 BP BP Patient Position SpO2 Pulse 01/28/19 1120 148/80 At rest 97 % 73  
01/28/19 1219   98 %  Mental Status Neurologic State: Alert Orientation Level: Oriented to person, Oriented to place, Oriented to time(needs additional cues for year) Cognition: Follows commands, Impaired decision making, Poor safety awareness Perception: Cues to maintain midline in standing Perseveration: No perseveration noted Safety/Judgement: Decreased awareness of need for assistance, Decreased awareness of need for safety, Decreased insight into deficits, Fall prevention, Home safety Physical Skills Involved: 1. Range of Motion 2. Balance 3. Strength 4. Activity Tolerance Cognitive Skills Affected (resulting in the inability to perform in a timely and safe manner): 1. Executive Function 2. Divided Attention Psychosocial Skills Affected: 1. Habits/Routines 2. Self-Awareness Number of elements that affect the Plan of Care: 5+:  HIGH COMPLEXITY CLINICAL DECISION MAKING:  
[employer] [facility] 6 Clicks Daily Activity Inpatient Short Form How much help from another person does the patient currently need. .. Total A Lot A Little None 1. Putting on and taking off regular lower body clothing? [] 1   [x] 2   [] 3   [] 4  
2. Bathing (including washing, rinsing, drying)? [] 1   [x] 2   [] 3   [] 4  
3.   Toileting, which includes using toilet, bedpan or urinal?   [] 1   [x] 2   [] 3   [] 4  
 4.  Putting on and taking off regular upper body clothing? [] 1   [] 2   [x] 3   [] 4  
5. Taking care of personal grooming such as brushing teeth? [] 1   [] 2   [x] 3   [] 4  
6. Eating meals? [] 1   [] 2   [x] 3   [] 4  
© 2007, Trustees of [address] [place] [id number], under license to [employer]. All rights reserved Score:  Initial: 15 Most Recent: X (Date: -- ) Interpretation of Tool:  Represents activities that are increasingly more difficult (i.e. Bed mobility, Transfers, Gait). Medical Necessity:    
· Patient demonstrates good rehab potential due to higher previous functional level. Reason for Services/Other Comments: 
· Patient continues to require skilled intervention due to decreased independence with ADL/functional transfers. Use of outcome tool(s) and clinical judgement create a POC that gives a: LOW COMPLEXITY  
 
 
 
TREATMENT:  
(In addition to Assessment/Re-Assessment sessions the following treatments were rendered) Pre-treatment Symptoms/Complaints:   
Pain: Initial:  
Pain Intensity 1: 0  Post Session:  0/10 Self Care: (8 minutes): Procedure(s) (per grid) utilized to improve and/or restore self-care/home management as related to dressing. Required minimal visual, verbal and manual cueing to facilitate activities of daily living skills and compensatory activities. Braces/Orthotics/Lines/Etc:  
· IV Treatment/Session Assessment:   
· Response to Treatment: Pt tolerated with decrease safety and balance. · Interdisciplinary Collaboration:  
o Occupational Therapist 
o Registered Nurse · After treatment position/precautions:  
o Supine in bed 
o Bed alarm/tab alert on 
o Bed/Chair-wheels locked 
o Call light within reach 
o RN notified 
o Visitors at bedside · Compliance with Program/Exercises: Will assess as treatment progresses. · Recommendations/Intent for next treatment session:   \"Next visit will focus on advancements to more challenging activities and reduction in assistance provided\". Total Treatment Duration: OT Patient Time In/Time Out Time In: 7801 Time Out: 6549 Toshia Began, OT

## 2019-01-28 NOTE — PROGRESS NOTES
Primary Nurse Malorie Conner RN and [facility], performed a dual skin assessment on this patient. Patient has one small abrasion on his right knee and right arm r/t prior to admission fall.   
Sulaiman score is 21

## 2019-01-28 NOTE — ED TRIAGE NOTES
Pt walks into triage stating he keeps falling and is sob. Pt states he fell an hour. Pt states he was laying there for about 30 mins. Pt crawled to his car and drove here. Pt stated he just go weak and fell. Pt states he lives at home alone. Pt states he is on blood thinners, but did not hit his head. Pt denies any pain

## 2019-01-28 NOTE — ED PROVIDER NOTES
Presents with complaint of falling. Patient states he's had weakness and several falls recently. He states this evening he got up to go the bathroom and was too weak and fell down. He states he was breathing very hard for 2 hours. He denies any chest pain nausea or vomiting. He denies any abdominal pain. He states he takes blood thinner. He thinks he passed out. He states his breathing has improved. Patient states it was very hard for him to get up and get to his car and drive here secondary to weakness. He denies any injury from the fall except abrasions to his elbows. The history is provided by the patient. Shortness of Breath This is a new problem. The problem occurs continuously. The current episode started 3 to 5 hours ago. The problem has been gradually improving. Pertinent negatives include no fever, no cough, no wheezing, no chest pain, no leg pain and no leg swelling. He has had prior hospitalizations. He has had prior ED visits. Associated medical issues include DVT. Past Medical History:  
Diagnosis Date  Arthritis  BPH (benign prostatic hypertrophy) bph  CAD (coronary artery disease) 11/2009  
 3 stents per pt  Claustrophobia  Coagulation defects   
 takes Plavix, Coumadin and aspirin- held for surgery  Former cigarette smoker  GERD (gastroesophageal reflux disease)   
 controlled with prevacid- only thing that controls it  H/O heart artery stent X3  
 Heart disease, unspecified  Heart murmur MVP   LVEF 50%  Hyperlipidemia  Hypertension  Neuropathy  Other calculus in bladder  Other pulmonary embolism and infarction  PONV (postoperative nausea and vomiting)  Poor historian  Pure hypercholesterolemia   
 no current meds  PVD (peripheral vascular disease) ([facility] [address]  Skin cancer   
 reomoved from Right side of head  Thromboembolus ([facility] [address] 2/2012  
 3 clots in leg and 1 PE  
 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   
 oral and insulin reliant/AVg - at night/ s.s of hypoglycemia @ 51  Unspecified essential hypertension  Unspecified hyperplasia of prostate with urinary obstruction and other lower urinary tract symptoms (LUTS)   
 monitored by Dr Isaías Palmer  Unspecified sleep apnea   
 pt denies- it is on Dr Peggy Kraft H&P Past Surgical History:  
Procedure Laterality Date  HX COLONOSCOPY polyps removed  HX HEART CATHETERIZATION  2009  
 stents x 3  
 HX OTHER SURGICAL BLADDER STONE REMOVAL  
 HX PROSTATECTOMY TRANSURETHRAL- pt denies  HX UROLOGICAL CYSTOSCOPY  MI PROSTATE BIOPSY, NEEDLE, SATURATION SAMPLING    
 AND ULTASOUND Family History:  
Problem Relation Age of Onset  Heart Disease Brother  Cancer Mother Stomach Cancer  No Known Problems Father  Diabetes Sister  Dementia Brother Social History Socioeconomic History  Marital status:  Spouse name: Not on file  Number of children: Not on file  Years of education: Not on file  Highest education level: Not on file Social Needs  Financial resource strain: Not on file  Food insecurity - worry: Not on file  Food insecurity - inability: Not on file  Transportation needs - medical: Not on file  Transportation needs - non-medical: Not on file Occupational History  Not on file Tobacco Use  Smoking status: Former Smoker Packs/day: 1.00 Years: 14.00 Pack years: 14.00 Last attempt to quit: 1970 Years since quittin.1  Smokeless tobacco: Never Used Substance and Sexual Activity  Alcohol use: No  
 Drug use: No  
 Sexual activity: Not on file Other Topics Concern  Not on file Social History Narrative  Not on file ALLERGIES: Other food Review of Systems Constitutional: Negative for fever. Respiratory: Positive for shortness of breath. Negative for cough and wheezing. Cardiovascular: Negative for chest pain and leg swelling. All other systems reviewed and are negative. Vitals:  
 01/28/19 0305 BP: 142/86 Pulse: (!) 101 Resp: 20 Temp: 97.5 °F (36.4 °C) SpO2: 91% Weight: 98 kg (216 lb) Height: 6' 1\" (1.854 m) Physical Exam  
Constitutional: He appears well-developed and well-nourished. He appears ill. He appears distressed. HENT:  
Head: Normocephalic and atraumatic. Eyes: Conjunctivae are normal. Right eye exhibits no discharge. Left eye exhibits no discharge. Neck: Normal range of motion. Neck supple. Cardiovascular: Normal rate and regular rhythm. Pulmonary/Chest: Effort normal and breath sounds normal. Tachypnea (intermittently) noted. No respiratory distress. He has no decreased breath sounds. He has no wheezes. He has no rhonchi. Abdominal: Soft. He exhibits no distension. There is no tenderness. Musculoskeletal: Normal range of motion. He exhibits no edema. Right lower leg: Normal.  
     Left lower leg: Normal.  
Neurological: He is alert. No cranial nerve deficit. He seems confused at times Skin: Skin is warm and dry. Capillary refill takes less than 2 seconds. He is not diaphoretic. Psychiatric: His mood appears anxious. He expresses inappropriate judgment. He exhibits abnormal recent memory. keeps asking why he is here and wants to know when he can leave so he can have breakfast with his friends. Nursing note and vitals reviewed. MDM Number of Diagnoses or Management Options Other acute pulmonary embolism without acute cor pulmonale ([facility] [address]):  
Syncope and collapse:  
Diagnosis management comments: Patient intermittently, tachycardic, mildly hypoxic. He has a history of multiple PEs and DVTs.   He is supposed to be on Coumadin. His INR is 1. CT of his chest was done. It showed bilateral PEs. I discussed the case with him further and he reports that his Coumadin was stopped and he is only on Plavix because he had bleeding into his face. He cannot elaborate on this or tummy stopped it. I reviewed his chart and he was admitted in March 2018 after parapharyngeal hematoma developed while on Coumadin and aspirin following a dental procedure BNP and trop neg. Admit to hospitalist on 2 L and heparin gtt Amount and/or Complexity of Data Reviewed Clinical lab tests: ordered and reviewed Review and summarize past medical records: yes Discuss the patient with other providers: yes Independent visualization of images, tracings, or specimens: yes (nsr [id number]) Risk of Complications, Morbidity, and/or Mortality Presenting problems: high Diagnostic procedures: moderate Management options: high Patient Progress Patient progress: stable Procedures

## 2019-01-28 NOTE — PROGRESS NOTES
Problem: Mobility Impaired (Adult and Pediatric) Goal: *Acute Goals and Plan of Care (Insert Text) STG: 
(1.)Mr. Eva Fritz will move from supine to sit and sit to supine  with STAND BY ASSIST within 3 treatment day(s). (2.)Mr. Eva Fritz will transfer from bed to chair and chair to bed with STAND BY ASSIST using the least restrictive device within 3 treatment day(s). (3.)Mr. Eva Fritz will ambulate with STAND BY ASSIST for 250 feet with the least restrictive device within 3 treatment day(s). LTG: 
(1.)Mr. Eva Fritz will move from supine to sit and sit to supine  in bed with INDEPENDENT within 7 treatment day(s). (2.)Mr. Eva Fritz will transfer from bed to chair and chair to bed with MODIFIED INDEPENDENCE using the least restrictive device within 7 treatment day(s). (3.)Mr. Eva Fritz will ambulate with SUPERVISION for 500+ feet with the least restrictive device within 7 treatment day(s). ________________________________________________________________________________________________ PHYSICAL THERAPY: Initial Assessment and PM 1/28/2019INPATIENT: PT Visit Days : 1 Payor: [facility] / Plan[place] [employer] [place] / Product Type: Managed Care Medicare /   
  
NAME/AGE/GENDER: Safia Sorto is a 69 y.o. male PRIMARY DIAGNOSIS: Acute pulmonary embolism ([address] Syncope and collapse Acute pulmonary embolism (HCC) Acute pulmonary embolism ([facility] ICD-10: Treatment Diagnosis:  
 · Generalized Muscle Weakness (M62.81) · Difficulty in walking, Not elsewhere classified (R26.2) · History of falling (Z91.81) Precaution/Allergies: Other food ASSESSMENT:  
Mr. Eva Fritz is supine in bed upon contact and agreeable to PT evaluation and treatment this afternoon with visitor at bedside. Pt is A&O X 3 with reports of 0/10 pain prior to mobility. Pt lives alone in 1 story home with 2 steps to enter.  Pt is ambulatory both household and community distances with use of SPC and drives. Pt does not require supplemental O2 at baseline and is currently on 2L with O2 sat at 96% prior to mobility. Pt reports 1 recent fall. Removed O2 prior to mobility. Pt transitioned supine to sit EOB with Meredith. Pt performed STS with CGA with some increased trunk sway noted fair static standing balance. Provided walker. Pt ambulated 140 ft in hallway with RW and CGA. Pt ambulates with widened MIGUEL and almost Parkinson's like gait pattern shuffling feet with increased forward trunk lean with fluctuations in speed. Cues provided for walker management and safety. Pt with O2 sat at 98% after ambulating 70 ft. Pt returned to room and sitting with O2 sat at 92%. Pt states he has noted progressive LE weakness over past 5 years that no one has been able to explain at this time. Pt left sitting up with OT at bedside for evaluation. Safia Sorto will benefit from skilled PT (medically necessary) to address decreased strength, decreased balance, decreased functional tolerance, decreased cardiopulmonary endurance affecting participation in basic ADLs and functional tasks. This section established at most recent assessment PROBLEM LIST (Impairments causing functional limitations): 1. Decreased Strength 2. Decreased ADL/Functional Activities 3. Decreased Transfer Abilities 4. Decreased Ambulation Ability/Technique 5. Decreased Balance 6. Decreased Activity Tolerance 7. Decreased Pacing Skills 8. Increased Fatigue 9. Decreased [place] with Home Exercise Program 
 INTERVENTIONS PLANNED: (Benefits and precautions of physical therapy have been discussed with the patient.) 1. Balance Exercise 2. Bed Mobility 3. Family Education 4. Gait Training 5. Home Exercise Program (HEP) 6. Neuromuscular Re-education/Strengthening 7. Therapeutic Activites 8. Therapeutic Exercise/Strengthening 9. Transfer Training TREATMENT PLAN: Frequency/Duration: 3 times a week for duration of hospital stay Rehabilitation Potential For Stated Goals: Good RECOMMENDED REHABILITATION/EQUIPMENT: (at time of discharge pending progress): Due to the probability of continued deficits (see above) this patient will likely need continued skilled physical therapy after discharge. Equipment:  
? None at this time HISTORY:  
History of Present Injury/Illness (Reason for Referral): 
See H&P below Patient is a 69years old male with pmhx of multiple PE/DVT (recently taken off coumadin), HTN, GERD, BPH,, CKD-2, neuropathy, dyslipidemia, DM-2 presented to ER after having a near syncopal event. Pt reported that he fell and stayed on floor for about an hour and then crawled to his car. He lives by himself. He reports feeling weak and c/o difficulty in breathing. He denies head trauma, chest pain, palpitations, nausea/vomiting, headache, blurry vision, weakness/numbness/tingling in extremities, abdominal pain, diarrhea, urinary symptoms. In ER, CTA chest showed bilateral PE, R>L, CT head was unremarkable. Pt was on coumadin until recently, he was taken off it in view of easy bruising. Pt has been started on heparin drip in ER.  
 
 
Past Medical History/Comorbidities:  
Mr. Jett Jean  has a past medical history of Arthritis, BPH (benign prostatic hypertrophy), CAD (coronary artery disease) (11/2009), Claustrophobia, Coagulation defects, Former cigarette smoker, GERD (gastroesophageal reflux disease), H/O heart artery stent, Heart disease, unspecified, Heart murmur, Hyperlipidemia, Hypertension, Neuropathy, Other calculus in bladder, Other pulmonary embolism and infarction, PONV (postoperative nausea and vomiting), Poor historian, Pure hypercholesterolemia, PVD (peripheral vascular disease) ([address] Skin cancer, Thromboembolus ([address] (2/2012), 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 ([address] Type II or unspecified type diabetes mellitus without mention of complication, not stated as uncontrolled, Unspecified essential hypertension, Unspecified hyperplasia of prostate with urinary obstruction and other lower urinary tract symptoms (LUTS), and Unspecified sleep apnea. Mr. Pamalee Opitz  has a past surgical history that includes hx heart catheterization (11/2009); pr prostate biopsy, needle, saturation sampling; hx prostatectomy; hx other surgical; hx urological; and hx colonoscopy. Social History/Living Environment:  
Home Environment: Private residence # Steps to Enter: 2 Wheelchair Ramp: No 
One/Two Story Residence: One story Living Alone: Yes Support Systems: Friends \ neighbors(children live in [facility]) Patient Expects to be Discharged to[de-identified] Unknown Current DME Used/Available at Home: Cane, straight, Shower chair, Walker, rolling Tub or Shower Type: Shower Prior Level of Function/Work/Activity: 
Use of SPC for gait, indep with ADLs, 1 fall, drives, progressive weakness B LE for 5 years Number of Personal Factors/Comorbidities that affect the Plan of Care: 3+: HIGH COMPLEXITY EXAMINATION:  
Most Recent Physical Functioning:  
Gross Assessment: 
Strength: Generally decreased, functional 
Coordination: Generally decreased, functional 
         
  
Posture: 
  
Balance: 
Standing - Static: Fair Standing - Dynamic : Fair Bed Mobility: 
Supine to Sit: Minimum assistance Wheelchair Mobility: 
  
Transfers: 
Sit to Stand: Stand-by assistance Stand to Sit: Stand-by assistance Gait: 
  
Base of Support: Widened Speed/Nikky: Slow;Shuffled Step Length: Left shortened;Right shortened Gait Abnormalities: Decreased step clearance;Shuffling gait; Step to gait Distance (ft): 140 Feet (ft) Assistive Device: Walker, rolling Ambulation - Level of Assistance: Contact guard assistance Interventions: Safety awareness training; Tactile cues; Verbal cues Body Structures Involved: 1. Nerves 2. Heart 3. Lungs 4. Bones 5. Muscles Body Functions Affected: 1. Cardio 2. Respiratory 3. Neuromusculoskeletal 
4. Movement Related Activities and Participation Affected: 1. General Tasks and Demands 2. Mobility 3. Self Care 4. Domestic Life 5. Interpersonal Interactions and Relationships 6. Community, Social and [place] Number of elements that affect the Plan of Care: 4+: HIGH COMPLEXITY CLINICAL PRESENTATION:  
Presentation: Evolving clinical presentation with changing clinical characteristics: MODERATE COMPLEXITY CLINICAL DECISION [address] [place] [address] AM-PAC 6 Clicks Basic Mobility Inpatient Short Form How much difficulty does the patient currently have. .. Unable A Lot A Little None 1. Turning over in bed (including adjusting bedclothes, sheets and blankets)? [] 1   [] 2   [x] 3   [] 4  
2. Sitting down on and standing up from a chair with arms ( e.g., wheelchair, bedside commode, etc.)   [] 1   [] 2   [x] 3   [] 4  
3. Moving from lying on back to sitting on the side of the bed? [] 1   [] 2   [x] 3   [] 4 How much help from another person does the patient currently need. .. Total A Lot A Little None 4. Moving to and from a bed to a chair (including a wheelchair)? [] 1   [] 2   [x] 3   [] 4  
5. Need to walk in hospital room? [] 1   [] 2   [x] 3   [] 4  
6. Climbing 3-5 steps with a railing? [] 1   [] 2   [x] 3   [] 4  
© , Trustees of [address] [place], [place] [address], under license to [employer]. All rights reserved Score:  Initial: 18 Most Recent: X (Date: -- ) Interpretation of Tool:  Represents activities that are increasingly more difficult (i.e. Bed mobility, Transfers, Gait). Medical Necessity:    
· Patient is expected to demonstrate progress in strength, balance, coordination and functional technique to decrease assistance required with gait, transfers, and functional mobility. Jessica Lama Reason for Services/Other Comments: · Patient continues to require skilled intervention due to decreased strength, decreased balance, decreased functional tolerance, decreased cardiopulmonary endurance affecting participation in basic ADLs and functional tasks. Use of outcome tool(s) and clinical judgement create a POC that gives a: Clear prediction of patient's progress: LOW COMPLEXITY  
  
 
 
 
TREATMENT:  
(In addition to Assessment/Re-Assessment sessions the following treatments were rendered) Pre-treatment Symptoms/Complaints:  weakness Pain: Initial:  
Pain Intensity 1: 0  Post Session:  0/10 In addition to evaluation: 
Therapeutic Activity: (    8 minutes): Therapeutic activities including Bed transfers, Ambulation on level ground and cues for ease and safety of transfers, walker management and safety to improve mobility, strength, balance and coordination. Required minimal Safety awareness training; Tactile cues; Verbal cues to promote static and dynamic balance in standing. Braces/Orthotics/Lines/Etc:  
· none Treatment/Session Assessment:   
· Response to Treatment:  Amb 140 ft with RW and CGA · Interdisciplinary Collaboration:  
o Physical Therapist 
o Occupational Therapist 
o Registered Nurse · After treatment position/precautions:  
o Bed/Chair-wheels locked 
o Bed in low position 
o Call light within reach 
o Visitors at bedside 
o sitting EOB in OT care · Compliance with Program/Exercises: Will assess as treatment progresses · Recommendations/Intent for next treatment session: \"Next visit will focus on advancements to more challenging activities and reduction in assistance provided\". Total Treatment Duration: PT Patient Time In/Time Out Time In: 3781 Time Out: 1425 Memo Best

## 2019-01-28 NOTE — PROGRESS NOTES
Met with patient at bedside. Patient is alert and oriented. Patient states he lives at home alone. States no family or friends that come to house to check in on him. Prior to admission, patient was independent with alds, currently still drives. Has a walker at home. Verified insurance and pcp with patient. Discussed discharge plan with patient. Patient states he is going home. Discussed with patient that therapy recommendation is SNF rehab. Patient states he isn't going to SNF because it doesn't help. Asked patient if he would be agreeable to [facility] services. Patient states that doesn't help either. Patient to discharge home. Referral to ambulatory . CM will continue to follow for discharge needs. Care Management Interventions PCP Verified by CM: Yes Mode of Transport at Discharge: Other (see comment) Transition of Care Consult (CM Consult): Discharge Planning Discharge Durable Medical Equipment: No 
Physical Therapy Consult: Yes Occupational Therapy Consult: Yes Current Support Network: Own Home, Lives Alone Confirm Follow Up Transport: Family Plan discussed with Pt/Family/Caregiver: Yes Freedom of Choice Offered: Yes Discharge Location Discharge Placement: Home

## 2019-01-29 PROBLEM — N17.9 AKI (ACUTE KIDNEY INJURY) (HCC): Status: ACTIVE | Noted: 2019-01-29

## 2019-01-29 LAB
ALBUMIN SERPL-MCNC: 2.9 G/DL (ref 3.2–4.6)
ALBUMIN/GLOB SERPL: 0.9 {RATIO} (ref 1.2–3.5)
ALP SERPL-CCNC: 112 U/L (ref 50–136)
ALT SERPL-CCNC: 18 U/L (ref 12–65)
ANION GAP SERPL CALC-SCNC: 5 MMOL/L (ref 7–16)
APTT PPP: 30 SEC (ref 24.7–39.8)
AST SERPL-CCNC: 11 U/L (ref 15–37)
AT III ACT/NOR PPP CHRO: NORMAL %
AT III AG ACT/NOR PPP IA: NORMAL %
BILIRUB SERPL-MCNC: 0.5 MG/DL (ref 0.2–1.1)
BUN SERPL-MCNC: 14 MG/DL (ref 8–23)
CALCIUM SERPL-MCNC: 8 MG/DL (ref 8.3–10.4)
CHLORIDE SERPL-SCNC: 109 MMOL/L (ref 98–107)
CO2 SERPL-SCNC: 28 MMOL/L (ref 21–32)
CREAT SERPL-MCNC: 1.26 MG/DL (ref 0.8–1.5)
ERYTHROCYTE [DISTWIDTH] IN BLOOD BY AUTOMATED COUNT: 15.3 % (ref 11.9–14.6)
GLOBULIN SER CALC-MCNC: 3.1 G/DL (ref 2.3–3.5)
GLUCOSE BLD STRIP.AUTO-MCNC: 142 MG/DL (ref 65–100)
GLUCOSE BLD STRIP.AUTO-MCNC: 142 MG/DL (ref 65–100)
GLUCOSE BLD STRIP.AUTO-MCNC: 256 MG/DL (ref 65–100)
GLUCOSE BLD STRIP.AUTO-MCNC: 98 MG/DL (ref 65–100)
GLUCOSE SERPL-MCNC: 104 MG/DL (ref 65–100)
HCT VFR BLD AUTO: 39.2 % (ref 41.1–50.3)
HGB BLD-MCNC: 11.7 G/DL (ref 13.6–17.2)
MCH RBC QN AUTO: 23.3 PG (ref 26.1–32.9)
MCHC RBC AUTO-ENTMCNC: 29.8 G/DL (ref 31.4–35)
MCV RBC AUTO: 78.1 FL (ref 79.6–97.8)
NRBC # BLD: 0 K/UL (ref 0–0.2)
PLATELET # BLD AUTO: 179 K/UL (ref 150–450)
PMV BLD AUTO: 9.2 FL (ref 9.4–12.3)
POTASSIUM SERPL-SCNC: 3.9 MMOL/L (ref 3.5–5.1)
PROT SERPL-MCNC: 6 G/DL (ref 6.3–8.2)
RBC # BLD AUTO: 5.02 M/UL (ref 4.23–5.6)
SODIUM SERPL-SCNC: 142 MMOL/L (ref 136–145)
WBC # BLD AUTO: 7.5 K/UL (ref 4.3–11.1)

## 2019-01-29 PROCEDURE — 74011250637 HC RX REV CODE- 250/637: Performed by: NURSE PRACTITIONER

## 2019-01-29 PROCEDURE — 82962 GLUCOSE BLOOD TEST: CPT

## 2019-01-29 PROCEDURE — 74011250637 HC RX REV CODE- 250/637: Performed by: HOSPITALIST

## 2019-01-29 PROCEDURE — 36415 COLL VENOUS BLD VENIPUNCTURE: CPT

## 2019-01-29 PROCEDURE — 85730 THROMBOPLASTIN TIME PARTIAL: CPT

## 2019-01-29 PROCEDURE — 80053 COMPREHEN METABOLIC PANEL: CPT

## 2019-01-29 PROCEDURE — 85027 COMPLETE CBC AUTOMATED: CPT

## 2019-01-29 PROCEDURE — 65660000000 HC RM CCU STEPDOWN

## 2019-01-29 PROCEDURE — 74011636637 HC RX REV CODE- 636/637: Performed by: EMERGENCY MEDICINE

## 2019-01-29 PROCEDURE — 74011250637 HC RX REV CODE- 250/637: Performed by: FAMILY MEDICINE

## 2019-01-29 PROCEDURE — 74011636637 HC RX REV CODE- 636/637: Performed by: HOSPITALIST

## 2019-01-29 RX ORDER — METOPROLOL TARTRATE 50 MG/1
50 TABLET ORAL 2 TIMES DAILY
Status: DISCONTINUED | OUTPATIENT
Start: 2019-01-29 | End: 2019-01-30 | Stop reason: HOSPADM

## 2019-01-29 RX ADMIN — METOPROLOL TARTRATE 50 MG: 50 TABLET ORAL at 09:28

## 2019-01-29 RX ADMIN — INSULIN LISPRO 6 UNITS: 100 INJECTION, SOLUTION INTRAVENOUS; SUBCUTANEOUS at 22:10

## 2019-01-29 RX ADMIN — INSULIN GLARGINE 20 UNITS: 100 INJECTION, SOLUTION SUBCUTANEOUS at 22:08

## 2019-01-29 RX ADMIN — TAMSULOSIN HYDROCHLORIDE 0.4 MG: 0.4 CAPSULE ORAL at 09:00

## 2019-01-29 RX ADMIN — Medication 10 ML: at 22:11

## 2019-01-29 RX ADMIN — CLOPIDOGREL BISULFATE 75 MG: 75 TABLET ORAL at 09:29

## 2019-01-29 RX ADMIN — Medication 10 ML: at 17:01

## 2019-01-29 RX ADMIN — Medication 10 ML: at 07:20

## 2019-01-29 RX ADMIN — APIXABAN 10 MG: 2.5 TABLET, FILM COATED ORAL at 22:06

## 2019-01-29 RX ADMIN — STANDARDIZED SENNA CONCENTRATE AND DOCUSATE SODIUM 1 TABLET: 8.6; 5 TABLET, FILM COATED ORAL at 09:00

## 2019-01-29 RX ADMIN — METOPROLOL TARTRATE 50 MG: 50 TABLET ORAL at 18:28

## 2019-01-29 RX ADMIN — APIXABAN 10 MG: 2.5 TABLET, FILM COATED ORAL at 09:39

## 2019-01-29 RX ADMIN — Medication 400 MG: at 09:31

## 2019-01-29 NOTE — PROGRESS NOTES
Per [facility], cost of Eliquis 60/30day is $47 for one month supply and $94 for 3 month supply. Reference # [id number]

## 2019-01-29 NOTE — PROGRESS NOTES
Interdisciplinary Rounds completed 01/29/19. Nursing, Case Management, Physician and PT present. Plan of care reviewed and updated. Pt concerned about cost of eliquis. SW/CM verified cost and updated pt.

## 2019-01-29 NOTE — PROGRESS NOTES
Progress Note Patient: Nakia Kam MRN: [id number]  SSN: [id number] YOB: 1940  Age: 69 y.o. Sex: male Admit Date: 1/28/2019 LOS: 1 day Subjective: F/U bilateral PE 
 
\"72 years old male with pmhx of multiple PE/DVT (recently taken off coumadin), HTN, GERD, BPH,, CKD-2, neuropathy, dyslipidemia, DM-2 presented to ER after having a near syncopal event. \" Patient found to have bilateral PE again. US LE showed partial thrombosis of the right superficial femoral and 
popliteal veins. There is complete thrombosis of the left superficial femoral and popliteal 
veins. Current Facility-Administered Medications Medication Dose Route Frequency  clopidogrel (PLAVIX) tablet 75 mg  75 mg Oral DAILY  magnesium oxide (MAG-OX) tablet 400 mg  400 mg Oral DAILY  metoprolol tartrate (LOPRESSOR) tablet 50 mg  50 mg Oral DAILY  sodium chloride (NS) flush 5-40 mL  5-40 mL IntraVENous Q8H  
 sodium chloride (NS) flush 5-40 mL  5-40 mL IntraVENous PRN  
 acetaminophen (TYLENOL) tablet 650 mg  650 mg Oral Q6H PRN  
 oxyCODONE-acetaminophen (PERCOCET) 5-325 mg per tablet 1 Tab  1 Tab Oral Q6H PRN  
 morphine injection 1 mg  1 mg IntraVENous Q4H PRN  
 naloxone (NARCAN) injection 0.4 mg  0.4 mg IntraVENous PRN  
 ondansetron (ZOFRAN) injection 4 mg  4 mg IntraVENous Q4H PRN  
 senna-docusate (PERICOLACE) 8.6-50 mg per tablet 1 Tab  1 Tab Oral DAILY  insulin lispro (HUMALOG) injection   SubCUTAneous AC&HS  tamsulosin (FLOMAX) capsule 0.4 mg  0.4 mg Oral DAILY  insulin glargine (LANTUS) injection 20 Units  20 Units SubCUTAneous QHS  apixaban (ELIQUIS) tablet 10 mg  10 mg Oral Q12H Followed by  
Lydia Pulido ON 2/4/2019] apixaban (ELIQUIS) tablet 5 mg  5 mg Oral Q12H Objective:  
 
Vitals:  
 01/29/19 0721 01/29/19 0928 01/29/19 1051 01/29/19 1527 BP: 112/80 112/80 112/79 161/76 Pulse: 80 80 67 77 Resp: 20 20 18  
 Temp: 97.7 °F (36.5 °C)  97.1 °F (36.2 °C) 98.4 °F (36.9 °C) SpO2: 98%  97% 96% Weight:      
Height:      
  
  
Intake and Output: 
Current Shift: 01/29 0701 - 01/29 1900 In: 80 [P.O.:780] Out: - Last three shifts: 01/27 1901 - 01/29 0700 In: -  
Out: 9455 [LBTRA:9804] Physical Exam:  
General:  Alert, cooperative, no distress, appears stated age. Eyes:  Conjunctivae/corneas clear. PERRL, EOMs intact. Fundi benign Ears:  Normal TMs and external ear canals both ears. Nose: Nares normal. Septum midline. Mucosa normal. No drainage or sinus tenderness. Mouth/Throat: Lips, mucosa, and tongue normal. Teeth and gums normal.  
Neck: Supple, symmetrical, trachea midline, no adenopathy, thyroid: no enlargment/tenderness/nodules, no carotid bruit and no JVD. Back:   Symmetric, no curvature. ROM normal. No CVA tenderness. Lungs:   Clear to auscultation bilaterally. Heart:  Regular rate and rhythm, S1, S2 normal, no murmur, click, rub or gallop. Abdomen:   Soft, non-tender. Bowel sounds normal. No masses,  No organomegaly. Extremities: Extremities normal, atraumatic, no cyanosis or edema. Pulses: 2+ and symmetric all extremities. Skin: Skin color, texture, turgor normal. No rashes or lesions Lymph nodes: Cervical, supraclavicular, and axillary nodes normal.  
Neurologic: CNII-XII intact. Normal strength, sensation and reflexes throughout. Lab/Data Review: 
 
Recent Results (from the past 24 hour(s)) GLUCOSE, POC Collection Time: 01/28/19  8:20 PM  
Result Value Ref Range Glucose (POC) 253 (H) 65 - 100 mg/dL PTT Collection Time: 01/29/19  7:41 AM  
Result Value Ref Range aPTT 30.0 24.7 - 39.8 SEC METABOLIC PANEL, COMPREHENSIVE Collection Time: 01/29/19  7:42 AM  
Result Value Ref Range Sodium 142 136 - 145 mmol/L Potassium 3.9 3.5 - 5.1 mmol/L Chloride 109 (H) 98 - 107 mmol/L  
 CO2 28 21 - 32 mmol/L  Anion gap 5 (L) 7 - 16 mmol/L  
 Glucose 104 (H) 65 - 100 mg/dL BUN 14 8 - 23 MG/DL Creatinine 1.26 0.8 - 1.5 MG/DL  
 GFR est AA >60 >60 ml/min/1.73m2 GFR est non-AA 59 (L) >60 ml/min/1.73m2 Calcium 8.0 (L) 8.3 - 10.4 MG/DL Bilirubin, total 0.5 0.2 - 1.1 MG/DL  
 ALT (SGPT) 18 12 - 65 U/L  
 AST (SGOT) 11 (L) 15 - 37 U/L Alk. phosphatase 112 50 - 136 U/L Protein, total 6.0 (L) 6.3 - 8.2 g/dL Albumin 2.9 (L) 3.2 - 4.6 g/dL Globulin 3.1 2.3 - 3.5 g/dL A-G Ratio 0.9 (L) 1.2 - 3.5    
CBC W/O DIFF Collection Time: 01/29/19  7:42 AM  
Result Value Ref Range WBC 7.5 4.3 - 11.1 K/uL  
 RBC 5.02 4.23 - 5.6 M/uL  
 HGB 11.7 (L) 13.6 - 17.2 g/dL HCT 39.2 (L) 41.1 - 50.3 % MCV 78.1 (L) 79.6 - 97.8 FL  
 MCH 23.3 (L) 26.1 - 32.9 PG  
 MCHC 29.8 (L) 31.4 - 35.0 g/dL  
 RDW 15.3 (H) 11.9 - 14.6 % PLATELET 876 620 - 381 K/uL MPV 9.2 (L) 9.4 - 12.3 FL ABSOLUTE NRBC 0.00 0.0 - 0.2 K/uL GLUCOSE, POC Collection Time: 01/29/19  7:48 AM  
Result Value Ref Range Glucose (POC) 98 65 - 100 mg/dL GLUCOSE, POC Collection Time: 01/29/19 11:30 AM  
Result Value Ref Range Glucose (POC) 142 (H) 65 - 100 mg/dL GLUCOSE, POC Collection Time: 01/29/19  4:57 PM  
Result Value Ref Range Glucose (POC) 142 (H) 65 - 100 mg/dL Assessment/ Plan:  
 
Principal Problem: 
  Acute pulmonary embolism ([address] (1/28/2019) Active Problems: 
  Bladder stone (11/27/2012) Pure hypercholesterolemia () Essential hypertension () Lumbar stenosis (2/14/2018) Type II diabetes mellitus ([facility] (3/11/2018) Near syncope (1/28/2019) BRODERICK (acute kidney injury) ([facility] (1/29/2019) Hematology has seen. Will need long term anticoagulation. Patient states he cannot afford Eliquis but I will talk with case management tomorrow to see if we can offer discounts. Trying to avoid coumadin due to his hx of supratherapeutic and subtherapeutic levels with warfarin.  Currently on Eliquis 10mg BID for 14 doses and then will start 5mg BID. BRODERICK - improved. HTN - metoprolol tartrate 50mg BID. DM - Lantus 20 units with SS Look to potentially dc tomorrow if can get financial help with Eliquis. DVT prophylaxis - Eliquis. Signed By: Harris Do,    
 January 29, 2019

## 2019-01-29 NOTE — PROGRESS NOTES
Hourly rounds done. Patient has a bilateral duplex today, which confirmed blood clots in bilateral extremities. Patient is still on heparin drip, but will discontinue once patient is started on Eliquis. Patient has no needs at this time, call light within reach.

## 2019-01-29 NOTE — PROGRESS NOTES
Hourly rounds complete this shift, patient continues c/o:SOB upon ambulating   bed in low, locked position, call light and bedside table within reach,  all needs met. Will continue to monitor Report to day shift nurse.

## 2019-01-30 ENCOUNTER — HOME HEALTH ADMISSION (OUTPATIENT)
Dept: HOME HEALTH SERVICES | Facility: HOME HEALTH | Age: 79
End: 2019-01-30

## 2019-01-30 VITALS
WEIGHT: 216 LBS | TEMPERATURE: 98.2 F | RESPIRATION RATE: 20 BRPM | HEIGHT: 73 IN | HEART RATE: 64 BPM | SYSTOLIC BLOOD PRESSURE: 139 MMHG | DIASTOLIC BLOOD PRESSURE: 78 MMHG | BODY MASS INDEX: 28.63 KG/M2 | OXYGEN SATURATION: 92 %

## 2019-01-30 PROBLEM — I27.20 PULMONARY HTN (HCC): Status: ACTIVE | Noted: 2019-01-30

## 2019-01-30 LAB
ALBUMIN SERPL-MCNC: 3 G/DL (ref 3.2–4.6)
ALBUMIN/GLOB SERPL: 1 {RATIO} (ref 1.2–3.5)
ALP SERPL-CCNC: 118 U/L (ref 50–136)
ALT SERPL-CCNC: 21 U/L (ref 12–65)
ANION GAP SERPL CALC-SCNC: 10 MMOL/L (ref 7–16)
AST SERPL-CCNC: 12 U/L (ref 15–37)
BILIRUB SERPL-MCNC: 0.6 MG/DL (ref 0.2–1.1)
BUN SERPL-MCNC: 14 MG/DL (ref 8–23)
CALCIUM SERPL-MCNC: 8.4 MG/DL (ref 8.3–10.4)
CHLORIDE SERPL-SCNC: 107 MMOL/L (ref 98–107)
CO2 SERPL-SCNC: 26 MMOL/L (ref 21–32)
CREAT SERPL-MCNC: 1.26 MG/DL (ref 0.8–1.5)
ERYTHROCYTE [DISTWIDTH] IN BLOOD BY AUTOMATED COUNT: 15.1 % (ref 11.9–14.6)
EST. AVERAGE GLUCOSE BLD GHB EST-MCNC: 206 MG/DL
GLOBULIN SER CALC-MCNC: 3.1 G/DL (ref 2.3–3.5)
GLUCOSE BLD STRIP.AUTO-MCNC: 105 MG/DL (ref 65–100)
GLUCOSE BLD STRIP.AUTO-MCNC: 119 MG/DL (ref 65–100)
GLUCOSE SERPL-MCNC: 98 MG/DL (ref 65–100)
HBA1C MFR BLD: 8.8 % (ref 4.8–6)
HCT VFR BLD AUTO: 41 % (ref 41.1–50.3)
HGB BLD-MCNC: 12.4 G/DL (ref 13.6–17.2)
MCH RBC QN AUTO: 23.4 PG (ref 26.1–32.9)
MCHC RBC AUTO-ENTMCNC: 30.2 G/DL (ref 31.4–35)
MCV RBC AUTO: 77.4 FL (ref 79.6–97.8)
MM INDURATION POC: 0 MM (ref 0–5)
NRBC # BLD: 0 K/UL (ref 0–0.2)
PLATELET # BLD AUTO: 188 K/UL (ref 150–450)
PMV BLD AUTO: 8.9 FL (ref 9.4–12.3)
POTASSIUM SERPL-SCNC: 3.8 MMOL/L (ref 3.5–5.1)
PPD POC: NORMAL NEGATIVE
PROT SERPL-MCNC: 6.1 G/DL (ref 6.3–8.2)
RBC # BLD AUTO: 5.3 M/UL (ref 4.23–5.6)
SODIUM SERPL-SCNC: 143 MMOL/L (ref 136–145)
WBC # BLD AUTO: 7.6 K/UL (ref 4.3–11.1)

## 2019-01-30 PROCEDURE — 80053 COMPREHEN METABOLIC PANEL: CPT

## 2019-01-30 PROCEDURE — 77010033678 HC OXYGEN DAILY

## 2019-01-30 PROCEDURE — 74011250637 HC RX REV CODE- 250/637: Performed by: HOSPITALIST

## 2019-01-30 PROCEDURE — 85027 COMPLETE CBC AUTOMATED: CPT

## 2019-01-30 PROCEDURE — 74011250637 HC RX REV CODE- 250/637: Performed by: NURSE PRACTITIONER

## 2019-01-30 PROCEDURE — 94761 N-INVAS EAR/PLS OXIMETRY MLT: CPT

## 2019-01-30 PROCEDURE — 99231 SBSQ HOSP IP/OBS SF/LOW 25: CPT | Performed by: INTERNAL MEDICINE

## 2019-01-30 PROCEDURE — 97110 THERAPEUTIC EXERCISES: CPT

## 2019-01-30 PROCEDURE — 83036 HEMOGLOBIN GLYCOSYLATED A1C: CPT

## 2019-01-30 PROCEDURE — 82962 GLUCOSE BLOOD TEST: CPT

## 2019-01-30 PROCEDURE — 97116 GAIT TRAINING THERAPY: CPT

## 2019-01-30 PROCEDURE — 36415 COLL VENOUS BLD VENIPUNCTURE: CPT

## 2019-01-30 PROCEDURE — 74011250637 HC RX REV CODE- 250/637: Performed by: FAMILY MEDICINE

## 2019-01-30 RX ORDER — INSULIN LISPRO 100 [IU]/ML
INJECTION, SOLUTION INTRAVENOUS; SUBCUTANEOUS
Qty: 1 VIAL | Refills: 0 | Status: ON HOLD
Start: 2019-01-30 | End: 2019-06-30

## 2019-01-30 RX ORDER — INSULIN LISPRO 100 [IU]/ML
INJECTION, SOLUTION INTRAVENOUS; SUBCUTANEOUS
Qty: 1 VIAL | Refills: 0 | Status: SHIPPED | OUTPATIENT
Start: 2019-01-30 | End: 2019-01-30

## 2019-01-30 RX ADMIN — CLOPIDOGREL BISULFATE 75 MG: 75 TABLET ORAL at 08:16

## 2019-01-30 RX ADMIN — APIXABAN 10 MG: 2.5 TABLET, FILM COATED ORAL at 08:16

## 2019-01-30 RX ADMIN — Medication 10 ML: at 05:24

## 2019-01-30 RX ADMIN — Medication 400 MG: at 08:16

## 2019-01-30 RX ADMIN — METOPROLOL TARTRATE 50 MG: 50 TABLET ORAL at 08:16

## 2019-01-30 NOTE — PROGRESS NOTES
[address] Face to Face Encounter Patients Name: Harinder Araiza    YOB: 1940 Ordering Physician: Dr. Vasiliy Calvo Primary Diagnosis: Acute pulmonary embolism ([facility] [address] Syncope and collapse Date of Face to Face:   1/30/2019 Face to Face Encounter findings are related to primary reason for home care:   yes. 1. I certify that the patient needs intermittent care as follows: skilled nursing care:  skilled observation/assessment, patient education 2. I certify that this patient is homebound, that is: 1) patient requires the use of a walker device, special transportation, or assistance of another to leave the home; or 2) patient's condition makes leaving the home medically contraindicated; and 3) patient has a normal inability to leave the home and leaving the home requires considerable and taxing effort. Patient may leave the home for infrequent and short duration for medical reasons, and occasional absences for non-medical reasons. Homebound status is due to the following functional limitations: Patient with strength deficits limiting the performance of all ADL's without caregiver assistance or the use of an assistive device. 3. I certify that this patient is under my care and that I, or a nurse practitioner or [facility], or clinical nurse specialist, or certified nurse midwife, working with me, had a Face-to-Face Encounter that meets the physician Face-to-Face Encounter requirements. The following are the clinical findings from the [address] [place] [address] encounter that support the need for skilled services and is a summary of the encounter: see hospital chart See summary of the patient's illness Liam Rubi LMSW 
1/30/2019 THE FOLLOWING TO BE COMPLETED BY THE COMMUNITY PHYSICIAN: 
 
I concur with the findings described above from the [facility] encounter that this patient is homebound and in need of a skilled service. Certifying Physician: _____________________________________ Printed Certifying Physician Name: _____________________________________ Date: _________________

## 2019-01-30 NOTE — DISCHARGE INSTRUCTIONS
Patient Education     DISCHARGE SUMMARY from Nurse    PATIENT INSTRUCTIONS:    After general anesthesia or intravenous sedation, for 24 hours or while taking prescription Narcotics:  · Limit your activities  · Do not drive and operate hazardous machinery  · Do not make important personal or business decisions  · Do  not drink alcoholic beverages  · If you have not urinated within 8 hours after discharge, please contact your surgeon on call. Report the following to your surgeon:  · Excessive pain, swelling, redness or odor of or around the surgical area  · Temperature over 100.5  · Nausea and vomiting lasting longer than 4 hours or if unable to take medications  · Any signs of decreased circulation or nerve impairment to extremity: change in color, persistent  numbness, tingling, coldness or increase pain  · Any questions    What to do at Home:  Recommended activity: Activity as tolerated,     If you experience any of the following symptoms fever, new or unrelieved pain, persistent nausea or vomiting, shortness of breath, dizziness, chest pain, or any unusual occurrence of bleeding , please follow up with PCP. *  Please give a list of your current medications to your Primary Care Provider. *  Please update this list whenever your medications are discontinued, doses are      changed, or new medications (including over-the-counter products) are added. *  Please carry medication information at all times in case of emergency situations. These are general instructions for a healthy lifestyle:    No smoking/ No tobacco products/ Avoid exposure to second hand smoke  Surgeon General's Warning:  Quitting smoking now greatly reduces serious risk to your health.     Obesity, smoking, and sedentary lifestyle greatly increases your risk for illness    A healthy diet, regular physical exercise & weight monitoring are important for maintaining a healthy lifestyle    You may be retaining fluid if you have a history of heart failure or if you experience any of the following symptoms:  Weight gain of 3 pounds or more overnight or 5 pounds in a week, increased swelling in our hands or feet or shortness of breath while lying flat in bed. Please call your doctor as soon as you notice any of these symptoms; do not wait until your next office visit. Recognize signs and symptoms of STROKE:    F-face looks uneven    A-arms unable to move or move unevenly    S-speech slurred or non-existent    T-time-call 911 as soon as signs and symptoms begin-DO NOT go       Back to bed or wait to see if you get better-TIME IS BRAIN. Warning Signs of HEART ATTACK     Call 911 if you have these symptoms:   Chest discomfort. Most heart attacks involve discomfort in the center of the chest that lasts more than a few minutes, or that goes away and comes back. It can feel like uncomfortable pressure, squeezing, fullness, or pain.  Discomfort in other areas of the upper body. Symptoms can include pain or discomfort in one or both arms, the back, neck, jaw, or stomach.  Shortness of breath with or without chest discomfort.  Other signs may include breaking out in a cold sweat, nausea, or lightheadedness. Don't wait more than five minutes to call 911 - MINUTES MATTER! Fast action can save your life. Calling 911 is almost always the fastest way to get lifesaving treatment. Emergency Medical Services staff can begin treatment when they arrive -- up to an hour sooner than if someone gets to the hospital by car. The discharge information has been reviewed with the patient. The patient verbalized understanding. Discharge medications reviewed with the patient and appropriate educational materials and side effects teaching were provided.   ___________________________________________________________________________________________________________________________________     Pulmonary Embolism: Care Instructions  Your Care Instructions    Pulmonary embolism is the sudden blockage of an artery in the lung. Blood clots in the deep veins of the leg or pelvis (deep vein thrombosis, or DVT) are the most common cause. These blood clots can travel to the lungs. Pulmonary embolism can be very serious. Because you have had one pulmonary embolism, you are at greater risk for having another one. But you can take steps to prevent another pulmonary embolism by following your doctor's instructions. You will probably take a prescription blood-thinning medicine to prevent blood clots. A blood thinner can stop a blood clot from growing larger and prevent new clots from forming. Follow-up care is a key part of your treatment and safety. Be sure to make and go to all appointments, and call your doctor if you are having problems. It's also a good idea to know your test results and keep a list of the medicines you take. How can you care for yourself at home? · Take your medicines exactly as prescribed. Call your doctor if you think you are having a problem with your medicine. You will get more details on the specific medicines your doctor prescribes. · If you are taking a blood thinner, be sure you get instructions about how to take your medicine safely. Blood thinners can cause serious bleeding problems. Preventing future pulmonary embolisms  · Exercise. Keep blood moving in your legs to keep clots from forming. If you are traveling by car, stop every hour or so. Get out and walk around for a few minutes. If you are traveling by bus, train, or plane, get out of your seat and walk up and down the aisles every hour or so. You also can do leg exercises while you are seated. Pump your feet up and down by pulling your toes up toward your knees then pointing them down. · Get up out of bed as soon as possible after an illness or surgery. · Do not smoke. If you need help quitting, talk to your doctor about stop-smoking programs and medicines.  These can increase your chances of quitting for good. · Check with your doctor before taking hormone or birth control pills. These may increase your risk of blot clots. · Ask your doctor about wearing compression stockings to help prevent blood clots in your legs. There are different types of stockings, and they need to fit right. So your doctor will recommend what you need. When should you call for help? Call 911 anytime you think you may need emergency care. For example, call if:    · You have shortness of breath.     · You have chest pain.     · You passed out (lost consciousness).     · You cough up blood.    Call your doctor now or seek immediate medical care if:    · You have new or worsening pain or swelling in your leg.    Watch closely for changes in your health, and be sure to contact your doctor if:    · You do not get better as expected. Where can you learn more? Go to http://helen-edmundo.info/. Enter K420 in the search box to learn more about \"Pulmonary Embolism: Care Instructions. \"  Current as of: September 26, 2018  Content Version: 11.9  © 7775-7059 [employer]. Care instructions adapted under license by [employer] (which disclaims liability or warranty for this information). If you have questions about a medical condition or this instruction, always ask your healthcare professional. [address] any warranty or liability for your use of this information.

## 2019-01-30 NOTE — DIABETES MGMT
Patient admitted with acute pulmonary embolism. Admitting blood glucose 277. HbA1c 8.8 (eAG 206). Blood glucose range yesterday  with pt receiving Lantus 20 units and Humalog 6 units. This AM blood glucose 105. Creatinine 1.26. GFR 59. Last blood glucose 119. Pt to discharge home today. Pt states he was diagnosed with diabetes 15 years ago and voices a positive family history. Pt states he \"is supposed to check his blood glucose every day,\" but states \"I only check it a few times a week. \" Pt states \"why should I check it when I know it's going to be high. \" Educated pt on the effects of Humalog on elevated blood glucose levels. Pt does voice compliance with taking Levemir 25 units nightly using insulin pen method. Explained the importance of blood glucose monitoring. Recommended frequency of qid ac, hs, and to record in log book to take to PCP appointment. Pt states he has a working glucometer at home with supplies. Patient does verbalize understanding of site rotation, storage of insulin, and proper sharps disposal. Educator gave patient \"my blood sugar log\" to fill out and take to PCP appointment. Educated pt that he should never take insulin without first checking his blood glucose level. Educated pt on the difference between basal and bolus insulin including timing, onset, peak, and duration. Pt verbalized understanding. Pt states he currently eats \"2 and a half\" meals a day, stating he eats breakfast, a mid-day snack, and supper. Reviewed hypoglycemia signs, symptoms, and treatment with pt. Encouraged compliance with discharge regimen. Encouraged patient to work continue to work on lifestyle modifications and to follow up with PCP for further titration of regimen. Patient verbalized understanding and voices no questions at this time. Pt voices concern over cost of diabetic medication stating \"I pay $100 for my Levemir. \" Pt given jose assistance program brochure. Pt voices no other questions at this time regarding diabetes management. Pt states he is \"running low\" on insulin pen needles. Spoke with provider regarding discharge instructions, home medications, and patient hesitancy regarding multiple blood glucose checks and injections. Provider plans to discharge pt on Humalog SSI ACHS and Levemir 20 units nightly. Provider also plans to write prescription for insulin pen needles.

## 2019-01-30 NOTE — PROGRESS NOTES
Per RT, patient qualifies for home oxygen. Referral sent to [facility]. Portable tank provided to patient. [employer] company to deliver other supplies to patient's home.

## 2019-01-30 NOTE — PROGRESS NOTES
Cm provided patient with eliquis coupon. Discussed with patient if CM could setup [facility] RN to check on patient especially since O2 is new. Patient is agreeable. Would like referral sent to [facility]. Referral sent. liaisons notified.

## 2019-01-30 NOTE — PROGRESS NOTES
[facility] Hematology & Oncology Inpatient Hematology / Oncology Progress NoteAdmission Date: 2019  3:20 AM 
Reason for Admission/Hospital Course: Acute pulmonary embolism ([address] Syncope and collapse 24 Hour Events: 
Up in bedside chair Friend at bedside Discharge today ROS: 
Constitutional: negative for fever, chills, weakness, malaise, fatigue. CV:  negative for chest pain, palpitations, edema. Respiratory: negative for dyspnea, cough, wheezing. GI: negative for nausea, abdominal pain, diarrhea. 10 point review of systems is otherwise negative with the exception of the elements mentioned above in the HPI. Allergies Allergen Reactions  Other Food Other (comments) OBJECTIVE: 
Patient Vitals for the past 8 hrs: 
 BP Temp Pulse Resp SpO2  
19 1119     92 % 19 1036 139/78 98.2 °F (36.8 °C) 64 20 95 % 19 0738 (!) 165/91 98 °F (36.7 °C) 79 18 94 % Temp (24hrs), Av.4 °F (36.9 °C), Min:98 °F (36.7 °C), Max:98.8 °F (37.1 °C) No intake/output data recorded. Physical Exam: 
Constitutional: Well developed, well nourished male in no acute distress, sitting in bedside chair. HEENT: Normocephalic and atraumatic. Oropharynx is clear, mucous membranes are moist. Extraocular muscles are intact. Sclerae anicteric. Skin Warm and dry. No bruising and no rash noted. No erythema. No pallor. Respiratory Lungs are clear to auscultation bilaterally without wheezes, rales or rhonchi, normal air exchange without accessory muscle use. CVS Normal rate, regular rhythm and normal S1 and S2. No murmurs, gallops, or rubs. Abdomen Soft, nontender and nondistended, normoactive bowel sounds. Neuro Grossly nonfocal with no obvious sensory or motor deficits. MSK Normal range of motion in general.  No edema and no tenderness. Psych Appropriate mood and affect. Labs: 
   
Recent Labs  
  19 
0606 19 
6251 19 
0315 WBC 7.6 7.5 9.0  
RBC 5.30 5.02 5.96* HGB 12.4* 11.7* 13.9 HCT 41.0* 39.2* 47.5 MCV 77.4* 78.1* 79.7 MCH 23.4* 23.3* 23.3*  
MCHC 30.2* 29.8* 29.3*  
RDW 15.1* 15.3* 15.3*  
 179 203 GRANS  --   --  67  
LYMPH  --   --  11* MONOS  --   --  9  
EOS  --   --  11* BASOS  --   --  1 IG  --   --  0  
DF  --   --  AUTOMATED ANEU  --   --  6.0 ABL  --   --  1.0 ABM  --   --  0.8 CRISTOBAL  --   --  1.0* ABB  --   --  0.1 AIG  --   --  0.0 Recent Labs  
  01/30/19 
0606 01/29/19 
0365 01/28/19 
0315  142 138  
K 3.8 3.9 3.7  109* 102 CO2 26 28 27 AGAP 10 5* 9 GLU 98 104* 277* BUN 14 14 14 CREA 1.26 1.26 1.63* GFRAA >60 >60 53* GFRNA 59* 59* 44*  
CA 8.4 8.0* 8.7 SGOT 12* 11* 14*  112 149* TP 6.1* 6.0* 7.5 ALB 3.0* 2.9* 3.7 GLOB 3.1 3.1 3.8* AGRAT 1.0* 0.9* 1.0* Imaging: 
 
Medications: 
Current Facility-Administered Medications Medication Dose Route Frequency  metoprolol tartrate (LOPRESSOR) tablet 50 mg  50 mg Oral BID  clopidogrel (PLAVIX) tablet 75 mg  75 mg Oral DAILY  magnesium oxide (MAG-OX) tablet 400 mg  400 mg Oral DAILY  sodium chloride (NS) flush 5-40 mL  5-40 mL IntraVENous Q8H  
 sodium chloride (NS) flush 5-40 mL  5-40 mL IntraVENous PRN  
 acetaminophen (TYLENOL) tablet 650 mg  650 mg Oral Q6H PRN  
 oxyCODONE-acetaminophen (PERCOCET) 5-325 mg per tablet 1 Tab  1 Tab Oral Q6H PRN  
 morphine injection 1 mg  1 mg IntraVENous Q4H PRN  
 naloxone (NARCAN) injection 0.4 mg  0.4 mg IntraVENous PRN  
 ondansetron (ZOFRAN) injection 4 mg  4 mg IntraVENous Q4H PRN  
 senna-docusate (PERICOLACE) 8.6-50 mg per tablet 1 Tab  1 Tab Oral DAILY  insulin lispro (HUMALOG) injection   SubCUTAneous AC&HS  tamsulosin (FLOMAX) capsule 0.4 mg  0.4 mg Oral DAILY  insulin glargine (LANTUS) injection 20 Units  20 Units SubCUTAneous QHS  apixaban (ELIQUIS) tablet 10 mg  10 mg Oral Q12H  Followed by  
  [START ON 2/4/2019] apixaban (ELIQUIS) tablet 5 mg  5 mg Oral Q12H  
 
 
ASSESSMENT: 
 
Problem List  Date Reviewed: 8/29/2018 Codes Class Noted Pulmonary HTN ([facility] ICD-10-CM: I27.20 ICD-9-CM: 416.8  1/30/2019 BRODERICK (acute kidney injury) ([facility] ICD-10-CM: N17.9 ICD-9-CM: 584.9  1/29/2019 Near syncope ICD-10-CM: R55 
ICD-9-CM: 780.2  1/28/2019 * (Principal) Acute pulmonary embolism ([facility] ICD-10-CM: I26.99 
ICD-9-CM: 415.19  1/28/2019 Peripheral polyneuropathy ICD-10-CM: G62.9 ICD-9-CM: 356.9  9/11/2018 Dyspnea on exertion ICD-10-CM: R06.09 
ICD-9-CM: 786.09  6/5/2018 Anemia ICD-10-CM: D64.9 ICD-9-CM: 285.9  4/24/2018 Debility ICD-10-CM: R53.81 ICD-9-CM: 799.3  3/19/2018 Type II diabetes mellitus (HCC) (Chronic) ICD-10-CM: E11.9 ICD-9-CM: 250.00  3/11/2018 Surgical wound hemorrhage after dental procedure ICD-10-CM: K91.840 ICD-9-CM: 998.11  3/10/2018 Airway compromise ICD-10-CM: J98.8 ICD-9-CM: 519.8  3/10/2018 Hx pulmonary embolism (Chronic) ICD-10-CM: P33.828 ICD-9-CM: V12.55  3/10/2018 Lumbar stenosis ICD-10-CM: M48.061 
ICD-9-CM: 724.02  2/14/2018 Type 2 diabetes mellitus with diabetic neuropathy (HCC) ICD-10-CM: E11.40 ICD-9-CM: 250.60, 357.2  1/8/2018 Lumbar stenosis with neurogenic claudication ICD-10-CM: S80.610 
ICD-9-CM: 724.03  12/12/2017 Myopathy ICD-10-CM: G72.9 ICD-9-CM: 359.9  12/7/2017 Arthralgia ICD-10-CM: M25.50 ICD-9-CM: 719.40  12/7/2017 Heart disease, unspecified ICD-10-CM: I51.9 ICD-9-CM: 429.9  Unknown Other pulmonary embolism and infarction ICD-10-CM: I26.99 
ICD-9-CM: 415.19  Unknown Type II or unspecified type diabetes mellitus without mention of complication, not stated as uncontrolled ICD-10-CM: E11.9 ICD-9-CM: 250.00  Unknown Pure hypercholesterolemia ICD-10-CM: E78.00 ICD-9-CM: 272.0  Unknown Essential hypertension ICD-10-CM: I10 
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 Mike Silva is a 69 y.o. male admitted on 1/28/2019. PMH: HTN, GERD,CKD2, DM2, cardiac stents (on plavix). He previously had DVT in leg (unclear dates) and reports he took coumadin for \"3-4 years\". In 3/2018, he was reportedly on 3 baby aspirin, plavix and coumadin. He went for dental procedure and developed upper airway obstruction from a hematoma with subsequent respiratory distress and hospital admission. Apparently, he was supratherapeutic at the time. He stopped coumadin and aspirin but continued plavix per direction of Dr. Cindy Still (per notes). He presented to the ER on 1/28 after a near syncopal episode and dyspnea. CTA showed bilateral PE and dopplers showed BLE DVT. He has been started on a heparin drip. We were consulted for hypercoag w/u and [facility] recommendations. He is on room air and symptoms have improved since admission. He is somewhat of a poor historian, family and friends at the bedside who assisted with history. PLAN: 
PE/Bilateral DVT 
- Started on heparin drip on admission. Recommend eliquis given history of bleeding and difficulty maintaining therapeutic INR on coumadin in the past. Discussed with pharmD. Can do hypercoag workup outpatient. Will arrange f/u with Dr. Kamila Soto in 3-4 weeks - As long as he tolerates anticoagulation, no need for IVC filter now 1/30 Patient is being discharged today. He has started on Eliquis and continues Plavix.  
  
CKD - Creatinine 1.6 today, slightly above baseline. No issue with eliquis 1/30 Cr 1.26 
  
Other medical issues per primary team 
  
Lab studies and imaging studies (CTA chest, Dopplers BLE) were personally reviewed. Thank you for allowing us to participate in the care of . Pamalee Opitz. He will have follow up appointment with Dr. Rosamaria Mercedes in 2 weeks with labs prior.  
 
  
  
 
 
 
 
Goals and plan of care reviewed with the patient. All questions answered to the best of our ability. [facility], NP [facility]ematology [facility] Oncology [address] [place] [address] Office : [phone] Fax : [phone]

## 2019-01-30 NOTE — DISCHARGE SUMMARY
Hospitalist Discharge Summary     Patient ID:  Jody Garcia  [id number]  41 y.o.  1940  Admit date: 1/28/2019  3:20 AM  Discharge date and time: 1/30/2019  Attending: Lisette Ponce MD  PCP:  Jeana Mike MD  Treatment Team: Attending Provider: Lisette Ponce MD; Consulting Provider: Sunny Smith MD; Utilization Review: Ulices Moeller    Principal Diagnosis Acute pulmonary embolism ([address]   Principal Problem:    Acute pulmonary embolism ([address] (1/28/2019)    Active Problems:    Bladder stone (11/27/2012)      Pure hypercholesterolemia ()      Essential hypertension ()      Lumbar stenosis (2/14/2018)      Type II diabetes mellitus ([address] (3/11/2018)      Near syncope (1/28/2019)      BRODERICK (acute kidney injury) ([address] (1/29/2019)      Pulmonary HTN ([address] (1/30/2019)     Hospital Course:  68 yo with past medical hx of multiple PE/DVT, HTN, GERD, BPH, CKD-2, HLD, and DM admitted for recurrent bilateral PE. Patient was recently taken off his coumadin after he had a significant hematoma involving his oral cavity after a dental procedure with supra therapeutic INR levels. After being off the warfarin, patient began to have SOB. CTA chest in ED showed bilateral lower PE with right more involement than the left. No obvious right heart strain dictated. CT head was benign. ECHO showed EF 55-60% and severe pulmonary HTN 55-60mmHg. US LE showed partial thrombosis of the right superficial femoral and popliteal veins. There is complete thrombosis of the left superficial femoral and popliteal veins. Hematology consulted for hypercoagulable work up. Hematology has recommended patient start Eliquis due to patient being unable to consistently have a therapeutic INR. When discussing medication management with the patient, patient becomes confused and has poor insight to medication management.  Due to this, it was determined Eliquis would be the safest medication to start for anticoagulation since will not need monthly INR checks. Patient understands and agrees with plan. We have given discount card for Eliquis and stated the importance for life long anti coagulation. Glucose also not controlled but with Lantus 20 units and sliding scale insulin, glucose levels have improved. DM education will see before discharged. A1C 8.8. PCP could potentially set up for outpatient DM education if patient agrees. Continue Eliquis 10mg BID until 2/4/19 AM dose and then start taking Eliquis 5mg BID in evening. Will discharge with 2L NC to use when ambulating due to desaturations. No oxygen supplement needed at rest.     Will need to follow up closely with PCP of glucose control along with recurrent PE making sure he is taking Eliquis appropriately. If worsening SOB, chest pain, or AMS, please come back to the ED. We have offered home health but patient refuses. .   Please refer to the admission H&P for details of presentation. In summary, the patient is stable for discharge.      Significant Diagnostic Studies:       Labs: Results:       Chemistry Recent Labs     01/30/19  0606 01/29/19  0742 01/28/19 0315   GLU 98 104* 277*    142 138   K 3.8 3.9 3.7    109* 102   CO2 26 28 27   BUN 14 14 14   CREA 1.26 1.26 1.63*   CA 8.4 8.0* 8.7   AGAP 10 5* 9    112 149*   TP 6.1* 6.0* 7.5   ALB 3.0* 2.9* 3.7   GLOB 3.1 3.1 3.8*   AGRAT 1.0* 0.9* 1.0*      CBC w/Diff Recent Labs     01/30/19  0606 01/29/19  0742 01/28/19 0315   WBC 7.6 7.5 9.0   RBC 5.30 5.02 5.96*   HGB 12.4* 11.7* 13.9   HCT 41.0* 39.2* 47.5    179 203   GRANS  --   --  67   LYMPH  --   --  11*   EOS  --   --  11*      Cardiac Enzymes Recent Labs     01/28/19 0315         Coagulation Recent Labs     01/29/19  0741 01/28/19  1015 01/28/19 0315   PTP  --   --  13.4   INR  --   --  1.0   APTT 30.0 148.9*  --        Lipid Panel No results found for: CHOL, CHOLPOCT, CHOLX, CHLST, CHOLV, 281108, HDL, LDL, LDLC, DLDLP, 744495, VLDLC, VLDL, TGLX, TRIGL, TRIGP, TGLPOCT, CHHD, CHHDX   BNP No results for input(s): BNPP in the last 72 hours. Liver Enzymes Recent Labs     01/30/19  0606   TP 6.1*   ALB 3.0*      SGOT 12*      Thyroid Studies No results found for: T4, T3U, TSH, TSHEXT, TSHEXT         Discharge Exam:  Visit Vitals  /78 (BP 1 Location: Left arm, BP Patient Position: At rest)   Pulse 64   Temp 98.2 °F (36.8 °C)   Resp 20   Ht 6' 1\" (1.854 m)   Wt 98 kg (216 lb)   SpO2 92%   BMI 28.50 kg/m²     General appearance: alert, cooperative, no distress, appears stated age. Confused at times when discussing medications. Lungs: limited breathe sounds at RLL  Heart: regular rate and rhythm, S1, S2 normal, no murmur, click, rub or gallop  Abdomen: soft, non-tender. Bowel sounds normal. No masses,  no organomegaly  Extremities: no cyanosis or edema  Neurologic: Grossly normal    Disposition: home   Discharge Condition: stable  Patient Instructions: as above   Current Discharge Medication List      START taking these medications    Details   !! apixaban (ELIQUIS) 5 mg tablet Take 2 Tabs by mouth every twelve (12) hours. Qty: 20 Tab, Refills: 0      !! apixaban (ELIQUIS) 5 mg tablet Take 1 Tab by mouth every twelve (12) hours. Qty: 60 Tab, Refills: 0      insulin lispro (HUMALOG) 100 unit/mL injection Less than 150 =   0 units           150 -199 =   2 units  200 -249 =   4 units  250 -299 =   6 units  300 -349 =   8 units  Before meals and at night. Qty: 1 Vial, Refills: 0       !! - Potential duplicate medications found. Please discuss with provider. CONTINUE these medications which have NOT CHANGED    Details   tamsulosin (FLOMAX) 0.4 mg capsule Take 1 Cap by mouth daily. Qty: 30 Cap, Refills: 2    Associated Diagnoses: Spinal stenosis of lumbar region with neurogenic claudication      metoprolol tartrate (LOPRESSOR) 50 mg tablet Take 50 mg by mouth daily. LANSOPRAZOLE (PREVACID PO) Take 1 Tab by mouth daily. tadalafil (CIALIS) 5 mg tablet Take 1 Tab by mouth daily. Qty: 90 Tab, Refills: 3      gabapentin (NEURONTIN) 300 mg capsule Take 300 mg by mouth two (2) times a day. insulin detemir (LEVEMIR) 100 unit/mL injection 20 Units by SubCUTAneous route nightly. clopidogrel (PLAVIX) 75 mg tablet Take 75 mg by mouth daily. Last dose mid Jan 2018      magnesium oxide (MAG-OX) 400 mg tablet Take 400 mg by mouth daily. STOP taking these medications       glipiZIDE (GLUCOTROL) 10 mg tablet Comments:   Reason for Stopping:               Activity: up and lavinia with cane   Diet: ADA  Wound Care: none     Follow-up PCP in one week. Hematology in three weeks.    ·     Time spent to discharge patient 35 minutes  Signed:  Lamont Nguyen DO  1/30/2019  10:54 AM

## 2019-01-30 NOTE — PROGRESS NOTES
Problem: Mobility Impaired (Adult and Pediatric) Goal: *Acute Goals and Plan of Care (Insert Text) STG: 
(1.)Mr. Saul Sánchez will move from supine to sit and sit to supine  with STAND BY ASSIST within 3 treatment day(s). (2.)Mr. Saul Sánchez will transfer from bed to chair and chair to bed with STAND BY ASSIST using the least restrictive device within 3 treatment day(s). (3.)Mr. Saul Sánchez will ambulate with STAND BY ASSIST for 250 feet with the least restrictive device within 3 treatment day(s). LTG: 
(1.)Mr. Saul Sánchez will move from supine to sit and sit to supine  in bed with INDEPENDENT within 7 treatment day(s). (2.)Mr. Saul Sánchez will transfer from bed to chair and chair to bed with MODIFIED INDEPENDENCE using the least restrictive device within 7 treatment day(s). (3.)Mr. Saul Sánchez will ambulate with SUPERVISION for 500+ feet with the least restrictive device within 7 treatment day(s). ________________________________________________________________________________________________ PHYSICAL THERAPY: Daily Note and AM 1/30/2019INPATIENT: PT Visit Days : 2 Payor: [facility] MEDICARE / Plan[place] [employer] [place] / Product Type: Managed Care Medicare /   
  
NAME/AGE/GENDER: Josie Mojica is a 69 y.o. male PRIMARY DIAGNOSIS: Acute pulmonary embolism ([address] Syncope and collapse Acute pulmonary embolism (HCC) Acute pulmonary embolism ([facility] ICD-10: Treatment Diagnosis:  
 · Generalized Muscle Weakness (M62.81) · Difficulty in walking, Not elsewhere classified (R26.2) · History of falling (Z91.81) Precaution/Allergies: Other food ASSESSMENT:  
Mr. Saul Sánchez is supine in bed upon contact and agreeable to PT. Pt is A&O X 3 with reports of 0/10 pain prior to mobility. Pt lives alone in 1 story home with 2 steps to enter. Pt is ambulatory both household and community distances with use of SPC and drives.  Pt does not require supplemental O2 at baseline and is currently on 2L with O2 sats at 98% prior to mobility. Pt reports 1 recent fall. Removed O2 prior to mobility. Respiratory Therapy present to get ambulatory O2 Sat. Pt transitioned supine to sit EOB with Meredith. Pt performed STS with CGA with some increased trunk sway noted fair static standing balance. Pt ambulated 160 ft in hallway with RW and CGA. Pt ambulates with widened MIGUEL and almost Parkinson's like gait pattern shuffling feet with increased forward trunk lean with fluctuations in speed. Cues provided for walker management and safety. Pt with O2 Sats at 86% after ambulating 80 ft. Placed back on 2L O2 and Sats increased to 98%. Pt states he has noted progressive LE weakness over past 5 years that his doctors have been unable to explain at this time even though he has seen multiple physicians including neurolopgists. Progressing toward goals. This section established at most recent assessment PROBLEM LIST (Impairments causing functional limitations): 1. Decreased Strength 2. Decreased ADL/Functional Activities 3. Decreased Transfer Abilities 4. Decreased Ambulation Ability/Technique 5. Decreased Balance 6. Decreased Activity Tolerance 7. Decreased Pacing Skills 8. Increased Fatigue 9. Decreased [place] with Home Exercise Program 
 INTERVENTIONS PLANNED: (Benefits and precautions of physical therapy have been discussed with the patient.) 1. Balance Exercise 2. Bed Mobility 3. Family Education 4. Gait Training 5. Home Exercise Program (HEP) 6. Neuromuscular Re-education/Strengthening 7. Therapeutic Activites 8. Therapeutic Exercise/Strengthening 9. Transfer Training TREATMENT PLAN: Frequency/Duration: 3 times a week for duration of hospital stay Rehabilitation Potential For Stated Goals: Good RECOMMENDED REHABILITATION/EQUIPMENT: (at time of discharge pending progress): Due to the probability of continued deficits (see above) this patient will likely need continued skilled physical therapy after discharge. Equipment:  
? None at this time HISTORY:  
History of Present Injury/Illness (Reason for Referral): 
See H&P below Patient is a 69years old male with pmhx of multiple PE/DVT (recently taken off coumadin), HTN, GERD, BPH,, CKD-2, neuropathy, dyslipidemia, DM-2 presented to ER after having a near syncopal event. Pt reported that he fell and stayed on floor for about an hour and then crawled to his car. He lives by himself. He reports feeling weak and c/o difficulty in breathing. He denies head trauma, chest pain, palpitations, nausea/vomiting, headache, blurry vision, weakness/numbness/tingling in extremities, abdominal pain, diarrhea, urinary symptoms. In ER, CTA chest showed bilateral PE, R>L, CT head was unremarkable. Pt was on coumadin until recently, he was taken off it in view of easy bruising. Pt has been started on heparin drip in ER. Past Medical History/Comorbidities:  
Mr. Mary Pool  has a past medical history of Arthritis, BPH (benign prostatic hypertrophy), CAD (coronary artery disease) (11/2009), Claustrophobia, Coagulation defects, Former cigarette smoker, GERD (gastroesophageal reflux disease), H/O heart artery stent, Heart disease, unspecified, Heart murmur, Hyperlipidemia, Hypertension, Neuropathy, Other calculus in bladder, Other pulmonary embolism and infarction, PONV (postoperative nausea and vomiting), Poor historian, Pure hypercholesterolemia, PVD (peripheral vascular disease) ([address] Skin cancer, Thromboembolus ([address] (2/2012), 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 ([address] Type II or unspecified type diabetes mellitus without mention of complication, not stated as uncontrolled, Unspecified essential hypertension, Unspecified hyperplasia of prostate with urinary obstruction and other lower urinary tract symptoms (LUTS), and Unspecified sleep apnea.   Mr. Mary Pool  has a past surgical history that includes hx heart catheterization (11/2009); pr prostate biopsy, needle, saturation sampling; hx prostatectomy; hx other surgical; hx urological; and hx colonoscopy. Social History/Living Environment:  
Home Environment: Private residence # Steps to Enter: 2 Wheelchair Ramp: No 
One/Two Story Residence: One story Living Alone: Yes Support Systems: Friends \ neighbors(children live in [facility]) Patient Expects to be Discharged to[de-identified] Unknown Current DME Used/Available at Home: Cane, straight, Shower chair, Walker, rolling Tub or Shower Type: Shower Prior Level of Function/Work/Activity: 
Use of SPC for gait, indep with ADLs, 1 fall, drives, progressive weakness B LE for 5 years Number of Personal Factors/Comorbidities that affect the Plan of Care: 3+: HIGH COMPLEXITY EXAMINATION:  
Most Recent Physical Functioning:  
Gross Assessment: 
  
         
  
Posture: 
  
Balance: 
Sitting - Static: Good (unsupported) Sitting - Dynamic: Fair (occasional) Standing - Static: Fair Standing - Dynamic : Fair Bed Mobility: 
Supine to Sit: Minimum assistance Sit to Supine: (left up in chair) Wheelchair Mobility: 
  
Transfers: 
Sit to Stand: Stand-by assistance Stand to Sit: Stand-by assistance Bed to Chair: Contact guard assistance Gait: 
  
Base of Support: Widened Speed/Nikky: Shuffled; Slow Step Length: Left shortened;Right shortened Gait Abnormalities: Decreased step clearance;Shuffling gait; Step to gait Distance (ft): 160 Feet (ft) Assistive Device: Walker, rolling Ambulation - Level of Assistance: Contact guard assistance Interventions: Manual cues; Safety awareness training;Verbal cues Duration: 13 Minutes Body Structures Involved: 1. Nerves 2. Heart 3. Lungs 4. Bones 5. Muscles Body Functions Affected: 1. Cardio 2. Respiratory 3. Neuromusculoskeletal 
4. Movement Related Activities and Participation Affected: 1. General Tasks and Demands 2. Mobility 3. Self Care 4. Domestic Life 5. Interpersonal Interactions and Relationships 6. Community, Social and Ravenna Rexford Number of elements that affect the Plan of Care: 4+: HIGH COMPLEXITY CLINICAL PRESENTATION:  
Presentation: Evolving clinical presentation with changing clinical characteristics: MODERATE COMPLEXITY CLINICAL DECISION MAKIN[address] [place] [id number] AM-PAC 6 Clicks Basic Mobility Inpatient Short Form How much difficulty does the patient currently have. .. Unable A Lot A Little None 1. Turning over in bed (including adjusting bedclothes, sheets and blankets)? [] 1   [] 2   [x] 3   [] 4  
2. Sitting down on and standing up from a chair with arms ( e.g., wheelchair, bedside commode, etc.)   [] 1   [] 2   [x] 3   [] 4  
3. Moving from lying on back to sitting on the side of the bed? [] 1   [] 2   [x] 3   [] 4 How much help from another person does the patient currently need. .. Total A Lot A Little None 4. Moving to and from a bed to a chair (including a wheelchair)? [] 1   [] 2   [x] 3   [] 4  
5. Need to walk in hospital room? [] 1   [] 2   [x] 3   [] 4  
6. Climbing 3-5 steps with a railing? [] 1   [] 2   [x] 3   [] 4  
© , Trustees of [address] [place], [place] [id number], under license to [employer]. All rights reserved Score:  Initial: 18 Most Recent: X (Date: -- ) Interpretation of Tool:  Represents activities that are increasingly more difficult (i.e. Bed mobility, Transfers, Gait). Medical Necessity:    
· Patient is expected to demonstrate progress in strength, balance, coordination and functional technique to decrease assistance required with gait, transfers, and functional mobility. Cecilia Bourne Reason for Services/Other Comments: 
· Patient continues to require skilled intervention due to decreased strength, decreased balance, decreased functional tolerance, decreased cardiopulmonary endurance affecting participation in basic ADLs and functional tasks. Use of outcome tool(s) and clinical judgement create a POC that gives a: Clear prediction of patient's progress: LOW COMPLEXITY  
  
 
 
 
TREATMENT:  
(In addition to Assessment/Re-Assessment sessions the following treatments were rendered) Pre-treatment Symptoms/Complaints:  weakness Pain: Initial:  
Pain Intensity 1: 0  Post Session:  0/10 Gait Training (13 Minutes):  Gait training to improve and/or restore physical functioning as related to mobility, strength, balance and coordination. Ambulated 160 Feet (ft) with Contact guard assistance using a Walker, rolling and minimal Manual cues; Safety awareness training;Verbal cues . Therapeutic Exercise: (12 Minutes):  Exercises per grid below to improve mobility and strength. Required minimal verbal cues to perform exercises correctly. Date: 
1/30/19 Date: 
 Date: Activity/Exercise Parameters Parameters Parameters Ankle pumps 15B Long arc quads 15B Seated marching 15B Hip abduction/adduction 15B Braces/Orthotics/Lines/Etc:  
· none Treatment/Session Assessment:   
· Response to Treatment:  Tolerated well · Interdisciplinary Collaboration:  
o Physical Therapist 
o Registered Nurse 
o Respiratory Therapist 
· After treatment position/precautions:  
o Up in chair 
o Bed alarm/tab alert on 
o Bed/Chair-wheels locked 
o Caregiver at bedside 
o Call light within reach · Compliance with Program/Exercises: Compliant most of the time · Recommendations/Intent for next treatment session: \"Next visit will focus on advancements to more challenging activities and reduction in assistance provided\". Total Treatment Duration: PT Patient Time In/Time Out Time In: 1020 Time Out: 1055 Pop Rea, PT

## 2019-01-30 NOTE — PROGRESS NOTES
Oxygen Qualifier Room air: SpO2 with O2 and liter flow Resting SpO2  92%  na Ambulating SpO2  86% 87% on 1L 
91% on 2L Completed by: 
 
Orlando Noe, RT

## 2019-01-30 NOTE — PROGRESS NOTES
Discharge instructions and prescriptions provided and explained to patient, patient voiced understanding. Medication side effect sheet reviewed with pt. No home meds or valuables to return. Opportunity for questions provided. Instructed to call once ready to leave the floor. Pt's friend here and driving patient home.

## 2019-01-31 ENCOUNTER — PATIENT OUTREACH (OUTPATIENT)
Dept: CASE MANAGEMENT | Age: 79
End: 2019-01-31

## 2019-01-31 LAB — FACTOR V LEIDEN MUTATION, XMF5LT: NORMAL %

## 2019-01-31 NOTE — PROGRESS NOTES
This note will not be viewable in [address] Transition of Care Discharge Follow-up Questionnaire Date/Time of Call: 
 1/31/19 1:37pm  
What was the patient hospitalized for? Acute pulmonary embolism Does the patient understand his/her diagnosis and/or treatment and what happened during the hospitalization? Yes, spoke with patient, he states understanding of diagnosis and treatment; and is agreeable to call. Patient states to be doing well Did the patient receive discharge instructions? Yes   
CM Assessed Risk for Readmission:  
 
 
Patient stated Risk for Readmission:  
 
 low r/t diagnosis and/or comorbidities 
 
 
none stated Review any discharge instructions (see discharge instructions/AVS in [facility]). Ask patient if they understand these. Do they have any questions? Reviewed, understanding is stated, no questions at this time Were home services ordered (nursing, PT, OT, ST, etc.)? Yes, [facility] RN If so, has the first visit occurred? If not, why? (Assist with coordination of services if necessary.) Patient states he declined service Was any DME ordered? Yes, O2 ([facility]) If so, has it been received? If not, why?  (Assist patient in obtaining DME orders &/or equipment if necessary.) Yes Complete a review of all medications (new, continued and discontinued meds per the D/C instructions and medication tab in [facility]). Attempted, patient states he has a friend coing to help him with his medications Were all new prescriptions filled? If not, why?  (Assist patient in obtaining medications if necessary  escalate for CCM &/or SW if ongoing issues are verbalized by pt or anticipated) Yes Does the patient understand the purpose and dosing instructions for all medications? (If patient has questions, provide explanation and education.) Yes Does the patient have any problems in performing ADLs? (If patient is unable to perform ADLs  what is the limiting factor(s)? Do they have a support system that can assist? If no support system is present, discuss possible assistance that they may be able to obtain. Escalate for CCM/SW if ongoing issues are verbalized by pt or anticipated) Independent with ADLs Does the patient have all follow-up appointments scheduled? 7 day f/up with PCP?  
(f/up with PCP may be w/in 14 days if patient has a f/up with their specialist w/in 7 days) 7-14 day f/up with specialist?  
(or per discharge instructions) If f/up has not been made  what actions has the care coordinator made to accomplish this? Has transportation been arranged? Yes Dr. Brittny Mcmahan 2/7/19 Dr. Robel Cooley 2/22/19 Yes, no transportation needs were identified at this time Any other questions or concerns expressed by the patient? No further needs or concerns identified. Patient states his gratitude for follow up. Contact information for [facility] was given, instructed to call with new questions or concerns. Schedule next appointment with DEMETRIUS ALMONTE Coordinator or refer to RN Case Manager/ per the workflow guidelines. When is care coordinators next follow-up call scheduled? If referred for CCM  what RN care manager was the referral assigned? Community Care Coordinator will follow per workflow guidelines. Within 30days PRUDENCIO Call Completed By: Fatou Gunter LPN Community Care Coordinator

## 2019-02-26 ENCOUNTER — PATIENT OUTREACH (OUTPATIENT)
Dept: CASE MANAGEMENT | Age: 79
End: 2019-02-26

## 2019-02-26 NOTE — PROGRESS NOTES
This note will not be viewable in [address] Transitions of Care  Follow up Outreach Note Outreach type Phone call: spoke with patient Home visit:  
Date/Time of Outreach: 2/26/19  11:43am  
 
Has patient attended PCP or specialist follow-up appointments since last contact? What was outcome of appointment? When is next follow-up scheduled? Patient states, Im doing pretty good for an old man. Patient states he has oxygen that he is not using, it showed up after I was in the hospital, I dont need it, I explained it was ordered at discharge and instructions. Patient has f/u with Dr. Oh Warden on 2/28/19 will ask regarding discontinuation for O2, again stating I dont need that. Follow up with Dr. Emigdio Stoddard was rescheduled and is 3/15/19. Review medications. Any medication changes since last outreach? Does patient have any questions or issues related to their medications? No  
 
No  
 
Home health active? If yes  any issue? Progress? No    
 
Referrals needed? 
(CM, SW, HH, etc. ) No  
  
Other issues/Miscellaneous? (Transportation, access to meals, ability to perform ADLs, adequate caregiver support, etc.) No other needs or concerns at this time. Patient states his gratitude for follow up. Next Outreach Scheduled? Graduation from program? 
 N/A Yes Next Steps/Goals (if applicable): N/A Outreach completed by: 
 Consuelo Meredith LPN Community Care Coordinator

## 2019-03-15 PROBLEM — I26.99 ACUTE PULMONARY EMBOLISM (HCC): Status: RESOLVED | Noted: 2019-01-28 | Resolved: 2019-03-15

## 2019-03-15 PROBLEM — D68.51 FACTOR 5 LEIDEN MUTATION, HETEROZYGOUS (HCC): Status: ACTIVE | Noted: 2019-03-15

## 2019-06-30 ENCOUNTER — HOSPITAL ENCOUNTER (OUTPATIENT)
Age: 79
Setting detail: OBSERVATION
Discharge: HOME | End: 2019-07-02
Attending: EMERGENCY MEDICINE | Admitting: FAMILY MEDICINE
Payer: MEDICARE

## 2019-06-30 ENCOUNTER — APPOINTMENT (OUTPATIENT)
Dept: CT IMAGING | Age: 79
End: 2019-06-30
Attending: EMERGENCY MEDICINE
Payer: MEDICARE

## 2019-06-30 DIAGNOSIS — R29.898 LEG WEAKNESS, BILATERAL: ICD-10-CM

## 2019-06-30 DIAGNOSIS — G45.9 TIA (TRANSIENT ISCHEMIC ATTACK): Primary | ICD-10-CM

## 2019-06-30 DIAGNOSIS — D68.2 FACTOR V DEFICIENCY (HCC): ICD-10-CM

## 2019-06-30 PROBLEM — I25.10 CAD (CORONARY ARTERY DISEASE): Status: ACTIVE | Noted: 2019-06-30

## 2019-06-30 PROBLEM — K21.9 GERD (GASTROESOPHAGEAL REFLUX DISEASE): Status: ACTIVE | Noted: 2019-06-30

## 2019-06-30 PROBLEM — E11.40 DIABETIC NEUROPATHY (HCC): Status: ACTIVE | Noted: 2019-06-30

## 2019-06-30 PROBLEM — I27.20 PULMONARY HTN (HCC): Chronic | Status: ACTIVE | Noted: 2019-01-30

## 2019-06-30 PROBLEM — M48.062 LUMBAR STENOSIS WITH NEUROGENIC CLAUDICATION: Chronic | Status: ACTIVE | Noted: 2017-12-12

## 2019-06-30 PROBLEM — M48.061 LUMBAR STENOSIS: Chronic | Status: ACTIVE | Noted: 2018-02-14

## 2019-06-30 LAB
ALBUMIN SERPL-MCNC: 3.6 G/DL (ref 3.2–4.6)
ALBUMIN/GLOB SERPL: 1.2 {RATIO} (ref 1.2–3.5)
ALP SERPL-CCNC: 115 U/L (ref 50–136)
ALT SERPL-CCNC: 23 U/L (ref 12–65)
ANION GAP SERPL CALC-SCNC: 8 MMOL/L (ref 7–16)
AST SERPL-CCNC: 11 U/L (ref 15–37)
BASOPHILS # BLD: 0.1 K/UL (ref 0–0.2)
BASOPHILS NFR BLD: 1 % (ref 0–2)
BILIRUB SERPL-MCNC: 0.3 MG/DL (ref 0.2–1.1)
BUN SERPL-MCNC: 23 MG/DL (ref 8–23)
CALCIUM SERPL-MCNC: 8.6 MG/DL (ref 8.3–10.4)
CHLORIDE SERPL-SCNC: 107 MMOL/L (ref 98–107)
CO2 SERPL-SCNC: 25 MMOL/L (ref 21–32)
CREAT SERPL-MCNC: 1.9 MG/DL (ref 0.8–1.5)
DIFFERENTIAL METHOD BLD: ABNORMAL
EOSINOPHIL # BLD: 0.1 K/UL (ref 0–0.8)
EOSINOPHIL NFR BLD: 2 % (ref 0.5–7.8)
ERYTHROCYTE [DISTWIDTH] IN BLOOD BY AUTOMATED COUNT: 16.5 % (ref 11.9–14.6)
GLOBULIN SER CALC-MCNC: 3.1 G/DL (ref 2.3–3.5)
GLUCOSE BLD STRIP.AUTO-MCNC: 176 MG/DL (ref 65–100)
GLUCOSE SERPL-MCNC: 217 MG/DL (ref 65–100)
HCT VFR BLD AUTO: 41.2 % (ref 41.1–50.3)
HGB BLD-MCNC: 12.6 G/DL (ref 13.6–17.2)
IMM GRANULOCYTES # BLD AUTO: 0 K/UL (ref 0–0.5)
IMM GRANULOCYTES NFR BLD AUTO: 0 % (ref 0–5)
LYMPHOCYTES # BLD: 1.1 K/UL (ref 0.5–4.6)
LYMPHOCYTES NFR BLD: 15 % (ref 13–44)
MAGNESIUM SERPL-MCNC: 2.1 MG/DL (ref 1.8–2.4)
MCH RBC QN AUTO: 23.3 PG (ref 26.1–32.9)
MCHC RBC AUTO-ENTMCNC: 30.6 G/DL (ref 31.4–35)
MCV RBC AUTO: 76.3 FL (ref 79.6–97.8)
MONOCYTES # BLD: 0.6 K/UL (ref 0.1–1.3)
MONOCYTES NFR BLD: 9 % (ref 4–12)
NEUTS SEG # BLD: 5.4 K/UL (ref 1.7–8.2)
NEUTS SEG NFR BLD: 74 % (ref 43–78)
NRBC # BLD: 0 K/UL (ref 0–0.2)
PHOSPHATE SERPL-MCNC: 2.9 MG/DL (ref 2.3–3.7)
PLATELET # BLD AUTO: 256 K/UL (ref 150–450)
PMV BLD AUTO: 9.7 FL (ref 9.4–12.3)
POTASSIUM SERPL-SCNC: 4 MMOL/L (ref 3.5–5.1)
PROT SERPL-MCNC: 6.7 G/DL (ref 6.3–8.2)
RBC # BLD AUTO: 5.4 M/UL (ref 4.23–5.6)
SODIUM SERPL-SCNC: 140 MMOL/L (ref 136–145)
WBC # BLD AUTO: 7.3 K/UL (ref 4.3–11.1)

## 2019-06-30 PROCEDURE — 84100 ASSAY OF PHOSPHORUS: CPT

## 2019-06-30 PROCEDURE — 99218 HC RM OBSERVATION: CPT

## 2019-06-30 PROCEDURE — 93005 ELECTROCARDIOGRAM TRACING: CPT | Performed by: EMERGENCY MEDICINE

## 2019-06-30 PROCEDURE — 83735 ASSAY OF MAGNESIUM: CPT

## 2019-06-30 PROCEDURE — 85025 COMPLETE CBC W/AUTO DIFF WBC: CPT

## 2019-06-30 PROCEDURE — 74011636637 HC RX REV CODE- 636/637: Performed by: NURSE PRACTITIONER

## 2019-06-30 PROCEDURE — 80053 COMPREHEN METABOLIC PANEL: CPT

## 2019-06-30 PROCEDURE — 74011250637 HC RX REV CODE- 250/637: Performed by: NURSE PRACTITIONER

## 2019-06-30 PROCEDURE — 99285 EMERGENCY DEPT VISIT HI MDM: CPT | Performed by: EMERGENCY MEDICINE

## 2019-06-30 PROCEDURE — 82962 GLUCOSE BLOOD TEST: CPT

## 2019-06-30 PROCEDURE — 70450 CT HEAD/BRAIN W/O DYE: CPT

## 2019-06-30 RX ORDER — CLOPIDOGREL BISULFATE 75 MG/1
75 TABLET ORAL DAILY
Status: DISCONTINUED | OUTPATIENT
Start: 2019-07-01 | End: 2019-07-02 | Stop reason: HOSPADM

## 2019-06-30 RX ORDER — ONDANSETRON 2 MG/ML
4 INJECTION INTRAMUSCULAR; INTRAVENOUS
Status: DISCONTINUED | OUTPATIENT
Start: 2019-06-30 | End: 2019-07-02 | Stop reason: HOSPADM

## 2019-06-30 RX ORDER — METOPROLOL TARTRATE 50 MG/1
50 TABLET ORAL DAILY
Status: DISCONTINUED | OUTPATIENT
Start: 2019-07-01 | End: 2019-07-02 | Stop reason: HOSPADM

## 2019-06-30 RX ORDER — ATORVASTATIN CALCIUM 40 MG/1
40 TABLET, FILM COATED ORAL
Status: DISCONTINUED | OUTPATIENT
Start: 2019-06-30 | End: 2019-07-02 | Stop reason: HOSPADM

## 2019-06-30 RX ORDER — PANTOPRAZOLE SODIUM 40 MG/1
40 TABLET, DELAYED RELEASE ORAL
Status: DISCONTINUED | OUTPATIENT
Start: 2019-07-01 | End: 2019-07-02 | Stop reason: HOSPADM

## 2019-06-30 RX ORDER — INSULIN GLARGINE 100 [IU]/ML
20 INJECTION, SOLUTION SUBCUTANEOUS
Status: DISCONTINUED | OUTPATIENT
Start: 2019-06-30 | End: 2019-07-02 | Stop reason: HOSPADM

## 2019-06-30 RX ORDER — SODIUM CHLORIDE 0.9 % (FLUSH) 0.9 %
5-40 SYRINGE (ML) INJECTION EVERY 8 HOURS
Status: DISCONTINUED | OUTPATIENT
Start: 2019-06-30 | End: 2019-07-02 | Stop reason: HOSPADM

## 2019-06-30 RX ORDER — ACETAMINOPHEN 325 MG/1
650 TABLET ORAL
Status: DISCONTINUED | OUTPATIENT
Start: 2019-06-30 | End: 2019-07-02 | Stop reason: HOSPADM

## 2019-06-30 RX ORDER — INSULIN LISPRO 100 [IU]/ML
INJECTION, SOLUTION INTRAVENOUS; SUBCUTANEOUS
Status: DISCONTINUED | OUTPATIENT
Start: 2019-07-01 | End: 2019-07-02 | Stop reason: HOSPADM

## 2019-06-30 RX ORDER — GABAPENTIN 300 MG/1
300 CAPSULE ORAL 2 TIMES DAILY
Status: DISCONTINUED | OUTPATIENT
Start: 2019-07-01 | End: 2019-07-02 | Stop reason: HOSPADM

## 2019-06-30 RX ORDER — LANOLIN ALCOHOL/MO/W.PET/CERES
400 CREAM (GRAM) TOPICAL DAILY
Status: DISCONTINUED | OUTPATIENT
Start: 2019-07-01 | End: 2019-07-02 | Stop reason: HOSPADM

## 2019-06-30 RX ORDER — DOCUSATE SODIUM 100 MG/1
100 CAPSULE, LIQUID FILLED ORAL
Status: DISCONTINUED | OUTPATIENT
Start: 2019-06-30 | End: 2019-07-02 | Stop reason: HOSPADM

## 2019-06-30 RX ORDER — SODIUM CHLORIDE 0.9 % (FLUSH) 0.9 %
5-40 SYRINGE (ML) INJECTION AS NEEDED
Status: DISCONTINUED | OUTPATIENT
Start: 2019-06-30 | End: 2019-07-02 | Stop reason: HOSPADM

## 2019-06-30 RX ADMIN — Medication 10 ML: at 23:48

## 2019-06-30 RX ADMIN — ATORVASTATIN CALCIUM 40 MG: 40 TABLET, FILM COATED ORAL at 23:47

## 2019-06-30 RX ADMIN — INSULIN GLARGINE 20 UNITS: 100 INJECTION, SOLUTION SUBCUTANEOUS at 23:48

## 2019-06-30 RX ADMIN — APIXABAN 5 MG: 5 TABLET, FILM COATED ORAL at 23:47

## 2019-06-30 NOTE — ED PROVIDER NOTES
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 Reports that approximately 2 hours ago. He had an episode of bilateral facial drawing where he felt like he was having some fullness in his throat and difficulty swallowing. He reports that the episode lasted 45 minutes, but is not completely resolved. No prior episodes. States he been compliant with medications including Eliquis    The history is provided by the patient and a caregiver. Facial Droop   This is a new problem. The current episode started 1 to 2 hours ago. The problem has been resolved. There was left facial and right facial focality noted. Pertinent negatives include no loss of balance, no slurred speech, no memory loss, no mental status change, no unresponsiveness and no disorientation. There has been no fever. Pertinent negatives include no shortness of breath, no chest pain, no vomiting, no altered mental status, no confusion, no headaches and no nausea. There were no medications administered prior to arrival. Associated medical issues do not include trauma.         Past Medical History:   Diagnosis Date    Arthritis     BPH (benign prostatic hypertrophy)     bph    CAD (coronary artery disease) 11/2009    3 stents per pt    Claustrophobia     Coagulation defects     takes Plavix, Coumadin and aspirin- held for surgery     Former cigarette smoker     GERD (gastroesophageal reflux disease)     controlled with prevacid- only thing that controls it    H/O heart artery stent     X3    Heart disease, unspecified     Heart murmur     MVP   LVEF 50%    Hyperlipidemia     Hypertension     Neuropathy     Other calculus in bladder     Other pulmonary embolism and infarction     PONV (postoperative nausea and vomiting)     Poor historian     Pure hypercholesterolemia     no current meds    PVD [facility]peripheral vascular disease) [facility] [address]     Skin cancer     reomoved from Right side of head    Thromboembolus ([facility] [address] 2012    3 clots in leg and 1 PE    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)     Type II or unspecified type diabetes mellitus without mention of complication, not stated as uncontrolled     oral and insulin reliant/AVg - at night/ s.s of hypoglycemia @ 49    Unspecified essential hypertension     Unspecified hyperplasia of prostate with urinary obstruction and other lower urinary tract symptoms (LUTS)     monitored by Dr Dorcas Walsh Unspecified sleep apnea     pt denies- it is on Dr Ida Jefferson H&P       Past Surgical History:   Procedure Laterality Date    HX COLONOSCOPY      polyps removed    HX HEART CATHETERIZATION  2009    stents x 3    HX OTHER SURGICAL      BLADDER STONE REMOVAL    HX PROSTATECTOMY      TRANSURETHRAL- pt denies    HX UROLOGICAL      CYSTOSCOPY    SC PROSTATE BIOPSY, NEEDLE, SATURATION SAMPLING      AND ULTASOUND         Family History:   Problem Relation Age of Onset    Heart Disease Brother     Cancer Mother         Stomach Cancer    No Known Problems Father     Diabetes Sister     Dementia Brother        Social History     Socioeconomic History    Marital status:      Spouse name: Not on file    Number of children: Not on file    Years of education: Not on file    Highest education level: Not on file   Occupational History    Not on file   Social Needs    Financial resource strain: Not on file    Food insecurity:     Worry: Not on file     Inability: Not on file    Transportation needs:     Medical: Not on file     Non-medical: Not on file   Tobacco Use    Smoking status: Former Smoker     Packs/day: 1.00     Years: 14.00     Pack years: 14.00     Last attempt to quit: 1970     Years since quittin.5    Smokeless tobacco: Never Used   Substance and Sexual Activity    Alcohol use: No    Drug use: No    Sexual activity: Not on file   Lifestyle    Physical activity:     Days per week: Not on file Minutes per session: Not on file    Stress: Not on file   Relationships    Social connections:     Talks on phone: Not on file     Gets together: Not on file     Attends Shinto service: Not on file     Active member of club or organization: Not on file     Attends meetings of clubs or organizations: Not on file     Relationship status: Not on file    Intimate partner violence:     Fear of current or ex partner: Not on file     Emotionally abused: Not on file     Physically abused: Not on file     Forced sexual activity: Not on file   Other Topics Concern    Not on file   Social History Narrative    Not on file         ALLERGIES: Other food    Review of Systems   Constitutional: Negative for activity change, chills, diaphoresis and fever. HENT: Negative for dental problem, hearing loss, nosebleeds, rhinorrhea and sore throat. Eyes: Negative for pain, discharge, redness and visual disturbance. Respiratory: Negative for cough, chest tightness and shortness of breath. Cardiovascular: Negative for chest pain, palpitations and leg swelling. Gastrointestinal: Negative for abdominal pain, constipation, diarrhea, nausea and vomiting. Endocrine: Negative for cold intolerance, heat intolerance, polydipsia and polyuria. Genitourinary: Negative for dysuria and flank pain. Musculoskeletal: Negative for arthralgias, back pain, joint swelling, myalgias and neck pain. Skin: Negative for pallor and rash. Allergic/Immunologic: Negative for environmental allergies and food allergies. Neurological: Positive for facial asymmetry and weakness. Negative for dizziness, tremors, light-headedness, numbness, headaches and loss of balance. Hematological: Negative for adenopathy. Does not bruise/bleed easily. Psychiatric/Behavioral: Negative for confusion, dysphoric mood and memory loss. The patient is not nervous/anxious and is not hyperactive. All other systems reviewed and are negative.       There were no vitals filed for this visit. Physical Exam   Constitutional: He is oriented to person, place, and time. He appears well-developed and well-nourished. He appears distressed. HENT:   Head: Normocephalic and atraumatic. Mouth/Throat: Oropharynx is clear and moist. No oropharyngeal exudate. Eyes: Pupils are equal, round, and reactive to light. Conjunctivae and EOM are normal. No scleral icterus. Neck: Normal range of motion. Neck supple. No JVD present. No thyromegaly present. Cardiovascular: Normal rate, regular rhythm, normal heart sounds and intact distal pulses. Exam reveals no gallop and no friction rub. No murmur heard. Pulmonary/Chest: Effort normal and breath sounds normal. No respiratory distress. He has no wheezes. Abdominal: Soft. Bowel sounds are normal. He exhibits no distension. There is no hepatosplenomegaly. There is no tenderness. Musculoskeletal: Normal range of motion. He exhibits no edema, tenderness or deformity. Neurological: He is alert and oriented to person, place, and time. No cranial nerve deficit or sensory deficit. He exhibits normal muscle tone. Coordination normal.   Cranial nerves are intact  There is weakness in both lower extremities  Normal sensation noted in all 4 extremities    Romberg negative   Skin: Skin is warm and dry. Capillary refill takes less than 2 seconds. No rash noted. Psychiatric: His behavior is normal. Judgment and thought content normal. His mood appears anxious. Nursing note and vitals reviewed.        MDM  Number of Diagnoses or Management Options  Factor V deficiency ([facility] [address]): minor  Leg weakness, bilateral: established and worsening  TIA (transient ischemic attack): established and worsening  Diagnosis management comments: 8:57 PM  Review of neurologist  Patient will be admitted Hospitalist service       Amount and/or Complexity of Data Reviewed  Clinical lab tests: ordered and reviewed  Tests in the radiology section of CPT®: ordered and reviewed  Review and summarize past medical records: yes  Discuss the patient with other providers: yes  Independent visualization of images, tracings, or specimens: yes    Risk of Complications, Morbidity, and/or Mortality  Presenting problems: high  Diagnostic procedures: high  Management options: high  General comments: Elements of this note have been dictated via voice recognition software. Text and phrases may be limited by the accuracy of the software. The chart has been reviewed, but errors may still be present.       Patient Progress  Patient progress: improved         Procedures

## 2019-06-30 NOTE — ED TRIAGE NOTES
Reports his \"mouth was drawing\" on both sides for about 1.5 hours earlier today. States he could not smile earlier. Patient states symptoms have fully resolved. Denies extremity weakness or other symptoms.

## 2019-07-01 ENCOUNTER — APPOINTMENT (OUTPATIENT)
Dept: MRI IMAGING | Age: 79
End: 2019-07-01
Payer: MEDICARE

## 2019-07-01 ENCOUNTER — APPOINTMENT (OUTPATIENT)
Dept: ULTRASOUND IMAGING | Age: 79
End: 2019-07-01
Payer: MEDICARE

## 2019-07-01 LAB
ANION GAP SERPL CALC-SCNC: 6 MMOL/L (ref 7–16)
ATRIAL RATE: 85 BPM
BUN SERPL-MCNC: 22 MG/DL (ref 8–23)
CALCIUM SERPL-MCNC: 8.2 MG/DL (ref 8.3–10.4)
CALCULATED P AXIS, ECG09: 39 DEGREES
CALCULATED R AXIS, ECG10: -51 DEGREES
CALCULATED T AXIS, ECG11: 10 DEGREES
CHLORIDE SERPL-SCNC: 108 MMOL/L (ref 98–107)
CHOLEST SERPL-MCNC: 120 MG/DL
CO2 SERPL-SCNC: 28 MMOL/L (ref 21–32)
CREAT SERPL-MCNC: 1.48 MG/DL (ref 0.8–1.5)
DIAGNOSIS, 93000: NORMAL
ERYTHROCYTE [DISTWIDTH] IN BLOOD BY AUTOMATED COUNT: 16.1 % (ref 11.9–14.6)
EST. AVERAGE GLUCOSE BLD GHB EST-MCNC: 166 MG/DL
GLUCOSE BLD STRIP.AUTO-MCNC: 139 MG/DL (ref 65–100)
GLUCOSE BLD STRIP.AUTO-MCNC: 172 MG/DL (ref 65–100)
GLUCOSE BLD STRIP.AUTO-MCNC: 186 MG/DL (ref 65–100)
GLUCOSE BLD STRIP.AUTO-MCNC: 94 MG/DL (ref 65–100)
GLUCOSE SERPL-MCNC: 217 MG/DL (ref 65–100)
HBA1C MFR BLD: 7.4 % (ref 4.8–6)
HCT VFR BLD AUTO: 36.7 % (ref 41.1–50.3)
HDLC SERPL-MCNC: 28 MG/DL (ref 40–60)
HDLC SERPL: 4.3 {RATIO}
HGB BLD-MCNC: 11 G/DL (ref 13.6–17.2)
LDLC SERPL CALC-MCNC: 71.8 MG/DL
LIPID PROFILE,FLP: ABNORMAL
MAGNESIUM SERPL-MCNC: 2 MG/DL (ref 1.8–2.4)
MCH RBC QN AUTO: 22.9 PG (ref 26.1–32.9)
MCHC RBC AUTO-ENTMCNC: 30 G/DL (ref 31.4–35)
MCV RBC AUTO: 76.3 FL (ref 79.6–97.8)
NRBC # BLD: 0 K/UL (ref 0–0.2)
P-R INTERVAL, ECG05: 184 MS
PLATELET # BLD AUTO: 213 K/UL (ref 150–450)
PMV BLD AUTO: 9.8 FL (ref 9.4–12.3)
POTASSIUM SERPL-SCNC: 3.7 MMOL/L (ref 3.5–5.1)
Q-T INTERVAL, ECG07: 380 MS
QRS DURATION, ECG06: 142 MS
QTC CALCULATION (BEZET), ECG08: 452 MS
RBC # BLD AUTO: 4.81 M/UL (ref 4.23–5.6)
SODIUM SERPL-SCNC: 142 MMOL/L (ref 136–145)
TRIGL SERPL-MCNC: 101 MG/DL (ref 35–150)
VENTRICULAR RATE, ECG03: 85 BPM
VLDLC SERPL CALC-MCNC: 20.2 MG/DL (ref 6–23)
WBC # BLD AUTO: 5 K/UL (ref 4.3–11.1)

## 2019-07-01 PROCEDURE — 70551 MRI BRAIN STEM W/O DYE: CPT

## 2019-07-01 PROCEDURE — 85027 COMPLETE CBC AUTOMATED: CPT

## 2019-07-01 PROCEDURE — 74011250637 HC RX REV CODE- 250/637: Performed by: PHYSICIAN ASSISTANT

## 2019-07-01 PROCEDURE — 83735 ASSAY OF MAGNESIUM: CPT

## 2019-07-01 PROCEDURE — 36415 COLL VENOUS BLD VENIPUNCTURE: CPT

## 2019-07-01 PROCEDURE — 99218 HC RM OBSERVATION: CPT

## 2019-07-01 PROCEDURE — 74011250637 HC RX REV CODE- 250/637: Performed by: NURSE PRACTITIONER

## 2019-07-01 PROCEDURE — 80061 LIPID PANEL: CPT

## 2019-07-01 PROCEDURE — 83036 HEMOGLOBIN GLYCOSYLATED A1C: CPT

## 2019-07-01 PROCEDURE — 93970 EXTREMITY STUDY: CPT

## 2019-07-01 PROCEDURE — 72148 MRI LUMBAR SPINE W/O DYE: CPT

## 2019-07-01 PROCEDURE — 82962 GLUCOSE BLOOD TEST: CPT

## 2019-07-01 PROCEDURE — 80048 BASIC METABOLIC PNL TOTAL CA: CPT

## 2019-07-01 PROCEDURE — 93880 EXTRACRANIAL BILAT STUDY: CPT

## 2019-07-01 PROCEDURE — 99204 OFFICE O/P NEW MOD 45 MIN: CPT | Performed by: PSYCHIATRY & NEUROLOGY

## 2019-07-01 PROCEDURE — 92610 EVALUATE SWALLOWING FUNCTION: CPT

## 2019-07-01 PROCEDURE — 74011636637 HC RX REV CODE- 636/637: Performed by: NURSE PRACTITIONER

## 2019-07-01 RX ADMIN — Medication 5 ML: at 21:32

## 2019-07-01 RX ADMIN — MAGNESIUM GLUCONATE 500 MG ORAL TABLET 400 MG: 500 TABLET ORAL at 08:34

## 2019-07-01 RX ADMIN — Medication 10 ML: at 05:24

## 2019-07-01 RX ADMIN — METOPROLOL TARTRATE 50 MG: 50 TABLET, FILM COATED ORAL at 08:34

## 2019-07-01 RX ADMIN — GABAPENTIN 300 MG: 300 CAPSULE ORAL at 18:33

## 2019-07-01 RX ADMIN — INSULIN LISPRO 2 UNITS: 100 INJECTION, SOLUTION INTRAVENOUS; SUBCUTANEOUS at 18:33

## 2019-07-01 RX ADMIN — PANTOPRAZOLE SODIUM 40 MG: 40 TABLET, DELAYED RELEASE ORAL at 05:26

## 2019-07-01 RX ADMIN — CLOPIDOGREL BISULFATE 75 MG: 75 TABLET ORAL at 08:34

## 2019-07-01 RX ADMIN — ATORVASTATIN CALCIUM 40 MG: 40 TABLET, FILM COATED ORAL at 21:32

## 2019-07-01 RX ADMIN — APIXABAN 10 MG: 5 TABLET, FILM COATED ORAL at 21:32

## 2019-07-01 RX ADMIN — Medication 5 ML: at 13:37

## 2019-07-01 RX ADMIN — INSULIN GLARGINE 20 UNITS: 100 INJECTION, SOLUTION SUBCUTANEOUS at 21:32

## 2019-07-01 RX ADMIN — GABAPENTIN 300 MG: 300 CAPSULE ORAL at 08:34

## 2019-07-01 RX ADMIN — INSULIN LISPRO 2 UNITS: 100 INJECTION, SOLUTION INTRAVENOUS; SUBCUTANEOUS at 08:34

## 2019-07-01 RX ADMIN — APIXABAN 5 MG: 5 TABLET, FILM COATED ORAL at 08:34

## 2019-07-01 NOTE — ROUTINE PROCESS
TRANSFER - OUT REPORT: 
 
Verbal report given to ISABELLE Limon on Ching Pulido  being transferred to [facility] [phone] for routine progression of care Report consisted of patients Situation, Background, Assessment and  
Recommendations(SBAR). Information from the following report(s) SBAR, ED Summary, [facility] and Recent Results was reviewed with the receiving nurse. Lines:  
Peripheral IV 06/30/19 Left Antecubital (Active) Site Assessment Clean, dry, & intact 6/30/2019  9:38 PM  
Phlebitis Assessment 0 6/30/2019  9:38 PM  
Infiltration Assessment 0 6/30/2019  9:38 PM  
Dressing Status Clean, dry, & intact 6/30/2019  9:38 PM  
  
 
Opportunity for questions and clarification was provided. Patient transported with: 
 [facility]

## 2019-07-01 NOTE — PROGRESS NOTES
06/30/19 2222   Dual Skin Pressure Injury Assessment   Dual Skin Pressure Injury Assessment WDL   Second Care Provider (Based on Facility Policy) Yumiko Magana RN   Skin Integumentary   Skin Integumentary (WDL) X   Skin Color Appropriate for ethnicity; Red  (sacrum red but blanchable)   Turgor Epidermis thin w/ loss of subcut tissue

## 2019-07-01 NOTE — PROGRESS NOTES
Speech language pathology: bedside swallow note: Initial Assessment    NAME/AGE/GENDER: Doron Kirk is a 67 y.o. male  DATE: 7/1/2019  PRIMARY DIAGNOSIS: TIA (transient ischemic attack) [G45.9]      ICD-10: Treatment Diagnosis: R13.12 Oropharyngeal Dysphagia  INTERDISCIPLINARY COLLABORATION: Registered Nurse  PRECAUTIONS/ALLERGIES: Other food ASSESSMENT:Based on the objective data described below, Mr. Thang Calvo presents with normal oropharyngeal swallow function. Patient participated in Modified Barium Swallow Study 3/16/18 which revealed deep nonclearing laryngeal penetration with thin liquids via straw. Patient declined regular consistency cracker during assessment. Recommended diet post MBSS: mechanical soft and thin liquids via cup; no straws. Patient exhibited no overt s/sx airway compromise with any trialed consistencies this date: thin liquids via cup/successive swallows, mixed, and regular consistency. Patient declined thin via straws stating he does not use straws. Oral prep time and oral clearance WNL. Patient with laryngeal elevation present upon palpation with single swallow per bolus. Patient denies globus sensation or any reflux symptoms. Recommend regular consistency diet and thin liquids via cup only No straws. Medication whole with thin liquids. No further speech therapy indicated at this time as swallow function is within normal limits. Please re-consult if new concerns arise. Patient reports no recent decline in cognitive linguistic function; however, states decline in short term memory over past year. Educated patient on option to complete cognitive linguistic assessment at later date; however, patient declined stating he had previously had home health cognitive linguistic treatment and did not find it beneficial.   ?????? ? ? This section established at most recent assessment??????????  PROBLEM LIST (Impairments causing functional limitations):  1.  Oropharyngeal dysphagia- No symptoms identified    REHABILITATION POTENTIAL FOR STATED GOALS: Excellent  PLAN OF CARE:   Patient will benefit from skilled intervention to address the following impairments. RECOMMENDATIONS AND PLANNED INTERVENTIONS (Benefits and precautions of therapy have been discussed with the patient.):  · PO:  Regular  · Liquids:  regular thin-NO straws  MEDICATIONS:  · With liquid  ASPIRATION PRECAUTIONS:  · Slow rate of intake  · Small bites/sips  · Upright at 90 degrees during meal  COMPENSATORY STRATEGIES/MODIFICATIONS INCLUDING:  · None  OTHER RECOMMENDATIONS (including follow up treatment recommendations):   · Oral motor exercises  · Family training/education  · Patient education  RECOMMENDED DIET MODIFICATIONS DISCUSSED WITH:  · Nursing  · Patient  FREQUENCY/DURATION: No further speech therapy indicated at this time as oropharyngeal swallow function is within normal limits. RECOMMENDED REHABILITATION/EQUIPMENT: (at time of discharge pending progress): Due to the probability of continued deficits (see above) this patient will not likely need continued skilled speech therapy after discharge. SUBJECTIVE:   \"I'm doing fine. Can I go home now? \"  History of Present Injury/Illness: Mr. Sheron Rogel  has a past medical history of Arthritis, BPH (benign prostatic hypertrophy), CAD (coronary artery disease) (11/2009), Claustrophobia, Factor V deficiency ([address] Former cigarette smoker, GERD (gastroesophageal reflux disease), H/O heart artery stent, Heart murmur, Hyperlipidemia, Hypertension, Insulin dependent diabetes mellitus ([address] (6/30/2019), Leg weakness, bilateral (6/30/2019), Lumbar stenosis (2/14/2018), Neuropathy, Other calculus in bladder, PONV (postoperative nausea and vomiting), Poor historian, Pulmonary HTN ([address] (1/30/2019), Pure hypercholesterolemia, PVD (peripheral vascular disease) ([address] Skin cancer, Thromboembolus ([address] (2/2012), and Unspecified sleep apnea. .  He also  has a past surgical history that includes hx heart catheterization (11/2009); pr prostate biopsy, needle, saturation sampling; hx prostatectomy; hx other surgical; hx urological; and hx colonoscopy. Present Symptoms:    Pain Scale 1: Numeric (0 - 10)  Pain Intensity 1: 0  Current Medications:   No current facility-administered medications on file prior to encounter. Current Outpatient Medications on File Prior to Encounter   Medication Sig Dispense Refill    apixaban (ELIQUIS) 5 mg tablet Take 1 Tab by mouth every twelve (12) hours. 60 Tab 0    metoprolol tartrate (LOPRESSOR) 50 mg tablet Take 50 mg by mouth daily.  LANSOPRAZOLE (PREVACID PO) Take 30 mg by mouth daily.  gabapentin (NEURONTIN) 300 mg capsule Take 300 mg by mouth two (2) times a day.  insulin detemir (LEVEMIR) 100 unit/mL injection 20 Units by SubCUTAneous route nightly.  clopidogrel (PLAVIX) 75 mg tablet Take 75 mg by mouth daily. Last dose mid Jan 2018      magnesium oxide (MAG-OX) 400 mg tablet Take 400 mg by mouth daily.        Current Dietary Status:     Regular/thin  History of reflux:  YES    Reflux medication:Lansoprazole  Social History/Home Situation:    Home Environment: Private residence  One/Two Story Residence: One story  Living Alone: Yes  Support Systems: Spouse/Significant Other/Partner, Child(jori)  Patient Expects to be Discharged to[de-identified] Private residence  Current DME Used/Available at Home: Walker, rolling, Cane, quad  OBJECTIVE:   Respiratory Status:        CXR Results:N/A  MRI/CT Results:no evidence of acute infarction  Oral Motor Structure/Speech:  Oral-Motor Structure/Motor Speech  Labial: No impairment  Dentition: Natural, Intact  Oral Hygiene: Adequate  Lingual: No impairment  Velum: No impairment    Cognitive and Communication Status:  Neurologic State: Alert  Orientation Level: Oriented X4  Cognition: Follows commands  Perception: Appears intact  Perseveration: No perseveration noted  Safety/Judgement: Not assessed    BEDSIDE SWALLOW EVALUATION  Oral Assessment:  Oral Assessment  Labial: No impairment  Dentition: Natural;Intact  Oral Hygiene: Adequate  Lingual: No impairment  Velum: No impairment  P.O. Trials:  Patient Position: Upright in bed    The patient was given the following:   Consistency Presented: Solid; Thin liquid;Mixed consistency  How Presented: Successive swallows;Cup/gulp    ORAL PHASE:  Bolus Acceptance: No impairment  Bolus Formation/Control: No impairment  Propulsion: No impairment     Oral Residue: None    PHARYNGEAL PHASE:     Laryngeal Elevation: (Present upon palpation)  Aspiration Signs/Symptoms: None  Vocal Quality: No impairment           Pharyngeal Phase Characteristics: No impairment, issues, or problems     OTHER OBSERVATIONS:  Rate/bite size: WNL   Endurance: WNL   Comments: Tool Used: Dysphagia Outcome and Severity Scale (KENDELL)    Score Comments   Normal Diet  [x] 7 With no strategies or extra time needed   Functional Swallow  [] 6 May have mild oral or pharyngeal delay       Mild Dysphagia    [] 5 Which may require one diet consistency restricted (those who demonstrate penetration which is entirely cleared on MBS would be included)   Mild-Moderate Dysphagia  [] 4 With 1-2 diet consistencies restricted       Moderate Dysphagia  [] 3 With 2 or more diet consistencies restricted       Moderately Severe Dysphagia  [] 2 With partial PO strategies (trials with ST only)       Severe Dysphagia  [] 1 With inability to tolerate any PO safely          Score:  Initial: 7 Most Recent: X (Date: --)   Interpretation of Tool: The Dysphagia Outcome and Severity Scale (KENDELL) is a simple, easy-to-use, 7-point scale developed to systematically rate the functional severity of dysphagia based on objective assessment and make recommendations for diet level, independence level, and type of nutrition.        Payor: [facility] [place] [facility] / Plan: [place] [employer] [place] / Product Type: Managed Care Medicare /     TREATMENT: (In addition to Assessment/Re-Assessment sessions the following treatments were rendered)  Assessment/Reassessment only, no treatment provided today  MODALITIES:                                                                    ORAL MOTOR  EXERCISES:                                                                                                                                                                      LARYNGEAL / PHARYNGEAL EXERCISES:                                                                                                                                     __________________________________________________________________________________________________  Safety:   After treatment position/precautions:  · Call light within reach  · RN notified  · Upright in Bed  Treatment Assessment:  Oropharyngeal dysphagia- No symptoms identified. Progression/Medical Necessity:   · No further speech therapy clinically indicated. Compliance with Program/Exercises: Compliant with clinical swallowing evaluation. Reason for Continuation of Services/Other Comments:  · No further speech therapy clinically indicated. Recommendations/Intent for next treatment session: No further speech therapy indicated at this time as swallow function is within normal limits. Please re-consult if new concerns arise.      Total Treatment Duration:  Time In: 9073  Time Out: 4600 [facility]

## 2019-07-01 NOTE — CONSULTS
[address]    Name:  eDmetrio Weiss  MR#:  [id number]  :  1940  ACCOUNT #:  [de-identified]  DATE OF SERVICE:          ATTENDING PHYSICIAN:  Dr. Ramirez Hazard:  Lower extremity weakness x2 years. HISTORY OF PRESENT ILLNESS:  A 31-year-old gentleman known to me status post L3-L4 laminectomy and diskectomy 2 years ago for symptomatic radiculopathy. The patient has had a progressive course with lower extremity weakness and was admitted to the hospital for lower extremity weakness and possible TIA. He had some perioral and facial numbness. MRI scanning of the brain this morning was negative for acute CVA but did show a chronic lacunar infarction and some ventriculomegaly. [place] He does have cortical atrophy, however. MRI scanning of the lumbar spine shows postsurgical change at L3-L4, normal alignment, and no significant spinal stenosis. There is some disk bulging at L3-L4 and he does have some foraminal stenosis. However, no lateral stenosis. ALLERGIES:  NO MEDICATION ALLERGIES. MEDICATIONS:  Listed on the universal medication form attached to the chart. Please refer to that form. PAST MEDICAL HISTORY:  Significant for coronary artery disease, diabetic neuropathy, essential hypertension, GERD, insulin-dependent diabetes mellitus, bilateral leg weakness, pulmonary hypertension. SOCIAL HISTORY:  He is a nonsmoker. He is a nondrinker. He is retired. REVIEW OF SYSTEMS:  Negative for shortness of breath or chest pain. History of previous thromboembolism, pulmonary emboli. PHYSICAL EXAMINATION:  Awake, alert, oriented x3. Cranial nerves II-XII are intact. Motor strength 5/5. No pathological reflexes present. Gait not assessed. Sensation distally reveals that he was unable to tell position sense in his feet x6.     IMPRESSION:  A 31-year-old gentleman with multiple health issues including diagnosed diabetic neuropathy, who was admitted for lower extremity weakness. No evidence of acute stroke on MRI scanning. Lumbar MRI scan is negative for significant spinal stenosis of any kind. He does not have position sense in the lower extremities. RECOMMENDATIONS:  Nonsurgical.  Agree with Neurology evaluation. He does not seem to have the triad of normal pressure hydrocephalus despite the ventriculomegaly which may be related to the atrophy.     Agree with Neurology referral.    Thank you for this referral.      MD JUDI [id number]  D:  07/01/2019 12:46  T:  07/01/2019 12:50  JOB #:  [id number]

## 2019-07-01 NOTE — PROGRESS NOTES
OT order received, chart reviewed, evaluation attempted; pt off floor for testing. Will follow up as schedule allows. Addendum: US + BLE DVTs. Will follow up when pt has been adequately anticoagulated to safely participate in evaluation.     Gerry North, OT

## 2019-07-01 NOTE — H&P
Hospitalist Admission History and Physical       CHIEF COMPLAINT:  Transient facial weakness and worsening bilateral leg weakness. Subjective:   Patient is an obese 67 y.o.  male who presents to ER this pm with complaints of progressive bilateral leg weakness for > 2 years with sudden worsening over the past week or so, now able to shuffle with walker and assist only. Old MRI of lumbar spine from 2017 below with spinal impingement indicated. He has not been referred to Neurosurgery as yet for unclear reasons. He also reports transient bilateral \"mouth drawing \" that lasted about 1.5 hours earlier today. He denies any speech impairment, but does report he was unable to smile for that length of time. Next door neighbor who accompanies patient admits to witnessing same. Spontaneous resolution, is now back to baseline. He has no family locally and lives alone. He also informs me he has not taken his eliquis for 10 days as he couldn't find it. 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 Denies SOB, chest pain, tachycardia or calf pain. Evaluated by Teleneurology in ER with recommendations. Past Medical History:   Diagnosis Date    Arthritis     BPH (benign prostatic hypertrophy)     bph    CAD (coronary artery disease) 11/2009    3 stents per pt    Claustrophobia     Factor V deficiency ([address]     takes Plavix, Coumadin and aspirin- held for surgery     Former cigarette smoker     GERD (gastroesophageal reflux disease)     controlled with prevacid- only thing that controls it    H/O heart artery stent     X3    Heart murmur     MVP   LVEF 50%    Hyperlipidemia     Hypertension     Insulin dependent diabetes mellitus ([address] 6/30/2019    A1C: 1/19: 8.8    Leg weakness, bilateral 6/30/2019    WORSE X 1-2 WEEKS.      Lumbar stenosis 2/14/2018    Neuropathy     Other calculus in bladder     PONV (postoperative nausea and vomiting)     Poor historian     Pulmonary HTN ([facility] 1/30/2019    Pure hypercholesterolemia     no current meds    PVD (peripheral vascular disease) (HCC)     Skin cancer     reomoved from Right side of head    Thromboembolus ([facility] 2/2012    3 clots in leg and 1 PE    Unspecified sleep apnea     pt denies- it is on Dr Efrain Juarez H&P        Past Surgical History:   Procedure Laterality Date    HX COLONOSCOPY      polyps removed    HX HEART CATHETERIZATION  11/2009    stents x 3    HX OTHER SURGICAL      BLADDER STONE REMOVAL    HX PROSTATECTOMY      TRANSURETHRAL- pt denies    HX UROLOGICAL      CYSTOSCOPY    OR PROSTATE BIOPSY, NEEDLE, SATURATION SAMPLING      AND ULTASOUND        Family History   Problem Relation Age of Onset    Heart Disease Brother     Cancer Mother         Stomach Cancer    No Known Problems Father     Diabetes Sister     Dementia Brother        Social History     Social History Narrative    6/30/19:  PATIENT IS SINGLE, LIVES ALONE. Prosper Pryor [phone] LOOKS AFTER PATIENT. DAUGHTER PEG LIVES IN [place]. Social History     Substance and Sexual Activity   Drug Use No       Allergies   Allergen Reactions    Other Food Other (comments)       Prior to Admission medications    Medication Sig Start Date End Date Taking? Authorizing Provider   apixaban (ELIQUIS) 5 mg tablet Take 1 Tab by mouth every twelve (12) hours. 2/4/19   Paul Hawkins, DO   metoprolol tartrate (LOPRESSOR) 50 mg tablet Take 50 mg by mouth daily. Provider, Historical   LANSOPRAZOLE (PREVACID PO) Take 30 mg by mouth daily. Provider, Historical   gabapentin (NEURONTIN) 300 mg capsule Take 300 mg by mouth two (2) times a day. Provider, Historical   insulin detemir (LEVEMIR) 100 unit/mL injection 20 Units by SubCUTAneous route nightly. Provider, Historical   clopidogrel (PLAVIX) 75 mg tablet Take 75 mg by mouth daily.  Last dose mid Jan 2018    Provider, Historical   magnesium oxide (MAG-OX) 400 mg tablet Take 400 mg by mouth daily. Provider, Historical     PHP:  Nathaly Pritchard MD    Review of Systems  A comprehensive review of systems was negative except for that written in the HPI. Patient is an extremely poor historian. Objective:     Visit Vitals  /79   Pulse 64   Resp 21   Ht 6' 1\" (1.854 m)   Wt 91.9 kg (202 lb 8 oz)   SpO2 96%   BMI 26.72 kg/m²      Data Review:   Recent Results (from the past 24 hour(s))   CBC WITH AUTOMATED DIFF    Collection Time: 06/30/19  6:34 PM   Result Value Ref Range    WBC 7.3 4.3 - 11.1 K/uL    RBC 5.40 4.23 - 5.6 M/uL    HGB 12.6 (L) 13.6 - 17.2 g/dL    HCT 41.2 41.1 - 50.3 %    MCV 76.3 (L) 79.6 - 97.8 FL    MCH 23.3 (L) 26.1 - 32.9 PG    MCHC 30.6 (L) 31.4 - 35.0 g/dL    RDW 16.5 (H) 11.9 - 14.6 %    PLATELET 535 715 - 173 K/uL    MPV 9.7 9.4 - 12.3 FL    ABSOLUTE NRBC 0.00 0.0 - 0.2 K/uL    DF AUTOMATED      NEUTROPHILS 74 43 - 78 %    LYMPHOCYTES 15 13 - 44 %    MONOCYTES 9 4.0 - 12.0 %    EOSINOPHILS 2 0.5 - 7.8 %    BASOPHILS 1 0.0 - 2.0 %    IMMATURE GRANULOCYTES 0 0.0 - 5.0 %    ABS. NEUTROPHILS 5.4 1.7 - 8.2 K/UL    ABS. LYMPHOCYTES 1.1 0.5 - 4.6 K/UL    ABS. MONOCYTES 0.6 0.1 - 1.3 K/UL    ABS. EOSINOPHILS 0.1 0.0 - 0.8 K/UL    ABS. BASOPHILS 0.1 0.0 - 0.2 K/UL    ABS. IMM. GRANS. 0.0 0.0 - 0.5 K/UL   METABOLIC PANEL, COMPREHENSIVE    Collection Time: 06/30/19  6:34 PM   Result Value Ref Range    Sodium 140 136 - 145 mmol/L    Potassium 4.0 3.5 - 5.1 mmol/L    Chloride 107 98 - 107 mmol/L    CO2 25 21 - 32 mmol/L    Anion gap 8 7 - 16 mmol/L    Glucose 217 (H) 65 - 100 mg/dL    BUN 23 8 - 23 MG/DL    Creatinine 1.90 (H) 0.8 - 1.5 MG/DL    GFR est AA 44 (L) >60 ml/min/1.73m2    GFR est non-AA 37 (L) >60 ml/min/1.73m2    Calcium 8.6 8.3 - 10.4 MG/DL    Bilirubin, total 0.3 0.2 - 1.1 MG/DL    ALT (SGPT) 23 12 - 65 U/L    AST (SGOT) 11 (L) 15 - 37 U/L    Alk.  phosphatase 115 50 - 136 U/L    Protein, total 6.7 6.3 - 8.2 g/dL    Albumin 3.6 3.2 - 4.6 g/dL    Globulin 3.1 2.3 - 3.5 g/dL    A-G Ratio 1.2 1.2 - 3.5     MAGNESIUM    Collection Time: 06/30/19  6:34 PM   Result Value Ref Range    Magnesium 2.1 1.8 - 2.4 mg/dL   PHOSPHORUS    Collection Time: 06/30/19  6:34 PM   Result Value Ref Range    Phosphorus 2.9 2.3 - 3.7 MG/DL     CT HEAD:  No CT evidence of acute intracranial abnormality.       Old records reviewed. LUMBAR MRI 10/10/17:    1. At L3-4, a severe cantral canal impingement due to a posterior central disc extrusion and facet arthropathy appears present. 2.  Additional areas of fspondylosis are present with possibly mild right foraminal narrowing at L5 S1 but no additiona sites of substantial neural impingement otherwise suggested. PHYSICAL EXAM:  General appearance:  Oriented and alert, however, is an extremely poor historian. Obese. Unkempt. At baseline per patient and neighbor. only real complaint is bilateral leg weakness. Cooperative, no acute distress, appears stated age  Head: Normocephalic, without obvious abnormality, atraumatic  Eyes: conjunctivae/corneas clear. Left pupil larger than right at base line. Both reactive to light. Throat: Lips, mucosa, and tongue normal. Teeth and gums normal  Neck: supple, symmetrical, trachea midline, thyroid: not enlarged, symmetric, no tenderness/mass/nodules, no carotid bruit and no JVD  Lungs: clear to auscultation bilaterally, slightly diminished in bases. Heart: regular rate and rhythm, S1, S2 normal, no murmur, click, rub or gallop  Abdomen: Protuberant, soft, non-tender. Bowel sounds normal. No masses,  no organomegaly  Extremities: All four extremities normal, atraumatic, no cyanosis or edema.   No calf pain   Skin: Skin color, texture, turgor normal. No rashes or lesions  Neurologic: Grossly normal    Assessment/Plan:   Transient bilateral mouth numbness:  Questionable TIA    Stroke protocols in place   Evaluated by [facility]   Neurology consult in am    MRI IN AM    CAROTID dopplers in am   Bilateral lower extremity weakness:  Most likely due to diabetic neuropathy and spinal stenosis   Neurosurgery consult after Lumbar MRI in am    Continue home gabapentin    PT, OT consults   CM consult for discharge planning   Pure hypercholesterolemia:  Home meds  IDDM:  Last A1C: 8.8   Home insulin and add SSI   A1C in am   Concern for medication noncompliance   Stress need for same  Coagulopthy on eliquis for recurrent DVT and PE history in Factor V deficiency   Has not been taking x 5 days   Reordered   Ultrasound legs.    Essential hypertension:  Home meds  BRODERICK:  Monitor  Pulmonary HTN:  Home meds  CAD WITH STENT:  Home meds  GERD:  Home PPI    FULL CODE  MAY NEED REHAB  HOME MEDS REVIEWED AND RECONCILED BY ME   Signed By: Panda Vallejo NP     June 30, 2019

## 2019-07-01 NOTE — PROGRESS NOTES
Spiritual Care Visit, initial visit. Visited with patient at bedside. Prayed for patient's healing and health. Visit by Jayce Wilkes, Staff .  [place]., Kerri.B., B.A.

## 2019-07-01 NOTE — PROGRESS NOTES
TRANSFER - IN REPORT:    Verbal report received from [address] [place] [address] Courtney, RN(name) on Azeb Alexander  being received from ER(unit) for routine progression of care      Report consisted of patients Situation, Background, Assessment and   Recommendations(SBAR). Information from the following report(s) SBAR, Kardex, ED Summary, Intake/Output, MAR and Recent Results was reviewed with the receiving nurse. Opportunity for questions and clarification was provided. Assessment to be completed upon patients arrival to unit and care assumed.

## 2019-07-01 NOTE — PROGRESS NOTES
PT note:    Chart reviewed and pt with new findings of B LE DVT. Will check back tomorrow.     Thanks,  CORA AbarcaT

## 2019-07-01 NOTE — PROGRESS NOTES
Hospitalist Progress Note    2019  Admit Date: 2019  6:35 PM   NAME: Pat Maki   :  1940   MRN:  [id number]   Attending: Ilsa Reynolds MD  PCP:  Betty Pearce MD    SUBJECTIVE:   Pat Maki is a 67 y.o. Male with a history of CAD, Factor V Leiden, h/o PEs, DVTs, GERD, HTN, HLD, Neuropathy, PVD, JESSICA who presented to the ED with a complaint of progressive bilateral leg weakness for > 2 years with sudden worsening over the past week or so, now able to shuffle with walker and assist only. Old MRI of lumbar spine from 2017 below with spinal impingement indicated. Also facial droop which has since resolved. He missed 10 days of Eliquis because he \"couldn't find it. \" Evaluated by Teleneurology in ER with recommendations. CT head NAD. Patient placed in observation. 2019: No Events over night. No new complaints. Refuses MRI due to claustrophobia and refuses premedication. Symptoms resolved. Review of Systems negative with exception of pertinent positives noted above  PHYSICAL EXAM     Visit Vitals  /88 (BP 1 Location: Right arm, BP Patient Position: Supine)   Pulse 68   Temp 97.8 °F (36.6 °C)   Resp 19   Ht 6' 1\" (1.854 m)   Wt 91.9 kg (202 lb 8 oz)   SpO2 93%   BMI 26.72 kg/m²      Temp (24hrs), Av °F (36.7 °C), Min:97.8 °F (36.6 °C), Max:98.4 °F (36.9 °C)    Oxygen Therapy  O2 Sat (%): 93 % (19 0800)  Pulse via Oximetry: 65 beats per minute (19 2130)  No intake or output data in the 24 hours ending 19 1031   General: Alert and oriented, No acute distress. Eye: EOMI, Normal conjunctiva, Vision Unchanged. HENT: Normocephalic, atraumatic, Normal hearing, moist mucous membranes. Neck: Supple, Non-tender. Respiratory: Lungs are clear to auscultation, Respirations are non-labored. Cardiovascular: Normal rate, Regular rhythm, No murmur.    Gastrointestinal: Soft, Non-tender, nondistended, positive bowel sounds   Musculoskeletal: No cyanosis, clubbing or edema.   Integumentary: Warm, Dry, Pink, no rash. Neurologic: Alert, Oriented, No focal deficits. Normal coordination  Cognition and Speech: Oriented, Speech clear and coherent. Psychiatric: Cooperative, Appropriate mood & affect. ASSESSMENT      TIA work up Symptoms improving.   - Neurology and neurosurgery consulted  - Echo, MRI Carotid US.   - CT head: NAD  - lipids: HDL low rest at goal  - A1C 7.4  - ASA. - PT, ST.   - Neurochecks. Bilateral lower extremity weakness:  Most likely due to diabetic neuropathy and spinal stenosis              Neurosurgery consult after Lumbar MRI in am               Continue home gabapentin               PT, OT consults              CM consult for discharge planning     HLD  - Continue Statin    DM II uncontrolled, hyperglycemia. - ACs qAC & qHS. - ISS. basal  - Diabetic Diet. - A1C 7.4    Concern for medication noncompliance              Stress need for compliance    Coagulopthy on eliquis for recurrent DVT and PE history in Factor V deficiency              Reordered              Ultrasound legs pending    Hypertension  -  Continue home antihypertensives. -  Monitor and trend BP. Acute kidney injury  - Hold nephrotoxic medications. - Follow BUN and Cr.   - IVF    Pulmonary HTN:   - continue Home meds    CAD WITH STENT:  Home meds    GERD:  Home PPI    DVT prophylaxis: Eliquis  Status: Full  Dispo: to Rehab [facility]? when stable. Total Time spent: 30 minutes. Counseling & coordinating care dominated the encounter >55%. Counseled patient regarding dx, rx, tx. Reviewed prior records, labs, vitals, diagnostic tests, flow sheet, home medications, inpatient medications, nursing and provider notes.     Ángel Odom PA-C, MPAS

## 2019-07-01 NOTE — PROGRESS NOTES
Problem: Falls - Risk of  Goal: *Absence of Falls  Description  Document Jon Kumar Fall Risk and appropriate interventions in the flowsheet.   Outcome: Progressing Towards Goal     Problem: TIA/CVA Stroke: 0-24 hours  Goal: Consults, if ordered  Outcome: Progressing Towards Goal     Problem: TIA/CVA Stroke: 0-24 hours  Goal: Diagnostic Test/Procedures  Outcome: Progressing Towards Goal     Problem: TIA/CVA Stroke: 0-24 hours  Goal: Medications  Outcome: Progressing Towards Goal

## 2019-07-01 NOTE — PROGRESS NOTES
SW met with patient in room to discuss discharge planning. He was admitted under observation status for TIA work up. He was alert and oriented x4 at time of discussion. SOCIAL:  Patient lives alone in a 1 level house with 2 step entry with handrail and walk-in shower. His wife is in a long-term care facility in [place]. His daughter, Luigi Rao lives in [place], and oversees spouse's care. 3 other adult children living in [place] (Xavier Urban, and a son). He reports he has a neighbor and friends who live locally that can assist him, but declines having any support that can provide higher level physical care assistance (showering, toileting, etc). He is a , but denies having any VA benefits established. His insurance is confirmed as Soriano Patches of Medicare. He reports his medications are affordable. He gets free samples of Eloquis from his PCP. PCP is confirmed as Tate Townsend. Poor compliance to Eloquis - patient reports he misplaced his medicine for a few days. Currently has 1 weeks worth of Eloquis remaining. No history of mental health or substance abuse issues reported. Source of income: social security - no savings. MOBILITY / HISTORY:  At baseline, patient ambulates with a cane at all times. He reports independence with ADLs and driving. He works in his garden (1 acre) daily. He drives himself approximately 3x per day. DME - cane, rolling walker, rollator  No home oxygen. No dialysis history. No privately paid sitters, aids, caregivers, or CLTC hours. History of STR at [employer] year\". [facility] history with unknown company. Patient states \"I don't believe if that therapy stuff\" regarding rehab, home health, or OP therapy services. PLAN FOR DISCHARGE:  Anticipate patient will DC to home with no services. He has declined any options including STR, home health, or outpatient therapies.  SADIA will continue to follow case during admission for any recommendations regarding discharge needs, and will revisit with patient as needed. Patient reports his daughter, Prerna Mejia (lives in [place]), will arrive in the area on Wednesday, and remain locally for 4 days. Care Management Interventions  PCP Verified by CM: Yes  Mode of Transport at Discharge: Other (see comment)  Transition of Care Consult (CM Consult): Discharge Planning  Discharge Durable Medical Equipment: No  Physical Therapy Consult: Yes  Occupational Therapy Consult: Yes  Speech Therapy Consult: No  Current Support Network: Own Home, Lives Alone  Confirm Follow Up Transport: Family  Plan discussed with Pt/Family/Caregiver: Yes(Spoke with patient)  Freedom of Choice Offered: Yes  Discharge Location  Discharge Placement: Home with home health(Home.  Patient declining STR, HH, and OP therapies. )

## 2019-07-01 NOTE — PROGRESS NOTES
NS  CONSULT  FULL NOTE DICTATED   IMP:  LE WEAKNESS  MRI L-SPINE IS NEGATIVE FOR SIGNIFICANT STENOSIS  ABSENT POSITION SENSE IN FEET    REC:  Gonzalez Ivey MD

## 2019-07-01 NOTE — PROGRESS NOTES
07/01/19 1926   NIH Stroke Scale   Interval Other (comment)  (Dual NIH w/ Daniele Hurtado RN)   LOC 0   LOC Questions 0   LOC Commands 0   Best Gaze 0   Visual 0   Facial Palsy 0   Motor Right Arm 0   Motor Left Arm 0   Motor Right Leg 0   Motor Left Leg 0   Limb Ataxia 0   Sensory 0   Best Language 0   Dysarthria 0   Extinction and Inattention 0   Total 0

## 2019-07-01 NOTE — PROGRESS NOTES
07/01/19 0715   NIH Stroke Scale   Interval Other (comment)  (shift change w/ISABELLE Oneil)   LOC 0   LOC Questions 0   LOC Commands 0   Best Gaze 0   Visual 0   Facial Palsy 0   Motor Right Arm 0   Motor Left Arm 0   Motor Right Leg 0   Motor Left Leg 0   Limb Ataxia 0   Sensory 0   Best Language 0   Dysarthria 0   Extinction and Inattention 0   Total 0

## 2019-07-01 NOTE — CONSULTS
Consult    Patient: Yoon Martinez MRN: [id number]     YOB: 1940  Age: 67 y.o. Sex: male      Subjective:      Yoon Martinez is a 67 y.o. male who is being seen for lower extremity weakness and facial weakness. Patient reports lower extremity weakness for the past four years. He reports that yesterday, he noted bilateral facial weakness and numbness for 1.5 hours, which then resolved. He is currently prescribed Eliquis, which he has been off of for 10 days because he could not find it. He reports progressive difficulty walking over the past few years, currently uses a walker. He endorses frequent falls. Patient reports peripheral neuropathy, for which he takes gabapentin. He states he will take extra medication, up to three pills at one time, if pain is worse. Past Medical History:   Diagnosis Date    Arthritis     BPH (benign prostatic hypertrophy)     bph    CAD (coronary artery disease) 11/2009    3 stents per pt    Claustrophobia     Factor V deficiency ([facility] [address]     takes Plavix, Coumadin and aspirin- held for surgery     Former cigarette smoker     GERD (gastroesophageal reflux disease)     controlled with prevacid- only thing that controls it    H/O heart artery stent     X3    Heart murmur     MVP   LVEF 50%    Hyperlipidemia     Hypertension     Insulin dependent diabetes mellitus ([address] 6/30/2019    A1C: 1/19: 8.8    Leg weakness, bilateral 6/30/2019    WORSE X 1-2 WEEKS.      Lumbar stenosis 2/14/2018    Neuropathy     Other calculus in bladder     PONV (postoperative nausea and vomiting)     Poor historian     Pulmonary HTN ([address] 1/30/2019    Pure hypercholesterolemia     no current meds    PVD (peripheral vascular disease) (HCC)     Skin cancer     reomoved from Right side of head    Thromboembolus ([address] 2/2012    3 clots in leg and 1 PE    Unspecified sleep apnea     pt denies- it is on Dr Anais Madison H&P     Past Surgical History:   Procedure Laterality Date    HX COLONOSCOPY      polyps removed    HX HEART CATHETERIZATION  2009    stents x 3    HX OTHER SURGICAL      BLADDER STONE REMOVAL    HX PROSTATECTOMY      TRANSURETHRAL- pt denies    HX UROLOGICAL      CYSTOSCOPY    MS PROSTATE BIOPSY, NEEDLE, SATURATION SAMPLING      AND ULTASOUND      Family History   Problem Relation Age of Onset    Heart Disease Brother     Cancer Mother         Stomach Cancer    No Known Problems Father     Diabetes Sister     Dementia Brother      Social History     Tobacco Use    Smoking status: Former Smoker     Packs/day: 1.00     Years: 14.00     Pack years: 14.00     Last attempt to quit: 1970     Years since quittin.5    Smokeless tobacco: Never Used   Substance Use Topics    Alcohol use: No      Current Facility-Administered Medications   Medication Dose Route Frequency Provider Last Rate Last Dose    sodium chloride (NS) flush 5-40 mL  5-40 mL IntraVENous Q8H Mechelle Stewart NP   5 mL at 19 1337    sodium chloride (NS) flush 5-40 mL  5-40 mL IntraVENous PRN Frederick Stewart NP        insulin lispro (HUMALOG) injection   SubCUTAneous AC&HS Daisha GAYATRI Nolan   Stopped at 19 1130    ondansetron (ZOFRAN) injection 4 mg  4 mg IntraVENous Q6H PRN Frederick Stewart NP        atorvastatin (LIPITOR) tablet 40 mg  40 mg Oral QHS Mechelle Stewart NP   40 mg at 19 2347    acetaminophen (TYLENOL) tablet 650 mg  650 mg Oral Q4H PRN Mechelle Stewart NP        docusate sodium (COLACE) capsule 100 mg  100 mg Oral BID PRN Frederick Stewart NP        apixaban (ELIQUIS) tablet 5 mg  5 mg Oral Q12H Mechelle Stewart NP   5 mg at 19 0834    clopidogrel (PLAVIX) tablet 75 mg  75 mg Oral DAILY Mechelle Stewart NP   75 mg at 19 0834    gabapentin (NEURONTIN) capsule 300 mg  300 mg Oral BID Frederick Stewart NP   300 mg at 19 0834    insulin glargine (LANTUS) injection 20 Units  20 Units SubCUTAneous QHS Mechelle Stewart NP   20 Units at 06/30/19 2348    pantoprazole (PROTONIX) tablet 40 mg  40 mg Oral ACB Mechelle Stewart NP   40 mg at 07/01/19 0526    magnesium oxide (MAG-OX) tablet 400 mg  400 mg Oral DAILY Mechelle Stewart NP   400 mg at 07/01/19 0834    metoprolol tartrate (LOPRESSOR) tablet 50 mg  50 mg Oral DAILY Mechelle Stewart NP   50 mg at 07/01/19 3921        Allergies   Allergen Reactions    Other Food Other (comments)       Review of Systems:  CONSTITUTIONAL:  Endorses weakness. Denies weight loss, fever, chills, or fatigue. HEENT:  Eyes:  Denies visual loss, blurred vision, double vision or yellow sclerae. Ears, Nose, Throat:  Denies hearing loss, sneezing, congestion, runny nose or sore throat. SKIN:  Denies rash or itching. CARDIOVASCULAR:  Denies chest pain, chest pressure or chest discomfort. No palpitations or edema. RESPIRATORY:  Denies shortness of breath, cough or sputum. GASTROINTESTINAL:  Denies anorexia, nausea, vomiting or diarrhea. No abdominal pain or blood. GENITOURINARY:  Denies burning with urination. NEUROLOGICAL:  Denies headache, dizziness, syncope, paralysis, ataxia, numbness or tingling in the extremities. Denies change in bowel or bladder control. MUSCULOSKELETAL:  Denies muscle, back pain, joint pain or stiffness. HEMATOLOGIC:  Denies anemia, bleeding or bruising. LYMPHATICS:  Denies enlarged nodes. PSYCHIATRIC: Denies history depression or anxiety. ENDOCRINOLOGIC:  Denies reports of sweating, cold or heat intolerance. No polyuria or polydipsia. ALLERGIES:  Denies history of asthma, hives, eczema or rhinitis.         Objective:     Vitals:    07/01/19 0021 07/01/19 0421 07/01/19 0800 07/01/19 1216   BP: 169/85 163/73 169/88 175/87   Pulse: 63 63 68 (!) 53   Resp: 20 20 19 17   Temp: 97.8 °F (36.6 °C) 98.4 °F (36.9 °C) 97.8 °F (36.6 °C) 98.2 °F (36.8 °C)   SpO2: 93% 92% 93% 97%   Weight:       Height:            Physical Exam:  General - Well developed, well nourished, in no apparent distress. Pleasant and conversent. HEENT - Normocephalic, atraumatic. Conjunctiva, tympanic membranes, and oropharynx are clear. Neck - Supple without masses, no bruits   Cardiovascular - Regular rate and rhythm. Normal S1, S2 without murmurs, rubs, or gallops. Lungs - Clear to auscultation. Abdomen - Soft, nontender with normal bowel sounds. Extremities - Peripheral pulses intact. No edema and no rashes. Neurological examination - Comprehension, attention, memory and reasoning are intact. Language and speech are normal.   On cranial nerve examination, (II, III, IV, VI) pupils are equal, round, and reactive to light. Visual acuity is adequate. Visual fields are full to finger confrontation. Extraocular motility is normal. (V, VII) Face is symmetric and sensation is intact to light touch. (VIII) Hearing is intact. (IX, X) Palate and uvula elevate symmetrically. Voice is normal. (XI) Shoulder shrug is strong and equal bilaterally. (XII)Tongue is midline. Motor examination - There is normal muscle tone and bulk. Power is full throughout. Muscle stretch reflexes are normoactive and there are no pathological reflexes present. Sensation is intact to light touch, pinprick, vibration and proprioception in all extremities. Cerebellar examination demonstrates ataxia with static standing. Gait is abnormal with shuffling gait and broad based stance.          Lab Results   Component Value Date/Time    Cholesterol, total 120 07/01/2019 05:19 AM    HDL Cholesterol 28 (L) 07/01/2019 05:19 AM    LDL, calculated 71.8 07/01/2019 05:19 AM    VLDL, calculated 20.2 07/01/2019 05:19 AM    Triglyceride 101 07/01/2019 05:19 AM    CHOL/HDL Ratio 4.3 07/01/2019 05:19 AM        Lab Results   Component Value Date/Time    Hemoglobin A1c 7.4 (H) 07/01/2019 05:19 AM        CT Results (most recent):  Results from Hospital Encounter encounter on 06/30/19   CT HEAD WO CONT Narrative Noncontrast head CT     Clinical Indication: Acute facial drooping and oral weakness/paresthesias. Technique: Noncontrast axial images were obtained through the brain. All CT  scans at this location are performed using dose modulation techniques as  appropriate including the following: Automated exposure control, adjustment of  the MA and/or kV according to patient's size, or use of iterative reconstruction  technique. Comparison: 1/28/2019    Findings: There is no acute intracranial hemorrhage, developing hydrocephalus,  intra-axial mass, or mass-effect. Small unchanged right basal ganglia lacunar  infarct. Unchanged mild diffuse ventricular prominence. There is no CT evidence  of acute large artery territorial infarction or abnormal extra-axial fluid  collection. The mastoid air cells and paranasal sinuses are clear where imaged. No displaced skull fractures are present. Impression Impression: No CT evidence of acute intracranial abnormality. Results for orders placed or performed during the hospital encounter of 06/30/19   EKG, 12 LEAD, INITIAL   Result Value Ref Range    Ventricular Rate 85 BPM    Atrial Rate 85 BPM    P-R Interval 184 ms    QRS Duration 142 ms    Q-T Interval 380 ms    QTC Calculation (Bezet) 452 ms    Calculated P Axis 39 degrees    Calculated R Axis -51 degrees    Calculated T Axis 10 degrees    Diagnosis       !! AGE AND GENDER SPECIFIC ECG ANALYSIS !! Normal sinus rhythm  Right bundle branch block  Left anterior fascicular block  !!! Bifascicular block !!!   Abnormal ECG  When compared with ECG of 28-JAN-2019 03:08,  Left anterior fascicular block is now Present  T wave inversion no longer evident in Anterior leads  Confirmed by INGRID LOVE (), SANGEETHA MCKOY ([id number]) on 7/1/2019 9:23:32 AM          MRI Results (most recent):   Results from [place] encounter on 06/30/19   MRI BRAIN WO CONT    Narrative MRI brain without contrast: 07/01/2019    History: Progressive leg weakness x2 years worsening acutely over the last week. Gait disturbance. .    Imaging sequences: Sagittal short TR/short TE, axial short TR/short TE, long  TR/long TE, FLAIR, gradient recall, diffusion weighted images and ADC mapping. Coronal FLAIR. Imaging was performed on a 1.5 Antonia magnet. Comparison: None. Correlation is made to the CT scan of the brain 06/30/2019. Findings: The ventricles are mildly prominent in relationship to borderline  prominent sulci. There are no extra-axial fluid collections. Normal flow voids  are present within all of the major intracranial vessels. No evidence of  intraparenchymal hemorrhage or mass effect is identified. There are no areas of  restricted diffusion to suggest an acute or subacute infarction. There is a  remote right thalamic lacunar infarction. Few scattered foci of T2  hyperintensity elsewhere within the supratentorial white matter suggest chronic  small vessel ischemic change, unremarkable for age. The visualized mastoid air  cells and paranasal sinuses are well pneumatized and aerated. Impression Impression:   1. No evidence of acute infarction. 2. Mild ventricular prominence would raise suspicion for either pressure  hydrocephalus versus centrally predominant volume loss. 3. Remote right thalamic lacunar infarction. Assessment:     67year old who presented with facial numbness/weakness, now resolved. MRI of brain was negative for stroke. The patient also reports a four year history difficulty ambulation with frequent falls, which has progressed over four years. MRI and CT of brain demonstrate cerebral atrophy. Clinical findings are not consistent with NPH. Findings of MRI on lumbar spine would not account for patient's lower extremity weakness. Patient's decline in ambulation is likely related to problems with balance. He would benefit from PT.  Patient also reports overtaking gabapentin, which could contribute to falls and other adverse effects. Plan:     · B12, Folate, TSH, CBC  · PT  · Counseled to take gabapentin only as prescribed     Signed By: Amanda Dewitt NP     July 1, 2019    Patient seen and examined along with MsCarolina Johnnie Subramanian. Pertinent features on review of MRI scan demonstrate features that are consistent with a frontotemporal dementia. It is noted that the patient lives alone and that he is however very active in terms of his garden. He does give a history that he obliterated for of his tomato plants with a fall and that he was having frequent falls in his yard but is it was in the region of softer he did not worry about it. He  clearly has evidence of frontal lobe gait    From a treatment standpoint not much can be offered. He may be worth a low dose trial of Aricept which has been documented to be of some assistance with gait disorders although not to the same degree that he does impact on occasion on a short intermediate term basis the memory loss associated to brain disease.     It is apparent that the patient has not been taking his prescription medications as prescribed    Questions with reference to ongoing living conditions are clearly pertinent and should be addressed by

## 2019-07-01 NOTE — PROGRESS NOTES
Interdisciplinary team rounds were held 7/1/2019 with the following team members:Care Management, Nurse Practitioner, Physical Therapy and . Plan of care discussed. See clinical pathway and/or care plan for interventions and desired outcomes.

## 2019-07-02 VITALS
HEART RATE: 57 BPM | RESPIRATION RATE: 20 BRPM | SYSTOLIC BLOOD PRESSURE: 167 MMHG | TEMPERATURE: 97.5 F | BODY MASS INDEX: 26.84 KG/M2 | DIASTOLIC BLOOD PRESSURE: 88 MMHG | OXYGEN SATURATION: 98 % | WEIGHT: 202.5 LBS | HEIGHT: 73 IN

## 2019-07-02 LAB
FOLATE SERPL-MCNC: 13.6 NG/ML (ref 3.1–17.5)
GLUCOSE BLD STRIP.AUTO-MCNC: 101 MG/DL (ref 65–100)
GLUCOSE BLD STRIP.AUTO-MCNC: 127 MG/DL (ref 65–100)
TSH SERPL DL<=0.005 MIU/L-ACNC: 1.91 UIU/ML (ref 0.36–3.74)
VIT B12 SERPL-MCNC: 935 PG/ML (ref 193–986)

## 2019-07-02 PROCEDURE — 84443 ASSAY THYROID STIM HORMONE: CPT

## 2019-07-02 PROCEDURE — 96374 THER/PROPH/DIAG INJ IV PUSH: CPT

## 2019-07-02 PROCEDURE — 97165 OT EVAL LOW COMPLEX 30 MIN: CPT

## 2019-07-02 PROCEDURE — 74011250637 HC RX REV CODE- 250/637: Performed by: NURSE PRACTITIONER

## 2019-07-02 PROCEDURE — 99218 HC RM OBSERVATION: CPT

## 2019-07-02 PROCEDURE — 82962 GLUCOSE BLOOD TEST: CPT

## 2019-07-02 PROCEDURE — 97161 PT EVAL LOW COMPLEX 20 MIN: CPT

## 2019-07-02 PROCEDURE — 74011250636 HC RX REV CODE- 250/636: Performed by: NURSE PRACTITIONER

## 2019-07-02 PROCEDURE — 36415 COLL VENOUS BLD VENIPUNCTURE: CPT

## 2019-07-02 PROCEDURE — 74011250637 HC RX REV CODE- 250/637: Performed by: PHYSICIAN ASSISTANT

## 2019-07-02 PROCEDURE — 82746 ASSAY OF FOLIC ACID SERUM: CPT

## 2019-07-02 PROCEDURE — 82607 VITAMIN B-12: CPT

## 2019-07-02 RX ORDER — ATORVASTATIN CALCIUM 40 MG/1
40 TABLET, FILM COATED ORAL
Qty: 30 TAB | Refills: 0 | Status: SHIPPED | OUTPATIENT
Start: 2019-07-02

## 2019-07-02 RX ADMIN — MAGNESIUM GLUCONATE 500 MG ORAL TABLET 400 MG: 500 TABLET ORAL at 08:23

## 2019-07-02 RX ADMIN — METOPROLOL TARTRATE 50 MG: 50 TABLET, FILM COATED ORAL at 08:27

## 2019-07-02 RX ADMIN — APIXABAN 10 MG: 5 TABLET, FILM COATED ORAL at 08:23

## 2019-07-02 RX ADMIN — PANTOPRAZOLE SODIUM 40 MG: 40 TABLET, DELAYED RELEASE ORAL at 05:23

## 2019-07-02 RX ADMIN — ONDANSETRON 4 MG: 2 INJECTION INTRAMUSCULAR; INTRAVENOUS at 12:29

## 2019-07-02 RX ADMIN — CLOPIDOGREL BISULFATE 75 MG: 75 TABLET ORAL at 08:23

## 2019-07-02 RX ADMIN — Medication 5 ML: at 05:24

## 2019-07-02 RX ADMIN — GABAPENTIN 300 MG: 300 CAPSULE ORAL at 08:23

## 2019-07-02 RX ADMIN — Medication 10 ML: at 12:29

## 2019-07-02 NOTE — DISCHARGE INSTRUCTIONS
Stroke: After Your Visit     Your Care Instructions     You have had a stroke. Risk factors for stroke include being overweight, smoking, and sedentary lifestyle. This means that the blood flow to a part of your brain was blocked for some time, which damages the nerve cells in that part of the brain. The part of your body controlled by that part of your brain may not function properly now. The brain is an amazing organ that can heal itself to some degree. The stroke you had damaged part of your brain, but other parts of your brain may take over in some way for the damaged areas. You have already started this process. Going home may be hard for you and your family. The more you can try to do for yourself, the better. Remember to take each day one at a time. Follow-up care is a key part of your treatment and safety. Be sure to make and go to all appointments, and call your doctor if you are having problems. Its also a good idea to know your test results and keep a list of the medicines you take. How can you care for yourself at home? Enter a stroke rehabilitation (rehab) program, if your doctor recommends it. Physical, speech, and occupational therapies can help you manage bathing, dressing, eating, and other basics of daily living. Eat a heart-healthy diet that is low in cholesterol, saturated fat, and salt. Eat lots of fresh fruits and vegetables and foods high in fiber. Increase your activities slowly. Take short rest breaks when you get tired. Gradually increase the amount you walk. Start out by walking a little more than you did the day before. Do not drive until your doctor says it is okay. It is normal to feel sad or depressed after a stroke. If the blues last, talk to your doctor. If you are having problems with urine leakage, go to the bathroom at regular times, including when you first wake up and at bedtime. Also, limit fluids after dinner.   If you are constipated, drink plenty of fluids, enough so that your urine is light yellow or clear like water. If you have kidney, heart, or liver disease and have to limit fluids, talk with your doctor before you increase the amount of fluids you drink. Set up a regular time for using the toilet. If you continue to have constipation, your doctor may suggest using a bulking agent, such as Metamucil, or a stool softener, laxative, or enema. Medicines  Take your medicines exactly as prescribed. Call your doctor if you think you are having a problem with your medicine. You may be taking several medicines. ACE (angiotensin-converting enzyme) inhibitors, angiotensin II receptor blockers (ARBs), beta-blockers, diuretics (water pills), and calcium channel blockers control your blood pressure. Statins help lower cholesterol. Your doctor may also prescribe medicines for depression, pain, sleep problems, anxiety, or agitation. If your doctor has given you medicine that prevents blood clots, such as warfarin (Coumadin), aspirin combined with extended-release dipyridamole (Aggrenox), clopidogrel (Plavix), or aspirin to prevent another stroke, you should:  Tell your dentist, pharmacist, and other health professionals that you take these medicines. Watch for unusual bruising or bleeding, such as blood in your urine, red or black stools, or bleeding from your nose or gums. Get regular blood tests to check your clotting time if you are taking Coumadin. Wear medical alert jewelry that says you take blood thinners. You can buy this at most drugstores. Do not take any over-the-counter medicines or herbal products without talking to your doctor first.  If you take birth control pills or hormone replacement therapy, talk to your doctor about whether they are right for you. For family members and caregivers  Make the home safe. Set up a room so that your loved one does not have to climb stairs. Be sure the bathroom is on the same floor.  Move throw rugs and furniture that could cause falls, and make sure that the lighting is good. Put grab bars and seats in tubs and showers. Find out what your loved one can do and what he or she needs help with. Try not to do things for your loved one that your loved one can do on his or her own. Help him or her learn and practice new skills. Visit and talk with your loved one often. Try doing activities together that you both enjoy, such as playing cards or board games. Keep in touch with your loved one's friends as much as you can, and encourage them to visit. Take care of yourself. Do not try to do everything yourself. Ask other family members to help. Eat well, get enough rest, and take time to do things that you enjoy. Keep up with your own doctor visits, and make sure to take your medicines regularly. Get out of the house as much as you can. Join a local support group. Find out if you qualify for home health care visits to help with rehab or for adult day care. When should you call for help? Call 911 anytime you think you may need emergency care. For example, call if:  You have signs of another stroke. These may include:  Sudden numbness, paralysis, or weakness in your face, arm, or leg, especially on only one side of your body. New problems with walking or balance. Sudden vision changes. Drooling or slurred speech. New problems speaking or understanding simple statements, or you feel confused. A sudden, severe headache that is different from past headaches. Call 911 even if these symptoms go away in a few minutes. You cough up blood. You vomit blood or what looks like coffee grounds. You pass maroon or very bloody stools. Call your doctor now or seek immediate medical care if:  You have new bruises or blood spots under your skin. You have a nosebleed. Your gums bleed when you brush your teeth. You have blood in your urine. Your stools are black and tarlike or have streaks of blood.   You have vaginal bleeding when you are not having your period, or heavy period bleeding. You have new symptoms that may be related to your stroke, such as falls or trouble swallowing. Watch closely for changes in your health, and be sure to contact your doctor if you have any problems. Where can you learn more? Go to Youca.st.be    Enter C294  in the search box to learn more about \"Stroke: After Your Visit\". © 8476-3295 [employer], [employer]. Care instructions adapted under license by [employer] (which disclaims liability or warranty for this information). This care instruction is for use with your licensed healthcare professional. If you have questions about a medical condition or this instruction, always ask your healthcare professional. Sourav Minors any warranty or liability for your use of this information. Patient Education        Preventing Falls: Care Instructions  Your Care Instructions    Getting around your home safely can be a challenge if you have injuries or health problems that make it easy for you to fall. Loose rugs and furniture in walkways are among the dangers for many older people who have problems walking or who have poor eyesight. People who have conditions such as arthritis, osteoporosis, or dementia also have to be careful not to fall. You can make your home safer with a few simple measures. Follow-up care is a key part of your treatment and safety. Be sure to make and go to all appointments, and call your doctor if you are having problems. It's also a good idea to know your test results and keep a list of the medicines you take. How can you care for yourself at home? Taking care of yourself  · You may get dizzy if you do not drink enough water. To prevent dehydration, drink plenty of fluids, enough so that your urine is light yellow or clear like water. Choose water and other caffeine-free clear liquids.  If you have kidney, heart, or liver disease and have to limit fluids, talk with your doctor before you increase the amount of fluids you drink. · Exercise regularly to improve your strength, muscle tone, and balance. Walk if you can. Swimming may be a good choice if you cannot walk easily. · Have your vision and hearing checked each year or any time you notice a change. If you have trouble seeing and hearing, you might not be able to avoid objects and could lose your balance. · Know the side effects of the medicines you take. Ask your doctor or pharmacist whether the medicines you take can affect your balance. Sleeping pills or sedatives can affect your balance. · Limit the amount of alcohol you drink. Alcohol can impair your balance and other senses. · Ask your doctor whether calluses or corns on your feet need to be removed. If you wear loose-fitting shoes because of calluses or corns, you can lose your balance and fall. · Talk to your doctor if you have numbness in your feet. Preventing falls at home  · Remove raised doorway thresholds, throw rugs, and clutter. Repair loose carpet or raised areas in the floor. · Move furniture and electrical cords to keep them out of walking paths. · Use nonskid floor wax, and wipe up spills right away, especially on ceramic tile floors. · If you use a walker or cane, put rubber tips on it. If you use crutches, clean the bottoms of them regularly with an abrasive pad, such as steel wool. · Keep your house well lit, especially [place], and outside walkways. Use night-lights in areas such as hallways and bathrooms. Add extra light switches or use remote switches (such as switches that go on or off when you clap your hands) to make it easier to turn lights on if you have to get up during the night. · Install sturdy handrails on stairways. · Move items in your cabinets so that the things you use a lot are on the lower shelves (about waist level).   · Keep a cordless phone and a flashlight with new batteries by your bed. If possible, put a phone in each of the main rooms of your house, or carry a cell phone in case you fall and cannot reach a phone. Or, you can wear a device around your neck or wrist. You push a button that sends a signal for help. · Wear low-heeled shoes that fit well and give your feet good support. Use footwear with nonskid soles. Check the heels and soles of your shoes for wear. Repair or replace worn heels or soles. · Do not wear socks without shoes on wood floors. · Walk on the grass when the sidewalks are slippery. If you live in an area that gets snow and ice in the winter, sprinkle salt on slippery steps and sidewalks. Preventing falls in the bath  · Install grab bars and nonskid mats inside and outside your shower or tub and near the toilet and sinks. · Use shower chairs and bath benches. · Use a hand-held shower head that will allow you to sit while showering. · Get into a tub or shower by putting the weaker leg in first. Get out of a tub or shower with your strong side first.  · Repair loose toilet seats and consider installing a raised toilet seat to make getting on and off the toilet easier. · Keep your bathroom door unlocked while you are in the shower. Where can you learn more? Go to http://helen-edmundo.info/. Enter 0476 [id number] in the search box to learn more about \"Preventing Falls: Care Instructions. \"  Current as of: March 15, 2018  Content Version: 11.9  © 8822-5121 [employer]. Care instructions adapted under license by [employer] (which disclaims liability or warranty for this information). If you have questions about a medical condition or this instruction, always ask your healthcare professional. [place] [address] any warranty or liability for your use of this information.          DISCHARGE SUMMARY from Nurse    PATIENT INSTRUCTIONS:    After general anesthesia or intravenous sedation, for 24 hours or while taking prescription Narcotics:  · Limit your activities  · Do not drive and operate hazardous machinery  · Do not make important personal or business decisions  · Do  not drink alcoholic beverages  · If you have not urinated within 8 hours after discharge, please contact your surgeon on call. Report the following to your surgeon:  · Excessive pain, swelling, redness or odor of or around the surgical area  · Temperature over 100.5  · Nausea and vomiting lasting longer than 4 hours or if unable to take medications  · Any signs of decreased circulation or nerve impairment to extremity: change in color, persistent  numbness, tingling, coldness or increase pain  · Any questions    What to do at Home:  Recommended activity: Activity as tolerated,     If you experience any of the following symptoms see discharge instructions, please follow up with primary care. *  Please give a list of your current medications to your Primary Care Provider. *  Please update this list whenever your medications are discontinued, doses are      changed, or new medications (including over-the-counter products) are added. *  Please carry medication information at all times in case of emergency situations. These are general instructions for a healthy lifestyle:    No smoking/ No tobacco products/ Avoid exposure to second hand smoke  Surgeon General's Warning:  Quitting smoking now greatly reduces serious risk to your health. Obesity, smoking, and sedentary lifestyle greatly increases your risk for illness    A healthy diet, regular physical exercise & weight monitoring are important for maintaining a healthy lifestyle    You may be retaining fluid if you have a history of heart failure or if you experience any of the following symptoms:  Weight gain of 3 pounds or more overnight or 5 pounds in a week, increased swelling in our hands or feet or shortness of breath while lying flat in bed.   Please call your doctor as soon as you notice any of these symptoms; do not wait until your next office visit. The discharge information has been reviewed with the patient. The patient verbalized understanding. Discharge medications reviewed with the patient and appropriate educational materials and side effects teaching were provided.   ___________________________________________________________________________________________________________________________________

## 2019-07-02 NOTE — DISCHARGE SUMMARY
Hospitalist Discharge Summary     Admit Date:  2019  6:35 PM   Name:  Ching Pulido   Age:  67 y.o.  :  1940   MRN:  [id number]   PCP:  Theresa Rivera MD  Treatment Team: Attending Provider: Kia Reynoso MD; Hospitalist: Alvarado Guo NP; Consulting Provider: Roby Castillo MD; Utilization Review: Florecita Albrecht; Care Manager: Reji Corrigan; Physical Therapist: Tabatha Lees; Occupational Therapist: Elizabeth Jarquin OTR/L    Problem List for this Hospitalization:  Hospital Problems as of 2019 Date Reviewed: 2019          Codes Class Noted - Resolved POA    * (Principal) TIA (transient ischemic attack) ICD-10-CM: G45.9  ICD-9-CM: 435.9  2019 - Present Yes        Leg weakness, bilateral (Chronic) ICD-10-CM: R29.898  ICD-9-CM: 729.89  2019 - Present Yes    Overview Signed 2019 10:17 PM by Alvarado Guo NP     WORSE X 1-2 WEEKS. Insulin dependent diabetes mellitus ([facility] ICD-10-CM: E11.9, Z79.4  ICD-9-CM: 250.00, V58.67  2019 - Present Yes    Overview Signed 2019 10:18 PM by Alvarado Guo NP     A1C: : 8.8             Diabetic neuropathy (HCC) ICD-10-CM: E11.40  ICD-9-CM: 250.60, 357.2  2019 - Present Yes        CAD (coronary artery disease) ICD-10-CM: I25.10  ICD-9-CM: 414.00  2019 - Present Yes    Overview Signed 2019 10:23 PM by Alvarado Guo NP     WITH STENT             GERD (gastroesophageal reflux disease) ICD-10-CM: K21.9  ICD-9-CM: 530.81  2019 - Present Yes        Pulmonary HTN ([facility] (Chronic) ICD-10-CM: I27.20  ICD-9-CM: 416.8  2019 - Present Yes        BRODERICK (acute kidney injury) ([address] ICD-10-CM: N17.9  ICD-9-CM: 584.9  2019 - Present Yes        Hx pulmonary embolism (Chronic) ICD-10-CM: A09.751  ICD-9-CM: V12.55  3/10/2018 - Present Yes    Overview Signed 2019 10:28 PM by Alvarado Guo NP     RECURRENT. FACTOR V DEFICIENCY.   WAS ON COUMADIN, DEVELOPED AN ORAL HEMATOMA AFTER PROCEDURE, COUMADIN DISCONTINUED. DEVELOPED ANOTHER PE 1/19, PUT ON ELIQUIS             Lumbar stenosis (Chronic) ICD-10-CM: M48.061  ICD-9-CM: 724.02  2/14/2018 - Present Yes        Lumbar stenosis with neurogenic claudication (Chronic) ICD-10-CM: H83.045  ICD-9-CM: 724.03  12/12/2017 - Present Yes        Pure hypercholesterolemia (Chronic) ICD-10-CM: E78.00  ICD-9-CM: 272.0  Unknown - Present Yes        Essential hypertension (Chronic) ICD-10-CM: I10  ICD-9-CM: 401.9  Unknown - Present Yes                Admission HPI from 6/30/2019:    \"Patient is an obese 67 y.o.  male who presents to ER this pm with complaints of progressive bilateral leg weakness for > 2 years with sudden worsening over the past week or so, now able to shuffle with walker and assist only. Old MRI of lumbar spine from 2017 below with spinal impingement indicated. He has not been referred to Neurosurgery as yet for unclear reasons. He also reports transient bilateral \"mouth drawing \" that lasted about 1.5 hours earlier today. He denies any speech impairment, but does report he was unable to smile for that length of time. Next door neighbor who accompanies patient admits to witnessing same. Spontaneous resolution, is now back to baseline. He has no family locally and lives alone. He also informs me he has not taken his eliquis for 10 days as he couldn't find it. 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 Denies SOB, chest pain, tachycardia or calf pain. Evaluated by Teleneurology in ER with recommendations. \"    Hospital Course:  As noted above pt is a 66 yo male who presented to the ED with progressive satya leg weakness x 2+ yrs with sudden worseing x 1 wk, only able to shuffle with a walker. There is evidence of spinal impingement from MTI in 2017, pt has not seen neurosurgery.   Pt also reports not being able to smile due to \"mouth drawing\", this was witnessed by a neighbor. Pt has been on eliquis but recently he was not able to locate it, thought he was out. SW will provide pt with a drug card. Pt does not seem to grasp the importance of taking his medications every day as prescribed. Pt's symptoms have resolved. He admits to having had falls at home inside his home and out in his garden. He does have a cane and a walker, favors using the cane. Cautioned pt that he could easily break a hip or hit his head with a fall so important to avoid. Pt reports that he has a large garden that he works in daily. Pt encouraged to follow up with his PCP and that he needs to take his meds every day. Pt does not have any family locally, let SW know that he was not interested in relocating to be closer to family. Pt encouraged to have home health follow up but pt refused, states that he \"doesn't believe in that stuff\". Follow up instructions and discharge meds at bottom of this note. Plan was discussed with patient and care. All questions answered. Patient was stable at time of discharge. 10 systems reviewed and negative except as noted in HPI. Diagnostic Imaging/Tests:   Mri Brain Wo Cont    Result Date: 7/1/2019  MRI brain without contrast: 07/01/2019 History: Progressive leg weakness x2 years worsening acutely over the last week. Gait disturbance. . Imaging sequences: Sagittal short TR/short TE, axial short TR/short TE, long TR/long TE, FLAIR, gradient recall, diffusion weighted images and ADC mapping. Coronal FLAIR. Imaging was performed on a 1.5 Antonia magnet. Comparison: None. Correlation is made to the CT scan of the brain 06/30/2019. Findings: The ventricles are mildly prominent in relationship to borderline prominent sulci. There are no extra-axial fluid collections. Normal flow voids are present within all of the major intracranial vessels.   No evidence of intraparenchymal hemorrhage or mass effect is identified. There are no areas of restricted diffusion to suggest an acute or subacute infarction. There is a remote right thalamic lacunar infarction. Few scattered foci of T2 hyperintensity elsewhere within the supratentorial white matter suggest chronic small vessel ischemic change, unremarkable for age. The visualized mastoid air cells and paranasal sinuses are well pneumatized and aerated. Impression: 1. No evidence of acute infarction. 2. Mild ventricular prominence would raise suspicion for either pressure hydrocephalus versus centrally predominant volume loss. 3. Remote right thalamic lacunar infarction. Mri Lumb Spine Wo Cont    Result Date: 7/1/2019  HISTORY: Cord impingement. Progressive bilateral leg weakness, greater than 2 years duration. History of L3-4 laminectomy and discectomy in 2008. EXAM: MRI lumbar spine without contrast TECHNIQUE: Axial and sagittal T1 and T2-weighted sequences are available for review. A sagittal STIR sequence is also available for review. COMPARISON: 10/10/2017 FINDINGS: The patient is status post left laminectomy at L3-4 in the interval since the prior study. Degenerative disc signal present at all included levels with varying degrees of disc height loss. Subarticular reactive endplate change most significant at L3-4. The conus is normal in configuration and signal intensity throughout. Axial imaging demonstrates no abnormal retroperitoneal lesions. L1-L2: There is facet arthropathy without central or neural foraminal narrowing. L2-L3: There is facet arthropathy without central or neural foraminal stenosis. L3-L4: Postsurgical change noted on the left status post left hemilaminectomy. Presumably, this patient has undergone discectomy at this level. Without the benefit of IV contrast, cannot differentiate between scarring and recurrent disc herniation, particularly within the left posterolateral subarticular zone.  There is mild right neural foraminal narrowing. There has been interval improvement in the degree of central stenosis at this level when compared with the prior study. L4-L5: There is a disc bulge with bilateral facet arthropathy. There is no central or neural foraminal narrowing. L5-S1: There is a disc bulge with bilateral facet arthropathy. There is mild right neural foraminal narrowing. IMPRESSION: 1. Interval left hemilaminectomy at L3-4 with presumable discectomy. 2. There is question of scarring versus recurrent disc herniation in the left subarticular zone at L3-4. Postcontrast imaging would be necessary to differentiate between these two possibilities. 3. Multilevel lumbar spondylosis. There is mild right neural foraminal narrowing at L3-4 and at L5-S1. 4. Improvement in the degree of central stenosis at L3-4 when compared with the prior exam.     Ct Head Wo Cont    Result Date: 6/30/2019  Noncontrast head CT Clinical Indication: Acute facial drooping and oral weakness/paresthesias. Technique: Noncontrast axial images were obtained through the brain. All CT scans at this location are performed using dose modulation techniques as appropriate including the following: Automated exposure control, adjustment of the MA and/or kV according to patient's size, or use of iterative reconstruction technique. Comparison: 1/28/2019 Findings: There is no acute intracranial hemorrhage, developing hydrocephalus, intra-axial mass, or mass-effect. Small unchanged right basal ganglia lacunar infarct. Unchanged mild diffuse ventricular prominence. There is no CT evidence of acute large artery territorial infarction or abnormal extra-axial fluid collection. The mastoid air cells and paranasal sinuses are clear where imaged. No displaced skull fractures are present. Impression: No CT evidence of acute intracranial abnormality. Duplex Carotid Bilateral    Result Date: 7/1/2019  TITLE:  Carotid and Vertebral Ultrasound Examination.  INDICATION: TIAs. TECHNIQUE: Grayscale, Color Doppler, and Spectral Doppler Ultrasound interrogation performed. Peak Systolic Velocity, ICA-to-CCA ratio, and End-Diastolic Velocity are recorded. The estimate of stenosis is inferred from velocity measurements cross referenced to published correlations as defined by the Society of Radiologists in [address] [place] [address] Drive Radiology 2003; 229; 340-346. COMPARISON: None. RIGHT NECK:  The peak systolic velocity in the Common Carotid Artery = 84 cm/sec; Internal Carotid Artery = 103 cm/sec. Ratio = 1.2. Antegrade flow in the vertebral artery. LEFT  NECK:  The peak systolic velocity in the Common Carotid Artery = 84 cm/sec; Internal Carotid Artery = 100 cm/sec. Ratio = 1.2. Antegrade flow in the vertebral artery. IMPRESSION:  CORIE:  No significant stenosis. LICA:  No significant stenosis. Duplex Lower Ext Venous Bilat    Result Date: 7/1/2019  HISTORY: Bilateral lower extremity swelling, chronic. History of DVT. Exam: Bilateral lower extremity ultrasound Technique: Realtime grayscale and color Doppler imaging is performed in the longitudinal and transverse planes. COMPARISON: 1/28/2019 FINDINGS: On the right, there is nonocclusive thrombus within the common femoral vein, SFV, and popliteal vein. There is also occlusive thrombus within the peroneal vein. On the left, there is nonocclusive thrombus within the left SFV, and popliteal vein. There is occlusive thrombus within the peroneal vein. IMPRESSIONS: Bilateral DVT as described. Echocardiogram results:  No results found for this visit on 06/30/19.       All Micro Results     None          Labs: Results:       BMP, Mg, Phos Recent Labs     07/01/19  0519 06/30/19  1834    140   K 3.7 4.0   * 107   CO2 28 25   AGAP 6* 8   BUN 22 23   CREA 1.48 1.90*   CA 8.2* 8.6   * 217*   MG 2.0 2.1   PHOS  --  2.9      CBC Recent Labs     07/01/19  0519 06/30/19  1834   WBC 5.0 7.3   RBC 4.81 5.40   HGB 11.0* 12.6*   HCT 36.7* 41.2    256   GRANS  --  74   LYMPH  --  15   EOS  --  2   MONOS  --  9   BASOS  --  1   IG  --  0   ANEU  --  5.4   ABL  --  1.1   CRISTOBAL  --  0.1   ABM  --  0.6   ABB  --  0.1   AIG  --  0.0      LFT Recent Labs     06/30/19  1834   SGOT 11*   ALT 23      TP 6.7   ALB 3.6   GLOB 3.1   AGRAT 1.2      Cardiac Testing Lab Results   Component Value Date/Time    BNP 59 (H) 01/28/2019 03:15 AM     01/28/2019 03:15 AM    Troponin-I, Qt. <0.02 (L) 11/30/2018 10:15 PM    Troponin-I, Qt. <0.02 (L) 01/18/2015 06:14 PM      Coagulation Tests Lab Results   Component Value Date/Time    Prothrombin time 13.4 01/28/2019 03:15 AM    Prothrombin time 14.7 (H) 03/21/2018 07:10 AM    Prothrombin time 15.3 (H) 03/20/2018 07:01 AM    INR 1.0 01/28/2019 03:15 AM    INR 1.2 03/21/2018 07:10 AM    INR 1.3 03/20/2018 07:01 AM    aPTT 30.0 01/29/2019 07:41 AM    aPTT 148.9 (H) 01/28/2019 10:15 AM    aPTT 23.8 11/23/2009 06:30 AM      A1c Lab Results   Component Value Date/Time    Hemoglobin A1c 7.4 (H) 07/01/2019 05:19 AM    Hemoglobin A1c 8.8 (H) 01/30/2019 06:06 AM    Hemoglobin A1c 7.8 (H) 01/24/2018 11:59 AM      Lipid Panel Lab Results   Component Value Date/Time    Cholesterol, total 120 07/01/2019 05:19 AM    HDL Cholesterol 28 (L) 07/01/2019 05:19 AM    LDL, calculated 71.8 07/01/2019 05:19 AM    VLDL, calculated 20.2 07/01/2019 05:19 AM    Triglyceride 101 07/01/2019 05:19 AM    CHOL/HDL Ratio 4.3 07/01/2019 05:19 AM      Thyroid Panel Lab Results   Component Value Date/Time    TSH 1.910 07/02/2019 05:24 AM        Most Recent UA Lab Results   Component Value Date/Time    Color YELLOW 02/15/2018 05:34 PM    Appearance CLEAR 02/15/2018 05:34 PM    Specific gravity 1.030 (H) 02/15/2018 05:34 PM    pH (UA) 5.5 02/15/2018 05:34 PM    Protein NEGATIVE  02/15/2018 05:34 PM    Glucose NEGATIVE  02/15/2018 05:34 PM    Ketone NEGATIVE  02/15/2018 05:34 PM    Bilirubin NEGATIVE  02/15/2018 05:34 PM Blood SMALL (A) 02/15/2018 05:34 PM    Urobilinogen 0.2 02/15/2018 05:34 PM    Nitrites NEGATIVE  02/15/2018 05:34 PM    Leukocyte Esterase NEGATIVE  02/15/2018 05:34 PM        Allergies   Allergen Reactions    Other Food Other (comments)     Immunization History   Administered Date(s) Administered    TB Skin Test (PPD) Intradermal 03/15/2018, 01/28/2019       All Labs from Last 24 Hrs:  Recent Results (from the past 24 hour(s))   GLUCOSE, POC    Collection Time: 07/01/19  4:46 PM   Result Value Ref Range    Glucose (POC) 186 (H) 65 - 100 mg/dL   GLUCOSE, POC    Collection Time: 07/01/19  9:00 PM   Result Value Ref Range    Glucose (POC) 139 (H) 65 - 100 mg/dL   VITAMIN B12    Collection Time: 07/02/19  5:24 AM   Result Value Ref Range    Vitamin B12 935 193 - 986 pg/mL   FOLATE    Collection Time: 07/02/19  5:24 AM   Result Value Ref Range    Folate 13.6 3.1 - 17.5 ng/mL   TSH 3RD GENERATION    Collection Time: 07/02/19  5:24 AM   Result Value Ref Range    TSH 1.910 0.358 - 3.740 uIU/mL   GLUCOSE, POC    Collection Time: 07/02/19  7:09 AM   Result Value Ref Range    Glucose (POC) 101 (H) 65 - 100 mg/dL   GLUCOSE, POC    Collection Time: 07/02/19 11:00 AM   Result Value Ref Range    Glucose (POC) 127 (H) 65 - 100 mg/dL       Discharge Exam:  Patient Vitals for the past 24 hrs:   Temp Pulse Resp BP SpO2   07/02/19 1159 97.5 °F (36.4 °C) (!) 57 20 167/88 98 %   07/02/19 0806 97.7 °F (36.5 °C) (!) 55 18 153/82 93 %   07/02/19 0400 97.6 °F (36.4 °C) (!) 57 18 170/82 93 %   07/02/19 0306  (!) 57      07/02/19 0000 98.4 °F (36.9 °C) 61 18 150/71 93 %   07/01/19 2000 97.6 °F (36.4 °C) (!) 59 18 167/82 93 %   07/01/19 1600 97.7 °F (36.5 °C) 62 18 176/88 93 %     Oxygen Therapy  O2 Sat (%): 98 % (07/02/19 1159)  Pulse via Oximetry: 65 beats per minute (06/30/19 2130)    Intake/Output Summary (Last 24 hours) at 7/2/2019 1350  Last data filed at 7/2/2019 1343  Gross per 24 hour   Intake 240 ml   Output    Net 240 ml Physical exam:  General:    Well nourished. Alert. No distress. Eyes:   Normal sclera. Extraocular movements intact. ENT:  Normocephalic, atraumatic. Moist mucous membranes  CV:   Regular rate and rhythm. No murmur, rub, or gallop. Lungs:  Clear to auscultation bilaterally. No wheezing, rhonchi, or rales. Abdomen: Soft, nontender, nondistended. Bowel sounds normal.   Extremities: Warm and dry. No cyanosis or edema. Neurologic: No focal deficits  Skin:     No rashes or jaundice. Psych:  Normal mood and affect. Discharge Info:   Current Discharge Medication List      START taking these medications    Details   atorvastatin (LIPITOR) 40 mg tablet Take 1 Tab by mouth nightly. Qty: 30 Tab, Refills: 0         CONTINUE these medications which have CHANGED    Details   !! apixaban (ELIQUIS) 5 mg tablet Take 2 Tabs by mouth every twelve (12) hours for 5 days. Qty: 20 Tab, Refills: 0    Comments: Pt on 10mg BID x 7 days (7/1-7/7), will then go back to 5mg BID      !! apixaban (ELIQUIS) 5 mg tablet Take 1 Tab by mouth every twelve (12) hours. Qty: 60 Tab, Refills: 0    Comments: Pt on 10mg BID x 7 days, then go back to 5mg BID       ! ! - Potential duplicate medications found. Please discuss with provider. CONTINUE these medications which have NOT CHANGED    Details   metoprolol tartrate (LOPRESSOR) 50 mg tablet Take 50 mg by mouth daily. LANSOPRAZOLE (PREVACID PO) Take 30 mg by mouth daily. gabapentin (NEURONTIN) 300 mg capsule Take 300 mg by mouth two (2) times a day. insulin detemir (LEVEMIR) 100 unit/mL injection 20 Units by SubCUTAneous route nightly. clopidogrel (PLAVIX) 75 mg tablet Take 75 mg by mouth daily. Last dose mid Jan 2018      magnesium oxide (MAG-OX) 400 mg tablet Take 400 mg by mouth daily.                Disposition: home    Activity: Activity as tolerated  Diet: DIET DIABETIC WITH OPTIONS Consistent Carb 1800kcal; Regular    Follow-up Appointments Procedures    FOLLOW UP VISIT Appointment in: Other (Ernie Hernandez) As instructed     As instructed     Standing Status:   Standing     Number of Occurrences:   1     Order Specific Question:   Appointment in     Answer: Other (Specify)         Follow-up Information     Follow up With Specialties Details Why Contact Info    Vinay Cook MD Cardiology Schedule an appointment as soon as possible for a visit in 1 week For a follow up appointment [address] [place] [address] [address] [place] [address]  [phone]              Case reviewed with supervising physician - Jenni Nick MD    Time spent in patient discharge planning and coordination 35 minutes.     Signed:  FILIBERTO BelloC

## 2019-07-02 NOTE — CONSULTS
[address]   [address] [facility]. [address] FAX: [address] [place] [address]  1940    Chief Complaint   Patient presents with    Facial Droop           HPI   Mr. Azeb Alexander is a 67y.o. year old male who presented with left facial drop and left leg weakness. Underwent a CT scan and MRI showed no acute stroke. Patient underwent a carotid duplex study which showed no evidence any significant disease. She denies any claudication or rest pain symptoms.     Current Facility-Administered Medications   Medication Dose Route Frequency Provider Last Rate Last Dose    apixaban (ELIQUIS) tablet 10 mg  10 mg Oral Q12H CHERRY Gudino   10 mg at 07/02/19 8849    sodium chloride (NS) flush 5-40 mL  5-40 mL IntraVENous Q8H Mechelle Stewart NP   5 mL at 07/02/19 0524    sodium chloride (NS) flush 5-40 mL  5-40 mL IntraVENous PRN Sly Stewart NP        insulin lispro (HUMALOG) injection   SubCUTAneous AC&HS Janette Mcnair NP   Stopped at 07/01/19 2133    ondansetron (ZOFRAN) injection 4 mg  4 mg IntraVENous Q6H PRN Sly Stewart NP        atorvastatin (LIPITOR) tablet 40 mg  40 mg Oral QHS Mechelle Stewart NP   40 mg at 07/01/19 2132    acetaminophen (TYLENOL) tablet 650 mg  650 mg Oral Q4H PRN Sly Stewart NP        docusate sodium (COLACE) capsule 100 mg  100 mg Oral BID PRN Sly Stewart NP        clopidogrel (PLAVIX) tablet 75 mg  75 mg Oral DAILY Mechelle Stewart NP   75 mg at 07/02/19 7442    gabapentin (NEURONTIN) capsule 300 mg  300 mg Oral BID Sly Stewart NP   300 mg at 07/02/19 0823    insulin glargine (LANTUS) injection 20 Units  20 Units SubCUTAneous QHS Mechelle Stewart NP   20 Units at 07/01/19 2132    pantoprazole (PROTONIX) tablet 40 mg  40 mg Oral ACB Mechelle Stewart NP   40 mg at 07/02/19 0523    magnesium oxide (MAG-OX) tablet 400 mg  400 mg Oral DAILY Sly Stewart, NP   400 mg at 07/02/19 2632    metoprolol tartrate (LOPRESSOR) tablet 50 mg  50 mg Oral DAILY LashaunalexeiKike Sunny, NP   50 mg at 07/02/19 0827     Allergies   Allergen Reactions    Other Food Other (comments)     Past Medical History:   Diagnosis Date    Arthritis     BPH (benign prostatic hypertrophy)     bph    CAD (coronary artery disease) 11/2009    3 stents per pt    Claustrophobia     Factor V deficiency ([facility] [address]     takes Plavix, Coumadin and aspirin- held for surgery     Former cigarette smoker     GERD (gastroesophageal reflux disease)     controlled with prevacid- only thing that controls it    H/O heart artery stent     X3    Heart murmur     MVP   LVEF 50%    Hyperlipidemia     Hypertension     Insulin dependent diabetes mellitus ([facility] [address] 6/30/2019    A1C: 1/19: 8.8    Leg weakness, bilateral 6/30/2019    WORSE X 1-2 WEEKS.      Lumbar stenosis 2/14/2018    Neuropathy     Other calculus in bladder     PONV (postoperative nausea and vomiting)     Poor historian     Pulmonary HTN ([facility] [address] 1/30/2019    Pure hypercholesterolemia     no current meds    PVD (peripheral vascular disease) (HCC)     Skin cancer     reomoved from Right side of head    Thromboembolus ([address] 2/2012    3 clots in leg and 1 PE    Unspecified sleep apnea     pt denies- it is on Dr Macey Santa H&P     Family History   Problem Relation Age of Onset    Heart Disease Brother     Cancer Mother         Stomach Cancer    No Known Problems Father     Diabetes Sister     Dementia Brother      Past Surgical History:   Procedure Laterality Date    HX COLONOSCOPY      polyps removed    HX HEART CATHETERIZATION  11/2009    stents x 3    HX OTHER SURGICAL      BLADDER STONE REMOVAL    HX PROSTATECTOMY      TRANSURETHRAL- pt denies    HX UROLOGICAL      CYSTOSCOPY    MI PROSTATE BIOPSY, NEEDLE, SATURATION SAMPLING      AND ULTASOUND     Social History     Tobacco Use    Smoking status: Former Smoker     Packs/day: 1.00     Years: 14.00 Pack years: 14.00     Last attempt to quit: 1970     Years since quittin.5    Smokeless tobacco: Never Used   Substance Use Topics    Alcohol use: No        Review of Systems  Constitutional: Negative for fever and chills. HENT: Negative for congestion and sore throat. Skin: Negative for rash and itching. Eyes: Negative for blurred vision and double vision. Respiratory: Negative for cough and shortness of breath. Cardiovascular: Negative for chest pain, palpitations, claudication and leg swelling. Gastrointestinal: Negative for nausea, vomiting and abdominal pain. Genitourinary: Negative for dysuria, hematuria and flank pain. Musculoskeletal: Negative for joint pain and falls. Neurological: Negative for dizziness, sensory change, focal weakness, loss of consciousness and headaches. PHYSICAL EXAM     Vitals:    19 0000 19 0306 19 0400 19 0806   BP: 150/71  170/82 153/82   BP 1 Location: Left leg  Right arm    BP Patient Position: At rest  At rest    Pulse: 61 (!) 57 (!) 57 (!) 55   Resp: 18  18 18   Temp: 98.4 °F (36.9 °C)  97.6 °F (36.4 °C) 97.7 °F (36.5 °C)   SpO2: 93%  93% 93%   Weight:       Height:            Constitutional: he appears well-developed. No distress. HENT: Hearing intact. Head: Atraumatic. Eyes: Pupils are equal, round, and reactive to light. Neck: Normal range of motion. Cardiovascular: Regular rhythm. Pulmonary/Chest: Effort normal and breath sounds normal. No respiratory distress. Abdominal: Soft. Bowel sounds are normal. he exhibits no distension. There is no tenderness. There is no guarding. No hernia. Musculoskeletal: Normal range of motion. Neurological: He is alert. CN II- XII grossly intact  Skin: Warm and dry.   Vascular: Palpable femoral pulses, palpable brachial pulses      Imaging Results  Carotid duplex study showed no evidence of any carotid disease and ICA or common carotid artery    ASSESSMENT AND PLAN Mr. Teodoro Vasquez is a 67y.o. year old male neurological weakness no evidence any acute stroke no evidence of any carotid disease. This is not surgical we will sign off please call with questions or concerns. Elements of this note have been dictated using speech recognition software. As a result, errors of speech recognition may have occurred.

## 2019-07-02 NOTE — PROGRESS NOTES
Problem: Mobility Impaired (Adult and Pediatric)  Goal: *Acute Goals and Plan of Care (Insert Text)  Description  Discharge Goals:  (1.)Mr. Naz Hamlin will move from supine to sit and sit to supine  with INDEPENDENT within 7 treatment day(s). (2.)Mr. Naz Hamlin will transfer from bed to chair and chair to bed with MODIFIED INDEPENDENCE using the least restrictive device within 7 treatment day(s). (3.)Mr. Naz Hamlin will ambulate with MODIFIED INDEPENDENCE for 250+ feet with the least restrictive device within 7 treatment day(s). ________________________________________________________________________________________________      . Outcome: Progressing Towards Goal      PHYSICAL THERAPY: Initial Assessment and AM 7/2/2019  OBSERVATION:    Payor: Victoria [place] OF [place] MEDICARE / Plan: Naila Montes De [place] OF [place] MEDICARE HMO/PPO / Product Type: Managed Care Medicare /       NAME/AGE/GENDER: Jameel Antony is a 67 y.o. male   PRIMARY DIAGNOSIS: TIA (transient ischemic attack) [G45.9] TIA (transient ischemic attack)   TIA (transient ischemic attack)          ICD-10: Treatment Diagnosis:    Generalized Muscle Weakness (M62.81)  Difficulty in walking, Not elsewhere classified (R26.2)  Repeated Falls (R29.6)  History of falling (Z91.81)   Precaution/Allergies: Other food      ASSESSMENT:     Mr. Naz Hamlin is supine in bed upon contact and agreeable to PT evaluation this morning. Pt is A&O X 4 with reports of 0/10 pain prior to mobility. Pt lives alone in 1 story home with 2 steps to enter. Pt uses SPC for household gait and short community distances. Pt is independent with ADLs, drives, and reports 6 falls in past 6 months. Pt transitioned supine to sit EOB with SBA and performed STS with CGA-SBA. Pt ambulated 100 ft in hallway with SPC and CGA-SBA. Pt ambulates with narrowed MIGUEL and increased trunk sway but no true unsteadiness. Pt returned to room and left sitting EOB with OT now at bedside. Pt left in OT care.  Jameel Antony will benefit from skilled PT (medically necessary) to address decreased strength, decreased balance, decreased functional tolerance affecting participation in basic ADLs and functional tasks. This section established at most recent assessment   PROBLEM LIST (Impairments causing functional limitations):  Decreased Strength  Decreased ADL/Functional Activities  Decreased Transfer Abilities  Decreased Ambulation Ability/Technique  Decreased Balance  Decreased Activity Tolerance  Increased Fatigue  Decreased [place] with Home Exercise Program   INTERVENTIONS PLANNED: (Benefits and precautions of physical therapy have been discussed with the patient.)  Balance Exercise  Bed Mobility  Family Education  Gait Training  Home Exercise Program (HEP)  Neuromuscular Re-education/Strengthening  Therapeutic Activites  Therapeutic Exercise/Strengthening  Transfer Training     TREATMENT PLAN: Frequency/Duration: 3 times a week for duration of hospital stay  Rehabilitation Potential For Stated Goals: Good     REHAB RECOMMENDATIONS (at time of discharge pending progress):    Placement: It is my opinion, based on this patient's performance to date, that Mr. Lucrecia Gonzalez may benefit from [address] after discharge due to the functional deficits listed above that are likely to improve with skilled rehabilitation because he/she has multiple medical issues that affect his/her functional mobility in the community. however pt refusing these services at this time. Equipment:   None at this time              HISTORY:   History of Present Injury/Illness (Reason for Referral):  See H&P below  Patient is an obese 67 y.o.  male who presents to ER this pm with complaints of progressive bilateral leg weakness for > 2 years with sudden worsening over the past week or so, now able to shuffle with walker and assist only. Old MRI of lumbar spine from 2017 below with spinal impingement indicated.   He has not been referred to Neurosurgery as yet for unclear reasons. He also reports transient bilateral \"mouth drawing \" that lasted about 1.5 hours earlier today. He denies any speech impairment, but does report he was unable to smile for that length of time. Next door neighbor who accompanies patient admits to witnessing same. Spontaneous resolution, is now back to baseline. He has no family locally and lives alone. He also informs me he has not taken his eliquis for 10 days as he couldn't find it. 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 Denies SOB, chest pain, tachycardia or calf pain. Evaluated by Teleneurology in ER with recommendations. Past Medical History/Comorbidities:   Mr. Josh Lu  has a past medical history of Arthritis, BPH (benign prostatic hypertrophy), CAD (coronary artery disease) (11/2009), Claustrophobia, Factor V deficiency ([address] Former cigarette smoker, GERD (gastroesophageal reflux disease), H/O heart artery stent, Heart murmur, Hyperlipidemia, Hypertension, Insulin dependent diabetes mellitus ([address] (6/30/2019), Leg weakness, bilateral (6/30/2019), Lumbar stenosis (2/14/2018), Neuropathy, Other calculus in bladder, PONV (postoperative nausea and vomiting), Poor historian, Pulmonary HTN ([address] (1/30/2019), Pure hypercholesterolemia, PVD (peripheral vascular disease) ([address] Skin cancer, Thromboembolus ([address] (2/2012), and Unspecified sleep apnea. Mr. Josh Lu  has a past surgical history that includes hx heart catheterization (11/2009); pr prostate biopsy, needle, saturation sampling; hx prostatectomy; hx other surgical; hx urological; and hx colonoscopy.   Social History/Living Environment:   Home Environment: Private residence  # Steps to Enter: 2  One/Two Story Residence: One story  Living Alone: Yes  Support Systems: Friends \ neighbors  Patient Expects to be Discharged to[employer] residence  Current DME Used/Available at Home: Devon Klinefelter, aleksator, Nelida Inch, straight  Prior Level of Function/Work/Activity:  Use of SPC for gait, indep with ADLs, drives, history of falls   Number of Personal Factors/Comorbidities that affect the Plan of Care: 3+: HIGH COMPLEXITY   EXAMINATION:   Most Recent Physical Functioning:   Gross Assessment:  Strength: Generally decreased, functional  Coordination: Generally decreased, functional               Posture:     Balance:  Sitting: Intact; Without support  Standing: Intact; With support Bed Mobility:     Wheelchair Mobility:     Transfers:  Sit to Stand: Stand-by assistance  Stand to Sit: Stand-by assistance  Gait:     Base of Support: Narrowed  Speed/Nikky: Slow  Step Length: Left shortened;Right shortened  Gait Abnormalities: Decreased step clearance;Shuffling gait  Distance (ft): 100 Feet (ft)  Assistive Device: Cane, straight  Ambulation - Level of Assistance: Contact guard assistance;Stand-by assistance  Interventions: Safety awareness training; Tactile cues; Verbal cues      Body Structures Involved:  Muscles Body Functions Affected:  Cardio  Neuromusculoskeletal  Movement Related Activities and Participation Affected:  General Tasks and Demands  Mobility  Self Care  Domestic Life  Interpersonal Interactions and Relationships  Community, Social and Civic Life   Number of elements that affect the Plan of Care: 4+: HIGH COMPLEXITY   CLINICAL PRESENTATION:   Presentation: Evolving clinical presentation with changing clinical characteristics: MODERATE COMPLEXITY   CLINICAL DECISION MAKIN [address]? ?6 Clicks? Basic Mobility Inpatient Short Form  How much difficulty does the patient currently have. .. Unable A Lot A Little None   1. Turning over in bed (including adjusting bedclothes, sheets and blankets)? ? 1   ? 2   ? 3   ? 4   2. Sitting down on and standing up from a chair with arms ( e.g., wheelchair, bedside commode, etc.)   ?  1   ? 2   ? 3 ? 4   3. Moving from lying on back to sitting on the side of the bed?   ? 1   ? 2   ? 3   ? 4   How much help from another person does the patient currently need. .. Total A Lot A Little None   4. Moving to and from a bed to a chair (including a wheelchair)? ? 1   ? 2   ? 3   ? 4   5. Need to walk in hospital room? ? 1   ? 2   ? 3   ? 4   6. Climbing 3-5 steps with a railing? ? 1   ? 2   ? 3   ? 4   © 2007, Trustees of [address] [place] [address] [address] [id number], under license to [email or web address]. All rights reserved      Score:  Initial: 20 Most Recent: X (Date: -- )    Interpretation of Tool:  Represents activities that are increasingly more difficult (i.e. Bed mobility, Transfers, Gait). Medical Necessity:     Patient is expected to demonstrate progress in strength, balance, coordination and functional technique   to decrease assistance required with gait, transfers, and functional mobility. .  Reason for Services/Other Comments:  Patient continues to require skilled intervention due to decreased strength, decreased balance, decreased functional tolerance affecting participation in basic ADLs and functional tasks. .   Use of outcome tool(s) and clinical judgement create a POC that gives a: Clear prediction of patient's progress: LOW COMPLEXITY            TREATMENT:   (In addition to Assessment/Re-Assessment sessions the following treatments were rendered)   Pre-treatment Symptoms/Complaints: \"Im ready to go home\"  Pain: Initial:   Pain Intensity 1: 0  Post Session:  0/10     Assessment/Reassessment only, no treatment provided today    Braces/Orthotics/Lines/Etc:      Treatment/Session Assessment:    Response to Treatment:  none  Interdisciplinary Collaboration:   Physical Therapist  Occupational Therapist  Registered Nurse  After treatment position/precautions:   Bed/Chair-wheels locked  Bed in low position  Call light within reach  Sitting EOB with OT    Compliance with Program/Exercises:  Will assess as treatment progresses  Recommendations/Intent for next treatment session: \"Next visit will focus on advancements to more challenging activities and reduction in assistance provided\".   Total Treatment Duration:  PT Patient Time In/Time Out  Time In: 0946  Time Out: Andrea Harris 3244

## 2019-07-02 NOTE — PROGRESS NOTES
Problem: Falls - Risk of  Goal: *Absence of Falls  Description  Document Zoraida Jones Fall Risk and appropriate interventions in the flowsheet. Outcome: Progressing Towards Goal     Problem: TIA/CVA Stroke: 0-24 hours  Goal: Diagnostic Test/Procedures  Outcome: Progressing Towards Goal     Problem: Diabetes Self-Management  Goal: *Monitoring blood glucose, interpreting and using results  Description  Identify recommended blood glucose targets  and personal targets.   Outcome: Progressing Towards Goal     Problem: Deep Venous Thrombosis - Risk of  Goal: *Knowledge of prescribed medications  Outcome: Progressing Towards Goal

## 2019-07-02 NOTE — PROGRESS NOTES
Problem: Self Care Deficits Care Plan (Adult)  Goal: *Acute Goals and Plan of Care (Insert Text)  Description  NA. Aren Pruitt is at/near baseline for ADLs. Outcome: Resolved/Met     OCCUPATIONAL THERAPY: Initial Assessment, Discharge and AM 7/2/2019  OBSERVATION:    Payor: Kady Goodwin / Plan: Jelani Saldana OF [place] MEDICARE HMO/PPO / Product Type: Managed Care Medicare /      NAME/AGE/GENDER: Aren Pruitt is a 67 y.o. male   PRIMARY DIAGNOSIS:  TIA (transient ischemic attack) [G45.9] TIA (transient ischemic attack)   TIA (transient ischemic attack)          ICD-10: Treatment Diagnosis:    Generalized Muscle Weakness (M62.81)  Repeated Falls (R29.6)   Precautions/Allergies:    Fall precautions  Other food      ASSESSMENT:     Mr. Randolph Cheung is a 67 y.o. male admitted with BLE weakness, undergoing TIA workup. At baseline pt lives alone and is independent to modified independent with ADLs, ambulation with cane, and driving. Pt endorses several recent falls in the garden due to uneven surface, but reports he does not get hurt so he does not care as long as someone is there to help him up. Upon arrival pt is alert and agreeable to OT evaluation. General UE screen revealed AROM and strength WFL in BUEs, sensation intact to light touch. Pt completes functional transfers with SBA, ambulation with CGA-SBA/cane, and toilet transfer with SBA/cueing for controlled descent. Pt demonstrates ability to bathe/dress feet with effort. Pt reports he is at his baseline for ADLs with no further need for OT, \"I am not good, but I'm exactly the same as I was before I came in here! \" Pt left supine in bed with call bell within reach. Pt is currently functioning at/near baseline for ADLs. Pt educated on home safety strategies. Defer to PT for decreased balance/strength. No indication for skilled OT at this time. Will d/c.        This section established at most recent assessment   PROBLEM LIST (Impairments causing functional limitations):  Decreased Strength  Decreased Transfer Abilities  Decreased Ambulation Ability/Technique  Decreased Balance  Defer to PT   INTERVENTIONS PLANNED: (Benefits and precautions of occupational therapy have been discussed with the patient.)  None      TREATMENT PLAN: Frequency/Duration: NA. Evaluate and discharge from OT services. REHAB RECOMMENDATIONS (at time of discharge pending progress):    Placement: It is my opinion, based on this patient's performance to date, that Mr. Linda Tripathi may benefit from being discharged with NO further skilled therapy due to the high likelihood of returning to baseline and pt reporting he does not want further OT/he is at his baseline for ADLs. Equipment:   None at this time              OCCUPATIONAL PROFILE AND HISTORY:   History of Present Injury/Illness (Reason for Referral):  See H&P. Past Medical History/Comorbidities:   Mr. Linda Tripathi  has a past medical history of Arthritis, BPH (benign prostatic hypertrophy), CAD (coronary artery disease) (11/2009), Claustrophobia, Factor V deficiency ([address] Former cigarette smoker, GERD (gastroesophageal reflux disease), H/O heart artery stent, Heart murmur, Hyperlipidemia, Hypertension, Insulin dependent diabetes mellitus ([address] (6/30/2019), Leg weakness, bilateral (6/30/2019), Lumbar stenosis (2/14/2018), Neuropathy, Other calculus in bladder, PONV (postoperative nausea and vomiting), Poor historian, Pulmonary HTN ([address] (1/30/2019), Pure hypercholesterolemia, PVD (peripheral vascular disease) ([address] Skin cancer, Thromboembolus ([address] (2/2012), and Unspecified sleep apnea. Mr. Linda Tripathi  has a past surgical history that includes hx heart catheterization (11/2009); pr prostate biopsy, needle, saturation sampling; hx prostatectomy; hx other surgical; hx urological; and hx colonoscopy.   Social History/Living Environment:   Home Environment: Private residence  # Steps to Enter: 2  One/Two Story Residence: One story  Living Alone: Yes  Support Systems: Friends \ neighbors  Patient Expects to be Discharged to[de-identified] Private residence  Current DME Used/Available at Home: Walker, rollator, Cane, straight  Tub or Shower Type: Shower  Prior Level of Function/Work/Activity:  At baseline pt lives alone and is independence to modified independence with ADLs, ambulation with cane, and driving. Pt endorses several recent falls in the garden due to uneven surface. Dominant Side:         RIGHT    Personal Factors:          Sex:  male        Age:  67 y.o. Other factors that influence how disability is experienced by the patient:  Falls    Number of Personal Factors/Comorbidities that affect the Plan of Care: Expanded review of therapy/medical records (1-2):  MODERATE COMPLEXITY   ASSESSMENT OF OCCUPATIONAL PERFORMANCE[de-identified]   Activities of Daily Living:   Basic ADLs (From Assessment) Complex ADLs (From Assessment)   Feeding: Independent  Oral Facial Hygiene/Grooming: Independent  Bathing: Supervision  Upper Body Dressing: Independent  Lower Body Dressing: Supervision  Toileting: Supervision     Grooming/Bathing/Dressing Activities of Daily Living     Cognitive Retraining  Safety/Judgement: Awareness of environment; Fall prevention; Insight into deficits                 Functional Transfers  Bathroom Mobility: Stand-by assistance  Toilet Transfer : Stand-by assistance     Bed/Mat Mobility  Supine to Sit: Supervision  Sit to Supine: Supervision  Sit to Stand: Stand-by assistance  Stand to Sit: Stand-by assistance  Scooting: Supervision     Most Recent Physical Functioning:   Gross Assessment:  AROM: Within functional limits(BUEs)  Strength: Within functional limits(BUEs)  Coordination: Within functional limits(BUEs)  Sensation: Intact(BUEs to light touch)               Posture:     Balance:  Sitting: Intact; Without support  Standing: Intact; With support Bed Mobility:  Supine to Sit: Supervision  Sit to Supine: Supervision  Scooting: Supervision  Wheelchair Mobility:     Transfers:  Sit to Stand: Stand-by assistance  Stand to Sit: Stand-by assistance            Patient Vitals for the past 6 hrs:   BP BP Patient Position SpO2 Pulse   19 0806 153/82 -- 93 % (!) 55   19 1159 167/88 At rest 98 % (!) 57       Mental Status  Neurologic State: Alert  Orientation Level: Oriented X4  Cognition: Appropriate decision making, Appropriate for age attention/concentration, Appropriate safety awareness, Follows commands  Perception: Appears intact  Perseveration: No perseveration noted  Safety/Judgement: Awareness of environment, Fall prevention, Insight into deficits                          Physical Skills Involved:  Balance  Strength Cognitive Skills Affected (resulting in the inability to perform in a timely and safe manner):  None  Psychosocial Skills Affected:  Habits/Routines  Environmental Adaptation  Self-Awareness  Awareness of Others  Social Roles   Number of elements that affect the Plan of Care: 5+:  HIGH COMPLEXITY   CLINICAL DECISION MAKIN [facility]  Ne? ?6 Clicks? Daily Activity Inpatient Short Form  How much help from another person does the patient currently need. .. Total A Lot A Little None   1. Putting on and taking off regular lower body clothing? ? 1   ? 2   ? 3   ? 4   2. Bathing (including washing, rinsing, drying)? ? 1   ? 2   ? 3   ? 4   3. Toileting, which includes using toilet, bedpan or urinal?   ? 1   ? 2   ? 3   ? 4   4. Putting on and taking off regular upper body clothing? ? 1   ? 2   ? 3   ? 4   5. Taking care of personal grooming such as brushing teeth? ? 1   ? 2   ? 3   ? 4   6. Eating meals? ? 1   ? 2   ? 3   ? 4   © 2007, Trustees of [address] [place] [address] [address] [address], under license to [employer]. All rights reserved      Score:  Initial: 19 Most Recent: X (Date: -- )    Interpretation of Tool:  Represents activities that are increasingly more difficult (i.e. Bed mobility, Transfers, Gait).     Medical Necessity:     NA.  Reason for Services/Other Comments:  NA.   Use of outcome tool(s) and clinical judgement create a POC that gives a: LOW COMPLEXITY         TREATMENT:   (In addition to Assessment/Re-Assessment sessions the following treatments were rendered)     Pre-treatment Symptoms/Complaints:    Pain: Initial:   Pain Intensity 1: 0  Post Session:  same     Assessment/Reassessment only, no treatment provided today    Braces/Orthotics/Lines/Etc:      Treatment/Session Assessment:    Response to Treatment:  Assessment only   Interdisciplinary Collaboration:   Physical Therapist  Occupational Therapist  Registered Nurse  After treatment position/precautions:   Supine in bed  Bed alarm/tab alert on  Bed/Chair-wheels locked  Bed in low position  Call light within reach   Compliance with Program/Exercises: NA .  Recommendations/Intent for next treatment session: NA. Evaluate and discharge from OT services.    Total Treatment Duration:  OT Patient Time In/Time Out  Time In: 0955  Time Out: 18[address] [facility] OTR/L

## 2019-07-02 NOTE — PROGRESS NOTES
Discharge instructions  all new medications,medication side effects sheet, follow up appointment and  prescriptions reviewed and explained to the patient. Signs and symptoms of stroke  and when to return to hospital reviewed with patient. Verbalizes understanding Stroke education book included in discharge information to go home with patient Patient verbalizes understanding of instructions. A copy of discharge instructions and  have been given to patient. Opportunity for questions provided.       Patient to be discharged when ride arrives

## 2019-07-02 NOTE — PROGRESS NOTES
07/02/19 0714   NIH Stroke Scale   Interval Other (comment)  (Dual neuro w/Roger, ISABELLE)   LOC 0   LOC Questions 0   LOC Commands 0   Best Gaze 0   Visual 0   Facial Palsy 0   Motor Right Arm 0   Motor Left Arm 0   Motor Right Leg 0   Motor Left Leg 0   Limb Ataxia 0   Sensory 0   Best Language 0   Dysarthria 0   Extinction and Inattention 0   Total 0

## 2019-07-11 ENCOUNTER — APPOINTMENT (RX ONLY)
Dept: URBAN - METROPOLITAN AREA CLINIC 23 | Facility: CLINIC | Age: 79
Setting detail: DERMATOLOGY
End: 2019-07-11

## 2019-07-11 DIAGNOSIS — D22 MELANOCYTIC NEVI: ICD-10-CM

## 2019-07-11 DIAGNOSIS — L81.4 OTHER MELANIN HYPERPIGMENTATION: ICD-10-CM

## 2019-07-11 DIAGNOSIS — Z85.828 PERSONAL HISTORY OF OTHER MALIGNANT NEOPLASM OF SKIN: ICD-10-CM

## 2019-07-11 DIAGNOSIS — L82.1 OTHER SEBORRHEIC KERATOSIS: ICD-10-CM

## 2019-07-11 DIAGNOSIS — L57.0 ACTINIC KERATOSIS: ICD-10-CM

## 2019-07-11 PROBLEM — D22.5 MELANOCYTIC NEVI OF TRUNK: Status: ACTIVE | Noted: 2019-07-11

## 2019-07-11 PROCEDURE — 99213 OFFICE O/P EST LOW 20 MIN: CPT | Mod: 25

## 2019-07-11 PROCEDURE — 17000 DESTRUCT PREMALG LESION: CPT

## 2019-07-11 PROCEDURE — ? COUNSELING

## 2019-07-11 PROCEDURE — 17003 DESTRUCT PREMALG LES 2-14: CPT

## 2019-07-11 PROCEDURE — ? LIQUID NITROGEN

## 2019-07-11 ASSESSMENT — LOCATION DETAILED DESCRIPTION DERM
LOCATION DETAILED: RIGHT INFERIOR FOREHEAD
LOCATION DETAILED: RIGHT SUPERIOR HELIX
LOCATION DETAILED: RIGHT SUPERIOR LATERAL FOREHEAD
LOCATION DETAILED: LEFT DISTAL PRETIBIAL REGION
LOCATION DETAILED: RIGHT SUPERIOR LATERAL UPPER BACK
LOCATION DETAILED: LEFT INFERIOR HELIX
LOCATION DETAILED: LEFT MEDIAL INFERIOR CHEST
LOCATION DETAILED: RIGHT SUPERIOR MEDIAL MIDBACK
LOCATION DETAILED: RIGHT LATERAL TEMPLE
LOCATION DETAILED: RIGHT PROXIMAL DORSAL FOREARM
LOCATION DETAILED: LEFT MID-UPPER BACK
LOCATION DETAILED: RIGHT CENTRAL TEMPLE
LOCATION DETAILED: LEFT DORSAL WRIST
LOCATION DETAILED: LEFT VENTRAL MEDIAL DISTAL FOREARM
LOCATION DETAILED: LEFT PROXIMAL DORSAL FOREARM
LOCATION DETAILED: LEFT DISTAL DORSAL FOREARM

## 2019-07-11 ASSESSMENT — LOCATION ZONE DERM
LOCATION ZONE: TRUNK
LOCATION ZONE: EAR
LOCATION ZONE: ARM
LOCATION ZONE: LEG
LOCATION ZONE: FACE

## 2019-07-11 ASSESSMENT — LOCATION SIMPLE DESCRIPTION DERM
LOCATION SIMPLE: LEFT UPPER BACK
LOCATION SIMPLE: LEFT WRIST
LOCATION SIMPLE: RIGHT FOREARM
LOCATION SIMPLE: RIGHT EAR
LOCATION SIMPLE: RIGHT UPPER BACK
LOCATION SIMPLE: LEFT EAR
LOCATION SIMPLE: LEFT PRETIBIAL REGION
LOCATION SIMPLE: CHEST
LOCATION SIMPLE: RIGHT TEMPLE
LOCATION SIMPLE: RIGHT LOWER BACK
LOCATION SIMPLE: LEFT FOREARM
LOCATION SIMPLE: RIGHT FOREHEAD

## 2019-07-11 NOTE — PROCEDURE: LIQUID NITROGEN
Number Of Freeze-Thaw Cycles: 1 freeze-thaw cycle
Render Note In Bullet Format When Appropriate: No
Duration Of Freeze Thaw-Cycle (Seconds): 5
Detail Level: Simple
Post-Care Instructions: I reviewed with the patient in detail post-care instructions. Patient is to wear sunprotection, and avoid picking at any of the treated lesions. Pt may apply Vaseline to crusted or scabbing areas.
Consent: The patient's consent was obtained including but not limited to risks of crusting, scabbing, blistering, scarring, darker or lighter pigmentary change, recurrence, incomplete removal and infection.

## 2019-10-18 NOTE — PROGRESS NOTES
Bedside shift report given to [facility]. Per nurse's notes: \"Reports elevated bp and ringing to right ear. Reports right ear pain with blowing of nose or \"if I swallow too hard\". Onset of pain to right ear last night. States intermittent ringing to right ear over last year. Hx htn however states taken off bp meds. bp at home tonight 200/114. Denies head pain, vision changes, n/v, numbness or tingling or dizziness. Wears ear plugs at work\"    Patient states over the last 3 weeks, her aunt has  and her mother was placed in a nursing home and she is been having to take care of everyone leading to a lot of stress and decreased sleep. Over the last few days she has had a runny nose, and her right ears felt clogged. Patient states the whooshing in her ear is intermittent. Patient used to be on blood pressure medication but her family doctor had taken her off all blood medication. The history is provided by the patient. Ringing in Ear    This is a new problem. The current episode started 6 to 12 hours ago. Episode frequency: Intermittent. The problem has been resolved. Patient complains that the right ear is affected. There has been no fever. The patient is experiencing no pain. Associated symptoms include hearing loss and rhinorrhea. Pertinent negatives include no ear discharge, no headaches, no sore throat, no abdominal pain, no diarrhea, no vomiting, no neck pain, no cough and no rash. Her past medical history does not include chronic ear infection, hearing loss or tympanostomy tube. No past medical history on file. Past Surgical History:   Procedure Laterality Date    HX HYSTERECTOMY      Partial Hysterectomy    HX OTHER SURGICAL      rectal fistula repair         No family history on file.     Social History     Socioeconomic History    Marital status: SINGLE     Spouse name: Not on file    Number of children: Not on file    Years of education: Not on file    Highest education level: Not on file   Occupational History    Not on file   Social Needs    Financial resource strain: Not on file    Food insecurity:     Worry: Not on file     Inability: Not on file    Transportation needs:     Medical: Not on file     Non-medical: Not on file   Tobacco Use    Smoking status: Never Smoker    Smokeless tobacco: Never Used   Substance and Sexual Activity    Alcohol use: Yes     Comment: occasional    Drug use: No    Sexual activity: Never     Birth control/protection: Surgical   Lifestyle    Physical activity:     Days per week: Not on file     Minutes per session: Not on file    Stress: Not on file   Relationships    Social connections:     Talks on phone: Not on file     Gets together: Not on file     Attends Mosque service: Not on file     Active member of club or organization: Not on file     Attends meetings of clubs or organizations: Not on file     Relationship status: Not on file    Intimate partner violence:     Fear of current or ex partner: Not on file     Emotionally abused: Not on file     Physically abused: Not on file     Forced sexual activity: Not on file   Other Topics Concern    Not on file   Social History Narrative    Not on file         ALLERGIES: Fish derived; Seafood [shellfish containing products]; and Chicken derived    Review of Systems   Constitutional: Negative for chills and fever. HENT: Positive for congestion, hearing loss, rhinorrhea and tinnitus. Negative for ear discharge and sore throat. Respiratory: Negative for cough. Gastrointestinal: Negative for abdominal pain, diarrhea and vomiting. Musculoskeletal: Negative for neck pain. Skin: Negative for rash. Neurological: Negative for headaches. Psychiatric/Behavioral: Positive for dysphoric mood and sleep disturbance. The patient is nervous/anxious. All other systems reviewed and are negative.       Vitals:    10/18/19 0050   BP: 192/79   Pulse: 70   Resp: 18   Temp: 97.9 °F (36.6 °C)   SpO2: 98%   Weight: 104.3 kg (230 lb)   Height: 5' 7\" (1.702 m)            Physical Exam   Constitutional: She is oriented to person, place, and time. She appears well-developed and well-nourished. No distress. HENT:   Head: Normocephalic and atraumatic. Right Ear: Hearing and external ear normal. Tympanic membrane is erythematous and bulging. No decreased hearing (moderate cerumen right canal) is noted. Left Ear: Hearing and external ear normal.   Mouth/Throat: Oropharynx is clear and moist.   Eyes: Pupils are equal, round, and reactive to light. Conjunctivae and EOM are normal.   Neck: Normal range of motion. Neck supple. Cardiovascular: Normal rate, regular rhythm, normal heart sounds and intact distal pulses. Pulmonary/Chest: Effort normal and breath sounds normal.   Abdominal: Soft. Bowel sounds are normal. There is no tenderness. Musculoskeletal: Normal range of motion. She exhibits no edema. Neurological: She is alert and oriented to person, place, and time. She has normal strength. No cranial nerve deficit or sensory deficit. Coordination and gait normal.   Skin: Skin is warm and dry. Capillary refill takes less than 2 seconds. Psychiatric: Her speech is normal and behavior is normal. Her mood appears anxious (mild). Cognition and memory are normal. She exhibits a depressed mood (mild). Nursing note and vitals reviewed.        MDM  Number of Diagnoses or Management Options  Acute otitis media, unspecified otitis media type: new and does not require workup  Adjustment disorder, unspecified type: new and does not require workup  Elevated blood pressure reading: new and does not require workup     Amount and/or Complexity of Data Reviewed  Clinical lab tests: ordered and reviewed  Review and summarize past medical records: yes    Risk of Complications, Morbidity, and/or Mortality  Presenting problems: moderate  Diagnostic procedures: minimal  Management options: low    Patient Progress  Patient progress: stable         Procedures

## 2020-01-09 ENCOUNTER — APPOINTMENT (RX ONLY)
Dept: URBAN - METROPOLITAN AREA CLINIC 24 | Facility: CLINIC | Age: 80
Setting detail: DERMATOLOGY
End: 2020-01-09

## 2020-01-09 DIAGNOSIS — D18.0 HEMANGIOMA: ICD-10-CM

## 2020-01-09 DIAGNOSIS — L82.1 OTHER SEBORRHEIC KERATOSIS: ICD-10-CM

## 2020-01-09 DIAGNOSIS — L57.0 ACTINIC KERATOSIS: ICD-10-CM

## 2020-01-09 DIAGNOSIS — Z85.828 PERSONAL HISTORY OF OTHER MALIGNANT NEOPLASM OF SKIN: ICD-10-CM

## 2020-01-09 DIAGNOSIS — L81.4 OTHER MELANIN HYPERPIGMENTATION: ICD-10-CM

## 2020-01-09 DIAGNOSIS — D485 NEOPLASM OF UNCERTAIN BEHAVIOR OF SKIN: ICD-10-CM

## 2020-01-09 DIAGNOSIS — D22 MELANOCYTIC NEVI: ICD-10-CM

## 2020-01-09 DIAGNOSIS — L72.8 OTHER FOLLICULAR CYSTS OF THE SKIN AND SUBCUTANEOUS TISSUE: ICD-10-CM

## 2020-01-09 PROBLEM — D48.5 NEOPLASM OF UNCERTAIN BEHAVIOR OF SKIN: Status: ACTIVE | Noted: 2020-01-09

## 2020-01-09 PROBLEM — D22.9 MELANOCYTIC NEVI, UNSPECIFIED: Status: ACTIVE | Noted: 2020-01-09

## 2020-01-09 PROBLEM — D18.01 HEMANGIOMA OF SKIN AND SUBCUTANEOUS TISSUE: Status: ACTIVE | Noted: 2020-01-09

## 2020-01-09 PROBLEM — D22.5 MELANOCYTIC NEVI OF TRUNK: Status: ACTIVE | Noted: 2020-01-09

## 2020-01-09 PROCEDURE — ? COUNSELING

## 2020-01-09 PROCEDURE — 11102 TANGNTL BX SKIN SINGLE LES: CPT

## 2020-01-09 PROCEDURE — ? LIQUID NITROGEN

## 2020-01-09 PROCEDURE — 17003 DESTRUCT PREMALG LES 2-14: CPT

## 2020-01-09 PROCEDURE — 11103 TANGNTL BX SKIN EA SEP/ADDL: CPT

## 2020-01-09 PROCEDURE — ? DEFER

## 2020-01-09 PROCEDURE — 17000 DESTRUCT PREMALG LESION: CPT | Mod: 59

## 2020-01-09 PROCEDURE — ? BIOPSY BY SHAVE METHOD

## 2020-01-09 PROCEDURE — 99213 OFFICE O/P EST LOW 20 MIN: CPT | Mod: 25

## 2020-01-09 ASSESSMENT — LOCATION DETAILED DESCRIPTION DERM
LOCATION DETAILED: RIGHT ELBOW
LOCATION DETAILED: LEFT SUPERIOR CENTRAL MALAR CHEEK
LOCATION DETAILED: LEFT MEDIAL INFERIOR CHEST
LOCATION DETAILED: RIGHT INFERIOR UPPER BACK
LOCATION DETAILED: RIGHT INFERIOR FOREHEAD
LOCATION DETAILED: RIGHT DISTAL RADIAL DORSAL FOREARM
LOCATION DETAILED: RIGHT TRAGUS
LOCATION DETAILED: LEFT RADIAL DORSAL HAND
LOCATION DETAILED: LEFT PROXIMAL DORSAL FOREARM
LOCATION DETAILED: LEFT DISTAL DORSAL FOREARM
LOCATION DETAILED: LEFT CLAVICULAR NECK
LOCATION DETAILED: RIGHT RADIAL DORSAL HAND
LOCATION DETAILED: RIGHT SUPERIOR MEDIAL MIDBACK
LOCATION DETAILED: LEFT MID-UPPER BACK
LOCATION DETAILED: LEFT VENTRAL MEDIAL DISTAL FOREARM
LOCATION DETAILED: EPIGASTRIC SKIN
LOCATION DETAILED: RIGHT PROXIMAL DORSAL FOREARM
LOCATION DETAILED: RIGHT ULNAR DORSAL HAND
LOCATION DETAILED: RIGHT SUPERIOR LATERAL UPPER BACK
LOCATION DETAILED: RIGHT LATERAL TEMPLE
LOCATION DETAILED: LEFT DISTAL PRETIBIAL REGION

## 2020-01-09 ASSESSMENT — LOCATION SIMPLE DESCRIPTION DERM
LOCATION SIMPLE: CHEST
LOCATION SIMPLE: RIGHT FOREARM
LOCATION SIMPLE: LEFT PRETIBIAL REGION
LOCATION SIMPLE: RIGHT UPPER BACK
LOCATION SIMPLE: RIGHT TEMPLE
LOCATION SIMPLE: RIGHT LOWER BACK
LOCATION SIMPLE: RIGHT FOREHEAD
LOCATION SIMPLE: RIGHT HAND
LOCATION SIMPLE: LEFT CHEEK
LOCATION SIMPLE: ABDOMEN
LOCATION SIMPLE: LEFT FOREARM
LOCATION SIMPLE: LEFT HAND
LOCATION SIMPLE: LEFT UPPER BACK
LOCATION SIMPLE: RIGHT ELBOW
LOCATION SIMPLE: LEFT ANTERIOR NECK
LOCATION SIMPLE: RIGHT EAR

## 2020-01-09 ASSESSMENT — LOCATION ZONE DERM
LOCATION ZONE: LEG
LOCATION ZONE: HAND
LOCATION ZONE: EAR
LOCATION ZONE: TRUNK
LOCATION ZONE: ARM
LOCATION ZONE: FACE
LOCATION ZONE: NECK

## 2020-01-09 NOTE — PROCEDURE: LIQUID NITROGEN
Render Note In Bullet Format When Appropriate: No
Number Of Freeze-Thaw Cycles: 1 freeze-thaw cycle
Post-Care Instructions: I reviewed with the patient in detail post-care instructions. Patient is to wear sunprotection, and avoid picking at any of the treated lesions. Pt may apply Vaseline to crusted or scabbing areas.
Detail Level: Simple
Duration Of Freeze Thaw-Cycle (Seconds): 4
Consent: The patient's consent was obtained including but not limited to risks of crusting, scabbing, blistering, scarring, darker or lighter pigmentary change, recurrence, incomplete removal and infection.

## 2020-01-09 NOTE — PROCEDURE: BIOPSY BY SHAVE METHOD
Biopsy Method: Dermablade
Was A Bandage Applied: Yes
Billing Type: Third-Party Bill
Hide Anesthesia Volume?: No
Dressing: bandage
Cryotherapy Text: The wound bed was treated with cryotherapy after the biopsy was performed.
Electrodesiccation And Curettage Text: The wound bed was treated with electrodesiccation and curettage after the biopsy was performed.
Wound Care: Petrolatum
Electrodesiccation Text: The wound bed was treated with electrodesiccation after the biopsy was performed.
Depth Of Biopsy: dermis
Accession #: PC
Biopsy Type: H and E
Type Of Destruction Used: Curettage
Curettage Text: The wound bed was treated with curettage after the biopsy was performed.
Anesthesia Volume In Cc: 0.5
Anesthesia Type: 1% lidocaine with 1:100,000 epinephrine and a 1:6 solution of 8.4% sodium bicarbonate
Detail Level: Detailed
Hemostasis: Aluminum Chloride
Additional Anesthesia Volume In Cc (Will Not Render If 0): 0
Silver Nitrate Text: The wound bed was treated with silver nitrate after the biopsy was performed.

## 2020-01-09 NOTE — PROCEDURE: DEFER
Procedure To Be Performed At Next Visit: Excision
Scheduling Instructions (Optional): 15 min treatment
Detail Level: Detailed
Introduction Text (Please End With A Colon): The following procedure was deferred:

## 2020-01-28 ENCOUNTER — APPOINTMENT (RX ONLY)
Dept: URBAN - METROPOLITAN AREA CLINIC 23 | Facility: CLINIC | Age: 80
Setting detail: DERMATOLOGY
End: 2020-01-28

## 2020-01-28 DIAGNOSIS — L72.8 OTHER FOLLICULAR CYSTS OF THE SKIN AND SUBCUTANEOUS TISSUE: ICD-10-CM

## 2020-01-28 DIAGNOSIS — L57.0 ACTINIC KERATOSIS: ICD-10-CM

## 2020-01-28 PROBLEM — D04.61 CARCINOMA IN SITU OF SKIN OF RIGHT UPPER LIMB, INCLUDING SHOULDER: Status: ACTIVE | Noted: 2020-01-28

## 2020-01-28 PROCEDURE — ? CURETTAGE AND DESTRUCTION

## 2020-01-28 PROCEDURE — 11440 EXC FACE-MM B9+MARG 0.5 CM/<: CPT

## 2020-01-28 PROCEDURE — 17261 DSTRJ MAL LES T/A/L .6-1.0CM: CPT

## 2020-01-28 PROCEDURE — 17000 DESTRUCT PREMALG LESION: CPT | Mod: 59

## 2020-01-28 PROCEDURE — ? PUNCH EXCISION

## 2020-01-28 PROCEDURE — ? LIQUID NITROGEN

## 2020-01-28 ASSESSMENT — LOCATION DETAILED DESCRIPTION DERM
LOCATION DETAILED: LEFT SUPERIOR CENTRAL MALAR CHEEK
LOCATION DETAILED: RIGHT MEDIAL MALAR CHEEK

## 2020-01-28 ASSESSMENT — LOCATION ZONE DERM: LOCATION ZONE: FACE

## 2020-01-28 ASSESSMENT — LOCATION SIMPLE DESCRIPTION DERM
LOCATION SIMPLE: RIGHT CHEEK
LOCATION SIMPLE: LEFT CHEEK

## 2020-01-28 NOTE — PROCEDURE: CURETTAGE AND DESTRUCTION
Add Ability To Document Additional Intralesional Injection: No
Number Of Curettages: 3
Bill As A Line Item Or As Units: Line Item
Biopsy Photograph Reviewed: Yes
What Was Performed First?: Curettage
Anesthesia Type: 1% lidocaine with epinephrine
Consent was obtained from the patient. The risks, benefits and alternatives to therapy were discussed in detail. Specifically, the risks of infection, scarring, bleeding, prolonged wound healing, nerve injury, incomplete removal, allergy to anesthesia and recurrence were addressed. Alternatives to ED&C, such as: surgical removal and XRT were also discussed.  Prior to the procedure, the treatment site was clearly identified and confirmed by the patient. All components of Universal Protocol/PAUSE Rule completed.
Additional Information: (Optional): The wound was cleaned, and a pressure dressing was applied.  The patient received detailed post-op instructions.
Size Of Lesion In Cm: 0.8
Detail Level: Detailed
Cautery Type: electrodesiccation
Total Volume (Ccs): 1
Post-Care Instructions: I reviewed with the patient in detail post-care instructions. Patient is to keep the area dry for 48 hours, and not to engage in any swimming until the area is healed. Should the patient develop any fevers, chills, bleeding, severe pain patient will contact the office immediately.

## 2020-01-28 NOTE — PROCEDURE: PUNCH EXCISION
Use Complex Repair Preambles?: Yes
Consent was obtained from the patient. The risks and benefits to therapy were discussed in detail. Specifically, the risks of infection, scarring, bleeding, prolonged wound healing, incomplete removal, allergy to anesthesia, nerve injury and recurrence were addressed. Prior to the procedure, the treatment site was clearly identified and confirmed by the patient. All components of Universal Protocol/PAUSE Rule completed.
Helical Rim Text: The closure involved the helical rim.
7 Mm Punch Excision Text: A 7 mm punch biopsy was used to make an initial incision over the lesion.  After this overlying column of skin was removed, blunt dissection was used to free the lesion from the surrounding tissues and the lesion was extirpated through the surgical opening made by the punch biopsy.
4 Mm Punch Excision Text: A 4 mm punch biopsy was used to make an initial incision over the lesion.  After this overlying column of skin was removed, blunt dissection was used to free the lesion from the surrounding tissues and the lesion was extirpated through the surgical opening made by the punch biopsy.
Anesthesia Type: 1% lidocaine without epinephrine
Additional Anesthesia Volume In Cc: 3
2.5 Mm Punch Excision Text: A 2.5 mm punch biopsy was used to make an initial incision over the lesion.  After this overlying column of skin was removed, blunt dissection was used to free the lesion from the surrounding tissues and the lesion was extirpated through the surgical opening made by the punch biopsy.
6 Mm Punch Excision Text: A 6 mm punch biopsy was used to make an initial incision over the lesion.  After this overlying column of skin was removed, blunt dissection was used to free the lesion from the surrounding tissues and the lesion was extirpated through the surgical opening made by the punch biopsy.
Excision Method: 5 mm Punch
3 Mm Punch Excision Text: A 3 mm punch biopsy was used to make an initial incision over the lesion.  After this overlying column of skin was removed, blunt dissection was used to free the lesion from the surrounding tissues and the lesion was extirpated through the surgical opening made by the punch biopsy.
Anesthesia Type: 1% lidocaine with epinephrine
10 Mm Punch Excision Text: A 10 mm punch biopsy was used to make an initial incision over the lesion.  After this overlying column of skin was removed, blunt dissection was used to free the lesion from the surrounding tissues and the lesion was extirpated through the surgical opening made by the punch biopsy.
Nostril Rim Text: The closure involved the nostril rim.
4.5 Mm Punch Excision Text: A 4.5 mm punch biopsy was used to make an initial incision over the lesion.  After this overlying column of skin was removed, blunt dissection was used to free the lesion from the surrounding tissues and the lesion was extirpated through the surgical opening made by the punch biopsy.
Suture Removal: 7 days
Accession #: [id number]
Post-Care Instructions: I reviewed with the patient in detail post-care instructions. Patient is not to engage in any heavy lifting, exercise, or swimming for the next 14 days. Should the patient develop any fevers, chills, bleeding, severe pain patient will contact the office immediately.
Path Notes (To The Dermatopathologist): Please check margins.
Complex Repair Preamble Text (Leave Blank If You Do Not Want): Extensive wide undermining was performed.
Size Of Margin In Cm: 0
Complex Requirements: Extensive Undermining Performed?: No
Intermediate Repair Preamble Text (Leave Blank If You Do Not Want): Undermining was performed with blunt dissection.
Retention Suture Text: Retention sutures were placed to support the closure and prevent dehiscence.
Repair Type: None (Simple)
Epidermal Closure: simple interrupted
Debridement Text: The wound edges were debrided prior to proceeding with the closure to facilitate wound healing.
Estimated Blood Loss (Cc): minimal
Medical Necessity Clause: The excision was medically necessary because the lesion which was excised was painful
Medical Necessity Clause: This procedure was medically necessary because the lesion that was treated was:
12 Mm Punch Excision Text: A 12 mm punch biopsy was used to make an initial incision over the lesion.  After this overlying column of skin was removed, blunt dissection was used to free the lesion from the surrounding tissues and the lesion was extirpated through the surgical opening made by the punch biopsy.
Billing Type: Third-Party Bill
1.5 Mm Punch Excision Text: A 1.5 mm punch biopsy was used to make an initial incision over the lesion.  After this overlying column of skin was removed, blunt dissection was used to free the lesion from the surrounding tissues and the lesion was extirpated through the surgical opening made by the punch biopsy.
Epidermal Sutures: 5-0 Prolene
Excision Depth: adipose tissue
Purse String (Intermediate) Text: Given the location of the defect and the characteristics of the surrounding skin a pursestring intermediate closure was deemed most appropriate.  Undermining was performed circumfirentially around the surgical defect.  A purstring suture was then placed and tightened.
Undermining Type: Entire Wound
3.5 Mm Punch Excision Text: A 3.5 mm punch biopsy was used to make an initial incision over the lesion.  After this overlying column of skin was removed, blunt dissection was used to free the lesion from the surrounding tissues and the lesion was extirpated through the surgical opening made by the punch biopsy.
5 Mm Punch Excision Text: A 5 mm punch biopsy was used to make an initial incision over the lesion.  After this overlying column of skin was removed, blunt dissection was used to free the lesion from the surrounding tissues and the lesion was extirpated through the surgical opening made by the punch biopsy.
Wound Care: Bacitracin
8 Mm Punch Excision Text: A 8 mm punch biopsy was used to make an initial incision over the lesion.  After this overlying column of skin was removed, blunt dissection was used to free the lesion from the surrounding tissues and the lesion was extirpated through the surgical opening made by the punch biopsy.
Hemostasis: Electrocautery
2 Mm Punch Excision Text: A 2 mm punch biopsy was used to make an initial incision over the lesion.  After this overlying column of skin was removed, blunt dissection was used to free the lesion from the surrounding tissues and the lesion was extirpated through the surgical opening made by the punch biopsy.
Size Of Lesion In Cm: 0.5
Dressing: dry sterile dressing
Detail Level: Detailed
Vermilion Border Text: The closure involved the vermilion border.

## 2020-01-28 NOTE — PROCEDURE: LIQUID NITROGEN
Render Post-Care Instructions In Note?: no
Consent: The patient's consent was obtained including but not limited to risks of crusting, scabbing, blistering, scarring, darker or lighter pigmentary change, recurrence, incomplete removal and infection.
Number Of Freeze-Thaw Cycles: 1 freeze-thaw cycle
Duration Of Freeze Thaw-Cycle (Seconds): 4
Post-Care Instructions: I reviewed with the patient in detail post-care instructions. Patient is to wear sunprotection, and avoid picking at any of the treated lesions. Pt may apply Vaseline to crusted or scabbing areas.
Detail Level: Simple

## 2020-02-06 ENCOUNTER — APPOINTMENT (RX ONLY)
Dept: URBAN - METROPOLITAN AREA CLINIC 23 | Facility: CLINIC | Age: 80
Setting detail: DERMATOLOGY
End: 2020-02-06

## 2020-02-06 DIAGNOSIS — Z48.01 ENCOUNTER FOR CHANGE OR REMOVAL OF SURGICAL WOUND DRESSING: ICD-10-CM

## 2020-02-06 PROCEDURE — ? SUTURE REMOVAL (GLOBAL PERIOD)

## 2020-02-06 PROCEDURE — 99024 POSTOP FOLLOW-UP VISIT: CPT

## 2020-02-06 ASSESSMENT — LOCATION DETAILED DESCRIPTION DERM: LOCATION DETAILED: LEFT SUPERIOR CENTRAL MALAR CHEEK

## 2020-02-06 ASSESSMENT — LOCATION SIMPLE DESCRIPTION DERM: LOCATION SIMPLE: LEFT CHEEK

## 2020-02-06 ASSESSMENT — LOCATION ZONE DERM: LOCATION ZONE: FACE

## 2020-02-06 NOTE — PROCEDURE: SUTURE REMOVAL (GLOBAL PERIOD)
Detail Level: Detailed
Add 08005 Cpt? (Important Note: In 2017 The Use Of 14101 Is Being Tracked By Cms To Determine Future Global Period Reimbursement For Global Periods): yes

## 2020-07-16 ENCOUNTER — APPOINTMENT (RX ONLY)
Dept: URBAN - METROPOLITAN AREA CLINIC 23 | Facility: CLINIC | Age: 80
Setting detail: DERMATOLOGY
End: 2020-07-16

## 2020-07-16 DIAGNOSIS — L82.1 OTHER SEBORRHEIC KERATOSIS: ICD-10-CM

## 2020-07-16 DIAGNOSIS — Z85.828 PERSONAL HISTORY OF OTHER MALIGNANT NEOPLASM OF SKIN: ICD-10-CM

## 2020-07-16 DIAGNOSIS — L81.4 OTHER MELANIN HYPERPIGMENTATION: ICD-10-CM

## 2020-07-16 DIAGNOSIS — D22 MELANOCYTIC NEVI: ICD-10-CM

## 2020-07-16 DIAGNOSIS — D18.0 HEMANGIOMA: ICD-10-CM

## 2020-07-16 DIAGNOSIS — L57.0 ACTINIC KERATOSIS: ICD-10-CM

## 2020-07-16 PROBLEM — D18.01 HEMANGIOMA OF SKIN AND SUBCUTANEOUS TISSUE: Status: ACTIVE | Noted: 2020-07-16

## 2020-07-16 PROBLEM — D22.5 MELANOCYTIC NEVI OF TRUNK: Status: ACTIVE | Noted: 2020-07-16

## 2020-07-16 PROCEDURE — 17004 DESTROY PREMAL LESIONS 15/>: CPT

## 2020-07-16 PROCEDURE — ? COUNSELING

## 2020-07-16 PROCEDURE — ? LIQUID NITROGEN

## 2020-07-16 PROCEDURE — 99213 OFFICE O/P EST LOW 20 MIN: CPT | Mod: 25

## 2020-07-16 ASSESSMENT — LOCATION SIMPLE DESCRIPTION DERM
LOCATION SIMPLE: RIGHT TEMPLE
LOCATION SIMPLE: RIGHT LOWER BACK
LOCATION SIMPLE: RIGHT UPPER BACK
LOCATION SIMPLE: LEFT UPPER BACK
LOCATION SIMPLE: LEFT ANTERIOR NECK
LOCATION SIMPLE: RIGHT HAND
LOCATION SIMPLE: RIGHT UPPER ARM
LOCATION SIMPLE: LEFT PRETIBIAL REGION
LOCATION SIMPLE: RIGHT FOREHEAD
LOCATION SIMPLE: CHEST
LOCATION SIMPLE: LEFT HAND
LOCATION SIMPLE: ABDOMEN
LOCATION SIMPLE: RIGHT FOREARM
LOCATION SIMPLE: RIGHT EAR
LOCATION SIMPLE: LEFT FOREARM
LOCATION SIMPLE: SCALP
LOCATION SIMPLE: RIGHT CHEEK
LOCATION SIMPLE: RIGHT ELBOW

## 2020-07-16 ASSESSMENT — LOCATION DETAILED DESCRIPTION DERM
LOCATION DETAILED: RIGHT SUPERIOR MEDIAL MIDBACK
LOCATION DETAILED: LEFT CLAVICULAR NECK
LOCATION DETAILED: RIGHT CENTRAL POSTAURICULAR SKIN
LOCATION DETAILED: LEFT MID-UPPER BACK
LOCATION DETAILED: RIGHT LATERAL TEMPLE
LOCATION DETAILED: RIGHT SUPERIOR CRUS OF ANTIHELIX
LOCATION DETAILED: RIGHT SUPERIOR CENTRAL BUCCAL CHEEK
LOCATION DETAILED: RIGHT SUPERIOR LATERAL UPPER BACK
LOCATION DETAILED: RIGHT INFERIOR UPPER BACK
LOCATION DETAILED: RIGHT INFERIOR FOREHEAD
LOCATION DETAILED: RIGHT DISTAL POSTERIOR UPPER ARM
LOCATION DETAILED: RIGHT ANTECUBITAL SKIN
LOCATION DETAILED: LEFT PROXIMAL DORSAL FOREARM
LOCATION DETAILED: LEFT DISTAL PRETIBIAL REGION
LOCATION DETAILED: EPIGASTRIC SKIN
LOCATION DETAILED: LEFT MEDIAL INFERIOR CHEST
LOCATION DETAILED: LEFT DISTAL DORSAL FOREARM
LOCATION DETAILED: RIGHT LATERAL ELBOW
LOCATION DETAILED: LEFT RADIAL DORSAL HAND
LOCATION DETAILED: RIGHT ULNAR DORSAL HAND
LOCATION DETAILED: LEFT VENTRAL MEDIAL DISTAL FOREARM
LOCATION DETAILED: RIGHT DISTAL DORSAL FOREARM
LOCATION DETAILED: RIGHT DISTAL RADIAL DORSAL FOREARM
LOCATION DETAILED: RIGHT RADIAL DORSAL HAND

## 2020-07-16 ASSESSMENT — LOCATION ZONE DERM
LOCATION ZONE: ARM
LOCATION ZONE: FACE
LOCATION ZONE: SCALP
LOCATION ZONE: NECK
LOCATION ZONE: TRUNK
LOCATION ZONE: EAR
LOCATION ZONE: LEG
LOCATION ZONE: HAND

## 2020-07-16 NOTE — PROCEDURE: MIPS QUALITY
Detail Level: Detailed
Quality 110: Preventive Care And Screening: Influenza Immunization: Influenza Immunization not Administered because Patient Refused.
Quality 130: Documentation Of Current Medications In The Medical Record: Current Medications Documented
Quality 111:Pneumonia Vaccination Status For Older Adults: Pneumococcal Vaccination not Administered or Previously Received, Reason not Otherwise Specified

## 2020-07-16 NOTE — PROCEDURE: LIQUID NITROGEN
Detail Level: Simple
Render Note In Bullet Format When Appropriate: No
Consent: The patient's consent was obtained including but not limited to risks of crusting, scabbing, blistering, scarring, darker or lighter pigmentary change, recurrence, incomplete removal and infection.
Duration Of Freeze Thaw-Cycle (Seconds): 4
Post-Care Instructions: I reviewed with the patient in detail post-care instructions. Patient is to wear sunprotection, and avoid picking at any of the treated lesions. Pt may apply Vaseline to crusted or scabbing areas.
Number Of Freeze-Thaw Cycles: 2 freeze-thaw cycles

## 2020-07-28 ENCOUNTER — APPOINTMENT (RX ONLY)
Dept: URBAN - METROPOLITAN AREA CLINIC 23 | Facility: CLINIC | Age: 80
Setting detail: DERMATOLOGY
End: 2020-07-28

## 2020-07-28 PROBLEM — D04.61 CARCINOMA IN SITU OF SKIN OF RIGHT UPPER LIMB, INCLUDING SHOULDER: Status: ACTIVE | Noted: 2020-07-28

## 2020-07-28 PROCEDURE — 17271 DSTR MAL LES S/N/H/F/G 0.6-1: CPT

## 2020-07-28 PROCEDURE — ? BIOPSY BY SHAVE METHOD AND DESTRUCTION

## 2020-07-28 NOTE — PROCEDURE: BIOPSY BY SHAVE METHOD AND DESTRUCTION
Bill As?: Biopsy by Shave Method
Hemostasis: Electrodesiccation
Render Path Notes In Note?: No
Size Of Lesion After Curettage: 0.6
Anesthesia Volume In Cc: 1.5
Detail Level: Detailed
Wound Care: Petrolatum
Consent: Written consent was obtained and risks were reviewed including but not limited to scarring, infection, bleeding, scabbing, incomplete removal, nerve damage and allergy to anesthesia.
Anesthesia Type: 1% lidocaine with epinephrine
Notification Instructions: Patient will be notified of biopsy results. However, patient instructed to call the office if not contacted within 2 weeks.
Post-Care Instructions: I reviewed with the patient in detail post-care instructions. Patient is to keep the biopsy site dry overnight, and then apply bacitracin twice daily until healed. Patient may apply hydrogen peroxide soaks to remove any crusting.
Accession #: [id number]
Number Of Curettages: 3
Biopsy Type: H and E
Billing Type: Third-Party Bill

## 2021-11-29 NOTE — PROGRESS NOTES
Progress Note Patient: Олег Hummel MRN: [id number]  SSN: [id number] YOB: 1940  Age: 69 y.o. Sex: male Admit Date: 1/28/2019 LOS: 0 days Subjective:  
 
From admission note :  
 
\" Patient is a 69years old male with pmhx of multiple PE/DVT (recently taken off coumadin), HTN, GERD, BPH,, CKD-2, neuropathy, dyslipidemia, DM-2 presented to ER after having a near syncopal event. Pt reported that he fell and stayed on floor for about an hour and then crawled to his car. He lives by himself. He reports feeling weak and c/o difficulty in breathing. He denies head trauma, chest pain, palpitations, nausea/vomiting, headache, blurry vision, weakness/numbness/tingling in extremities, abdominal pain, diarrhea, urinary symptoms. In ER, CTA chest showed bilateral PE, R>L, CT head was unremarkable. Pt was on coumadin until recently, he was taken off it in view of easy bruising. Pt has been started on heparin drip in ER. \"  
 
Patient is seen by hematology in consultation today. He is on heparin IV drip now and Apixaban is started. He is feeling better. No shortness of breath. No chest pain. No fever. No shaking. No chills. Objective:  
 
Vitals:  
 01/28/19 0557 01/28/19 0753 01/28/19 1120 01/28/19 1219 BP: 143/89  148/80 Pulse: 90  73 Resp: 20  18 Temp: 97.5 °F (36.4 °C)  97.5 °F (36.4 °C) SpO2: 96% 96% 97% 98% Weight:      
Height:      
  
 
Intake and Output: 
Current Shift: No intake/output data recorded. Last three shifts: 01/26 1901 - 01/28 0700 In: -  
Out: 599 [PQHBO:235] Physical Exam:  
 
General:                    The patient is an elderly male in no acute distress. He is lying in bed comfortably. Head:                                   Normocephalic/atraumatic. Eyes:                                   No palpebral pallor or scleral icterus.  
ENT:                                    External auricular and nasal exam within normal limits. Mucous membranes are moist. 
Neck:                                   Supple, non-tender, no JVD. Lungs:                       Clear to auscultation bilaterally without wheezes or crackles. No respiratory distress or accessory muscle use. Heart:                                  Regular rate and rhythm, without murmurs, rubs, or gallops. Abdomen:                  Soft, non-tender, mildly distended with normoactive bowel sounds. Genitourinary:           No tenderness over the bladder or bilateral CVAs. Extremities:               Without clubbing, cyanosis, or edema. Skin:                                    Normal color, texture, and turgor. No rashes, lesions, or jaundice. Pulses:                      Radial and dorsalis pedis pulses present 2+ bilaterally. Capillary refill <2s. Neurologic:                CN II-XII grossly intact and symmetrical.  
                                            Moving all four extremities well with no focal deficits. Psychiatric:                Pleasant demeanor, appropriate affect. Alert and oriented x 3 Lab/Data Review: 
 
Recent Results (from the past 24 hour(s)) EKG, 12 LEAD, INITIAL Collection Time: 01/28/19  3:08 AM  
Result Value Ref Range Ventricular Rate 96 BPM  
 Atrial Rate 96 BPM  
 P-R Interval 182 ms QRS Duration 154 ms Q-T Interval 392 ms QTC Calculation (Bezet) 495 ms Calculated P Axis 44 degrees Calculated R Axis -17 degrees Calculated T Axis 10 degrees Diagnosis Normal sinus rhythm Right bundle branch block Abnormal ECG When compared with ECG of 30-NOV-2018 22:08, 
Borderline criteria for Lateral infarct are no longer Present Confirmed by Claudina Bernheim MD (), Nidia Wolfe ([id number]) on 1/28/2019 8:50:41 AM 
  
CBC WITH AUTOMATED DIFF  Collection Time: 01/28/19  3:15 AM  
 OB Progress Note:  PPD#1    S: 20yo  PPD#1 s/p  c/b gestational hypertension. Patient feels well. Pain is well controlled. She is tolerating a regular diet. She is voiding spontaneously, and ambulating without difficulty. Denies CP/SOB. Denies lightheadedness/dizziness. Denies N/V.    O:  Vitals:  Vital Signs Last 24 Hrs  T(C): 37 (2021 05:17), Max: 37.4 (2021 20:38)  T(F): 98.6 (2021 05:17), Max: 99.3 (2021 20:38)  HR: 99 (2021 05:17) (65 - 137)  BP: 125/82 (2021 05:17) (121/75 - 174/83)  BP(mean): --  RR: 18 (2021 05:17) (17 - 18)  SpO2: 99% (2021 05:17) (90% - 100%)    MEDICATIONS  (STANDING):  acetaminophen     Tablet .. 975 milliGRAM(s) Oral <User Schedule>  diphtheria/tetanus/pertussis (acellular) Vaccine (ADAcel) 0.5 milliLiter(s) IntraMuscular once  ibuprofen  Tablet. 600 milliGRAM(s) Oral every 6 hours  ketorolac   Injectable 30 milliGRAM(s) IV Push once  oxytocin Infusion 333.333 milliUNIT(s)/Min (1000 mL/Hr) IV Continuous <Continuous>  oxytocin Infusion 333.333 milliUNIT(s)/Min (1000 mL/Hr) IV Continuous <Continuous>  oxytocin Injectable 10 Unit(s) IntraMuscular once  prenatal multivitamin 1 Tablet(s) Oral daily  sodium chloride 0.9% lock flush 3 milliLiter(s) IV Push every 8 hours  triamcinolone 0.1% Ointment 1 Application(s) Topical every 12 hours      Labs:  Blood type: O Positive  Rubella IgG: RPR: Negative                          11.8   7.51 >-----------< 166    (  @ 01:01 )             35.2    - @ 01:01      137  |  102  |  4<L>  ----------------------------<  79  3.5   |  20<L>  |  0.63        Ca    9.0      2021 01:01    TPro  6.7  /  Alb  3.8  /  TBili  0.4  /  DBili  x   /  AST  22  /  ALT  11  /  AlkPhos  202<H>  21 @ 01:01          Physical Exam:  General: NAD  Abdomen: soft, non-tender, non-distended, fundus firm  Extremities: No erythema/edema   Result Value Ref Range WBC 9.0 4.3 - 11.1 K/uL  
 RBC 5.96 (H) 4.23 - 5.6 M/uL  
 HGB 13.9 13.6 - 17.2 g/dL HCT 47.5 41.1 - 50.3 % MCV 79.7 79.6 - 97.8 FL  
 MCH 23.3 (L) 26.1 - 32.9 PG  
 MCHC 29.3 (L) 31.4 - 35.0 g/dL  
 RDW 15.3 (H) 11.9 - 14.6 % PLATELET 207 379 - 866 K/uL MPV 9.1 (L) 9.4 - 12.3 FL ABSOLUTE NRBC 0.00 0.0 - 0.2 K/uL  
 DF AUTOMATED NEUTROPHILS 67 43 - 78 % LYMPHOCYTES 11 (L) 13 - 44 % MONOCYTES 9 4.0 - 12.0 % EOSINOPHILS 11 (H) 0.5 - 7.8 % BASOPHILS 1 0.0 - 2.0 % IMMATURE GRANULOCYTES 0 0.0 - 5.0 %  
 ABS. NEUTROPHILS 6.0 1.7 - 8.2 K/UL  
 ABS. LYMPHOCYTES 1.0 0.5 - 4.6 K/UL  
 ABS. MONOCYTES 0.8 0.1 - 1.3 K/UL  
 ABS. EOSINOPHILS 1.0 (H) 0.0 - 0.8 K/UL  
 ABS. BASOPHILS 0.1 0.0 - 0.2 K/UL  
 ABS. IMM. GRANS. 0.0 0.0 - 0.5 K/UL METABOLIC PANEL, COMPREHENSIVE Collection Time: 01/28/19  3:15 AM  
Result Value Ref Range Sodium 138 136 - 145 mmol/L Potassium 3.7 3.5 - 5.1 mmol/L Chloride 102 98 - 107 mmol/L  
 CO2 27 21 - 32 mmol/L Anion gap 9 7 - 16 mmol/L Glucose 277 (H) 65 - 100 mg/dL BUN 14 8 - 23 MG/DL Creatinine 1.63 (H) 0.8 - 1.5 MG/DL  
 GFR est AA 53 (L) >60 ml/min/1.73m2 GFR est non-AA 44 (L) >60 ml/min/1.73m2 Calcium 8.7 8.3 - 10.4 MG/DL Bilirubin, total 0.4 0.2 - 1.1 MG/DL  
 ALT (SGPT) 24 12 - 65 U/L  
 AST (SGOT) 14 (L) 15 - 37 U/L Alk. phosphatase 149 (H) 50 - 136 U/L Protein, total 7.5 6.3 - 8.2 g/dL Albumin 3.7 3.2 - 4.6 g/dL Globulin 3.8 (H) 2.3 - 3.5 g/dL A-G Ratio 1.0 (L) 1.2 - 3.5 BNP Collection Time: 01/28/19  3:15 AM  
Result Value Ref Range BNP 59 (H) 0 pg/mL PROTHROMBIN TIME + INR Collection Time: 01/28/19  3:15 AM  
Result Value Ref Range Prothrombin time 13.4 11.7 - 14.5 sec INR 1.0 CK Collection Time: 01/28/19  3:15 AM  
Result Value Ref Range  21 - 215 U/L  
POC TROPONIN-I Collection Time: 01/28/19  3:18 AM  
Result Value Ref Range Troponin-I (POC) 0.04 0.02 - 0.05 ng/ml POC LACTIC ACID Collection Time: 01/28/19  3:37 AM  
Result Value Ref Range Lactic Acid (POC) 2.10 (H) 0.5 - 1.9 mmol/L  
POC G3 Collection Time: 01/28/19  5:19 AM  
Result Value Ref Range Device: NASAL CANNULA pH (POC) 7.390 7.35 - 7.45    
 pCO2 (POC) 37.8 35 - 45 MMHG  
 pO2 (POC) 70 (L) 75 - 100 MMHG  
 HCO3 (POC) 22.9 22 - 26 MMOL/L  
 sO2 (POC) 94 (L) 95 - 98 % Base deficit (POC) 2 mmol/L Allens test (POC) YES Site RIGHT RADIAL Patient temp. 98.6 Specimen type (POC) ARTERIAL Performed by Dl   
 CO2, POC 24 MMOL/L Flow rate (POC) 2.000 L/min COLLECT TIME 518 GLUCOSE, POC Collection Time: 01/28/19  9:44 AM  
Result Value Ref Range Glucose (POC) 302 (H) 65 - 100 mg/dL FACTOR V LEIDEN MUTATION Collection Time: 01/28/19 10:15 AM  
Result Value Ref Range Factor V Leiden Mutation PENDING % ANTITHROMBIN ACTIVITY Collection Time: 01/28/19 10:15 AM  
Result Value Ref Range Antithrombin Activity PENDING % ANTITHROMBIN ANTIGEN Collection Time: 01/28/19 10:15 AM  
Result Value Ref Range Antithrombin Antigen PENDING % PTT Collection Time: 01/28/19 10:15 AM  
Result Value Ref Range aPTT 148.9 (H) 24.7 - 39.8 SEC  
LACTIC ACID Collection Time: 01/28/19 10:15 AM  
Result Value Ref Range Lactic acid 1.6 0.4 - 2.0 MMOL/L  
GLUCOSE, POC Collection Time: 01/28/19 11:19 AM  
Result Value Ref Range Glucose (POC) 227 (H) 65 - 100 mg/dL CT scan chest  
1- IMPRESSION: 
  
Bilateral lower lobe pulmonary emboli. 
  
Coronary artery disease. 
  
Moderate-sized hiatal hernia.  
  
No evidence of right ventricular strain. 
  
CT head 1- IMPRESSION: 
  
No acute intracranial abnormalities. XR chest  
1- IMPRESSION: Normal chest. 
 
Duplex legs 1- FINDINGS: There is partial thrombosis of the right superficial femoral and popliteal veins. Right common femoral vein is patent. 
  
There is complete thrombosis of the left superficial femoral and popliteal veins. Left common femoral vein is patent. ECG 
1- Normal sinus rhythm Right bundle branch block Abnormal ECG When compared with ECG of 30-NOV-2018 22:08,  
Borderline criteria for Lateral infarct are no longer Present Echo 
1- SUMMARY: 
 
-  Left ventricle: Systolic function was normal. Ejection fraction was 
estimated in the range of 55 % to 60 %. There were no regional wall motion abnormalities. There was mild concentric hypertrophy. The E/e' ratio was 9.25. Diastolic function was indeterminate. -  Right ventricle: The ventricle was mildly dilated. Systolic function was mildly to moderately reduced. There was severe pulmonary artery hypertension. 
 
-  Right atrium: The atrium was mildly dilated. -  Tricuspid valve: There was moderate regurgitation. Current Facility-Administered Medications:  
  heparin 25,000 units in dextrose 500 mL infusion, 18-36 Units/kg/hr, IntraVENous, TITRATE, Sofia Pitts NP, Last Rate: 31.4 mL/hr at 01/28/19 1359, 16 Units/kg/hr at 01/28/19 1359   clopidogrel (PLAVIX) tablet 75 mg, 75 mg, Oral, DAILY, Mayra Villasenor MD, 75 mg at 01/28/19 0932 
  magnesium oxide (MAG-OX) tablet 400 mg, 400 mg, Oral, DAILY, Mayra Villasenor MD, 400 mg at 01/28/19 2473   metoprolol tartrate (LOPRESSOR) tablet 50 mg, 50 mg, Oral, DAILY, Mayra Villasenor MD, 50 mg at 01/28/19 9351   sodium chloride (NS) flush 5-40 mL, 5-40 mL, IntraVENous, Q8H, Mayra Villasenor MD, 10 mL at 01/28/19 0900 
  sodium chloride (NS) flush 5-40 mL, 5-40 mL, IntraVENous, PRN, Mayra Villasenor MD 
  tuberculin injection 5 Units, 5 Units, IntraDERMal, Gaviota East MD, 5 Units at 01/28/19 2580   acetaminophen (TYLENOL) tablet 650 mg, 650 mg, Oral, Q6H PRN, Mayra Villasenor MD 
   oxyCODONE-acetaminophen (PERCOCET) 5-325 mg per tablet 1 Tab, 1 Tab, Oral, Q6H PRN, Cheng Negrete MD 
  morphine injection 1 mg, 1 mg, IntraVENous, Q4H PRN, Cheng Negrete MD 
  naloxone [facility]) injection 0.4 mg, 0.4 mg, IntraVENous, PRN, Cheng Negrete MD 
  ondansetron [facility]) injection 4 mg, 4 mg, IntraVENous, Q4H PRN, Cheng Negrete MD 
  senna-docusate (PERICOLACE) 8.6-50 mg per tablet 1 Tab, 1 Tab, Oral, DAILY, Cheng Negrete MD, 1 Tab at 01/28/19 0932 
  insulin lispro (HUMALOG) injection, , SubCUTAneous, AC&HS, Cheng Negrete MD, 6 Units at 01/28/19 1130   tamsulosin (FLOMAX) capsule 0.4 mg, 0.4 mg, Oral, DAILY, Cheng Negrete MD, 0.4 mg at 01/28/19 0932 
  insulin glargine (LANTUS) injection 20 Units, 20 Units, SubCUTAneous, QHS, Laverne Mccall MD 
  lactated Ringers infusion, 100 mL/hr, IntraVENous, CONTINUOUS, Cheng Negrete MD 
  apixaban (ELIQUIS) tablet 10 mg, 10 mg, Oral, Q12H **FOLLOWED BY** [START ON 2/4/2019] apixaban (ELIQUIS) tablet 5 mg, 5 mg, Oral, Q12H, Fidel Dumont NP 
 
  
 
Assessment:  
 
Principal Problem: 
  Acute pulmonary embolism ([facility] [address] (1/28/2019) Active Problems: 
  Bladder stone (11/27/2012) Pure hypercholesterolemia () Essential hypertension () Lumbar stenosis (2/14/2018) Type II diabetes mellitus ([facility] [address] (3/11/2018) Near syncope (1/28/2019) Plan:  
 
Acute pulmonary embolism On IV heparin Apixaban is ordered. Will start Apixaban tonight at 9 AM and stop IV heparin at that time. Hypertension Monitor blood pressure and manage accordingly. Continue home medications. Diabetes mellitus type 2 Monitor blood sugar. Cover with insulin sliding scale accordingly. BRODERICK Monitor renal function and intake and output. Avoid nephrotoxic agents. Check BMP tomorrow. Hyperlipidemia Continue home medications. I have discussed the plan of care with patient. Disposition plan : likely can discharge tomorrow. Signed By: Nathan Lo MD   
 January 28, 2019

## 2022-01-25 NOTE — PROGRESS NOTES
Patient is discharging to home today. No continued therapy needs after discharge. Patient's Eloquis dosage has changed, and  provided him with a coupon for free 30 day supply of Eloquis to assist with medication costs. Patient has been getting Eloquis free samples from his PCP prior to admission. No other needs identified for social or discharge assistance. Care Management Interventions  PCP Verified by CM: Yes  Mode of Transport at Discharge: Other (see comment)  Transition of Care Consult (CM Consult): Discharge Planning  Discharge Durable Medical Equipment: No  Physical Therapy Consult: Yes  Occupational Therapy Consult: Yes  Speech Therapy Consult: No  Current Support Network: Own Home, Lives Alone  Confirm Follow Up Transport: Family  Plan discussed with Pt/Family/Caregiver: Yes(Spoke with patient)  Freedom of Choice Offered: Yes  Discharge Location  Discharge Placement: Home with home health(Home.  Patient declining STR, HH, and OP therapies. ) None known

## 2022-04-21 NOTE — PROGRESS NOTES
Ventilator check complete; patient has a #7.5 ET tube secured at the 23 at the lip. Patient is sedated. Patient is not able to follow commands. Breath sounds are clear. Trachea is midline, Negative for subcutaneous air, and chest excursion is symmetric. Patient is also Negative for cyanosis. All alarms are set and audible. Resuscitation bag is at the head of the bed.       Ventilator Settings  Mode FIO2 Rate Tidal Volume Pressure PEEP I:E Ratio   PRVC  36 %    500 ml     8 cm H20  1:3.13      Peak airway pressure: 19 cm H2O   Minute ventilation: 4.3 l/min     ABG:   Recent Labs      03/10/18   2230   PH  7.36   PCO2  44   PO2  128*   HCO3  24         Concha Benitez, RT none

## 2022-06-30 NOTE — PROGRESS NOTES
Brief visit with patient to encourage  He was calm    Chuck Moya, staff Felice antony 81, [address] [facility]  /   [email or web address] To get better and follow your care plan as instructed.

## 2024-02-14 NOTE — PROGRESS NOTES
Problem: Falls - Risk of  Goal: *Absence of Falls  Document Jonathon Fall Risk and appropriate interventions in the flowsheet.    Outcome: Progressing Towards Goal  Fall Risk Interventions:       Mentation Interventions: Bed/chair exit alarm    Medication Interventions: Bed/chair exit alarm    Elimination Interventions: Call light in reach 14-Feb-2024 22:30

## 2025-03-04 NOTE — PROGRESS NOTES
During discharge instructions patient not very well educated on insulin and how to self administer insulin. MD aware of concerns. Per MD have diabetes educator come and see patient. MD also aware patient will need scripts for insulin needles. Message left for diabetes educator. Will follow. Universal Safety Interventions

## (undated) DEVICE — INTENDED FOR TISSUE SEPARATION, AND OTHER PROCEDURES THAT REQUIRE A SHARP SURGICAL BLADE TO PUNCTURE OR CUT.: Brand: BARD-PARKER SAFETY BLADES SIZE 15, STERILE

## (undated) DEVICE — SINGLE USE BIOPSY VALVE MAJ-210: Brand: SINGLE USE BIOPSY VALVE (STERILE)

## (undated) DEVICE — PACK PROCEDURE SURG POST LAMINECTOMY CDS

## (undated) DEVICE — DRAPE XR C ARM 41X74IN LF --

## (undated) DEVICE — SUTURE VCRL + SZ 3-0 L18IN ABSRB UD SH 1/2 CIR TAPERCUT NDL VCP864D

## (undated) DEVICE — GOWN,PREVENTION PLUS,2XL,ST,22/CS: Brand: MEDLINE

## (undated) DEVICE — Device

## (undated) DEVICE — AGENT HEMSTAT W3XL4IN OXIDIZED REGENERATED CELOS ABSRB FOR

## (undated) DEVICE — SKIN CLOS DERMABND PRINEO 60CM -- DERMABOUND PRINEO

## (undated) DEVICE — KENDALL RADIOLUCENT FOAM MONITORING ELECTRODE RECTANGULAR SHAPE: Brand: KENDALL

## (undated) DEVICE — BLADE ASSEMB CLP HAIR FINE --

## (undated) DEVICE — SINGLE USE SUCTION VALVE MAJ-209: Brand: SINGLE USE SUCTION VALVE (STERILE)

## (undated) DEVICE — REM POLYHESIVE ADULT PATIENT RETURN ELECTRODE: Brand: VALLEYLAB

## (undated) DEVICE — 3M™ TEGADERM™ TRANSPARENT FILM DRESSING FRAME STYLE, 1628, 6 IN X 8 IN (15 CM X 20 CM), 10/CT 8CT/CASE: Brand: 3M™ TEGADERM™

## (undated) DEVICE — SYR 10ML LUER LOK 1/5ML GRAD --

## (undated) DEVICE — GOWN,REINF,POLY,ECL,PP SLV,XL: Brand: MEDLINE

## (undated) DEVICE — (D)PREP SKN CHLRAPRP APPL 26ML -- CONVERT TO ITEM 371833

## (undated) DEVICE — SOLUTION IV 1000ML 0.9% SOD CHL

## (undated) DEVICE — 1010 S-DRAPE TOWEL DRAPE 10/BX: Brand: STERI-DRAPE™

## (undated) DEVICE — SUTURE VCRL VIO BR 0 18IN C/R M04 J701D

## (undated) DEVICE — STANDARD HYPODERMIC NEEDLE,POLYPROPYLENE HUB: Brand: MONOJECT

## (undated) DEVICE — DRSG AQUACEL SURG 3.5X6IN -- CONVERT TO ITEM 369227

## (undated) DEVICE — 2000CC GUARDIAN II: Brand: GUARDIAN

## (undated) DEVICE — KENDALL SCD EXPRESS SLEEVES, KNEE LENGTH, MEDIUM: Brand: KENDALL SCD

## (undated) DEVICE — WAX SURG 2.5GM HEMSTAT BNE BEESWAX PARAFFIN ISO PALMITATE

## (undated) DEVICE — BRONCHOSCOPY PACK: Brand: MEDLINE INDUSTRIES, INC.

## (undated) DEVICE — BIPOLAR SEALER 23-113-1 AQM 2.3 OM NEURO: Brand: AQUAMANTYS ®